# Patient Record
Sex: MALE | Race: BLACK OR AFRICAN AMERICAN | NOT HISPANIC OR LATINO | URBAN - METROPOLITAN AREA
[De-identification: names, ages, dates, MRNs, and addresses within clinical notes are randomized per-mention and may not be internally consistent; named-entity substitution may affect disease eponyms.]

---

## 2018-01-31 ENCOUNTER — EMERGENCY (EMERGENCY)
Facility: HOSPITAL | Age: 48
LOS: 0 days | Discharge: HOME | End: 2018-01-31
Attending: EMERGENCY MEDICINE | Admitting: EMERGENCY MEDICINE
Payer: SELF-PAY

## 2018-01-31 VITALS
RESPIRATION RATE: 18 BRPM | TEMPERATURE: 98 F | OXYGEN SATURATION: 100 % | HEART RATE: 73 BPM | DIASTOLIC BLOOD PRESSURE: 89 MMHG | SYSTOLIC BLOOD PRESSURE: 144 MMHG

## 2018-01-31 VITALS
HEART RATE: 88 BPM | TEMPERATURE: 98 F | RESPIRATION RATE: 16 BRPM | SYSTOLIC BLOOD PRESSURE: 144 MMHG | HEIGHT: 67 IN | WEIGHT: 160.06 LBS | OXYGEN SATURATION: 100 % | DIASTOLIC BLOOD PRESSURE: 102 MMHG

## 2018-01-31 PROCEDURE — 99285 EMERGENCY DEPT VISIT HI MDM: CPT
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
## 2018-01-31 NOTE — ED PROVIDER NOTE - MEDICAL DECISION MAKING DETAILS
48 yo male, diabetic s/p angry outburst at shelter, no active medical or psychiatric complaints. wants to return to [facility]
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
## 2018-01-31 NOTE — ED ADULT TRIAGE NOTE - CHIEF COMPLAINT QUOTE
Got angry at [facility] and kicked some glass. Denies any complaints. Brought in by [employer] because patient is diabetic and needs to be seen in ED. BGM 71. Patient appears in no distress. Calm and cooperative at this time.

## 2018-02-01 DIAGNOSIS — Z87.891 PERSONAL HISTORY OF NICOTINE DEPENDENCE: ICD-10-CM

## 2018-02-01 DIAGNOSIS — R45.4 IRRITABILITY AND ANGER: ICD-10-CM

## 2018-02-01 DIAGNOSIS — R45.1 RESTLESSNESS AND AGITATION: ICD-10-CM

## 2018-08-24 PROBLEM — Z00.00 ENCOUNTER FOR PREVENTIVE HEALTH EXAMINATION: Status: ACTIVE | Noted: 2018-08-24

## 2018-09-20 ENCOUNTER — APPOINTMENT (OUTPATIENT)
Dept: ENDOCRINOLOGY | Facility: CLINIC | Age: 48
End: 2018-09-20

## 2018-10-24 ENCOUNTER — APPOINTMENT (OUTPATIENT)
Dept: ENDOCRINOLOGY | Facility: CLINIC | Age: 48
End: 2018-10-24

## 2019-11-21 ENCOUNTER — TRANSCRIPTION ENCOUNTER (OUTPATIENT)
Age: 49
End: 2019-11-21

## 2021-03-07 ENCOUNTER — EMERGENCY (EMERGENCY)
Facility: HOSPITAL | Age: 51
LOS: 1 days | Discharge: HOME | End: 2021-03-07
Attending: EMERGENCY MEDICINE
Payer: MEDICAID

## 2021-03-07 VITALS
HEART RATE: 85 BPM | SYSTOLIC BLOOD PRESSURE: 147 MMHG | RESPIRATION RATE: 18 BRPM | OXYGEN SATURATION: 97 % | TEMPERATURE: 98 F | DIASTOLIC BLOOD PRESSURE: 100 MMHG

## 2021-03-07 DIAGNOSIS — F29 UNSPECIFIED PSYCHOSIS NOT DUE TO A SUBSTANCE OR KNOWN PHYSIOLOGICAL CONDITION: ICD-10-CM

## 2021-03-07 LAB
ALBUMIN SERPL ELPH-MCNC: 4.2 G/DL — SIGNIFICANT CHANGE UP (ref 3.3–5.2)
ALP SERPL-CCNC: 70 U/L — SIGNIFICANT CHANGE UP (ref 40–120)
ALT FLD-CCNC: 23 U/L — SIGNIFICANT CHANGE UP
ANION GAP SERPL CALC-SCNC: 12 MMOL/L — SIGNIFICANT CHANGE UP (ref 5–17)
APAP SERPL-MCNC: <3 UG/ML — LOW (ref 10–26)
AST SERPL-CCNC: 27 U/L — SIGNIFICANT CHANGE UP
BASOPHILS # BLD AUTO: 0.03 K/UL — SIGNIFICANT CHANGE UP (ref 0–0.2)
BASOPHILS NFR BLD AUTO: 0.4 % — SIGNIFICANT CHANGE UP (ref 0–2)
BILIRUB SERPL-MCNC: 0.2 MG/DL — LOW (ref 0.4–2)
BUN SERPL-MCNC: 11 MG/DL — SIGNIFICANT CHANGE UP (ref 8–20)
CALCIUM SERPL-MCNC: 8.8 MG/DL — SIGNIFICANT CHANGE UP (ref 8.6–10.2)
CHLORIDE SERPL-SCNC: 99 MMOL/L — SIGNIFICANT CHANGE UP (ref 98–107)
CO2 SERPL-SCNC: 24 MMOL/L — SIGNIFICANT CHANGE UP (ref 22–29)
CREAT SERPL-MCNC: 0.7 MG/DL — SIGNIFICANT CHANGE UP (ref 0.5–1.3)
EOSINOPHIL # BLD AUTO: 0.16 K/UL — SIGNIFICANT CHANGE UP (ref 0–0.5)
EOSINOPHIL NFR BLD AUTO: 2.2 % — SIGNIFICANT CHANGE UP (ref 0–6)
ETHANOL SERPL-MCNC: <10 MG/DL — SIGNIFICANT CHANGE UP (ref 0–9)
GLUCOSE BLDC GLUCOMTR-MCNC: 286 MG/DL — HIGH (ref 70–99)
GLUCOSE SERPL-MCNC: 156 MG/DL — HIGH (ref 70–99)
HCT VFR BLD CALC: 48.9 % — SIGNIFICANT CHANGE UP (ref 39–50)
HGB BLD-MCNC: 16.1 G/DL — SIGNIFICANT CHANGE UP (ref 13–17)
IMM GRANULOCYTES NFR BLD AUTO: 0.1 % — SIGNIFICANT CHANGE UP (ref 0–1.5)
LYMPHOCYTES # BLD AUTO: 2.22 K/UL — SIGNIFICANT CHANGE UP (ref 1–3.3)
LYMPHOCYTES # BLD AUTO: 30 % — SIGNIFICANT CHANGE UP (ref 13–44)
MCHC RBC-ENTMCNC: 30.4 PG — SIGNIFICANT CHANGE UP (ref 27–34)
MCHC RBC-ENTMCNC: 32.9 GM/DL — SIGNIFICANT CHANGE UP (ref 32–36)
MCV RBC AUTO: 92.4 FL — SIGNIFICANT CHANGE UP (ref 80–100)
MONOCYTES # BLD AUTO: 0.96 K/UL — HIGH (ref 0–0.9)
MONOCYTES NFR BLD AUTO: 13 % — SIGNIFICANT CHANGE UP (ref 2–14)
NEUTROPHILS # BLD AUTO: 4.01 K/UL — SIGNIFICANT CHANGE UP (ref 1.8–7.4)
NEUTROPHILS NFR BLD AUTO: 54.3 % — SIGNIFICANT CHANGE UP (ref 43–77)
PLATELET # BLD AUTO: 278 K/UL — SIGNIFICANT CHANGE UP (ref 150–400)
POTASSIUM SERPL-MCNC: 4.3 MMOL/L — SIGNIFICANT CHANGE UP (ref 3.5–5.3)
POTASSIUM SERPL-SCNC: 4.3 MMOL/L — SIGNIFICANT CHANGE UP (ref 3.5–5.3)
PROT SERPL-MCNC: 7.3 G/DL — SIGNIFICANT CHANGE UP (ref 6.6–8.7)
RBC # BLD: 5.29 M/UL — SIGNIFICANT CHANGE UP (ref 4.2–5.8)
RBC # FLD: 12.2 % — SIGNIFICANT CHANGE UP (ref 10.3–14.5)
SALICYLATES SERPL-MCNC: <0.6 MG/DL — LOW (ref 10–20)
SARS-COV-2 RNA SPEC QL NAA+PROBE: SIGNIFICANT CHANGE UP
SODIUM SERPL-SCNC: 135 MMOL/L — SIGNIFICANT CHANGE UP (ref 135–145)
WBC # BLD: 7.39 K/UL — SIGNIFICANT CHANGE UP (ref 3.8–10.5)
WBC # FLD AUTO: 7.39 K/UL — SIGNIFICANT CHANGE UP (ref 3.8–10.5)

## 2021-03-07 PROCEDURE — 99220: CPT

## 2021-03-07 PROCEDURE — 90792 PSYCH DIAG EVAL W/MED SRVCS: CPT

## 2021-03-07 PROCEDURE — 93010 ELECTROCARDIOGRAM REPORT: CPT

## 2021-03-07 RX ORDER — TRAZODONE HCL 50 MG
200 TABLET ORAL AT BEDTIME
Refills: 0 | Status: DISCONTINUED | OUTPATIENT
Start: 2021-03-07 | End: 2021-03-11

## 2021-03-07 RX ORDER — OLANZAPINE 15 MG/1
5 TABLET, FILM COATED ORAL
Refills: 0 | Status: DISCONTINUED | OUTPATIENT
Start: 2021-03-07 | End: 2021-03-11

## 2021-03-07 RX ORDER — OLANZAPINE 15 MG/1
10 TABLET, FILM COATED ORAL ONCE
Refills: 0 | Status: DISCONTINUED | OUTPATIENT
Start: 2021-03-07 | End: 2021-03-11

## 2021-03-07 RX ORDER — TRAZODONE HCL 50 MG
200 TABLET ORAL ONCE
Refills: 0 | Status: COMPLETED | OUTPATIENT
Start: 2021-03-07 | End: 2021-03-07

## 2021-03-07 RX ORDER — METFORMIN HYDROCHLORIDE 850 MG/1
500 TABLET ORAL
Refills: 0 | Status: DISCONTINUED | OUTPATIENT
Start: 2021-03-07 | End: 2021-03-11

## 2021-03-07 RX ORDER — ACETAMINOPHEN 500 MG
975 TABLET ORAL ONCE
Refills: 0 | Status: COMPLETED | OUTPATIENT
Start: 2021-03-07 | End: 2021-03-07

## 2021-03-07 RX ADMIN — Medication 200 MILLIGRAM(S): at 06:10

## 2021-03-07 RX ADMIN — METFORMIN HYDROCHLORIDE 500 MILLIGRAM(S): 850 TABLET ORAL at 16:29

## 2021-03-07 RX ADMIN — Medication 975 MILLIGRAM(S): at 06:17

## 2021-03-07 RX ADMIN — Medication 200 MILLIGRAM(S): at 21:29

## 2021-03-07 NOTE — ED BEHAVIORAL HEALTH ASSESSMENT NOTE - DETAILS
feet pain to kill self and others na reports suicide attempt by cutting and burning Pt selectively cooperative with questions. to follow

## 2021-03-07 NOTE — ED ADULT NURSE NOTE - SUICIDE RISK FACTORS
Hopelessness or despair/Mood Disorder current/past/Recent onset of current/past psychiatric diagnosis
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
## 2021-03-07 NOTE — ED PROVIDER NOTE - PRINCIPAL DIAGNOSIS
Spoke with patient and made patient aware of US results and Dr. DUNAWAY's recommendation to see a Vascular Dr. Patient verbalized understanding and agreed on the referral to vascular. Please place referral.    Psychosis, unspecified psychosis type

## 2021-03-07 NOTE — ED ADULT NURSE REASSESSMENT NOTE - NSIMPLEMENTINTERV_GEN_ALL_ED
Implemented All Universal Safety Interventions:  [place] to call system. Call bell, personal items and telephone within reach. Instruct patient to call for assistance. Room bathroom lighting operational. Non-slip footwear when patient is off stretcher. Physically safe environment: no spills, clutter or unnecessary equipment. Stretcher in lowest position, wheels locked, appropriate side rails in place.

## 2021-03-07 NOTE — ED BEHAVIORAL HEALTH ASSESSMENT NOTE - VIOLENCE RISK FACTORS:
Antisocial behavior/cognition (past or present)/Violent ideation/threat/speech/Substance abuse/Affective dysregulation/Impulsivity/Command hallucinations for violent behavior/Noncompliance with treatment/Community stressors that increase the risk of destabilization/Irritability
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
## 2021-03-07 NOTE — ED CDU PROVIDER INITIAL DAY NOTE - MEDICAL DECISION MAKING DETAILS
concern for psychosis. pending bed availability. psych recommending admission for further management

## 2021-03-07 NOTE — ED ADULT TRIAGE NOTE - CHIEF COMPLAINT QUOTE
pt presents to ed due to auditory hallucinations and suicidal ideations since being off medications for bipolar disorder and schizophrenia. last compliant with meds 6months ago. pt requesting to speak to psychiatry. belongings secured. yellow gown in place.

## 2021-03-07 NOTE — ED BEHAVIORAL HEALTH ASSESSMENT NOTE - NSPRESENTSXS_PSY_ALL_CORE
Depressed mood/Anhedonia/Psychosis/Hopelessness or despair/Command hallucinations to hurt self/Severe anxiety, agitation or panic/Refusal or inability to complete safety plan

## 2021-03-07 NOTE — ED ADULT NURSE REASSESSMENT NOTE - DESCRIPTION
pt received a&o x4 and in a yellow gown. pt endorses suicidal ideations and command auditory hallucinations, pt has a blunted affect and pressured speech but does not appear to be thought blocking. pt states that he has tried to harm himself by overdosing in the past and this would be his present plan. pt reports to be undomiciled with hygiene and grooming appearing fair.

## 2021-03-07 NOTE — CHART NOTE - NSCHARTNOTEFT_GEN_A_CORE
SW Note: Per psych, pt is in need of inpt psych trx on involuntary basis, DOCS completed and on chart. Pt's UA is pending at this time, covid is negative. SW placed call to [facility] ([facility]), [facility] ([facility]), [facility] ([facility]) [facility] ([place]), [facility] ([place]), no male beds at this time. Too late for referral to [place] or [facility]. SW to expand referral to other faiclities, SW following for inpt trx SW Note: Per psych, pt is in need of inpt psych trx on involuntary basis, DOCS completed and on chart. Pt's UA is pending at this time, covid is negative. SW placed call to [facility] ([facility]), [facility] ([facility]), [facility] ([facility]) [facility] ([place]), [facility] ([place]),  (Dr. Brewer) no male beds at this time. Too late for referral to [place] or [facility]. SW to expand referral to other faiclities, SW following for inpt trx SW Note: Per psych, pt is in need of inpt psych trx on involuntary basis, DOCS completed and on chart. Pt's UA is pending at this time, covid is negative. SW placed call to [facility] ([facility]), [facility] ([facility]), [facility] ([facility]) [facility] ([place]), [facility] ([place]),  (Dr. Brewer) no male beds at this time. Too late for referral to [place] or [facility]. SW to expand referral to other facilities, SW following for inpt trx

## 2021-03-07 NOTE — ED BEHAVIORAL HEALTH ASSESSMENT NOTE - DESCRIPTION
ICU Vital Signs Last 24 Hrs  T(C): 36.8 (07 Mar 2021 11:09), Max: 36.8 (07 Mar 2021 08:03)  T(F): 98.3 (07 Mar 2021 11:09), Max: 98.3 (07 Mar 2021 08:03)  HR: 87 (07 Mar 2021 11:09) (85 - 88)  BP: 105/60 (07 Mar 2021 11:09) (105/60 - 147/100)  BP(mean): --  ABP: --  ABP(mean): --  RR: 18 (07 Mar 2021 11:09) (18 - 18)  SpO2: 95% (07 Mar 2021 11:09) (95% - 98%) NIDDM homeless, uncooperative with history

## 2021-03-07 NOTE — ED PROVIDER NOTE - OBJECTIVE STATEMENT
51yom with bipolar and schizophrenia presents with hallucinations and suicidal thoughts. He has been off his medications for at least 6 months and he has been hearing voices telling him to kill himself. His symptoms have been exacerbated recently by the death of his mother 2 weeks ago. He states he has been trying to kill himself by using drugs but cannot clarify what drugs he used. He requests a dose of trazodone which he was previously prescribed.

## 2021-03-07 NOTE — ED ADULT NURSE REASSESSMENT NOTE - NS ED NURSE REASSESS COMMENT FT1
pt in no apparent distress, states he does not want to talk to anyone at the moment.
pt speaking to Alfredo RUSSELL NP Psych, plan of care explained to pt, pt verbalized understanding.
pt was medically cleared by the ED attending. pt was screened by security per hospital policies.

## 2021-03-07 NOTE — ED ADULT NURSE NOTE - OBJECTIVE STATEMENT
pt with auditory hallucinations and suicidal ideations. hx of schizophrenia and bipolar disorder. off medications x 6months. pt reports hallucinations have worsened since recent passing of his mother. pt in yellow gown. calm and cooperative throughout assessment, blood draw and ekg. constant observation initiated at triage. pt requesting psych eval.

## 2021-03-07 NOTE — ED PROVIDER NOTE - CLINICAL SUMMARY MEDICAL DECISION MAKING FREE TEXT BOX
Psychotic symptoms causing suicidality. No apparent medical problem, will give PO dose of trazodone at pt's request, hold for psychiatric evaluation.

## 2021-03-08 VITALS
TEMPERATURE: 98 F | HEART RATE: 89 BPM | SYSTOLIC BLOOD PRESSURE: 127 MMHG | OXYGEN SATURATION: 97 % | DIASTOLIC BLOOD PRESSURE: 78 MMHG | RESPIRATION RATE: 18 BRPM

## 2021-03-08 LAB
AMPHET UR-MCNC: NEGATIVE — SIGNIFICANT CHANGE UP
APPEARANCE UR: CLEAR — SIGNIFICANT CHANGE UP
BARBITURATES UR SCN-MCNC: NEGATIVE — SIGNIFICANT CHANGE UP
BENZODIAZ UR-MCNC: NEGATIVE — SIGNIFICANT CHANGE UP
BILIRUB UR-MCNC: NEGATIVE — SIGNIFICANT CHANGE UP
COCAINE METAB.OTHER UR-MCNC: POSITIVE
COLOR SPEC: YELLOW — SIGNIFICANT CHANGE UP
DIFF PNL FLD: NEGATIVE — SIGNIFICANT CHANGE UP
GLUCOSE UR QL: 1000 MG/DL
KETONES UR-MCNC: NEGATIVE — SIGNIFICANT CHANGE UP
LEUKOCYTE ESTERASE UR-ACNC: NEGATIVE — SIGNIFICANT CHANGE UP
METHADONE UR-MCNC: NEGATIVE — SIGNIFICANT CHANGE UP
NITRITE UR-MCNC: NEGATIVE — SIGNIFICANT CHANGE UP
OPIATES UR-MCNC: NEGATIVE — SIGNIFICANT CHANGE UP
PCP SPEC-MCNC: SIGNIFICANT CHANGE UP
PCP UR-MCNC: NEGATIVE — SIGNIFICANT CHANGE UP
PH UR: 6.5 — SIGNIFICANT CHANGE UP (ref 5–8)
PROT UR-MCNC: NEGATIVE MG/DL — SIGNIFICANT CHANGE UP
SP GR SPEC: 1.01 — SIGNIFICANT CHANGE UP (ref 1.01–1.02)
THC UR QL: NEGATIVE — SIGNIFICANT CHANGE UP
UROBILINOGEN FLD QL: 1 MG/DL

## 2021-03-08 PROCEDURE — G0378: CPT

## 2021-03-08 PROCEDURE — 82962 GLUCOSE BLOOD TEST: CPT

## 2021-03-08 PROCEDURE — 81003 URINALYSIS AUTO W/O SCOPE: CPT

## 2021-03-08 PROCEDURE — 85025 COMPLETE CBC W/AUTO DIFF WBC: CPT

## 2021-03-08 PROCEDURE — 80053 COMPREHEN METABOLIC PANEL: CPT

## 2021-03-08 PROCEDURE — 99285 EMERGENCY DEPT VISIT HI MDM: CPT

## 2021-03-08 PROCEDURE — 80307 DRUG TEST PRSMV CHEM ANLYZR: CPT

## 2021-03-08 PROCEDURE — U0005: CPT

## 2021-03-08 PROCEDURE — 36415 COLL VENOUS BLD VENIPUNCTURE: CPT

## 2021-03-08 PROCEDURE — 99217: CPT

## 2021-03-08 PROCEDURE — 93005 ELECTROCARDIOGRAM TRACING: CPT

## 2021-03-08 PROCEDURE — U0003: CPT

## 2021-03-08 RX ADMIN — METFORMIN HYDROCHLORIDE 500 MILLIGRAM(S): 850 TABLET ORAL at 07:54
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
## 2021-03-08 NOTE — CHART NOTE - NSCHARTNOTEFT_GEN_A_CORE
SW Note: pt has been accepted for inpt psych admit to [facility]. Coordinated admit with Sarah in admissions [phone]. Accepting MD, Dr. Watson. Legal status: 9.37. Ambulance arranged with [facility]. Completed ins auth. Called [facility] [phone], spoke with INs ARIANA Chavez [phone]. Auth approved for 5 days 3/8-3/12. Auth# [id number]. Ins auth and legals faxed to admissions.

## 2021-04-29 ENCOUNTER — EMERGENCY (EMERGENCY)
Facility: HOSPITAL | Age: 51
LOS: 1 days | Discharge: OTHER INSTITUTION | End: 2021-04-29
Attending: EMERGENCY MEDICINE
Payer: MEDICAID

## 2021-04-29 VITALS
RESPIRATION RATE: 18 BRPM | OXYGEN SATURATION: 95 % | WEIGHT: 199.96 LBS | HEART RATE: 100 BPM | HEIGHT: 72 IN | SYSTOLIC BLOOD PRESSURE: 132 MMHG | TEMPERATURE: 98 F | DIASTOLIC BLOOD PRESSURE: 86 MMHG

## 2021-04-29 DIAGNOSIS — F25.9 SCHIZOAFFECTIVE DISORDER, UNSPECIFIED: ICD-10-CM

## 2021-04-29 LAB
ALBUMIN SERPL ELPH-MCNC: 4.3 G/DL — SIGNIFICANT CHANGE UP (ref 3.3–5.2)
ALP SERPL-CCNC: 70 U/L — SIGNIFICANT CHANGE UP (ref 40–120)
ALT FLD-CCNC: 17 U/L — SIGNIFICANT CHANGE UP
ANION GAP SERPL CALC-SCNC: 17 MMOL/L — SIGNIFICANT CHANGE UP (ref 5–17)
APAP SERPL-MCNC: <3 UG/ML — LOW (ref 10–26)
AST SERPL-CCNC: 32 U/L — SIGNIFICANT CHANGE UP
BASOPHILS # BLD AUTO: 0.03 K/UL — SIGNIFICANT CHANGE UP (ref 0–0.2)
BASOPHILS NFR BLD AUTO: 0.4 % — SIGNIFICANT CHANGE UP (ref 0–2)
BILIRUB SERPL-MCNC: <0.2 MG/DL — LOW (ref 0.4–2)
BUN SERPL-MCNC: 10 MG/DL — SIGNIFICANT CHANGE UP (ref 8–20)
CALCIUM SERPL-MCNC: 9.8 MG/DL — SIGNIFICANT CHANGE UP (ref 8.6–10.2)
CHLORIDE SERPL-SCNC: 97 MMOL/L — LOW (ref 98–107)
CO2 SERPL-SCNC: 22 MMOL/L — SIGNIFICANT CHANGE UP (ref 22–29)
CREAT SERPL-MCNC: 0.66 MG/DL — SIGNIFICANT CHANGE UP (ref 0.5–1.3)
EOSINOPHIL # BLD AUTO: 0.04 K/UL — SIGNIFICANT CHANGE UP (ref 0–0.5)
EOSINOPHIL NFR BLD AUTO: 0.5 % — SIGNIFICANT CHANGE UP (ref 0–6)
ETHANOL SERPL-MCNC: 24 MG/DL — HIGH (ref 0–9)
GLUCOSE SERPL-MCNC: 159 MG/DL — HIGH (ref 70–99)
HCT VFR BLD CALC: 50.2 % — HIGH (ref 39–50)
HGB BLD-MCNC: 16.8 G/DL — SIGNIFICANT CHANGE UP (ref 13–17)
IMM GRANULOCYTES NFR BLD AUTO: 0.4 % — SIGNIFICANT CHANGE UP (ref 0–1.5)
LYMPHOCYTES # BLD AUTO: 1.97 K/UL — SIGNIFICANT CHANGE UP (ref 1–3.3)
LYMPHOCYTES # BLD AUTO: 26.5 % — SIGNIFICANT CHANGE UP (ref 13–44)
MCHC RBC-ENTMCNC: 30.1 PG — SIGNIFICANT CHANGE UP (ref 27–34)
MCHC RBC-ENTMCNC: 33.5 GM/DL — SIGNIFICANT CHANGE UP (ref 32–36)
MCV RBC AUTO: 90 FL — SIGNIFICANT CHANGE UP (ref 80–100)
MONOCYTES # BLD AUTO: 0.77 K/UL — SIGNIFICANT CHANGE UP (ref 0–0.9)
MONOCYTES NFR BLD AUTO: 10.4 % — SIGNIFICANT CHANGE UP (ref 2–14)
NEUTROPHILS # BLD AUTO: 4.58 K/UL — SIGNIFICANT CHANGE UP (ref 1.8–7.4)
NEUTROPHILS NFR BLD AUTO: 61.8 % — SIGNIFICANT CHANGE UP (ref 43–77)
PLATELET # BLD AUTO: 310 K/UL — SIGNIFICANT CHANGE UP (ref 150–400)
POTASSIUM SERPL-MCNC: 4.3 MMOL/L — SIGNIFICANT CHANGE UP (ref 3.5–5.3)
POTASSIUM SERPL-SCNC: 4.3 MMOL/L — SIGNIFICANT CHANGE UP (ref 3.5–5.3)
PROT SERPL-MCNC: 7.8 G/DL — SIGNIFICANT CHANGE UP (ref 6.6–8.7)
RBC # BLD: 5.58 M/UL — SIGNIFICANT CHANGE UP (ref 4.2–5.8)
RBC # FLD: 12.5 % — SIGNIFICANT CHANGE UP (ref 10.3–14.5)
SALICYLATES SERPL-MCNC: <0.6 MG/DL — LOW (ref 10–20)
SODIUM SERPL-SCNC: 136 MMOL/L — SIGNIFICANT CHANGE UP (ref 135–145)
WBC # BLD: 7.42 K/UL — SIGNIFICANT CHANGE UP (ref 3.8–10.5)
WBC # FLD AUTO: 7.42 K/UL — SIGNIFICANT CHANGE UP (ref 3.8–10.5)

## 2021-04-29 PROCEDURE — 90792 PSYCH DIAG EVAL W/MED SRVCS: CPT

## 2021-04-29 PROCEDURE — 99218: CPT

## 2021-04-29 RX ORDER — HEPARIN SODIUM 5000 [USP'U]/ML
INJECTION INTRAVENOUS; SUBCUTANEOUS
Qty: 25000 | Refills: 0 | Status: DISCONTINUED | OUTPATIENT
Start: 2021-04-29 | End: 2021-04-29

## 2021-04-29 RX ORDER — HEPARIN SODIUM 5000 [USP'U]/ML
7500 INJECTION INTRAVENOUS; SUBCUTANEOUS EVERY 6 HOURS
Refills: 0 | Status: DISCONTINUED | OUTPATIENT
Start: 2021-04-29 | End: 2021-04-29

## 2021-04-29 RX ORDER — HEPARIN SODIUM 5000 [USP'U]/ML
7500 INJECTION INTRAVENOUS; SUBCUTANEOUS ONCE
Refills: 0 | Status: DISCONTINUED | OUTPATIENT
Start: 2021-04-29 | End: 2021-04-29

## 2021-04-29 RX ORDER — HALOPERIDOL DECANOATE 100 MG/ML
5 INJECTION INTRAMUSCULAR EVERY 6 HOURS
Refills: 0 | Status: DISCONTINUED | OUTPATIENT
Start: 2021-04-29 | End: 2021-05-04

## 2021-04-29 RX ORDER — TRAZODONE HCL 50 MG
100 TABLET ORAL AT BEDTIME
Refills: 0 | Status: DISCONTINUED | OUTPATIENT
Start: 2021-04-29 | End: 2021-05-04

## 2021-04-29 RX ORDER — DIPHENHYDRAMINE HCL 50 MG
50 CAPSULE ORAL EVERY 6 HOURS
Refills: 0 | Status: DISCONTINUED | OUTPATIENT
Start: 2021-04-29 | End: 2021-05-04

## 2021-04-29 RX ORDER — HEPARIN SODIUM 5000 [USP'U]/ML
3500 INJECTION INTRAVENOUS; SUBCUTANEOUS EVERY 6 HOURS
Refills: 0 | Status: DISCONTINUED | OUTPATIENT
Start: 2021-04-29 | End: 2021-04-29

## 2021-04-29 RX ORDER — METFORMIN HYDROCHLORIDE 850 MG/1
500 TABLET ORAL ONCE
Refills: 0 | Status: COMPLETED | OUTPATIENT
Start: 2021-04-29 | End: 2021-04-30

## 2021-04-29 RX ADMIN — Medication 100 MILLIGRAM(S): at 21:34

## 2021-04-29 NOTE — ED BEHAVIORAL HEALTH ASSESSMENT NOTE - RISK ASSESSMENT
RF chronic noncompliance, failure to follow tx recommendations, substance abuse. possible malingering  PF help seeking Low Acute Suicide Risk

## 2021-04-29 NOTE — ED ADULT NURSE NOTE - OBJECTIVE STATEMENT
patient states that he has not had meds in 2 weeks states that he wants to die has given up and hearing voices non command

## 2021-04-29 NOTE — ED BEHAVIORAL HEALTH ASSESSMENT NOTE - NSTXRELFACTOR_PSY_ALL_CORE
Non-compliant or not receiving treatment/Recent inpatient discharge/Hopeless about or dissatisfied with provider or treatment

## 2021-04-29 NOTE — CHART NOTE - NSCHARTNOTEFT_GEN_A_CORE
SW Note: SW made aware by  provider pt is to be held overnight and reassessed in AM. SW following pending final psych reccs

## 2021-04-29 NOTE — ED PROVIDER NOTE - OBJECTIVE STATEMENT
/o suicidal thoughts. Pt states, "I am hearing voices but I don't know what they are telling me". Denies plan. Hx of bipolar and schizophrenia. Pt. present to the ED with  suicidal thoughts. Pt states, "I am hearing voices but I don't know what they are telling me". Denies plan. Hx of bipolar and schizophrenia. PT. states that he has hx of HTN and DM. PT. states that he is on insulin and metformin. He also states that he has not taken any of his medications for the past few weeks. Pt. denies any chest pain or shortness of breath.

## 2021-04-29 NOTE — ED PROVIDER NOTE - CLINICAL SUMMARY MEDICAL DECISION MAKING FREE TEXT BOX
Pt. with hx of Bipolar dz present to ED with suicidal ideations but no plan. Pt. with prior psych evaluation here in the ED. Will check labs/EKG/ and get psych consult.

## 2021-04-29 NOTE — ED BEHAVIORAL HEALTH ASSESSMENT NOTE - DESCRIPTION
homeless, uncooperative with history NIDDM ICU Vital Signs Last 24 Hrs  T(C): 36.8 (29 Apr 2021 20:15), Max: 36.8 (29 Apr 2021 20:15)  T(F): 98.3 (29 Apr 2021 20:15), Max: 98.3 (29 Apr 2021 20:15)  HR: 103 (29 Apr 2021 20:15) (100 - 103)  BP: 117/78 (29 Apr 2021 20:15) (117/78 - 132/86)  BP(mean): --  ABP: --  ABP(mean): --  RR: 18 (29 Apr 2021 20:15) (18 - 18)  SpO2: 103% (29 Apr 2021 20:15) (95% - 103%)

## 2021-04-29 NOTE — ED ADULT NURSE NOTE - ACTIVATING EVENTS/STRESSORS
Triggering events leading to humiliation, shame, and/or despair (e.g. Loss of relationship, financial or health status) (real or anticipated)/Pending incarceration or homelessness

## 2021-04-29 NOTE — ED ADULT NURSE NOTE - HPI (INCLUDE ILLNESS QUALITY, SEVERITY, DURATION, TIMING, CONTEXT, MODIFYING FACTORS, ASSOCIATED SIGNS AND SYMPTOMS)
patient rec'd to  unit wand by security for keyur. patient stats for 2 weeks he has been homeless wanting to die and just given up.  when asked what facility he was last he states that he does not remember . Pt given vague answer to question.  Pt states that the last place he was in did not help him because they did not take him serious they just treated him ,like a guinea pig testing him with drugs.  pt states that he take Trazadone 200 mg in pm and Depakote 500 mg BID, benadryl 100mg daily a d metformin 500 mg BID.  Pt stats not  having 5 daughters ages 19 to 32 smokes cigarettes drinks beer about 2 some times and occasional use of cocaine.  Pt states that he wants in-pt admission and asking for help
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
## 2021-04-29 NOTE — ED ADULT TRIAGE NOTE - CHIEF COMPLAINT QUOTE
C/o suicidal thoughts. Pt states, "I am hearing voices but I don't know what they are telling me". Denies plan. Hx of bipolar and schizophrenia. Pt placed in yellow gown. Belongings secured. Pt in NAD.
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
## 2021-04-29 NOTE — ED CDU PROVIDER INITIAL DAY NOTE - OBJECTIVE STATEMENT
Pt. present to the ED with  suicidal thoughts. Pt states, "I am hearing voices but I don't know what they are telling me". Denies plan. Hx of bipolar and schizophrenia. PT. states that he has hx of HTN and DM. PT. states that he is on insulin and metformin. He also states that he has not taken any of his medications for the past few weeks. Pt. denies any chest pain or shortness of breath.
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
## 2021-04-29 NOTE — ED CDU PROVIDER INITIAL DAY NOTE - MEDICAL DECISION MAKING DETAILS
Pt. seen by psych. As per psych note: 'Will hold for reeval in am pending labs, med clearance as may be intoxicated with drugs and or alcohol".

## 2021-04-29 NOTE — ED CDU PROVIDER INITIAL DAY NOTE - DETAILS
Pt. with depression and suicidal ideations and drug use. Pt. seen by psych and pt. will be held in the ED for re-evaluation in the morning.

## 2021-04-30 VITALS
TEMPERATURE: 98 F | SYSTOLIC BLOOD PRESSURE: 131 MMHG | DIASTOLIC BLOOD PRESSURE: 73 MMHG | RESPIRATION RATE: 18 BRPM | OXYGEN SATURATION: 95 % | HEART RATE: 90 BPM

## 2021-04-30 PROBLEM — F31.9 BIPOLAR DISORDER, UNSPECIFIED: Chronic | Status: ACTIVE | Noted: 2021-03-07

## 2021-04-30 LAB
AMPHET UR-MCNC: NEGATIVE — SIGNIFICANT CHANGE UP
APPEARANCE UR: CLEAR — SIGNIFICANT CHANGE UP
BACTERIA # UR AUTO: ABNORMAL
BARBITURATES UR SCN-MCNC: NEGATIVE — SIGNIFICANT CHANGE UP
BENZODIAZ UR-MCNC: NEGATIVE — SIGNIFICANT CHANGE UP
BILIRUB UR-MCNC: NEGATIVE — SIGNIFICANT CHANGE UP
COCAINE METAB.OTHER UR-MCNC: POSITIVE
COLOR SPEC: YELLOW — SIGNIFICANT CHANGE UP
COVID-19 NUCLEOCAPSID GAM AB INTERP: POSITIVE
COVID-19 NUCLEOCAPSID TOTAL GAM ANTIBODY RESULT: 212 INDEX — HIGH
DIFF PNL FLD: NEGATIVE — SIGNIFICANT CHANGE UP
EPI CELLS # UR: NEGATIVE — SIGNIFICANT CHANGE UP
GLUCOSE UR QL: 1000 MG/DL
KETONES UR-MCNC: ABNORMAL
LEUKOCYTE ESTERASE UR-ACNC: NEGATIVE — SIGNIFICANT CHANGE UP
METHADONE UR-MCNC: NEGATIVE — SIGNIFICANT CHANGE UP
NITRITE UR-MCNC: NEGATIVE — SIGNIFICANT CHANGE UP
OPIATES UR-MCNC: NEGATIVE — SIGNIFICANT CHANGE UP
PCP SPEC-MCNC: SIGNIFICANT CHANGE UP
PCP UR-MCNC: NEGATIVE — SIGNIFICANT CHANGE UP
PH UR: 5 — SIGNIFICANT CHANGE UP (ref 5–8)
PROT UR-MCNC: 30 MG/DL
RBC CASTS # UR COMP ASSIST: SIGNIFICANT CHANGE UP /HPF (ref 0–4)
SARS-COV-2 IGG+IGM SERPL QL IA: 212 INDEX — HIGH
SARS-COV-2 IGG+IGM SERPL QL IA: POSITIVE
SARS-COV-2 RNA SPEC QL NAA+PROBE: SIGNIFICANT CHANGE UP
SP GR SPEC: 1.02 — SIGNIFICANT CHANGE UP (ref 1.01–1.02)
THC UR QL: NEGATIVE — SIGNIFICANT CHANGE UP
UROBILINOGEN FLD QL: NEGATIVE MG/DL — SIGNIFICANT CHANGE UP
WBC UR QL: SIGNIFICANT CHANGE UP

## 2021-04-30 PROCEDURE — 85025 COMPLETE CBC W/AUTO DIFF WBC: CPT

## 2021-04-30 PROCEDURE — 80053 COMPREHEN METABOLIC PANEL: CPT

## 2021-04-30 PROCEDURE — 86769 SARS-COV-2 COVID-19 ANTIBODY: CPT

## 2021-04-30 PROCEDURE — 99285 EMERGENCY DEPT VISIT HI MDM: CPT

## 2021-04-30 PROCEDURE — 36415 COLL VENOUS BLD VENIPUNCTURE: CPT

## 2021-04-30 PROCEDURE — 99211 OFF/OP EST MAY X REQ PHY/QHP: CPT

## 2021-04-30 PROCEDURE — U0003: CPT

## 2021-04-30 PROCEDURE — 80307 DRUG TEST PRSMV CHEM ANLYZR: CPT

## 2021-04-30 PROCEDURE — 81001 URINALYSIS AUTO W/SCOPE: CPT

## 2021-04-30 PROCEDURE — U0005: CPT

## 2021-04-30 PROCEDURE — 99217: CPT

## 2021-04-30 PROCEDURE — G0378: CPT

## 2021-04-30 RX ADMIN — METFORMIN HYDROCHLORIDE 500 MILLIGRAM(S): 850 TABLET ORAL at 07:34

## 2021-04-30 NOTE — CHART NOTE - NSCHARTNOTEFT_GEN_A_CORE
SW Note: Plan is to transfer pt for inpt psychiatric care. Referral made to [facility]. Working on 9.13 legals.

## 2021-04-30 NOTE — CHART NOTE - NSCHARTNOTEFT_GEN_A_CORE
Brian: p/c to pt's insurance [employer] mcaid [phone] to request auth for transfer to [facility]. Spoke with Kelly MORGAN clinical info shared, auth# [id number] given for 48hrs ,a [facility] care advocate will contact [facility] next business day  for updated info and number of days. Email sent to [facility].
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
## 2021-05-03 NOTE — CHART NOTE - NSCHARTNOTEFT_GEN_A_CORE
SW Note: Completed ins auth for admit to [facility]. Spoke with ins ARIANA Grayson [phone] x [phone]. Auth approved 6 days 4/30-5/5. Auth# [id number]. Info forwarded to [facility] UR dept.

## 2021-05-10 ENCOUNTER — EMERGENCY (EMERGENCY)
Facility: HOSPITAL | Age: 51
LOS: 1 days | Discharge: HOME | End: 2021-05-10
Attending: EMERGENCY MEDICINE
Payer: MEDICAID

## 2021-05-10 VITALS
SYSTOLIC BLOOD PRESSURE: 124 MMHG | RESPIRATION RATE: 18 BRPM | OXYGEN SATURATION: 98 % | HEART RATE: 91 BPM | DIASTOLIC BLOOD PRESSURE: 74 MMHG | HEIGHT: 72 IN | TEMPERATURE: 98 F

## 2021-05-10 DIAGNOSIS — F14.10 COCAINE ABUSE, UNCOMPLICATED: ICD-10-CM

## 2021-05-10 DIAGNOSIS — F25.9 SCHIZOAFFECTIVE DISORDER, UNSPECIFIED: ICD-10-CM

## 2021-05-10 PROBLEM — I10 ESSENTIAL (PRIMARY) HYPERTENSION: Chronic | Status: ACTIVE | Noted: 2021-04-29

## 2021-05-10 PROBLEM — E11.9 TYPE 2 DIABETES MELLITUS WITHOUT COMPLICATIONS: Chronic | Status: ACTIVE | Noted: 2021-04-29

## 2021-05-10 LAB
ALBUMIN SERPL ELPH-MCNC: 4.5 G/DL — SIGNIFICANT CHANGE UP (ref 3.3–5.2)
ALP SERPL-CCNC: 82 U/L — SIGNIFICANT CHANGE UP (ref 40–120)
ALT FLD-CCNC: 20 U/L — SIGNIFICANT CHANGE UP
AMPHET UR-MCNC: NEGATIVE — SIGNIFICANT CHANGE UP
ANION GAP SERPL CALC-SCNC: 16 MMOL/L — SIGNIFICANT CHANGE UP (ref 5–17)
APAP SERPL-MCNC: <3 UG/ML — LOW (ref 10–26)
APPEARANCE UR: CLEAR — SIGNIFICANT CHANGE UP
AST SERPL-CCNC: 27 U/L — SIGNIFICANT CHANGE UP
BACTERIA # UR AUTO: NEGATIVE — SIGNIFICANT CHANGE UP
BARBITURATES UR SCN-MCNC: NEGATIVE — SIGNIFICANT CHANGE UP
BASOPHILS # BLD AUTO: 0.03 K/UL — SIGNIFICANT CHANGE UP (ref 0–0.2)
BASOPHILS NFR BLD AUTO: 0.3 % — SIGNIFICANT CHANGE UP (ref 0–2)
BENZODIAZ UR-MCNC: NEGATIVE — SIGNIFICANT CHANGE UP
BILIRUB SERPL-MCNC: 0.3 MG/DL — LOW (ref 0.4–2)
BILIRUB UR-MCNC: NEGATIVE — SIGNIFICANT CHANGE UP
BUN SERPL-MCNC: 16 MG/DL — SIGNIFICANT CHANGE UP (ref 8–20)
CALCIUM SERPL-MCNC: 9.8 MG/DL — SIGNIFICANT CHANGE UP (ref 8.6–10.2)
CHLORIDE SERPL-SCNC: 96 MMOL/L — LOW (ref 98–107)
CO2 SERPL-SCNC: 24 MMOL/L — SIGNIFICANT CHANGE UP (ref 22–29)
COCAINE METAB.OTHER UR-MCNC: POSITIVE
COLOR SPEC: YELLOW — SIGNIFICANT CHANGE UP
COVID-19 SPIKE DOMAIN AB INTERP: POSITIVE
COVID-19 SPIKE DOMAIN ANTIBODY RESULT: >250 U/ML — HIGH
CREAT SERPL-MCNC: 0.75 MG/DL — SIGNIFICANT CHANGE UP (ref 0.5–1.3)
DIFF PNL FLD: NEGATIVE — SIGNIFICANT CHANGE UP
EOSINOPHIL # BLD AUTO: 0.14 K/UL — SIGNIFICANT CHANGE UP (ref 0–0.5)
EOSINOPHIL NFR BLD AUTO: 1.6 % — SIGNIFICANT CHANGE UP (ref 0–6)
EPI CELLS # UR: NEGATIVE — SIGNIFICANT CHANGE UP
ETHANOL SERPL-MCNC: <10 MG/DL — SIGNIFICANT CHANGE UP (ref 0–9)
GLUCOSE SERPL-MCNC: 260 MG/DL — HIGH (ref 70–99)
GLUCOSE UR QL: 1000 MG/DL
HCT VFR BLD CALC: 53.6 % — HIGH (ref 39–50)
HGB BLD-MCNC: 17.5 G/DL — HIGH (ref 13–17)
IMM GRANULOCYTES NFR BLD AUTO: 0.2 % — SIGNIFICANT CHANGE UP (ref 0–1.5)
KETONES UR-MCNC: ABNORMAL
LEUKOCYTE ESTERASE UR-ACNC: NEGATIVE — SIGNIFICANT CHANGE UP
LYMPHOCYTES # BLD AUTO: 3.47 K/UL — HIGH (ref 1–3.3)
LYMPHOCYTES # BLD AUTO: 38.7 % — SIGNIFICANT CHANGE UP (ref 13–44)
MCHC RBC-ENTMCNC: 29.8 PG — SIGNIFICANT CHANGE UP (ref 27–34)
MCHC RBC-ENTMCNC: 32.6 GM/DL — SIGNIFICANT CHANGE UP (ref 32–36)
MCV RBC AUTO: 91.2 FL — SIGNIFICANT CHANGE UP (ref 80–100)
METHADONE UR-MCNC: NEGATIVE — SIGNIFICANT CHANGE UP
MONOCYTES # BLD AUTO: 1.16 K/UL — HIGH (ref 0–0.9)
MONOCYTES NFR BLD AUTO: 12.9 % — SIGNIFICANT CHANGE UP (ref 2–14)
NEUTROPHILS # BLD AUTO: 4.14 K/UL — SIGNIFICANT CHANGE UP (ref 1.8–7.4)
NEUTROPHILS NFR BLD AUTO: 46.3 % — SIGNIFICANT CHANGE UP (ref 43–77)
NITRITE UR-MCNC: NEGATIVE — SIGNIFICANT CHANGE UP
OPIATES UR-MCNC: NEGATIVE — SIGNIFICANT CHANGE UP
PCP SPEC-MCNC: SIGNIFICANT CHANGE UP
PCP UR-MCNC: NEGATIVE — SIGNIFICANT CHANGE UP
PH UR: 6 — SIGNIFICANT CHANGE UP (ref 5–8)
PLATELET # BLD AUTO: 295 K/UL — SIGNIFICANT CHANGE UP (ref 150–400)
POTASSIUM SERPL-MCNC: 4.1 MMOL/L — SIGNIFICANT CHANGE UP (ref 3.5–5.3)
POTASSIUM SERPL-SCNC: 4.1 MMOL/L — SIGNIFICANT CHANGE UP (ref 3.5–5.3)
PROT SERPL-MCNC: 8.5 G/DL — SIGNIFICANT CHANGE UP (ref 6.6–8.7)
PROT UR-MCNC: 15 MG/DL
RBC # BLD: 5.88 M/UL — HIGH (ref 4.2–5.8)
RBC # FLD: 12.4 % — SIGNIFICANT CHANGE UP (ref 10.3–14.5)
RBC CASTS # UR COMP ASSIST: NEGATIVE /HPF — SIGNIFICANT CHANGE UP (ref 0–4)
SALICYLATES SERPL-MCNC: <0.6 MG/DL — LOW (ref 10–20)
SARS-COV-2 IGG+IGM SERPL QL IA: >250 U/ML — HIGH
SARS-COV-2 IGG+IGM SERPL QL IA: POSITIVE
SARS-COV-2 RNA SPEC QL NAA+PROBE: SIGNIFICANT CHANGE UP
SODIUM SERPL-SCNC: 136 MMOL/L — SIGNIFICANT CHANGE UP (ref 135–145)
SP GR SPEC: 1.01 — SIGNIFICANT CHANGE UP (ref 1.01–1.02)
THC UR QL: NEGATIVE — SIGNIFICANT CHANGE UP
UROBILINOGEN FLD QL: NEGATIVE MG/DL — SIGNIFICANT CHANGE UP
VALPROATE SERPL-MCNC: <3.7 UG/ML — LOW (ref 50–100)
WBC # BLD: 8.96 K/UL — SIGNIFICANT CHANGE UP (ref 3.8–10.5)
WBC # FLD AUTO: 8.96 K/UL — SIGNIFICANT CHANGE UP (ref 3.8–10.5)
WBC UR QL: SIGNIFICANT CHANGE UP

## 2021-05-10 PROCEDURE — 99218: CPT

## 2021-05-10 PROCEDURE — 93010 ELECTROCARDIOGRAM REPORT: CPT

## 2021-05-10 PROCEDURE — 99285 EMERGENCY DEPT VISIT HI MDM: CPT

## 2021-05-10 RX ORDER — METFORMIN HYDROCHLORIDE 850 MG/1
500 TABLET ORAL
Refills: 0 | Status: DISCONTINUED | OUTPATIENT
Start: 2021-05-10 | End: 2021-05-14

## 2021-05-10 RX ORDER — TRAZODONE HCL 50 MG
100 TABLET ORAL AT BEDTIME
Refills: 0 | Status: DISCONTINUED | OUTPATIENT
Start: 2021-05-10 | End: 2021-05-14

## 2021-05-10 RX ORDER — FLUPHENAZINE HYDROCHLORIDE 1 MG/1
5 TABLET, FILM COATED ORAL
Refills: 0 | Status: DISCONTINUED | OUTPATIENT
Start: 2021-05-10 | End: 2021-05-14

## 2021-05-10 RX ORDER — TRAZODONE HCL 50 MG
100 TABLET ORAL ONCE
Refills: 0 | Status: COMPLETED | OUTPATIENT
Start: 2021-05-10 | End: 2021-05-10

## 2021-05-10 RX ADMIN — FLUPHENAZINE HYDROCHLORIDE 5 MILLIGRAM(S): 1 TABLET, FILM COATED ORAL at 11:06

## 2021-05-10 RX ADMIN — METFORMIN HYDROCHLORIDE 500 MILLIGRAM(S): 850 TABLET ORAL at 18:30

## 2021-05-10 RX ADMIN — METFORMIN HYDROCHLORIDE 500 MILLIGRAM(S): 850 TABLET ORAL at 11:06

## 2021-05-10 RX ADMIN — Medication 100 MILLIGRAM(S): at 22:56

## 2021-05-10 RX ADMIN — FLUPHENAZINE HYDROCHLORIDE 5 MILLIGRAM(S): 1 TABLET, FILM COATED ORAL at 18:30

## 2021-05-10 RX ADMIN — Medication 100 MILLIGRAM(S): at 02:15

## 2021-05-10 NOTE — ED PROVIDER NOTE - PHYSICAL EXAMINATION
Gen: Well appearing in NAD  Head: NC/AT  Neck: trachea midline  Cardiac: RRR  Resp:  No distress  Ext: no deformities  Neuro:  A&O appears non focal  Skin:  Warm and dry as visualized  Psych: Mildly anxious. Cooperative with exam. Gen: Well appearing in NAD  Head: NC/AT  Neck: trachea midline  Cardiac: RRR  Resp:  No distress  Ext: no deformities  Neuro:  A&O appears non focal  Skin:  Warm and dry as visualized  Psych: Mildly anxious. Cooperative with exam. (+) SI without plan (+) auditory hallucinations

## 2021-05-10 NOTE — ED ADULT NURSE NOTE - OBJECTIVE STATEMENT
received pt in yellow gown .  pt is anger yelling at this rn stating look I was here last week I need to be admitted to a hospital  to [place]. I need help. I hear voices.   if you put me on the street I am going to hurt someone. and don't asked me the same questions don't f...k with me.  (as patient pointing finger at this rn. )  this rn stopped interview. made pt aware awaiting security to waned.  patient stating a list of needs ie vegetarian meal, something for chap lips, a pillow a lot blankets I need to be in a different room not this one.

## 2021-05-10 NOTE — ED BEHAVIORAL HEALTH ASSESSMENT NOTE - DETAILS
See HPI. To kill himself and harm others. Contact ED if you have any other questions. None Patient states Haldol causes side effect of slurred speech and twitching of the body. Patient is uncooperative and only states "I have thoughts of killing myself"

## 2021-05-10 NOTE — ED BEHAVIORAL HEALTH ASSESSMENT NOTE - PSYCHIATRIC ISSUES AND PLAN (INCLUDE STANDING AND PRN MEDICATION)
Schizoaffective disorder - Fluphenazine 5 mg, oral two times a day, trazodone 100 mg, oral once stop after 1 dose.

## 2021-05-10 NOTE — ED CDU PROVIDER INITIAL DAY NOTE - PHYSICAL EXAMINATION
Gen: Well appearing in NAD  Head: NC/AT  Neck: trachea midline  Cardiac: RRR  Resp:  No distress  Ext: no deformities  Neuro:  A&O appears non focal  Skin:  Warm and dry as visualized  Psych: Mildly anxious. Cooperative with exam. (+) SI without plan (+) auditory hallucinations

## 2021-05-10 NOTE — ED PROVIDER NOTE - PMH
Bipolar disorder    DM (diabetes mellitus)    HTN (hypertension)    Schizoaffective disorder, unspecified type
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
## 2021-05-10 NOTE — ED PROVIDER NOTE - OBJECTIVE STATEMENT
Pt is a 50yo M presenting with suicidal ideation. He reports that he has been off his trazadone and depakote. States that he is hearing command auditory hallucinations to hurt himself. He reports that he is feeling like killing or hurting himself. He denies having a plan or means. Endorses thoughts. States he feels like he may be coming down with a cold, endorses some increased WOB, endorses smoking cigarettes. Denies fevers, chills, chest pain, sob, nausea, vomiting, diarrhea

## 2021-05-10 NOTE — CHART NOTE - NSCHARTNOTEFT_GEN_A_CORE
NOELLE Note: NOELLE made aware by  provider that pt is in need of inpt psych trx on involuntary basis. DOCS legals completed and on chart. NOELLE placed call to [facility] ([place]) and [facility] ([place]), no beds available. NOELLE placed call to [facility] ([facility]), who reports pt has exhausted his treatment options at their facility. Too late in the day to send referral to [facility] Health system or [facility]. NOELLE placed call to [facility], spoke to Georgina who made this writer aware NOELLE can send packet to [phone] to be reviewed. Packet faxed, NOELLE awaiting call back at this time

## 2021-05-10 NOTE — ED ADULT NURSE REASSESSMENT NOTE - DESCRIPTION
received report received pt ao awaiting transfer.  pt speech is loud pt is cooperative pleasant at this time but demanding.   po fluid food provided. talking on phone for long periods.

## 2021-05-10 NOTE — ED BEHAVIORAL HEALTH ASSESSMENT NOTE - ORAL MEDICATION DETAILS
Metformin 500 mg 1 tablet BID for DM, Fluphenazine 5 mg, oral two times a day, trazodone 100 mg, oral once stop after 1 dose.

## 2021-05-10 NOTE — ED PROVIDER NOTE - CLINICAL SUMMARY MEDICAL DECISION MAKING FREE TEXT BOX
Patient presenting with psychiatric complaints and + SI. Will evaluate medically, to treat with trazodone, will clear for BH. Patient presenting with psychiatric complaints and + SI. Will evaluate medically, to treat with trazodone, will clear for psychiatric evaluation.

## 2021-05-10 NOTE — ED ADULT TRIAGE NOTE - CHIEF COMPLAINT QUOTE
PT BIBA for auditory hallucinations. PT stating the voices are telling him to hurt himself. PT states he has been off his medications.  Belongings secured and pt placed in yellow gown

## 2021-05-10 NOTE — ED BEHAVIORAL HEALTH ASSESSMENT NOTE - COMMENTS ON VIOLENCE RISK/PROTECTIVE FACTORS:
Patient is aware that he needs help since he has thoughts of killing himself and harming other people.

## 2021-05-10 NOTE — ED BEHAVIORAL HEALTH ASSESSMENT NOTE - NSPRESENTSXS_PSY_ALL_CORE
Depressed mood/Anhedonia/Psychosis/Hopelessness or despair/Command hallucinations to hurt self/Severe anxiety, agitation or panic
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
## 2021-05-10 NOTE — ED PROVIDER NOTE - NS ED ROS FT
General: Denies fever, chills  HEENT: Denies visual changes, sore throat  Neck: Denies neck pain, neck stiffness  Resp: Denies coughing, SOB  Cardiovascular: Denies CP, palpitations, LE edema  GI: Denies abdominal pain, nausea, vomiting, diarrhea  : Denies dysuria, hematuria, incontinence  MSK: Denies back pain  Neuro: Denies HA, dizziness, numbness, weakness  Skin: Denies rashes

## 2021-05-10 NOTE — ED BEHAVIORAL HEALTH ASSESSMENT NOTE - DESCRIPTION
Patient will be admitted to inpatient for further evaluation and monitoring. Diabetes Mellitus, HTN Homeless, uncooperative.

## 2021-05-10 NOTE — ED PROVIDER NOTE - ATTENDING CONTRIBUTION TO CARE
50 yo male with hx of bipolar dz and schizoaffective disorder and previous suicide attempts presents again for evaluation due to command auditory hallucinations and recurrent SI. I personally saw the patient with the resident, and completed the key components of the history and physical exam. I then discussed the management plan with the resident.

## 2021-05-11 VITALS
DIASTOLIC BLOOD PRESSURE: 84 MMHG | SYSTOLIC BLOOD PRESSURE: 142 MMHG | RESPIRATION RATE: 20 BRPM | HEART RATE: 86 BPM | OXYGEN SATURATION: 98 % | TEMPERATURE: 98 F

## 2021-05-11 LAB
GLUCOSE BLDC GLUCOMTR-MCNC: 270 MG/DL — HIGH (ref 70–99)
GLUCOSE BLDC GLUCOMTR-MCNC: 308 MG/DL — HIGH (ref 70–99)

## 2021-05-11 PROCEDURE — U0005: CPT

## 2021-05-11 PROCEDURE — U0003: CPT

## 2021-05-11 PROCEDURE — G0378: CPT

## 2021-05-11 PROCEDURE — 86769 SARS-COV-2 COVID-19 ANTIBODY: CPT

## 2021-05-11 PROCEDURE — 85025 COMPLETE CBC W/AUTO DIFF WBC: CPT

## 2021-05-11 PROCEDURE — 93005 ELECTROCARDIOGRAM TRACING: CPT

## 2021-05-11 PROCEDURE — 99217: CPT

## 2021-05-11 PROCEDURE — 81001 URINALYSIS AUTO W/SCOPE: CPT

## 2021-05-11 PROCEDURE — 80053 COMPREHEN METABOLIC PANEL: CPT

## 2021-05-11 PROCEDURE — 36415 COLL VENOUS BLD VENIPUNCTURE: CPT

## 2021-05-11 PROCEDURE — 80164 ASSAY DIPROPYLACETIC ACD TOT: CPT

## 2021-05-11 PROCEDURE — 82962 GLUCOSE BLOOD TEST: CPT

## 2021-05-11 PROCEDURE — 99212 OFFICE O/P EST SF 10 MIN: CPT

## 2021-05-11 PROCEDURE — 99285 EMERGENCY DEPT VISIT HI MDM: CPT | Mod: 25

## 2021-05-11 PROCEDURE — 80307 DRUG TEST PRSMV CHEM ANLYZR: CPT

## 2021-05-11 RX ORDER — INSULIN LISPRO 100/ML
6 VIAL (ML) SUBCUTANEOUS ONCE
Refills: 0 | Status: COMPLETED | OUTPATIENT
Start: 2021-05-11 | End: 2021-05-11

## 2021-05-11 RX ADMIN — Medication 6 UNIT(S): at 10:40

## 2021-05-11 RX ADMIN — METFORMIN HYDROCHLORIDE 500 MILLIGRAM(S): 850 TABLET ORAL at 07:47

## 2021-05-11 RX ADMIN — FLUPHENAZINE HYDROCHLORIDE 5 MILLIGRAM(S): 1 TABLET, FILM COATED ORAL at 07:47

## 2021-05-11 NOTE — ED ADULT NURSE REASSESSMENT NOTE - COMFORT CARE
ambulated to bathroom/darkened lights/meal provided/plan of care explained/po fluids offered
ambulated to bathroom
meal provided/plan of care explained
meal provided
darkened lights

## 2021-05-11 NOTE — CHART NOTE - NSCHARTNOTEFT_GEN_A_CORE
SW Note: Plan is to transfer pt for inpt psychiatric care. Referral made to [facility] and pt accepted by DR. Vernon. Legal status: 9.37. Pt made aware. Ambulance arranged with [facility]. Ins auth will be needed for admit, SW to follow

## 2021-05-11 NOTE — ED ADULT NURSE REASSESSMENT NOTE - GENERAL PATIENT STATE
comfortable appearance/resting/sleeping
resting/sleeping
comfortable appearance/cooperative
resting/sleeping
comfortable appearance/cooperative
comfortable appearance/cooperative
cooperative

## 2021-05-11 NOTE — CHART NOTE - NSCHARTNOTEFT_GEN_A_CORE
NOELLE Note: Plan is for inpt psychiatric care. Followed up on referral to [facility] [phone], told by staff that their MD was not in yet and to call back. Called [facility] , referral made, pending bed census and MD review. SW to follow

## 2021-05-11 NOTE — ED BEHAVIORAL HEALTH NOTE - BEHAVIORAL HEALTH NOTE
PROGRESS NOTE: 21 @ 08:59  	  • Reason for Ongoing Consultation: 	    ID: 51yyo Male with HEALTH ISSUES - PROBLEM Dx:  Schizoaffective disorder, unspecified type    Cocaine abuse            INTERVAL DATA:   • Interval Chief Complaint: No complaints  • Interval History: Patient sleeping, no acute complaints. Patient states still hearing voices.     REVIEW OF SYSTEMS:   • Constitutional Symptoms	No complaints  • Eyes	No complaints  • Ears / Nose / Throat / Mouth	No complaints  • Cardiovascular	No complaints  • Respiratory	No complaints  • Gastrointestinal	No complaints  • Genitourinary	No complaints  • Musculoskeletal	No complaints  • Skin	No complaints  • Neurological	No complaints  • Psychiatric (see HPI)	See HPI  • Endocrine	No complaints  • Hematologic / Lymphatic	No complaints  • Allergic / Immunologic	No complaints    REVIEW OF VITALS/LABS/IMAGING/INVESTIGATIONS:   • Vital signs reviewed: Yes  • Vital Signs:	  Vital Signs Last 24 Hrs  T(C): 36.7 (11 May 2021 03:45), Max: 36.8 (10 May 2021 15:10)  T(F): 98 (11 May 2021 03:45), Max: 98.3 (10 May 2021 23:33)  HR: 82 (11 May 2021 03:45) (82 - 93)  BP: 122/70 (11 May 2021 03:45) (112/73 - 147/90)  BP(mean): --  RR: 18 (11 May 2021 03:45) (18 - 19)  SpO2: 98% (11 May 2021 03:45) (96% - 98%)    • Available labs reviewed: Yes  • Available Lab Results:                           17.5   8.96  )-----------( 295      ( 10 May 2021 02:06 )             53.6     05-10    136  |  96<L>  |  16.0  ----------------------------<  260<H>  4.1   |  24.0  |  0.75    Ca    9.8      10 May 2021 02:06    TPro  8.5  /  Alb  4.5  /  TBili  0.3<L>  /  DBili  x   /  AST  27  /  ALT  20  /  AlkPhos  82  05-10    LIVER FUNCTIONS - ( 10 May 2021 02:06 )  Alb: 4.5 g/dL / Pro: 8.5 g/dL / ALK PHOS: 82 U/L / ALT: 20 U/L / AST: 27 U/L / GGT: x             Urinalysis Basic - ( 10 May 2021 11:27 )    Color: Yellow / Appearance: Clear / S.015 / pH: x  Gluc: x / Ketone: Moderate  / Bili: Negative / Urobili: Negative mg/dL   Blood: x / Protein: 15 mg/dL / Nitrite: Negative   Leuk Esterase: Negative / RBC: Negative /HPF / WBC 3-5   Sq Epi: x / Non Sq Epi: Negative / Bacteria: Negative          MEDICATIONS:      PRN Medications:  • PRN Medications since last evaluation: N/A	  • PRN Details	    Current Medications:   fluphenazine 5 milliGRAM(s) Oral two times a day  metformin 500 milliGRAM(s) Oral two times a day  trazodone 100 milliGRAM(s) Oral at bedtime PRN     Medication Side Effects:  • Medication Side Effects or Adverse Reactions (new or ongoing)	None known    MENTAL STATUS EXAM:   • Level of Consciousness	Alert, sleeping but awakens to name  • General Appearance	Well developed  • Body Habitus	Well nourished  • Hygiene	Fair  • Grooming	Fair  • Behavior	Cooperative  • Eye Contact	Good  • Relatedness	Good  • Impulse Control	Normal  • Muscle Tone / Strength	Normal muscle tone/strength  • Abnormal Movements	No abnormal movements  • Gait / Station	Unable to access, patient sleeping in stretcher  • Speech Volume	Normal  • Speech Rate	Normal  • Speech Spontaneity	Normal  • Speech Articulation	Normal  • Mood	Normal  • Affect Quality	Euthymic  • Affect Range	Full  • Affect Congruence	Congruent  • Thought Process	Linear  • Thought Associations	AV Hallucinations  • Thought Content	AV Hallucinations  • Perceptions	AV Hallucinations  • Oriented to Time	Yes  • Oriented to Place	Yes  • Oriented to Situation	Yes  • Oriented to Person	Yes  • Attention / Concentration	Normal  • Estimated Intelligence	Average  • Recent Memory	Normal  • Remote Memory	Unable to access  • Fund of Knowledge	Normal  • Language	No abnormalities noted  • Judgment (regarding everyday events)	Fair  • Insight (regarding psychiatric illness)	Fair    SUICIDALITY:   • Suicidality (Interval)	none known    HOMICIDALITY/AGGRESSION:   • Homicidality/Aggression	none known    DIAGNOSIS DSM-V:    Psychiatric Diagnosis (Corresponds to DSM-IV Axis I, II):   HEALTH ISSUES - PROBLEM Dx:  Schizoaffective disorder, unspecified type    Cocaine abuse             Medical Diagnosis (Corresponds to DSM-IV Axis III):  • Axis III	      ASSESSMENT OF CURRENT CONDITION:   Summary (include case differential, formulation and patient response to therapy):     Pt sleeping in stretcher, no acute complaints or events overnight. Patient states persistent voices. Informed patient SW still working on placement. Patient pleasant and thanked team for letting him know.     Risk Assessment (consider static vs modifiable risk factors and protective factors; comment on level of risk for dangerous behavior):     PLAN PROGRESS NOTE: 21 @ 08:59  	  • Reason for Ongoing Consultation: 	awaiting transfer    ID: 51yyo Male with HEALTH ISSUES - PROBLEM Dx:  Schizoaffective disorder, unspecified type    Cocaine abuse            INTERVAL DATA:   • Interval Chief Complaint: No complaints  • Interval History: Patient sleeping, no acute complaints. Patient states still hearing voices.     REVIEW OF SYSTEMS:   • Constitutional Symptoms	No complaints  • Eyes	No complaints  • Ears / Nose / Throat / Mouth	No complaints  • Cardiovascular	No complaints  • Respiratory	No complaints  • Gastrointestinal	No complaints  • Genitourinary	No complaints  • Musculoskeletal	No complaints  • Skin	No complaints  • Neurological	No complaints  • Psychiatric (see HPI)	See HPI  • Endocrine	No complaints  • Hematologic / Lymphatic	No complaints  • Allergic / Immunologic	No complaints    REVIEW OF VITALS/LABS/IMAGING/INVESTIGATIONS:   • Vital signs reviewed: Yes  • Vital Signs:	  Vital Signs Last 24 Hrs  T(C): 36.7 (11 May 2021 03:45), Max: 36.8 (10 May 2021 15:10)  T(F): 98 (11 May 2021 03:45), Max: 98.3 (10 May 2021 23:33)  HR: 82 (11 May 2021 03:45) (82 - 93)  BP: 122/70 (11 May 2021 03:45) (112/73 - 147/90)  BP(mean): --  RR: 18 (11 May 2021 03:45) (18 - 19)  SpO2: 98% (11 May 2021 03:45) (96% - 98%)    • Available labs reviewed: Yes  • Available Lab Results:                           17.5   8.96  )-----------( 295      ( 10 May 2021 02:06 )             53.6     05-10    136  |  96<L>  |  16.0  ----------------------------<  260<H>  4.1   |  24.0  |  0.75    Ca    9.8      10 May 2021 02:06    TPro  8.5  /  Alb  4.5  /  TBili  0.3<L>  /  DBili  x   /  AST  27  /  ALT  20  /  AlkPhos  82  05-10    LIVER FUNCTIONS - ( 10 May 2021 02:06 )  Alb: 4.5 g/dL / Pro: 8.5 g/dL / ALK PHOS: 82 U/L / ALT: 20 U/L / AST: 27 U/L / GGT: x             Urinalysis Basic - ( 10 May 2021 11:27 )    Color: Yellow / Appearance: Clear / S.015 / pH: x  Gluc: x / Ketone: Moderate  / Bili: Negative / Urobili: Negative mg/dL   Blood: x / Protein: 15 mg/dL / Nitrite: Negative   Leuk Esterase: Negative / RBC: Negative /HPF / WBC 3-5   Sq Epi: x / Non Sq Epi: Negative / Bacteria: Negative          MEDICATIONS:      PRN Medications:  • PRN Medications since last evaluation: N/A	  • PRN Details	    Current Medications:   fluphenazine 5 milliGRAM(s) Oral two times a day  metformin 500 milliGRAM(s) Oral two times a day  trazodone 100 milliGRAM(s) Oral at bedtime PRN     Medication Side Effects:  • Medication Side Effects or Adverse Reactions (new or ongoing)	None known    MENTAL STATUS EXAM:   • Level of Consciousness	Alert, sleeping but awakens to name  • General Appearance	Well developed  • Body Habitus	Well nourished  • Hygiene	Fair  • Grooming	Fair  • Behavior	Cooperative  • Eye Contact	Good  • Relatedness	Good  • Impulse Control	Normal  • Muscle Tone / Strength	Normal muscle tone/strength  • Abnormal Movements	No abnormal movements  • Gait / Station	Unable to access, patient sleeping in stretcher  • Speech Volume	Normal  • Speech Rate	Normal  • Speech Spontaneity	Normal  • Speech Articulation	Normal  • Mood	Normal  • Affect Quality	Euthymic  • Affect Range	Full  • Affect Congruence	Congruent  • Thought Process	Linear  • Thought Associations	AV Hallucinations  • Thought Content	AV Hallucinations  • Perceptions	AV Hallucinations  • Oriented to Time	Yes  • Oriented to Place	Yes  • Oriented to Situation	Yes  • Oriented to Person	Yes  • Attention / Concentration	Normal  • Estimated Intelligence	Average  • Recent Memory	Normal  • Remote Memory	Unable to access  • Fund of Knowledge	Normal  • Language	No abnormalities noted  • Judgment (regarding everyday events)	Fair  • Insight (regarding psychiatric illness)	Fair    SUICIDALITY:   • Suicidality (Interval)	none known    HOMICIDALITY/AGGRESSION:   • Homicidality/Aggression	none known    DIAGNOSIS DSM-V:    Psychiatric Diagnosis (Corresponds to DSM-IV Axis I, II):   HEALTH ISSUES - PROBLEM Dx:  Schizoaffective disorder, unspecified type    Cocaine abuse             Medical Diagnosis (Corresponds to DSM-IV Axis III):  • Axis III	  diabetes mellitus    ASSESSMENT OF CURRENT CONDITION:   Summary (include case differential, formulation and patient response to therapy):     Pt sleeping in stretcher, no acute complaints or events overnight. Patient states persistent voices. Informed patient SW still working on placement. Patient pleasant and thanked team for letting him know.     Risk Assessment (consider static vs modifiable risk factors and protective factors; comment on level of risk for dangerous behavior):     PLAN transfer to inpatient psych unit  diabetes management

## 2021-05-11 NOTE — ED ADULT NURSE REASSESSMENT NOTE - NS ED NURSE REASSESS COMMENT FT1
Patient ate 100% of his lunch.  Patient compliant with medications as prescribed.  No attempts to harm self or others.  Contacting support system via phone.  Continues to agree with plan for transfer.  Safety of patient maintained.
pt refusing to take metformn and prolixin until breakfast. medciation held at this time
requesting trazodone and something to eat.  medicated with trazadone as ordered and snack provided
Patient POC testing reported to Dr. Mondragon.  Patient endorses he would benefit from inpatient hospitalization to maintain his safety.  No attempts to harm self or others and safety maintained.
Patient resting in bed much of day, asking for food and extra portions. Aware of plan to transfer inpatient when bed available. Accepted meds as ordered. States he hears voices "sometimes", but is able to contract for safety while in hospital.
Assumed care of patient at 0715.  Patient resting in bed appears to be sleeping with no distress and regular nonlabored breathing.  Safety of patient maintained.

## 2021-06-02 ENCOUNTER — INPATIENT (INPATIENT)
Facility: HOSPITAL | Age: 51
LOS: 5 days | Discharge: HOME | End: 2021-06-08
Attending: PSYCHIATRY & NEUROLOGY | Admitting: PSYCHIATRY & NEUROLOGY
Payer: MEDICAID

## 2021-06-02 ENCOUNTER — EMERGENCY (EMERGENCY)
Facility: HOSPITAL | Age: 51
LOS: 1 days | Discharge: OTHER INSTITUTION | End: 2021-06-02
Attending: STUDENT IN AN ORGANIZED HEALTH CARE EDUCATION/TRAINING PROGRAM
Payer: MEDICAID

## 2021-06-02 VITALS
WEIGHT: 154.98 LBS | TEMPERATURE: 98 F | DIASTOLIC BLOOD PRESSURE: 84 MMHG | HEIGHT: 72 IN | SYSTOLIC BLOOD PRESSURE: 119 MMHG | RESPIRATION RATE: 18 BRPM | HEART RATE: 98 BPM | OXYGEN SATURATION: 96 %

## 2021-06-02 VITALS
TEMPERATURE: 98 F | RESPIRATION RATE: 18 BRPM | DIASTOLIC BLOOD PRESSURE: 74 MMHG | HEART RATE: 87 BPM | OXYGEN SATURATION: 95 % | SYSTOLIC BLOOD PRESSURE: 144 MMHG

## 2021-06-02 DIAGNOSIS — F25.9 SCHIZOAFFECTIVE DISORDER, UNSPECIFIED: ICD-10-CM

## 2021-06-02 LAB
ALBUMIN SERPL ELPH-MCNC: 3.8 G/DL — SIGNIFICANT CHANGE UP (ref 3.3–5.2)
ALP SERPL-CCNC: 69 U/L — SIGNIFICANT CHANGE UP (ref 40–120)
ALT FLD-CCNC: 28 U/L — SIGNIFICANT CHANGE UP
AMPHET UR-MCNC: NEGATIVE — SIGNIFICANT CHANGE UP
ANION GAP SERPL CALC-SCNC: 12 MMOL/L — SIGNIFICANT CHANGE UP (ref 5–17)
APAP SERPL-MCNC: <3 UG/ML — LOW (ref 10–26)
APPEARANCE UR: ABNORMAL
AST SERPL-CCNC: 32 U/L — SIGNIFICANT CHANGE UP
BACTERIA # UR AUTO: NEGATIVE — SIGNIFICANT CHANGE UP
BARBITURATES UR SCN-MCNC: NEGATIVE — SIGNIFICANT CHANGE UP
BASOPHILS # BLD AUTO: 0.02 K/UL — SIGNIFICANT CHANGE UP (ref 0–0.2)
BASOPHILS NFR BLD AUTO: 0.2 % — SIGNIFICANT CHANGE UP (ref 0–2)
BENZODIAZ UR-MCNC: NEGATIVE — SIGNIFICANT CHANGE UP
BILIRUB SERPL-MCNC: 0.5 MG/DL — SIGNIFICANT CHANGE UP (ref 0.4–2)
BILIRUB UR-MCNC: NEGATIVE — SIGNIFICANT CHANGE UP
BUN SERPL-MCNC: 7.4 MG/DL — LOW (ref 8–20)
CALCIUM SERPL-MCNC: 8.6 MG/DL — SIGNIFICANT CHANGE UP (ref 8.6–10.2)
CHLORIDE SERPL-SCNC: 99 MMOL/L — SIGNIFICANT CHANGE UP (ref 98–107)
CO2 SERPL-SCNC: 22 MMOL/L — SIGNIFICANT CHANGE UP (ref 22–29)
COCAINE METAB.OTHER UR-MCNC: POSITIVE
COLOR SPEC: YELLOW — SIGNIFICANT CHANGE UP
COVID-19 SPIKE DOMAIN AB INTERP: POSITIVE
COVID-19 SPIKE DOMAIN ANTIBODY RESULT: >250 U/ML — HIGH
CREAT SERPL-MCNC: 0.59 MG/DL — SIGNIFICANT CHANGE UP (ref 0.5–1.3)
DIFF PNL FLD: NEGATIVE — SIGNIFICANT CHANGE UP
EOSINOPHIL # BLD AUTO: 0.11 K/UL — SIGNIFICANT CHANGE UP (ref 0–0.5)
EOSINOPHIL NFR BLD AUTO: 1.4 % — SIGNIFICANT CHANGE UP (ref 0–6)
EPI CELLS # UR: NEGATIVE — SIGNIFICANT CHANGE UP
ETHANOL SERPL-MCNC: <10 MG/DL — SIGNIFICANT CHANGE UP (ref 0–9)
GLUCOSE BLDC GLUCOMTR-MCNC: 385 MG/DL — HIGH (ref 70–99)
GLUCOSE SERPL-MCNC: 169 MG/DL — HIGH (ref 70–99)
GLUCOSE UR QL: 250 MG/DL
HCT VFR BLD CALC: 42.7 % — SIGNIFICANT CHANGE UP (ref 39–50)
HGB BLD-MCNC: 14.2 G/DL — SIGNIFICANT CHANGE UP (ref 13–17)
IMM GRANULOCYTES NFR BLD AUTO: 0.4 % — SIGNIFICANT CHANGE UP (ref 0–1.5)
KETONES UR-MCNC: ABNORMAL
LEUKOCYTE ESTERASE UR-ACNC: NEGATIVE — SIGNIFICANT CHANGE UP
LYMPHOCYTES # BLD AUTO: 2.29 K/UL — SIGNIFICANT CHANGE UP (ref 1–3.3)
LYMPHOCYTES # BLD AUTO: 28.2 % — SIGNIFICANT CHANGE UP (ref 13–44)
MCHC RBC-ENTMCNC: 29.9 PG — SIGNIFICANT CHANGE UP (ref 27–34)
MCHC RBC-ENTMCNC: 33.3 GM/DL — SIGNIFICANT CHANGE UP (ref 32–36)
MCV RBC AUTO: 89.9 FL — SIGNIFICANT CHANGE UP (ref 80–100)
METHADONE UR-MCNC: NEGATIVE — SIGNIFICANT CHANGE UP
MONOCYTES # BLD AUTO: 1.07 K/UL — HIGH (ref 0–0.9)
MONOCYTES NFR BLD AUTO: 13.2 % — SIGNIFICANT CHANGE UP (ref 2–14)
NEUTROPHILS # BLD AUTO: 4.6 K/UL — SIGNIFICANT CHANGE UP (ref 1.8–7.4)
NEUTROPHILS NFR BLD AUTO: 56.6 % — SIGNIFICANT CHANGE UP (ref 43–77)
NITRITE UR-MCNC: NEGATIVE — SIGNIFICANT CHANGE UP
OPIATES UR-MCNC: NEGATIVE — SIGNIFICANT CHANGE UP
PCP SPEC-MCNC: SIGNIFICANT CHANGE UP
PCP UR-MCNC: NEGATIVE — SIGNIFICANT CHANGE UP
PH UR: 6.5 — SIGNIFICANT CHANGE UP (ref 5–8)
PLATELET # BLD AUTO: 285 K/UL — SIGNIFICANT CHANGE UP (ref 150–400)
POTASSIUM SERPL-MCNC: 3.6 MMOL/L — SIGNIFICANT CHANGE UP (ref 3.5–5.3)
POTASSIUM SERPL-SCNC: 3.6 MMOL/L — SIGNIFICANT CHANGE UP (ref 3.5–5.3)
PROT SERPL-MCNC: 6.5 G/DL — LOW (ref 6.6–8.7)
PROT UR-MCNC: NEGATIVE MG/DL — SIGNIFICANT CHANGE UP
RBC # BLD: 4.75 M/UL — SIGNIFICANT CHANGE UP (ref 4.2–5.8)
RBC # FLD: 12.8 % — SIGNIFICANT CHANGE UP (ref 10.3–14.5)
RBC CASTS # UR COMP ASSIST: NEGATIVE /HPF — SIGNIFICANT CHANGE UP (ref 0–4)
SALICYLATES SERPL-MCNC: <0.6 MG/DL — LOW (ref 10–20)
SARS-COV-2 IGG+IGM SERPL QL IA: >250 U/ML — HIGH
SARS-COV-2 IGG+IGM SERPL QL IA: POSITIVE
SARS-COV-2 RNA SPEC QL NAA+PROBE: SIGNIFICANT CHANGE UP
SODIUM SERPL-SCNC: 132 MMOL/L — LOW (ref 135–145)
SP GR SPEC: 1.01 — SIGNIFICANT CHANGE UP (ref 1.01–1.02)
THC UR QL: NEGATIVE — SIGNIFICANT CHANGE UP
UROBILINOGEN FLD QL: NEGATIVE MG/DL — SIGNIFICANT CHANGE UP
WBC # BLD: 8.12 K/UL — SIGNIFICANT CHANGE UP (ref 3.8–10.5)
WBC # FLD AUTO: 8.12 K/UL — SIGNIFICANT CHANGE UP (ref 3.8–10.5)
WBC UR QL: NEGATIVE — SIGNIFICANT CHANGE UP

## 2021-06-02 PROCEDURE — 99285 EMERGENCY DEPT VISIT HI MDM: CPT

## 2021-06-02 PROCEDURE — 93010 ELECTROCARDIOGRAM REPORT: CPT

## 2021-06-02 PROCEDURE — 80307 DRUG TEST PRSMV CHEM ANLYZR: CPT

## 2021-06-02 PROCEDURE — U0005: CPT

## 2021-06-02 PROCEDURE — 80053 COMPREHEN METABOLIC PANEL: CPT

## 2021-06-02 PROCEDURE — 82962 GLUCOSE BLOOD TEST: CPT

## 2021-06-02 PROCEDURE — G0378: CPT

## 2021-06-02 PROCEDURE — 86769 SARS-COV-2 COVID-19 ANTIBODY: CPT

## 2021-06-02 PROCEDURE — 99218: CPT

## 2021-06-02 PROCEDURE — 36415 COLL VENOUS BLD VENIPUNCTURE: CPT

## 2021-06-02 PROCEDURE — 81001 URINALYSIS AUTO W/SCOPE: CPT

## 2021-06-02 PROCEDURE — 85025 COMPLETE CBC W/AUTO DIFF WBC: CPT

## 2021-06-02 PROCEDURE — U0003: CPT

## 2021-06-02 PROCEDURE — 93005 ELECTROCARDIOGRAM TRACING: CPT

## 2021-06-02 RX ORDER — DIPHENHYDRAMINE HCL 50 MG
50 CAPSULE ORAL EVERY 6 HOURS
Refills: 0 | Status: DISCONTINUED | OUTPATIENT
Start: 2021-06-02 | End: 2021-06-08

## 2021-06-02 RX ORDER — GLUCAGON INJECTION, SOLUTION 0.5 MG/.1ML
1 INJECTION, SOLUTION SUBCUTANEOUS ONCE
Refills: 0 | Status: DISCONTINUED | OUTPATIENT
Start: 2021-06-02 | End: 2021-06-02

## 2021-06-02 RX ORDER — METFORMIN HYDROCHLORIDE 850 MG/1
1000 TABLET ORAL
Refills: 0 | Status: DISCONTINUED | OUTPATIENT
Start: 2021-06-03 | End: 2021-06-06

## 2021-06-02 RX ORDER — NICOTINE POLACRILEX 2 MG
2 GUM BUCCAL
Refills: 0 | Status: DISCONTINUED | OUTPATIENT
Start: 2021-06-02 | End: 2021-06-02

## 2021-06-02 RX ORDER — INSULIN LISPRO 100/ML
VIAL (ML) SUBCUTANEOUS
Refills: 0 | Status: DISCONTINUED | OUTPATIENT
Start: 2021-06-02 | End: 2021-06-08

## 2021-06-02 RX ORDER — NICOTINE POLACRILEX 2 MG
2 GUM BUCCAL
Refills: 0 | Status: DISCONTINUED | OUTPATIENT
Start: 2021-06-02 | End: 2021-06-08

## 2021-06-02 RX ORDER — DIVALPROEX SODIUM 500 MG/1
500 TABLET, DELAYED RELEASE ORAL
Refills: 0 | Status: DISCONTINUED | OUTPATIENT
Start: 2021-06-02 | End: 2021-06-08

## 2021-06-02 RX ORDER — TRAZODONE HCL 50 MG
100 TABLET ORAL ONCE
Refills: 0 | Status: COMPLETED | OUTPATIENT
Start: 2021-06-02 | End: 2021-06-02

## 2021-06-02 RX ORDER — RISPERIDONE 4 MG/1
2 TABLET ORAL
Refills: 0 | Status: DISCONTINUED | OUTPATIENT
Start: 2021-06-02 | End: 2021-06-06

## 2021-06-02 RX ORDER — NICOTINE POLACRILEX 2 MG
1 GUM BUCCAL DAILY
Refills: 0 | Status: DISCONTINUED | OUTPATIENT
Start: 2021-06-03 | End: 2021-06-08

## 2021-06-02 RX ORDER — ACETAMINOPHEN 500 MG
650 TABLET ORAL EVERY 6 HOURS
Refills: 0 | Status: DISCONTINUED | OUTPATIENT
Start: 2021-06-02 | End: 2021-06-08

## 2021-06-02 RX ORDER — METFORMIN HYDROCHLORIDE 850 MG/1
1000 TABLET ORAL
Refills: 0 | Status: DISCONTINUED | OUTPATIENT
Start: 2021-06-02 | End: 2021-06-06

## 2021-06-02 RX ORDER — DEXTROSE 50 % IN WATER 50 %
25 SYRINGE (ML) INTRAVENOUS ONCE
Refills: 0 | Status: DISCONTINUED | OUTPATIENT
Start: 2021-06-02 | End: 2021-06-02

## 2021-06-02 RX ORDER — TRAZODONE HCL 50 MG
100 TABLET ORAL AT BEDTIME
Refills: 0 | Status: DISCONTINUED | OUTPATIENT
Start: 2021-06-02 | End: 2021-06-08

## 2021-06-02 RX ORDER — DIPHENHYDRAMINE HCL 50 MG
50 CAPSULE ORAL ONCE
Refills: 0 | Status: DISCONTINUED | OUTPATIENT
Start: 2021-06-02 | End: 2021-06-08

## 2021-06-02 RX ORDER — DEXTROSE 50 % IN WATER 50 %
15 SYRINGE (ML) INTRAVENOUS ONCE
Refills: 0 | Status: DISCONTINUED | OUTPATIENT
Start: 2021-06-02 | End: 2021-06-02

## 2021-06-02 RX ADMIN — METFORMIN HYDROCHLORIDE 1000 MILLIGRAM(S): 850 TABLET ORAL at 10:37

## 2021-06-02 RX ADMIN — Medication 100 MILLIGRAM(S): at 22:01

## 2021-06-02 RX ADMIN — Medication 100 MILLIGRAM(S): at 10:35

## 2021-06-02 RX ADMIN — Medication 5: at 22:00
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
## 2021-06-02 NOTE — ED ADULT NURSE NOTE - NSIMPLEMENTINTERV_GEN_ALL_ED
Implemented All Universal Safety Interventions:  [place] to call system. Call bell, personal items and telephone within reach. Instruct patient to call for assistance. Room bathroom lighting operational. Non-slip footwear when patient is off stretcher. Physically safe environment: no spills, clutter or unnecessary equipment. Stretcher in lowest position, wheels locked, appropriate side rails in place.
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
## 2021-06-02 NOTE — ED BEHAVIORAL HEALTH ASSESSMENT NOTE - OTHER
Unable to assess since patient is laying in bed. Patient was laying with his eyes closed sleeping Homeless Patient was sleeping.

## 2021-06-02 NOTE — ED BEHAVIORAL HEALTH ASSESSMENT NOTE - NSBHPSYCHOLCOGORIENT_PSY_A_CORE
Oriented to time, place, person, situation Patient was sleeping and was uncooperative during interview./Unable to assess

## 2021-06-02 NOTE — ED ADULT NURSE NOTE - NS ED NURSE LEVEL OF CONSCIOUSNESS ORIENTATION
Oriented - self; Oriented - place; Oriented - time/Age appropriate behavior/Hallucination - visual/Hallucination - audio/Ideation - homicidal/Ideation - suicidal

## 2021-06-02 NOTE — ED PROVIDER NOTE - ATTENDING CONTRIBUTION TO CARE
52 yo male presents for evaluation of recurrent agitation/ HI. I personally saw the patient with the PA, and completed the key components of the history and physical exam. I then discussed the management plan with the PA.
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
## 2021-06-02 NOTE — ED PROVIDER NOTE - NS ED ROS FT
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
## 2021-06-02 NOTE — ED CDU PROVIDER DISPOSITION NOTE - CLINICAL COURSE
patient arrived to ED with suicidal and homicidal ideation, seen by psych and recommend inpatient admission.
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
## 2021-06-02 NOTE — ED BEHAVIORAL HEALTH ASSESSMENT NOTE - ADDITIONAL DETAILS ALL
Patient has prior history of cutting himself and was hospitalized twice with suicidal ideation as per previous note
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
## 2021-06-02 NOTE — ED BEHAVIORAL HEALTH ASSESSMENT NOTE - PSYCHIATRIC ISSUES AND PLAN (INCLUDE STANDING AND PRN MEDICATION)
Schizoaffective disorder - trazodone 100 mg, oral once stop after 1 dose, Metformin 1000 mg 1 tablet BID for DM, Lorazepam 1 mg every 8 hours, Risperadol 2 mg, oral two times a day,

## 2021-06-02 NOTE — ED ADULT TRIAGE NOTE - CHIEF COMPLAINT QUOTE
comes to ED for auditory hallucinations and HI states has been off all medications for a year. pt placed in yellow gown and belongings secured. .

## 2021-06-02 NOTE — ED PROVIDER NOTE - CLINICAL SUMMARY MEDICAL DECISION MAKING FREE TEXT BOX
PT with stable VS, no acute distress, non toxic appearing, Pt with no HI, cooperative with staff, no s/s of trama, appears clinically sober, dines substance abuse in the last 24 hr, pt [facility].

## 2021-06-02 NOTE — ED BEHAVIORAL HEALTH ASSESSMENT NOTE - ORAL MEDICATION DETAILS
Metformin 1000 mg 1 tablet BID for DM, Lorazepam 1 mg every 8 hours, Risperadol 2 mg, oral two times a day, trazodone 100 mg, oral once stop after 1 dose.

## 2021-06-03 VITALS — TEMPERATURE: 97 F

## 2021-06-03 LAB
A1C WITH ESTIMATED AVERAGE GLUCOSE RESULT: 11.5 % — HIGH (ref 4–5.6)
ANION GAP SERPL CALC-SCNC: 11 MMOL/L — SIGNIFICANT CHANGE UP (ref 7–14)
BUN SERPL-MCNC: 10 MG/DL — SIGNIFICANT CHANGE UP (ref 7–23)
CALCIUM SERPL-MCNC: 9.1 MG/DL — SIGNIFICANT CHANGE UP (ref 8.4–10.5)
CHLORIDE SERPL-SCNC: 100 MMOL/L — SIGNIFICANT CHANGE UP (ref 98–107)
CHOLEST SERPL-MCNC: 138 MG/DL — SIGNIFICANT CHANGE UP
CO2 SERPL-SCNC: 25 MMOL/L — SIGNIFICANT CHANGE UP (ref 22–31)
COVID-19 SPIKE DOMAIN AB INTERP: POSITIVE
COVID-19 SPIKE DOMAIN ANTIBODY RESULT: >250 U/ML — HIGH
CREAT SERPL-MCNC: 0.77 MG/DL — SIGNIFICANT CHANGE UP (ref 0.5–1.3)
ESTIMATED AVERAGE GLUCOSE: 283 MG/DL — HIGH (ref 68–114)
GLUCOSE BLDC GLUCOMTR-MCNC: 278 MG/DL — HIGH (ref 70–99)
GLUCOSE BLDC GLUCOMTR-MCNC: 298 MG/DL — HIGH (ref 70–99)
GLUCOSE BLDC GLUCOMTR-MCNC: 314 MG/DL — HIGH (ref 70–99)
GLUCOSE BLDC GLUCOMTR-MCNC: 315 MG/DL — HIGH (ref 70–99)
GLUCOSE SERPL-MCNC: 293 MG/DL — HIGH (ref 70–99)
HDLC SERPL-MCNC: 44 MG/DL — SIGNIFICANT CHANGE UP
LIPID PNL WITH DIRECT LDL SERPL: 75 MG/DL — SIGNIFICANT CHANGE UP
NON HDL CHOLESTEROL: 94 MG/DL — SIGNIFICANT CHANGE UP
POTASSIUM SERPL-MCNC: 4.8 MMOL/L — SIGNIFICANT CHANGE UP (ref 3.5–5.3)
POTASSIUM SERPL-SCNC: 4.8 MMOL/L — SIGNIFICANT CHANGE UP (ref 3.5–5.3)
SARS-COV-2 IGG+IGM SERPL QL IA: >250 U/ML — HIGH
SARS-COV-2 IGG+IGM SERPL QL IA: POSITIVE
SARS-COV-2 RNA SPEC QL NAA+PROBE: SIGNIFICANT CHANGE UP
SODIUM SERPL-SCNC: 136 MMOL/L — SIGNIFICANT CHANGE UP (ref 135–145)
TRIGL SERPL-MCNC: 94 MG/DL — SIGNIFICANT CHANGE UP

## 2021-06-03 PROCEDURE — 99222 1ST HOSP IP/OBS MODERATE 55: CPT

## 2021-06-03 RX ORDER — DEXTROSE 50 % IN WATER 50 %
12.5 SYRINGE (ML) INTRAVENOUS ONCE
Refills: 0 | Status: DISCONTINUED | OUTPATIENT
Start: 2021-06-03 | End: 2021-06-04

## 2021-06-03 RX ORDER — DEXTROSE 50 % IN WATER 50 %
25 SYRINGE (ML) INTRAVENOUS ONCE
Refills: 0 | Status: DISCONTINUED | OUTPATIENT
Start: 2021-06-03 | End: 2021-06-04

## 2021-06-03 RX ORDER — GLUCAGON INJECTION, SOLUTION 0.5 MG/.1ML
1 INJECTION, SOLUTION SUBCUTANEOUS ONCE
Refills: 0 | Status: DISCONTINUED | OUTPATIENT
Start: 2021-06-03 | End: 2021-06-04

## 2021-06-03 RX ORDER — SODIUM CHLORIDE 9 MG/ML
1000 INJECTION, SOLUTION INTRAVENOUS
Refills: 0 | Status: DISCONTINUED | OUTPATIENT
Start: 2021-06-03 | End: 2021-06-04

## 2021-06-03 RX ORDER — DEXTROSE 50 % IN WATER 50 %
15 SYRINGE (ML) INTRAVENOUS ONCE
Refills: 0 | Status: DISCONTINUED | OUTPATIENT
Start: 2021-06-03 | End: 2021-06-04

## 2021-06-03 RX ORDER — INSULIN GLARGINE 100 [IU]/ML
10 INJECTION, SOLUTION SUBCUTANEOUS AT BEDTIME
Refills: 0 | Status: DISCONTINUED | OUTPATIENT
Start: 2021-06-03 | End: 2021-06-04

## 2021-06-03 RX ADMIN — Medication 50 MILLIGRAM(S): at 20:51

## 2021-06-03 RX ADMIN — Medication 4: at 17:04

## 2021-06-03 RX ADMIN — INSULIN GLARGINE 10 UNIT(S): 100 INJECTION, SOLUTION SUBCUTANEOUS at 21:08

## 2021-06-03 RX ADMIN — METFORMIN HYDROCHLORIDE 1000 MILLIGRAM(S): 850 TABLET ORAL at 21:09

## 2021-06-03 RX ADMIN — DIVALPROEX SODIUM 500 MILLIGRAM(S): 500 TABLET, DELAYED RELEASE ORAL at 08:25

## 2021-06-03 RX ADMIN — Medication 3: at 08:22

## 2021-06-03 RX ADMIN — Medication 100 MILLIGRAM(S): at 20:54

## 2021-06-03 RX ADMIN — METFORMIN HYDROCHLORIDE 1000 MILLIGRAM(S): 850 TABLET ORAL at 08:26

## 2021-06-03 RX ADMIN — DIVALPROEX SODIUM 500 MILLIGRAM(S): 500 TABLET, DELAYED RELEASE ORAL at 20:51

## 2021-06-03 RX ADMIN — Medication 1 TABLET(S): at 08:25

## 2021-06-03 RX ADMIN — Medication 4: at 11:42

## 2021-06-03 RX ADMIN — Medication 1 PATCH: at 08:25

## 2021-06-03 NOTE — BH INPATIENT PSYCHIATRY ASSESSMENT NOTE - NSBHMETABOLIC_PSY_ALL_CORE_FT
BMI:   HbA1c: A1C with Estimated Average Glucose Result: 11.5 % (06-03-21 @ 10:14)    Glucose: POCT Blood Glucose.: 314 mg/dL (06-03-21 @ 11:23)    BP: --  Lipid Panel: Date/Time: 06-03-21 @ 10:14  Cholesterol, Serum: 138  Direct LDL: --  HDL Cholesterol, Serum: 44  Total Cholesterol/HDL Ration Measurement: --  Triglycerides, Serum: 94

## 2021-06-03 NOTE — BH TREATMENT PLAN - NSTXPATIENTPARTICIPATE_PSY_ALL_CORE
Progress Note - Infectious Disease   Jaylyn Dawn 68 y o  male MRN: [id number]  [facility] En[facility]: [id number]      A/P:  1  Latent TB    Positive QuantiFERON gold x2   2  Drug-induced liver injury   Secondary to [address]  No, patient unwilling to participate

## 2021-06-03 NOTE — BH INPATIENT PSYCHIATRY ASSESSMENT NOTE - NSBHCHARTREVIEWVS_PSY_A_CORE FT
Vital Signs Last 24 Hrs  T(C): 36.3 (03 Jun 2021 08:16), Max: 36.3 (03 Jun 2021 08:16)  T(F): 97.3 (03 Jun 2021 08:16), Max: 97.3 (03 Jun 2021 08:16)  HR: --  BP: --  BP(mean): --  RR: --  SpO2: --

## 2021-06-03 NOTE — BH SOCIAL WORK INITIAL PSYCHOSOCIAL EVALUATION - NSBHHOUSECOMMENTFT_PSY_ALL_CORE
Patient reports both that he is homeless and that he lives with his sister it is unclear which is accurate.  None of the issues below were specifically reported other than asking for housing at times.

## 2021-06-03 NOTE — BH INPATIENT PSYCHIATRY ASSESSMENT NOTE - CURRENT MEDICATION
MEDICATIONS  (STANDING):  diVALproex  milliGRAM(s) Oral two times a day  insulin lispro (ADMELOG) corrective regimen sliding scale   SubCutaneous three times a day before meals  metFORMIN 1000 milliGRAM(s) Oral two times a day  multivitamin 1 Tablet(s) Oral daily  nicotine -  14 mG/24Hr(s) Patch 1 patch Transdermal daily    MEDICATIONS  (PRN):  acetaminophen   Tablet .. 650 milliGRAM(s) Oral every 6 hours PRN Mild Pain (1 - 3), Moderate Pain (4 - 6)  diphenhydrAMINE 50 milliGRAM(s) Oral every 6 hours PRN Agitation and/or Rash and/or Itching  diphenhydrAMINE   Injectable 50 milliGRAM(s) IntraMuscular once PRN severe agitation  LORazepam     Tablet 2 milliGRAM(s) Oral every 6 hours PRN agitation  LORazepam   Injectable 2 milliGRAM(s) IntraMuscular once PRN severe agitation  nicotine  Polacrilex Gum 2 milliGRAM(s) Oral every 2 hours PRN nicotine w/d  traZODone 100 milliGRAM(s) Oral at bedtime PRN insomnia

## 2021-06-03 NOTE — BH INPATIENT PSYCHIATRY ASSESSMENT NOTE - NSBHASSESSSUMMFT_PSY_ALL_CORE
A&P:  -52 y/o man, undomiciled with SAD, h/o of severe suicide attempts in past, prolonged stay at [facility], presenting with CAH and SI, recent cocaine intoxication.  -Admit on 9.37 status, Routine checks here on unit, pt contracts for safety, no need for 1:1 given lack of intent/plan  -Psych: Unclear home med regimen, pt likely non-adherent, questionable historian. Reports he remembers being on trazodone and depakote. Will restart trazodone 100 mg qHS (received at [facility] last night) and depakote 500 mg BID. Pt would benefit from standing antipsychotic pending clarification of meds ([facility] attempted to restart Risperdal 2 mg BID but pt refused). Haldol/Ativan/Benadryl prns for agitation  -Med: pt with diabetes, unknown insulin regimen. Will reinitiate metformin 1000 mg BID   -Substance: Utox positive for cocaine, denies alcohol/benzo use. No CIWA necessary, supportive care

## 2021-06-03 NOTE — BH INPATIENT PSYCHIATRY ASSESSMENT NOTE - VIOLENCE RISK FACTORS:
Substance abuse/Affective dysregulation/Impulsivity/Lack of insight into violence risk/need for treatment/Noncompliance with treatment/Irritability
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
## 2021-06-03 NOTE — BH INPATIENT PSYCHIATRY ASSESSMENT NOTE - CASE SUMMARY
gentleman with multiple inpatient admissions, long history of mental illness, suicide attempts, psychosis.  presented with psychosis and thoughts of self harm, noncompliant with medications.    I agree with above plan:    -50 y/o man, undomiciled with SAD, h/o of severe suicide attempts in past, prolonged stay at [place], presenting with CAH and SI, recent cocaine intoxication.  -Admit on 9.37 status, Routine checks here on unit, pt contracts for safety, no need for 1:1 given lack of intent/plan  -Psych: Unclear home med regimen, pt likely non-adherent, questionable historian. Reports he remembers being on trazodone and depakote. Will restart trazodone 100 mg qHS (received at [facility] last night) and depakote 500 mg BID. Pt would benefit from standing antipsychotic pending clarification of meds ([facility] attempted to restart Risperdal 2 mg BID but pt refused). Haldol/Ativan/Benadryl prns for agitation  -Med: pt with diabetes, unknown insulin regimen. Will reinitiate metformin 1000 mg BID   -Substance: Utox positive for cocaine, denies alcohol/benzo use. No CIWA necessary, supportive care

## 2021-06-03 NOTE — BH INPATIENT PSYCHIATRY ASSESSMENT NOTE - NSBHSACOC_PSY_A_CORE FT
Reports using cocaine x1 two weeks ago Reports using cocaine x1 two weeks ago.  Per chart, multiple hospital visits for cocaine abuse
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
## 2021-06-03 NOTE — DIETITIAN INITIAL EVALUATION ADULT. - NAME AND PHONE
Subjective:      Patient ID: Mariama Black is a 39 y.o. female. HPI    Review of Systems    Objective:   Physical Exam    Assessment:      Chronic migraine      Plan:      Botox completed, see procedure note. Brandi Lockhart MS RDN  Pager #[phone]

## 2021-06-04 LAB
GLUCOSE BLDC GLUCOMTR-MCNC: 234 MG/DL — HIGH (ref 70–99)
GLUCOSE BLDC GLUCOMTR-MCNC: 297 MG/DL — HIGH (ref 70–99)
GLUCOSE BLDC GLUCOMTR-MCNC: 361 MG/DL — HIGH (ref 70–99)
GLUCOSE BLDC GLUCOMTR-MCNC: 367 MG/DL — HIGH (ref 70–99)

## 2021-06-04 PROCEDURE — 99232 SBSQ HOSP IP/OBS MODERATE 35: CPT

## 2021-06-04 RX ORDER — SODIUM CHLORIDE 9 MG/ML
1000 INJECTION, SOLUTION INTRAVENOUS
Refills: 0 | Status: DISCONTINUED | OUTPATIENT
Start: 2021-06-04 | End: 2021-06-07

## 2021-06-04 RX ORDER — GLUCAGON INJECTION, SOLUTION 0.5 MG/.1ML
1 INJECTION, SOLUTION SUBCUTANEOUS ONCE
Refills: 0 | Status: DISCONTINUED | OUTPATIENT
Start: 2021-06-04 | End: 2021-06-04

## 2021-06-04 RX ORDER — DEXTROSE 50 % IN WATER 50 %
15 SYRINGE (ML) INTRAVENOUS ONCE
Refills: 0 | Status: DISCONTINUED | OUTPATIENT
Start: 2021-06-04 | End: 2021-06-07

## 2021-06-04 RX ORDER — DEXTROSE 50 % IN WATER 50 %
25 SYRINGE (ML) INTRAVENOUS ONCE
Refills: 0 | Status: DISCONTINUED | OUTPATIENT
Start: 2021-06-04 | End: 2021-06-04

## 2021-06-04 RX ORDER — SODIUM CHLORIDE 9 MG/ML
1000 INJECTION, SOLUTION INTRAVENOUS
Refills: 0 | Status: DISCONTINUED | OUTPATIENT
Start: 2021-06-04 | End: 2021-06-04

## 2021-06-04 RX ORDER — OLANZAPINE 15 MG/1
5 TABLET, FILM COATED ORAL EVERY 6 HOURS
Refills: 0 | Status: DISCONTINUED | OUTPATIENT
Start: 2021-06-04 | End: 2021-06-08

## 2021-06-04 RX ORDER — DEXTROSE 50 % IN WATER 50 %
15 SYRINGE (ML) INTRAVENOUS ONCE
Refills: 0 | Status: DISCONTINUED | OUTPATIENT
Start: 2021-06-04 | End: 2021-06-04

## 2021-06-04 RX ORDER — INSULIN GLARGINE 100 [IU]/ML
15 INJECTION, SOLUTION SUBCUTANEOUS AT BEDTIME
Refills: 0 | Status: DISCONTINUED | OUTPATIENT
Start: 2021-06-04 | End: 2021-06-07

## 2021-06-04 RX ORDER — DEXTROSE 50 % IN WATER 50 %
25 SYRINGE (ML) INTRAVENOUS ONCE
Refills: 0 | Status: DISCONTINUED | OUTPATIENT
Start: 2021-06-04 | End: 2021-06-07

## 2021-06-04 RX ORDER — DEXTROSE 50 % IN WATER 50 %
12.5 SYRINGE (ML) INTRAVENOUS ONCE
Refills: 0 | Status: DISCONTINUED | OUTPATIENT
Start: 2021-06-04 | End: 2021-06-04

## 2021-06-04 RX ORDER — OLANZAPINE 15 MG/1
5 TABLET, FILM COATED ORAL ONCE
Refills: 0 | Status: DISCONTINUED | OUTPATIENT
Start: 2021-06-04 | End: 2021-06-08

## 2021-06-04 RX ORDER — DEXTROSE 50 % IN WATER 50 %
12.5 SYRINGE (ML) INTRAVENOUS ONCE
Refills: 0 | Status: DISCONTINUED | OUTPATIENT
Start: 2021-06-04 | End: 2021-06-07

## 2021-06-04 RX ORDER — INSULIN LISPRO 100/ML
VIAL (ML) SUBCUTANEOUS AT BEDTIME
Refills: 0 | Status: DISCONTINUED | OUTPATIENT
Start: 2021-06-04 | End: 2021-06-08

## 2021-06-04 RX ORDER — GLUCAGON INJECTION, SOLUTION 0.5 MG/.1ML
1 INJECTION, SOLUTION SUBCUTANEOUS ONCE
Refills: 0 | Status: DISCONTINUED | OUTPATIENT
Start: 2021-06-04 | End: 2021-06-07

## 2021-06-04 RX ADMIN — DIVALPROEX SODIUM 500 MILLIGRAM(S): 500 TABLET, DELAYED RELEASE ORAL at 08:08

## 2021-06-04 RX ADMIN — Medication 2: at 08:07

## 2021-06-04 RX ADMIN — Medication 3: at 21:10

## 2021-06-04 RX ADMIN — Medication 50 MILLIGRAM(S): at 19:13

## 2021-06-04 RX ADMIN — Medication 1 TABLET(S): at 08:08

## 2021-06-04 RX ADMIN — METFORMIN HYDROCHLORIDE 1000 MILLIGRAM(S): 850 TABLET ORAL at 16:58

## 2021-06-04 RX ADMIN — Medication 3: at 11:51

## 2021-06-04 RX ADMIN — METFORMIN HYDROCHLORIDE 1000 MILLIGRAM(S): 850 TABLET ORAL at 08:08

## 2021-06-04 RX ADMIN — Medication 100 MILLIGRAM(S): at 21:22

## 2021-06-04 RX ADMIN — Medication 5: at 16:48

## 2021-06-04 RX ADMIN — INSULIN GLARGINE 15 UNIT(S): 100 INJECTION, SOLUTION SUBCUTANEOUS at 21:24

## 2021-06-04 RX ADMIN — DIVALPROEX SODIUM 500 MILLIGRAM(S): 500 TABLET, DELAYED RELEASE ORAL at 21:22

## 2021-06-04 NOTE — CHART NOTE - NSCHARTNOTEFT_GEN_A_CORE
Screening Medical Evaluation  Patient Admitted from: [facility] ER    [facility] admitting diagnosis: Schizoaffective disorder    PAST MEDICAL & SURGICAL HISTORY:        Allergies    Haldol (Other)    Intolerances        Social History:     FAMILY HISTORY:      MEDICATIONS  (STANDING):  diVALproex  milliGRAM(s) Oral two times a day  insulin glargine Injectable (LANTUS) 15 Unit(s) SubCutaneous at bedtime  insulin lispro (ADMELOG) corrective regimen sliding scale   SubCutaneous three times a day before meals  metFORMIN 1000 milliGRAM(s) Oral two times a day  multivitamin 1 Tablet(s) Oral daily  nicotine -  14 mG/24Hr(s) Patch 1 patch Transdermal daily    MEDICATIONS  (PRN):  acetaminophen   Tablet .. 650 milliGRAM(s) Oral every 6 hours PRN Mild Pain (1 - 3), Moderate Pain (4 - 6)  diphenhydrAMINE 50 milliGRAM(s) Oral every 6 hours PRN Agitation and/or Rash and/or Itching  diphenhydrAMINE   Injectable 50 milliGRAM(s) IntraMuscular once PRN severe agitation  LORazepam     Tablet 2 milliGRAM(s) Oral every 6 hours PRN agitation  nicotine  Polacrilex Gum 2 milliGRAM(s) Oral every 2 hours PRN nicotine w/d  OLANZapine 5 milliGRAM(s) Oral every 6 hours PRN agitation  OLANZapine Injectable 5 milliGRAM(s) IntraMuscular once PRN severe agitation  traZODone 100 milliGRAM(s) Oral at bedtime PRN insomnia      Vital Signs Last 24 Hrs  T(C): 36.3 (04 Jun 2021 08:23), Max: 36.3 (04 Jun 2021 08:23)  T(F): 97.4 (04 Jun 2021 08:23), Max: 97.4 (04 Jun 2021 08:23)  HR: --82  BP: --107/76  BP(mean): --  RR: --  SpO2: --  CAPILLARY BLOOD GLUCOSE      POCT Blood Glucose.: 367 mg/dL (04 Jun 2021 20:13)  POCT Blood Glucose.: 361 mg/dL (04 Jun 2021 16:46)  POCT Blood Glucose.: 297 mg/dL (04 Jun 2021 11:39)  POCT Blood Glucose.: 234 mg/dL (04 Jun 2021 08:01)        PHYSICAL EXAM:  GENERAL: NAD, well-developed  HEAD:  Atraumatic, Normocephalic  EYES: EOMI, PERRLA, conjunctiva and sclera clear  NECK: Supple, No JVD  CHEST/LUNG: Clear to auscultation bilaterally; No wheeze  HEART: Regular rate and rhythm; No murmurs, rubs, or gallops  ABDOMEN: Soft, Nontender, Nondistended; Bowel sounds present  EXTREMITIES:  2+ Peripheral Pulses, No clubbing, cyanosis, or edema  PSYCH: AAOx3  NEUROLOGY: non-focal  SKIN: No rashes or lesions    LABS:    06-03    136  |  100  |  10  ----------------------------<  293<H>  4.8   |  25  |  0.77    Ca    9.1      03 Jun 2021 10:14                RADIOLOGY & ADDITIONAL TESTS:    Assessment and Plan:  51y yo Male with PMH of diabetes is admitted to [facility] with a primary psychiatric diagnosis of Schizoaffective disorder. The pt currently denies having any medical complaints such as chest pain, sob, abdominal pain, n/v/d/c, or any problems with urination or bowel movements. The rest of his screening physical is unremarkable.    1.Schizoaffective disorder-Plan: continue with meds as per primary psychiatric team  2. Diabetes-Plan: continue with metformin; monitor FS

## 2021-06-04 NOTE — CHART NOTE - NSCHARTNOTEFT_GEN_A_CORE
SW Note: Completed ins auth for admit to [facility]. Spoke with ins ARIANA MILLS [phone]. Auth approved for 6 days. Info forwarded to [facility] UR dept for f/u
SW Note: Plan is to transfer pt for inpt psychiatric care. Covid neg. Last inpt tx at [facility], discharged a few weeks ago. Referral made to [facility] for possible transfer. SW to follow

## 2021-06-04 NOTE — PSYCHIATRIC REHAB INITIAL EVALUATION - NSBHPRRECOMMEND_PSY_ALL_CORE
Writer met with patient to orient to unit and introduce self, psychiatric rehabilitation staff and department functions Pts behavior is overall un-cooperative with poor eye contact. Pts speech is loud. Pts mood is irritable with labile affect. Pts thought process is disorganized and illogical/delusional. Pt ADLs are poorly maintained. Pts insight and judgement are poor. Writer encouraged patient to attend psychiatric rehabilitation groups and engage in treatment. Psychiatric Rehabilitation staff will continue to engage patient daily in order to develop therapeutic rapport.

## 2021-06-05 LAB
GLUCOSE BLDC GLUCOMTR-MCNC: 171 MG/DL — HIGH (ref 70–99)
GLUCOSE BLDC GLUCOMTR-MCNC: 275 MG/DL — HIGH (ref 70–99)
GLUCOSE BLDC GLUCOMTR-MCNC: 301 MG/DL — HIGH (ref 70–99)
GLUCOSE BLDC GLUCOMTR-MCNC: 433 MG/DL — HIGH (ref 70–99)

## 2021-06-05 PROCEDURE — 99232 SBSQ HOSP IP/OBS MODERATE 35: CPT

## 2021-06-05 RX ADMIN — Medication 1 TABLET(S): at 08:25

## 2021-06-05 RX ADMIN — Medication 100 MILLIGRAM(S): at 20:24

## 2021-06-05 RX ADMIN — Medication 2: at 20:37

## 2021-06-05 RX ADMIN — METFORMIN HYDROCHLORIDE 1000 MILLIGRAM(S): 850 TABLET ORAL at 08:25

## 2021-06-05 RX ADMIN — Medication 1: at 13:02

## 2021-06-05 RX ADMIN — Medication 3: at 08:23

## 2021-06-05 RX ADMIN — DIVALPROEX SODIUM 500 MILLIGRAM(S): 500 TABLET, DELAYED RELEASE ORAL at 08:24

## 2021-06-05 RX ADMIN — DIVALPROEX SODIUM 500 MILLIGRAM(S): 500 TABLET, DELAYED RELEASE ORAL at 20:23

## 2021-06-05 RX ADMIN — INSULIN GLARGINE 15 UNIT(S): 100 INJECTION, SOLUTION SUBCUTANEOUS at 20:31

## 2021-06-05 RX ADMIN — Medication 6: at 17:00

## 2021-06-05 RX ADMIN — METFORMIN HYDROCHLORIDE 1000 MILLIGRAM(S): 850 TABLET ORAL at 20:26

## 2021-06-05 RX ADMIN — Medication 50 MILLIGRAM(S): at 19:20

## 2021-06-06 LAB
GLUCOSE BLDC GLUCOMTR-MCNC: 217 MG/DL — HIGH (ref 70–99)
GLUCOSE BLDC GLUCOMTR-MCNC: 221 MG/DL — HIGH (ref 70–99)
GLUCOSE BLDC GLUCOMTR-MCNC: 275 MG/DL — HIGH (ref 70–99)
GLUCOSE BLDC GLUCOMTR-MCNC: 328 MG/DL — HIGH (ref 70–99)

## 2021-06-06 PROCEDURE — 99232 SBSQ HOSP IP/OBS MODERATE 35: CPT

## 2021-06-06 RX ORDER — METFORMIN HYDROCHLORIDE 850 MG/1
1000 TABLET ORAL
Refills: 0 | Status: DISCONTINUED | OUTPATIENT
Start: 2021-06-06 | End: 2021-06-08

## 2021-06-06 RX ADMIN — Medication 2: at 11:56

## 2021-06-06 RX ADMIN — DIVALPROEX SODIUM 500 MILLIGRAM(S): 500 TABLET, DELAYED RELEASE ORAL at 08:22

## 2021-06-06 RX ADMIN — Medication 50 MILLIGRAM(S): at 03:38

## 2021-06-06 RX ADMIN — Medication 2: at 08:21

## 2021-06-06 RX ADMIN — DIVALPROEX SODIUM 500 MILLIGRAM(S): 500 TABLET, DELAYED RELEASE ORAL at 20:27

## 2021-06-06 RX ADMIN — Medication 2 MILLIGRAM(S): at 19:45

## 2021-06-06 RX ADMIN — Medication 50 MILLIGRAM(S): at 19:46

## 2021-06-06 RX ADMIN — Medication 100 MILLIGRAM(S): at 19:46

## 2021-06-06 RX ADMIN — Medication 1 TABLET(S): at 08:23

## 2021-06-06 RX ADMIN — Medication 1: at 20:41

## 2021-06-06 RX ADMIN — METFORMIN HYDROCHLORIDE 1000 MILLIGRAM(S): 850 TABLET ORAL at 17:24

## 2021-06-06 RX ADMIN — METFORMIN HYDROCHLORIDE 1000 MILLIGRAM(S): 850 TABLET ORAL at 08:23

## 2021-06-06 RX ADMIN — Medication 4: at 17:07

## 2021-06-06 RX ADMIN — INSULIN GLARGINE 15 UNIT(S): 100 INJECTION, SOLUTION SUBCUTANEOUS at 20:40

## 2021-06-07 LAB
GLUCOSE BLDC GLUCOMTR-MCNC: 212 MG/DL — HIGH (ref 70–99)
GLUCOSE BLDC GLUCOMTR-MCNC: 236 MG/DL — HIGH (ref 70–99)
GLUCOSE BLDC GLUCOMTR-MCNC: 364 MG/DL — HIGH (ref 70–99)
GLUCOSE BLDC GLUCOMTR-MCNC: 369 MG/DL — HIGH (ref 70–99)

## 2021-06-07 PROCEDURE — 99232 SBSQ HOSP IP/OBS MODERATE 35: CPT | Mod: 25

## 2021-06-07 PROCEDURE — 99222 1ST HOSP IP/OBS MODERATE 55: CPT

## 2021-06-07 PROCEDURE — 90853 GROUP PSYCHOTHERAPY: CPT

## 2021-06-07 RX ORDER — DEXTROSE 50 % IN WATER 50 %
12.5 SYRINGE (ML) INTRAVENOUS ONCE
Refills: 0 | Status: DISCONTINUED | OUTPATIENT
Start: 2021-06-07 | End: 2021-06-07

## 2021-06-07 RX ORDER — INSULIN LISPRO 100/ML
5 VIAL (ML) SUBCUTANEOUS
Qty: 300 | Refills: 0
Start: 2021-06-07 | End: 2021-07-06

## 2021-06-07 RX ORDER — INSULIN GLARGINE 100 [IU]/ML
20 INJECTION, SOLUTION SUBCUTANEOUS AT BEDTIME
Refills: 0 | Status: DISCONTINUED | OUTPATIENT
Start: 2021-06-07 | End: 2021-06-08

## 2021-06-07 RX ORDER — DEXTROSE 50 % IN WATER 50 %
25 SYRINGE (ML) INTRAVENOUS ONCE
Refills: 0 | Status: DISCONTINUED | OUTPATIENT
Start: 2021-06-07 | End: 2021-06-07

## 2021-06-07 RX ORDER — ENOXAPARIN SODIUM 100 MG/ML
20 INJECTION SUBCUTANEOUS
Qty: 600 | Refills: 0
Start: 2021-06-07 | End: 2021-07-06

## 2021-06-07 RX ORDER — SODIUM CHLORIDE 9 MG/ML
1000 INJECTION, SOLUTION INTRAVENOUS
Refills: 0 | Status: DISCONTINUED | OUTPATIENT
Start: 2021-06-07 | End: 2021-06-07

## 2021-06-07 RX ORDER — METFORMIN HYDROCHLORIDE 850 MG/1
1 TABLET ORAL
Qty: 60 | Refills: 0
Start: 2021-06-07 | End: 2021-07-06

## 2021-06-07 RX ORDER — DEXTROSE 50 % IN WATER 50 %
15 SYRINGE (ML) INTRAVENOUS ONCE
Refills: 0 | Status: DISCONTINUED | OUTPATIENT
Start: 2021-06-07 | End: 2021-06-07

## 2021-06-07 RX ORDER — INSULIN LISPRO 100/ML
5 VIAL (ML) SUBCUTANEOUS
Refills: 0 | Status: DISCONTINUED | OUTPATIENT
Start: 2021-06-07 | End: 2021-06-08

## 2021-06-07 RX ORDER — DIVALPROEX SODIUM 500 MG/1
1 TABLET, DELAYED RELEASE ORAL
Qty: 60 | Refills: 0
Start: 2021-06-07 | End: 2021-07-06

## 2021-06-07 RX ORDER — ISOPROPYL ALCOHOL, BENZOCAINE .7; .06 ML/ML; ML/ML
1 SWAB TOPICAL
Qty: 120 | Refills: 0
Start: 2021-06-07 | End: 2021-07-06

## 2021-06-07 RX ORDER — GLUCAGON INJECTION, SOLUTION 0.5 MG/.1ML
1 INJECTION, SOLUTION SUBCUTANEOUS ONCE
Refills: 0 | Status: DISCONTINUED | OUTPATIENT
Start: 2021-06-07 | End: 2021-06-07

## 2021-06-07 RX ADMIN — Medication 1 TABLET(S): at 08:28

## 2021-06-07 RX ADMIN — Medication 2: at 12:39

## 2021-06-07 RX ADMIN — Medication 2: at 08:25

## 2021-06-07 RX ADMIN — DIVALPROEX SODIUM 500 MILLIGRAM(S): 500 TABLET, DELAYED RELEASE ORAL at 08:28

## 2021-06-07 RX ADMIN — METFORMIN HYDROCHLORIDE 1000 MILLIGRAM(S): 850 TABLET ORAL at 16:56

## 2021-06-07 RX ADMIN — Medication 5 UNIT(S): at 16:55

## 2021-06-07 RX ADMIN — Medication 100 MILLIGRAM(S): at 20:40

## 2021-06-07 RX ADMIN — Medication 50 MILLIGRAM(S): at 19:13

## 2021-06-07 RX ADMIN — INSULIN GLARGINE 20 UNIT(S): 100 INJECTION, SOLUTION SUBCUTANEOUS at 21:11

## 2021-06-07 RX ADMIN — Medication 5 UNIT(S): at 12:44

## 2021-06-07 RX ADMIN — Medication 2 MILLIGRAM(S): at 19:13

## 2021-06-07 RX ADMIN — Medication 3: at 20:44

## 2021-06-07 RX ADMIN — METFORMIN HYDROCHLORIDE 1000 MILLIGRAM(S): 850 TABLET ORAL at 08:28

## 2021-06-07 RX ADMIN — DIVALPROEX SODIUM 500 MILLIGRAM(S): 500 TABLET, DELAYED RELEASE ORAL at 20:36

## 2021-06-07 RX ADMIN — Medication 5: at 16:55

## 2021-06-07 NOTE — BH PSYCHOLOGY - GROUP THERAPY NOTE - NSPSYCHOLGRPGENGOAL_PSY_A_CORE
assessment/decrease symptoms/improve level of independent functioning/improve social/vocational/coping skills/psychoeducation/treatment compliance

## 2021-06-07 NOTE — BH PSYCHOLOGY - GROUP THERAPY NOTE - NSPSYCHOLGRPGENINT_PSY_A_CORE
cognitive/behavioral therapy/problem solving techniques discussed/stress management/supported coping skills/supportive therapy/treatment compliance encouraged/social skills training

## 2021-06-07 NOTE — CONSULT NOTE ADULT - SUBJECTIVE AND OBJECTIVE BOX
51y yo Male with PMH of DM2 is admitted to [facility] with a primary psychiatric diagnosis of Schizoaffective disorder, was asked to see for FS management saw pt during group meeting this morning, he is fixated on being discharge and is difficult to redirect and doesn't provide reliable hx on DM and home regimen but FS and labs reviewed, noted with A1C >11, was started on basal insulin on the 4th and noted still with high FS. pt likely on no prior regimen considering A1C. no hypoglycemic events reported, baron po and pt has accepted insulin regimen here. denies hx of HTN, CAD, retinopathy, neuropathy     Allergies: Haldol    PMHx: DM2, psych disorder  Social: denies to me  FHX: NC    ROS:  No HA/DZ  No Vision changes   No CP, SOB  No N/V/D  No Edema  No Rash  NO weakness, numbness    MEDICATIONS  (STANDING):  diVALproex  milliGRAM(s) Oral two times a day  insulin glargine Injectable (LANTUS) 15 Unit(s) SubCutaneous at bedtime  insulin lispro (ADMELOG) corrective regimen sliding scale   SubCutaneous at bedtime  insulin lispro (ADMELOG) corrective regimen sliding scale   SubCutaneous three times a day before meals  metFORMIN 1000 milliGRAM(s) Oral <User Schedule>  multivitamin 1 Tablet(s) Oral daily  nicotine -  14 mG/24Hr(s) Patch 1 patch Transdermal daily    MEDICATIONS  (PRN):  acetaminophen   Tablet .. 650 milliGRAM(s) Oral every 6 hours PRN Mild Pain (1 - 3), Moderate Pain (4 - 6)  diphenhydrAMINE 50 milliGRAM(s) Oral every 6 hours PRN Agitation and/or Rash and/or Itching  diphenhydrAMINE   Injectable 50 milliGRAM(s) IntraMuscular once PRN severe agitation  LORazepam     Tablet 2 milliGRAM(s) Oral every 6 hours PRN agitation  nicotine  Polacrilex Gum 2 milliGRAM(s) Oral every 2 hours PRN nicotine w/d  OLANZapine 5 milliGRAM(s) Oral every 6 hours PRN agitation  OLANZapine Injectable 5 milliGRAM(s) IntraMuscular once PRN severe agitation  traZODone 100 milliGRAM(s) Oral at bedtime PRN insomnia      T(C): 36.6 (06-07-21 @ 08:09)  HR: --76  BP: --101/67  RR: --12  SpO2: --  CAPILLARY BLOOD GLUCOSE      POCT Blood Glucose.: 212 mg/dL (07 Jun 2021 08:10)  POCT Blood Glucose.: 275 mg/dL (06 Jun 2021 20:28)  POCT Blood Glucose.: 328 mg/dL (06 Jun 2021 16:43)    I&O's Summary      PHYSICAL EXAM:  GENERAL: NAD, anxious   HEAD:  Atraumatic, Normocephalic  EYES: EOMI, PERRL, conjunctiva and sclera clear  NECK: Supple, No JVD  CHEST/LUNG: Clear to auscultation bilaterally  HEART: Regular rate and rhythm; No murmurs, rubs, or gallops, No Edema  ABDOMEN: Soft, Nontender, Nondistended; Bowel sounds present  EXTREMITIES:  2+ Peripheral Pulses, No clubbing, cyanosis  PSYCH: No SI/HI  NEUROLOGY: non-focal  SKIN: No rashes or lesions    LABS:            A1C 11.5    LDL 75        RADIOLOGY & ADDITIONAL TESTS:    Imaging Personally Reviewed:    Consultant(s) Notes Reviewed:      Care Discussed with Consultants/Other Providers:

## 2021-06-07 NOTE — BH PSYCHOLOGY - GROUP THERAPY NOTE - NSBHPSYCHOLPARTICIPCOMMENT_PSY_A_CORE FT
Patient appeared guarded during the group discussion. When offered, he refused to take one of the group worksheets; however, he observed the group discussing the topic. Patient did not engage in the group but was observed to stay for most, occasionally leaving only to return.

## 2021-06-07 NOTE — BH PSYCHOLOGY - GROUP THERAPY NOTE - NSPSYCHOLGRPGENPT_PSY_A_CORE FT
Patient attended the psychology cognitive-behavior therapy group. The discussion was focused on the topic of behavioral activation. Group expectations, guidelines, confidentiality and its limitations were reviewed. The group began with a description of behavioral activation as a treatment technique to decrease avoidance and isolation. Group members then learned how to introduce activities gradually into daily schedules. Also discussed were methods to create a behavioral activation schedule to assist in improving mood and activity levels.  Group members demonstrated their understanding through active participation, discussion, and by asking clarifying questions. Discussion stemmed from the group's focus on the connection between thoughts, feelings and behaviors. Group members were provided with a handout describing the concepts and strategies discussed during group.

## 2021-06-07 NOTE — BH PSYCHOLOGY - GROUP THERAPY NOTE - NSBHPSYCHOLPARTICIP_PSY_A_CORE
Ms. Kristy,  Your urine did have bacteria, but the amoxicillin should take care of this.  We can reschedule the stent removal in the near future.  Thank you, Sky Britt.
Resistant to interventions

## 2021-06-07 NOTE — CONSULT NOTE ADULT - ASSESSMENT
51y yo Male with PMH of DM2 is admitted to [facility] with a primary psychiatric diagnosis of Schizoaffective disorder, with uncontrolled FS    DM2 - uncontrolled, FS was in mid 300s and then started on lantus 15units, and Metformin - somewhat better controlled, but still running high especially post prandial. AM fs 212. A1C 11.5, needs basal + bolus for adequate control. would increase lantus to 20 units and add admelog 5 units TID pre meals. cw NISS and monitor FS closely, needs DM education and teaching, nutrition eval.     LDL controlled - no need for statin at this time    psych - per primary team, monitor QTC on psychotropics   check CBC while on psychotropics to ensure WBC stable

## 2021-06-08 VITALS — TEMPERATURE: 98 F | SYSTOLIC BLOOD PRESSURE: 116 MMHG | DIASTOLIC BLOOD PRESSURE: 67 MMHG | HEART RATE: 66 BPM

## 2021-06-08 LAB
ALBUMIN SERPL ELPH-MCNC: 3.9 G/DL — SIGNIFICANT CHANGE UP (ref 3.3–5)
ALP SERPL-CCNC: 59 U/L — SIGNIFICANT CHANGE UP (ref 40–120)
ALT FLD-CCNC: 15 U/L — SIGNIFICANT CHANGE UP (ref 4–41)
AST SERPL-CCNC: 11 U/L — SIGNIFICANT CHANGE UP (ref 4–40)
BILIRUB DIRECT SERPL-MCNC: <0.2 MG/DL — SIGNIFICANT CHANGE UP (ref 0–0.2)
BILIRUB INDIRECT FLD-MCNC: SIGNIFICANT CHANGE UP MG/DL (ref 0–1)
BILIRUB SERPL-MCNC: <0.2 MG/DL — SIGNIFICANT CHANGE UP (ref 0.2–1.2)
GLUCOSE BLDC GLUCOMTR-MCNC: 176 MG/DL — HIGH (ref 70–99)
GLUCOSE BLDC GLUCOMTR-MCNC: 234 MG/DL — HIGH (ref 70–99)
HCT VFR BLD CALC: 48.9 % — SIGNIFICANT CHANGE UP (ref 39–50)
HGB BLD-MCNC: 16.1 G/DL — SIGNIFICANT CHANGE UP (ref 13–17)
MCHC RBC-ENTMCNC: 29.9 PG — SIGNIFICANT CHANGE UP (ref 27–34)
MCHC RBC-ENTMCNC: 32.9 GM/DL — SIGNIFICANT CHANGE UP (ref 32–36)
MCV RBC AUTO: 90.9 FL — SIGNIFICANT CHANGE UP (ref 80–100)
NRBC # BLD: 0 /100 WBCS — SIGNIFICANT CHANGE UP
NRBC # FLD: 0 K/UL — SIGNIFICANT CHANGE UP
PLATELET # BLD AUTO: 371 K/UL — SIGNIFICANT CHANGE UP (ref 150–400)
PROT SERPL-MCNC: 7 G/DL — SIGNIFICANT CHANGE UP (ref 6–8.3)
RBC # BLD: 5.38 M/UL — SIGNIFICANT CHANGE UP (ref 4.2–5.8)
RBC # FLD: 12.5 % — SIGNIFICANT CHANGE UP (ref 10.3–14.5)
VALPROATE SERPL-MCNC: 51.4 UG/ML — SIGNIFICANT CHANGE UP (ref 50–100)
WBC # BLD: 4.67 K/UL — SIGNIFICANT CHANGE UP (ref 3.8–10.5)
WBC # FLD AUTO: 4.67 K/UL — SIGNIFICANT CHANGE UP (ref 3.8–10.5)

## 2021-06-08 PROCEDURE — 99232 SBSQ HOSP IP/OBS MODERATE 35: CPT

## 2021-06-08 RX ORDER — INSULIN GLARGINE 100 [IU]/ML
26 INJECTION, SOLUTION SUBCUTANEOUS
Qty: 780 | Refills: 0
Start: 2021-06-08 | End: 2021-07-07

## 2021-06-08 RX ORDER — INSULIN LISPRO 100/ML
8 VIAL (ML) SUBCUTANEOUS
Qty: 720 | Refills: 0
Start: 2021-06-08 | End: 2021-07-07

## 2021-06-08 RX ORDER — INSULIN GLARGINE 100 [IU]/ML
26 INJECTION, SOLUTION SUBCUTANEOUS AT BEDTIME
Refills: 0 | Status: DISCONTINUED | OUTPATIENT
Start: 2021-06-08 | End: 2021-06-08

## 2021-06-08 RX ORDER — INSULIN LISPRO 100/ML
8 VIAL (ML) SUBCUTANEOUS
Refills: 0 | Status: DISCONTINUED | OUTPATIENT
Start: 2021-06-08 | End: 2021-06-08

## 2021-06-08 RX ORDER — INSULIN LISPRO 100/ML
10 VIAL (ML) SUBCUTANEOUS
Refills: 0 | Status: DISCONTINUED | OUTPATIENT
Start: 2021-06-08 | End: 2021-06-08

## 2021-06-08 RX ORDER — INSULIN GLARGINE 100 [IU]/ML
30 INJECTION, SOLUTION SUBCUTANEOUS AT BEDTIME
Refills: 0 | Status: DISCONTINUED | OUTPATIENT
Start: 2021-06-08 | End: 2021-06-08

## 2021-06-08 RX ADMIN — Medication 1 TABLET(S): at 08:08

## 2021-06-08 RX ADMIN — METFORMIN HYDROCHLORIDE 1000 MILLIGRAM(S): 850 TABLET ORAL at 08:08

## 2021-06-08 RX ADMIN — Medication 1: at 08:07

## 2021-06-08 RX ADMIN — DIVALPROEX SODIUM 500 MILLIGRAM(S): 500 TABLET, DELAYED RELEASE ORAL at 08:08

## 2021-06-08 RX ADMIN — Medication 5 UNIT(S): at 08:08

## 2021-06-08 RX ADMIN — Medication 2: at 11:51

## 2021-06-08 NOTE — BH INPATIENT PSYCHIATRY PROGRESS NOTE - NSTXDCOPNOGOAL_PSY_ALL_CORE
Will agree to participate in appropriate outpatient care

## 2021-06-08 NOTE — BH DISCHARGE NOTE NURSING/SOCIAL WORK/PSYCH REHAB - NSCDUDCCRISIS_PSY_A_CORE
[facility]  1 [phone]) [facility]-WELL ([phone])  Text "WELL" to 31180  Website: www.[employer]/.[employer] [facility] 1 (394) 331-EDKO (4941) Website: www.safehorizon.org/.National Suicide Prevention Lifeline 7 (050) 161-1634/.  [employer]  1 (637) LIFENET (768-7158)/.  [facility]  [phone] [place] [phone]   Hours:  Monday through Friday from 9 AM to 3 PM/.  [employer] - Veterans Crisis Line  8 [phone], Option 1

## 2021-06-08 NOTE — BH INPATIENT PSYCHIATRY DISCHARGE NOTE - NSDCCPCAREPLAN_GEN_ALL_CORE_FT
PRINCIPAL DISCHARGE DIAGNOSIS  Diagnosis: Psychoactive substance-induced mood disorder  Assessment and Plan of Treatment:

## 2021-06-08 NOTE — BH INPATIENT PSYCHIATRY PROGRESS NOTE - NSBHASSESSSUMMFT_PSY_ALL_CORE
Pt more cooperative today and has been in good behavioral control. A bit irritable and overinclusive though currently without concern for acute psychotic process. Continues to deny CAH. Mood also stable and pt denying SIIP and HIIP. Compliant with Depakote though refusing antipsychotics. Will monitor over the weekend for safety and sustained improvement.      Plan: C/w Depakote DR 500mg BID, will get a level next week. Will collaborate with SW and sister with regards to dispo planning. 
Pt remains with more stable mood, noted to be much more pleasant and cooperative. Has been in good behavioral control. No evidence of acute mood or psychotic episode/process. Continues to deny CAH. Continuing to deny SIIP and HIIP. Remain concerned about uncontrolled blood sugar, though pt minimizing this and is rather very discharge-focused.     Plan: C/w Depakote DR 500mg BID, will get a level next week. F/u depakote level, liver function panel, and CBC tomorrow AM. Will f/u hospitalist recommendation regarding insulin dosing and will consult diabetes nurse educator. Will collaborate w/ SW with regards to psych and medical outpt f/u. Dispo planning underway. 
Pt remains with more stable mood, noted to be much more pleasant and cooperative. Has been in good behavioral control. No evidence of acute mood or psychotic episode/process. Continues to deny CAH. Continuing to deny SIIP and HIIP. In light of significant symptomatic improvement, pt no longer presents as an acute risk of harm to self or others and no longer requires hospitalization. Will be discharged to sister's home on Depakote DR 500mg BID (in addition to Insulin + supplies).

## 2021-06-08 NOTE — BH INPATIENT PSYCHIATRY PROGRESS NOTE - NSBHFUPINTERVALCCFT_PSY_A_CORE
f/u aron/psychosis and SI
f/u psychosis and SI
f/u aron/psychosis and SI
f/u CAH and SI
f/u aron/psychosis and SI

## 2021-06-08 NOTE — BH INPATIENT PSYCHIATRY PROGRESS NOTE - NSBHCONSULTIPREASON_PSY_A_CORE
danger to self; mental illness expected to respond to inpatient care
danger to self; mental illness expected to respond to inpatient care
other reason
danger to self; mental illness expected to respond to inpatient care
danger to self; mental illness expected to respond to inpatient care

## 2021-06-08 NOTE — BH INPATIENT PSYCHIATRY PROGRESS NOTE - NSBHATTESTSEENBY_PSY_A_CORE
attending Psychiatrist without NP/Trainee
NP without Attending Psychiatrist
NP without Attending Psychiatrist

## 2021-06-08 NOTE — BH INPATIENT PSYCHIATRY PROGRESS NOTE - CURRENT MEDICATION
MEDICATIONS  (STANDING):  dextrose 40% Gel 15 Gram(s) Oral once  dextrose 5%. 1000 milliLiter(s) (100 mL/Hr) IV Continuous <Continuous>  dextrose 5%. 1000 milliLiter(s) (50 mL/Hr) IV Continuous <Continuous>  dextrose 50% Injectable 25 Gram(s) IV Push once  dextrose 50% Injectable 12.5 Gram(s) IV Push once  dextrose 50% Injectable 25 Gram(s) IV Push once  diVALproex  milliGRAM(s) Oral two times a day  glucagon  Injectable 1 milliGRAM(s) IntraMuscular once  insulin glargine Injectable (LANTUS) 15 Unit(s) SubCutaneous at bedtime  insulin lispro (ADMELOG) corrective regimen sliding scale   SubCutaneous at bedtime  insulin lispro (ADMELOG) corrective regimen sliding scale   SubCutaneous three times a day before meals  metFORMIN 1000 milliGRAM(s) Oral two times a day  multivitamin 1 Tablet(s) Oral daily  nicotine -  14 mG/24Hr(s) Patch 1 patch Transdermal daily    MEDICATIONS  (PRN):  acetaminophen   Tablet .. 650 milliGRAM(s) Oral every 6 hours PRN Mild Pain (1 - 3), Moderate Pain (4 - 6)  diphenhydrAMINE 50 milliGRAM(s) Oral every 6 hours PRN Agitation and/or Rash and/or Itching  diphenhydrAMINE   Injectable 50 milliGRAM(s) IntraMuscular once PRN severe agitation  LORazepam     Tablet 2 milliGRAM(s) Oral every 6 hours PRN agitation  nicotine  Polacrilex Gum 2 milliGRAM(s) Oral every 2 hours PRN nicotine w/d  OLANZapine 5 milliGRAM(s) Oral every 6 hours PRN agitation  OLANZapine Injectable 5 milliGRAM(s) IntraMuscular once PRN severe agitation  traZODone 100 milliGRAM(s) Oral at bedtime PRN insomnia  
MEDICATIONS  (STANDING):  diVALproex  milliGRAM(s) Oral two times a day  insulin glargine Injectable (LANTUS) 26 Unit(s) SubCutaneous at bedtime  insulin lispro (ADMELOG) corrective regimen sliding scale   SubCutaneous three times a day before meals  insulin lispro (ADMELOG) corrective regimen sliding scale   SubCutaneous at bedtime  insulin lispro Injectable (ADMELOG) 8 Unit(s) SubCutaneous three times a day with meals  metFORMIN 1000 milliGRAM(s) Oral <User Schedule>  multivitamin 1 Tablet(s) Oral daily  nicotine -  14 mG/24Hr(s) Patch 1 patch Transdermal daily    MEDICATIONS  (PRN):  acetaminophen   Tablet .. 650 milliGRAM(s) Oral every 6 hours PRN Mild Pain (1 - 3), Moderate Pain (4 - 6)  diphenhydrAMINE 50 milliGRAM(s) Oral every 6 hours PRN Agitation and/or Rash and/or Itching  diphenhydrAMINE   Injectable 50 milliGRAM(s) IntraMuscular once PRN severe agitation  LORazepam     Tablet 2 milliGRAM(s) Oral every 6 hours PRN agitation  nicotine  Polacrilex Gum 2 milliGRAM(s) Oral every 2 hours PRN nicotine w/d  OLANZapine 5 milliGRAM(s) Oral every 6 hours PRN agitation  OLANZapine Injectable 5 milliGRAM(s) IntraMuscular once PRN severe agitation  traZODone 100 milliGRAM(s) Oral at bedtime PRN insomnia  
MEDICATIONS  (STANDING):  diVALproex  milliGRAM(s) Oral two times a day  insulin glargine Injectable (LANTUS) 20 Unit(s) SubCutaneous at bedtime  insulin lispro (ADMELOG) corrective regimen sliding scale   SubCutaneous at bedtime  insulin lispro (ADMELOG) corrective regimen sliding scale   SubCutaneous three times a day before meals  insulin lispro Injectable (ADMELOG) 5 Unit(s) SubCutaneous three times a day before meals  metFORMIN 1000 milliGRAM(s) Oral <User Schedule>  multivitamin 1 Tablet(s) Oral daily  nicotine -  14 mG/24Hr(s) Patch 1 patch Transdermal daily    MEDICATIONS  (PRN):  acetaminophen   Tablet .. 650 milliGRAM(s) Oral every 6 hours PRN Mild Pain (1 - 3), Moderate Pain (4 - 6)  diphenhydrAMINE 50 milliGRAM(s) Oral every 6 hours PRN Agitation and/or Rash and/or Itching  diphenhydrAMINE   Injectable 50 milliGRAM(s) IntraMuscular once PRN severe agitation  LORazepam     Tablet 2 milliGRAM(s) Oral every 6 hours PRN agitation  nicotine  Polacrilex Gum 2 milliGRAM(s) Oral every 2 hours PRN nicotine w/d  OLANZapine 5 milliGRAM(s) Oral every 6 hours PRN agitation  OLANZapine Injectable 5 milliGRAM(s) IntraMuscular once PRN severe agitation  traZODone 100 milliGRAM(s) Oral at bedtime PRN insomnia  
MEDICATIONS  (STANDING):  dextrose 40% Gel 15 Gram(s) Oral once  dextrose 5%. 1000 milliLiter(s) (50 mL/Hr) IV Continuous <Continuous>  dextrose 5%. 1000 milliLiter(s) (100 mL/Hr) IV Continuous <Continuous>  dextrose 50% Injectable 25 Gram(s) IV Push once  dextrose 50% Injectable 12.5 Gram(s) IV Push once  dextrose 50% Injectable 25 Gram(s) IV Push once  diVALproex  milliGRAM(s) Oral two times a day  glucagon  Injectable 1 milliGRAM(s) IntraMuscular once  insulin glargine Injectable (LANTUS) 15 Unit(s) SubCutaneous at bedtime  insulin lispro (ADMELOG) corrective regimen sliding scale   SubCutaneous three times a day before meals  insulin lispro (ADMELOG) corrective regimen sliding scale   SubCutaneous at bedtime  metFORMIN 1000 milliGRAM(s) Oral two times a day  multivitamin 1 Tablet(s) Oral daily  nicotine -  14 mG/24Hr(s) Patch 1 patch Transdermal daily    MEDICATIONS  (PRN):  acetaminophen   Tablet .. 650 milliGRAM(s) Oral every 6 hours PRN Mild Pain (1 - 3), Moderate Pain (4 - 6)  diphenhydrAMINE 50 milliGRAM(s) Oral every 6 hours PRN Agitation and/or Rash and/or Itching  diphenhydrAMINE   Injectable 50 milliGRAM(s) IntraMuscular once PRN severe agitation  LORazepam     Tablet 2 milliGRAM(s) Oral every 6 hours PRN agitation  nicotine  Polacrilex Gum 2 milliGRAM(s) Oral every 2 hours PRN nicotine w/d  OLANZapine 5 milliGRAM(s) Oral every 6 hours PRN agitation  OLANZapine Injectable 5 milliGRAM(s) IntraMuscular once PRN severe agitation  traZODone 100 milliGRAM(s) Oral at bedtime PRN insomnia  
MEDICATIONS  (STANDING):  diVALproex  milliGRAM(s) Oral two times a day  insulin glargine Injectable (LANTUS) 10 Unit(s) SubCutaneous at bedtime  insulin lispro (ADMELOG) corrective regimen sliding scale   SubCutaneous three times a day before meals  metFORMIN 1000 milliGRAM(s) Oral two times a day  multivitamin 1 Tablet(s) Oral daily  nicotine -  14 mG/24Hr(s) Patch 1 patch Transdermal daily    MEDICATIONS  (PRN):  acetaminophen   Tablet .. 650 milliGRAM(s) Oral every 6 hours PRN Mild Pain (1 - 3), Moderate Pain (4 - 6)  diphenhydrAMINE 50 milliGRAM(s) Oral every 6 hours PRN Agitation and/or Rash and/or Itching  diphenhydrAMINE   Injectable 50 milliGRAM(s) IntraMuscular once PRN severe agitation  LORazepam     Tablet 2 milliGRAM(s) Oral every 6 hours PRN agitation  LORazepam   Injectable 2 milliGRAM(s) IntraMuscular once PRN severe agitation  nicotine  Polacrilex Gum 2 milliGRAM(s) Oral every 2 hours PRN nicotine w/d  traZODone 100 milliGRAM(s) Oral at bedtime PRN insomnia

## 2021-06-08 NOTE — BH INPATIENT PSYCHIATRY PROGRESS NOTE - NSTXPSYCHOGOAL_PSY_ALL_CORE
Will identify 2 coping skills that help mitigate hallucinations

## 2021-06-08 NOTE — BH DISCHARGE NOTE NURSING/SOCIAL WORK/PSYCH REHAB - NSDCPEEMAIL_GEN_ALL_CORE
[facility] for Tobacco Control email [email or web address]

## 2021-06-08 NOTE — BH INPATIENT PSYCHIATRY PROGRESS NOTE - NSICDXBHPRIMARYDX_PSY_ALL_CORE
Schizoaffective disorder   F25.9  
Substance induced mood disorder   F19.66  
Schizoaffective disorder   F25.9
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
## 2021-06-08 NOTE — BH DISCHARGE NOTE NURSING/SOCIAL WORK/PSYCH REHAB - NSDCPEWEB_GEN_ALL_CORE
[facility] for Tobacco Control website --- [email or web address] website --- [email or web address]

## 2021-06-08 NOTE — BH DISCHARGE NOTE NURSING/SOCIAL WORK/PSYCH REHAB - DISCHARGE INSTRUCTIONS AFTERCARE APPOINTMENTS
Left voicemail.  Raghu Garay MD  You 1 hour ago (1:07 PM)      Give her a glycerin suppository, which is available over-the-counter.  Repeat in 48 hours.  Ask mom to schedule an appointment for continued symptoms or concerns     LORE Garay    Routing comment         In order to check the location, date, or time of your aftercare appointment, please refer to your Discharge Instructions Document given to you upon leaving the hospital.  If you have lost the instructions please call [phone]

## 2021-06-08 NOTE — BH DISCHARGE NOTE NURSING/SOCIAL WORK/PSYCH REHAB - PATIENT PORTAL LINK FT
You can access the FollowMyHealth Patient Portal offered by [facility] by registering at the following website: [email or web address]. By joining [facility] FollowMyHealth portal, you will also be able to view your health information using other applications (apps) compatible with our system.

## 2021-06-08 NOTE — BH INPATIENT PSYCHIATRY PROGRESS NOTE - NSBHMETABOLIC_PSY_ALL_CORE_FT
BMI:   HbA1c: A1C with Estimated Average Glucose Result: 11.5 % (06-03-21 @ 10:14)    Glucose: POCT Blood Glucose.: 236 mg/dL (06-07-21 @ 11:51)    BP: 104/60 (06-06-21 @ 08:02) (104/60 - 127/84)  Lipid Panel: Date/Time: 06-03-21 @ 10:14  Cholesterol, Serum: 138  Direct LDL: --  HDL Cholesterol, Serum: 44  Total Cholesterol/HDL Ration Measurement: --  Triglycerides, Serum: 94  
BMI:   HbA1c: A1C with Estimated Average Glucose Result: 11.5 % (06-03-21 @ 10:14)    Glucose: POCT Blood Glucose.: 234 mg/dL (06-08-21 @ 11:42)    BP: 116/67 (06-08-21 @ 08:06) (104/60 - 127/84)  Lipid Panel: Date/Time: 06-03-21 @ 10:14  Cholesterol, Serum: 138  Direct LDL: --  HDL Cholesterol, Serum: 44  Total Cholesterol/HDL Ration Measurement: --  Triglycerides, Serum: 94  
BMI:   HbA1c: A1C with Estimated Average Glucose Result: 11.5 % (06-03-21 @ 10:14)    Glucose: POCT Blood Glucose.: 217 mg/dL (06-06-21 @ 07:47)    BP: 104/60 (06-06-21 @ 08:02) (104/60 - 127/84)  Lipid Panel: Date/Time: 06-03-21 @ 10:14  Cholesterol, Serum: 138  Direct LDL: --  HDL Cholesterol, Serum: 44  Total Cholesterol/HDL Ration Measurement: --  Triglycerides, Serum: 94  
BMI:   HbA1c: A1C with Estimated Average Glucose Result: 11.5 % (06-03-21 @ 10:14)    Glucose: POCT Blood Glucose.: 297 mg/dL (06-04-21 @ 11:39)    BP: 112/70 (06-03-21 @ 19:51) (112/70 - 112/70)  Lipid Panel: Date/Time: 06-03-21 @ 10:14  Cholesterol, Serum: 138  Direct LDL: --  HDL Cholesterol, Serum: 44  Total Cholesterol/HDL Ration Measurement: --  Triglycerides, Serum: 94  
BMI:   HbA1c: A1C with Estimated Average Glucose Result: 11.5 % (06-03-21 @ 10:14)    Glucose: POCT Blood Glucose.: 275 mg/dL (06-05-21 @ 07:49)    BP: 112/70 (06-03-21 @ 19:51) (112/70 - 112/70)  Lipid Panel: Date/Time: 06-03-21 @ 10:14  Cholesterol, Serum: 138  Direct LDL: --  HDL Cholesterol, Serum: 44  Total Cholesterol/HDL Ration Measurement: --  Triglycerides, Serum: 94

## 2021-06-08 NOTE — BH INPATIENT PSYCHIATRY DISCHARGE NOTE - NSDCRECOMMENDDIETFT_PSY_ALL_CORE
Please abide by diet discussed with diabetes nurse educator (using the plate method: fill 1/2 of plate with vegetables, 1/4 of plate with protein, and 1/4 of plate with starch)

## 2021-06-08 NOTE — BH DISCHARGE NOTE NURSING/SOCIAL WORK/PSYCH REHAB - NSDCPRRECOMMEND_PSY_ALL_CORE
Psych rehab staff recommends that pt continue to explore appropriate outpatient treatment services for medication management, support, and psychotherapy services. Pt has follow up with [employer].

## 2021-06-08 NOTE — BH INPATIENT PSYCHIATRY DISCHARGE NOTE - HOSPITAL COURSE
Pt initially presented reports SI and CAH in the context of possible homelessness, cocaine use, and non-adherence with meds (Depakote) and outpt treatment. He presented as quite loud, animated, and irritable though was without any karsten delusions or other acute symptoms of psychosis. Pt also did not meet criteria for aron or a major depressive episode.     Pt was restarted on Depakote DR 500mg BID (serum level 51). He was initially offered Risperdal as well, though refused this (as well as other antipsychotics) and so it was discontinued (also due to no clear indication as pt did not present acutely psychotic on further evaluation). Irritability improved over hospitalization though pt did remain somewhat intrusive and demanding at times (largely related to wish for discharge). SI and CAH resolved on admission and pt consistently denied both throughout hospitalization (and quickly came up with own discharge plan to live with his sister). Mood was stable and pt slept and ate well. He was in good behavioral control and never became aggressive or violent (did not require IM prns or restraints). He was interactive in the unit milieu, social with select peers, and attended group therapy. He tended to ADLs independently and appropriately. He was hopeful about the future and demonstrated future-oriented thinking (discussed wish to attend family members birthday party, plan to attend court case, and wish to spend time with various family members).     Pt was discharged with 30-day supply of Depakote DR 500mg BID, as well as Insulin (nutritional and basal) and required supplies.

## 2021-06-08 NOTE — BH INPATIENT PSYCHIATRY PROGRESS NOTE - NSBHFUPINTERVALHXFT_PSY_A_CORE
Pt compliant with medication and tolerating it well.  No events reported overnight.  Patient cooperative with interview today, per nursing has been more pleasant.  Pt denies SI/HI/I/P or AH/VH or paranoia.  Pt reports eating and sleeping well.  Pt reports he came into the hospital because he was feeling depressed, but no longer feels depressed and is ready to be discharged.  Pt reports using cocaine and ETOH prior to admission, but does not attribute his substance use to his mood change.
No acute events overnight. Pt compliant with meds and slept well. Remains very discharge-focused and intrusive with staff regarding wish to leave the hospital. Mood stable. Denies SIIP and HIIP. Denies all symptoms of psychosis, including CAH. Appetite and energy remains good/at baseline. States intent to remain sober upon discharge. Remains in good behavioral control without any aggression or violence. Tending to ADLs independently and appropriately. Participated in education regarding diabetes self-management from diabetes nurse educator. 
Pt compliant with medication and tolerating it well.  No events reported overnight.  Pt in bed during interview, but reports he was out for breakfast.  Pt more irritable today, but has been in behavioral control.  Pt asking for discharge on Tuesday.  Pt superficially cooperative and denies all sx.
No acute events overnight. Pt compliant with meds, utilized prns last night and slept well thereafter. Mostly keeping to himself on the unit. Today pt more cooperative and amenable to interview though does remain discharge-focused with plan to stay with his sister. Reports stable mood. Denies SIIP and HIIP. Denies symptoms of psychosis including delusions of surveillance, IOR, AH, VH, and other paranoia. Says he is hoping to make it to his god mother's birthday party next week. Says he feels well-supported by family and denies being homeless. Agreeable to c/w Depakote though refusing all antipsychotics. 
No acute events over the weekend. Pt compliant with meds. Slept well. Remains in good behavioral control. Mood stable and pt noted to be much less irritable, more cooperative and pleasant. Mostly sticks to himself though noted to be out in the unit community more and attending some groups. Denies SIIP and HIIP. Denies all symptoms of psychosis, including AH/CAH (as previously reported). Reports feeling some anxiety about an impending court date for DWI charge. Looking forward to attending family member's birthday party later this week and still intends to stay with his sister upon discharge. Finger sticks noted to be very high over the weekend with need to adjust Insulin, pt has difficulty appreciating the importance of this.

## 2021-06-08 NOTE — BH INPATIENT PSYCHIATRY PROGRESS NOTE - NSBHINPTBILLING_PSY_ALL_CORE
24673 - Inpatient Moderate Complexity
27245 - Inpatient Moderate Complexity
33327 - Inpatient Moderate Complexity
17674 - Inpatient Moderate Complexity
29568 - Inpatient Moderate Complexity

## 2021-06-08 NOTE — BH INPATIENT PSYCHIATRY PROGRESS NOTE - NSTXPROBIMPULS_PSY_ALL_CORE
IMPULSIVITY/AGITATION

## 2021-06-08 NOTE — BH DISCHARGE NOTE NURSING/SOCIAL WORK/PSYCH REHAB - NSDCPRGOAL_PSY_ALL_CORE
Upon arrival to unit, pt presented in an irritable, loud, disorganized, illogical, and delusional state. Pt had very poor insight. According to pt's chart, one week prior to admission, pt endorsed having CAH telling him to kill himself however upon admission no longer endorsed CAH. Throughout hospitalization, pt became less intrusive and irritable, and more self isolative to personal room. Pt very perseverative on discharge time/date and is consistently asking to speak with staff regarding discharge plan although he has been informed about plan. Pt gets very close to others when speaking to them with an intensity, however is able to be redirected at discharge. Pt was calm and cooperative during meeting with writer. Pt was able to engage in the safety planning process where he was able to identify coping skills such as listening to music, taking walks, and taking long showers. Pt endorsed engaging in coping outside of the hospital. Pt was present during fresh air breaks and music listening groups. Pt endorsed loving music, especially Paul-Z. Pt remains minimizing of severity of diabetes, however has gone through diabetes education with staff and educated on the severity of the illness. Pt denies SI/HI/I/P and psychotic symptomology at this time.

## 2021-06-08 NOTE — PROGRESS NOTE ADULT - SUBJECTIVE AND OBJECTIVE BOX
Patient is a 51y old  Male who presents with a chief complaint of DM2    SUBJECTIVE / OVERNIGHT EVENTS: feels ok, pacing the hallways - fixated on being discharged     ROS:  No HA/DZ  No Vision changes   No CP, SOB  No N/V/D  No Edema  No Rash  NO weakness, numbness    MEDICATIONS  (STANDING):  diVALproex  milliGRAM(s) Oral two times a day  insulin glargine Injectable (LANTUS) 20 Unit(s) SubCutaneous at bedtime  insulin lispro (ADMELOG) corrective regimen sliding scale   SubCutaneous three times a day before meals  insulin lispro (ADMELOG) corrective regimen sliding scale   SubCutaneous at bedtime  insulin lispro Injectable (ADMELOG) 5 Unit(s) SubCutaneous three times a day before meals  metFORMIN 1000 milliGRAM(s) Oral <User Schedule>  multivitamin 1 Tablet(s) Oral daily  nicotine -  14 mG/24Hr(s) Patch 1 patch Transdermal daily    MEDICATIONS  (PRN):  acetaminophen   Tablet .. 650 milliGRAM(s) Oral every 6 hours PRN Mild Pain (1 - 3), Moderate Pain (4 - 6)  diphenhydrAMINE 50 milliGRAM(s) Oral every 6 hours PRN Agitation and/or Rash and/or Itching  diphenhydrAMINE   Injectable 50 milliGRAM(s) IntraMuscular once PRN severe agitation  LORazepam     Tablet 2 milliGRAM(s) Oral every 6 hours PRN agitation  nicotine  Polacrilex Gum 2 milliGRAM(s) Oral every 2 hours PRN nicotine w/d  OLANZapine 5 milliGRAM(s) Oral every 6 hours PRN agitation  OLANZapine Injectable 5 milliGRAM(s) IntraMuscular once PRN severe agitation  traZODone 100 milliGRAM(s) Oral at bedtime PRN insomnia      T(C): 36.7 (06-08-21 @ 08:06)  HR: 66 (06-08-21 @ 08:06)  BP: 116/67 (06-08-21 @ 08:06)  RR: --12  SpO2: --  CAPILLARY BLOOD GLUCOSE      POCT Blood Glucose.: 176 mg/dL (08 Jun 2021 08:04)  POCT Blood Glucose.: 369 mg/dL (07 Jun 2021 20:23)  POCT Blood Glucose.: 364 mg/dL (07 Jun 2021 16:53)  POCT Blood Glucose.: 236 mg/dL (07 Jun 2021 11:51)    I&O's Summary      PHYSICAL EXAM:  GENERAL: NAD, well-developed  HEAD:  Atraumatic, Normocephalic  EYES: EOMI, PERRL, conjunctiva and sclera clear  NECK: Supple, No JVD  CHEST/LUNG: Clear to auscultation bilaterally  HEART: Regular rate and rhythm; No murmurs, rubs, or gallops, No Edema  ABDOMEN: Soft, Nontender, Nondistended; Bowel sounds present  EXTREMITIES:  2+ Peripheral Pulses, No clubbing, cyanosis  PSYCH: No SI/HI  NEUROLOGY: non-focal  SKIN: No rashes or lesions    LABS:                        RADIOLOGY & ADDITIONAL TESTS:    Imaging Personally Reviewed:    Consultant(s) Notes Reviewed:      Care Discussed with Consultants/Other Providers:

## 2021-06-08 NOTE — ADVANCED PRACTICE NURSE CONSULT - RECOMMEDATIONS
Case discussed with Dr. Ragland for diabetes medication regimen  Requested for NP Abbie to order a glucometer for patient to learn before D/C  Endorsed to primary nurse
Endorsed to primary nurse for patient to administer his insulin and check his blood glucose with staff for lunch.  Patient is to follow up with PCP, dental and ortho

## 2021-06-08 NOTE — PROGRESS NOTE ADULT - ASSESSMENT
51y yo Male with PMH of DM2 is admitted to [facility] with a primary psychiatric diagnosis of Schizoaffective disorder, with uncontrolled FS    DM2 - remains uncontrolled, particularly post prandial. AM FS low 176 - inc basal lantus to 30 units and premeals to 10 TID. cw NISS - monitor FS, diabetic education and teaching, nutrition eval, tight diet control     LDL controlled - no need for statin at this time    psych - per primary team, monitor QTC on psychotropics     check CBC while on psychotropics to ensure WBC stable - pending collection    51y yo Male with PMH of DM2 is admitted to [facility] with a primary psychiatric diagnosis of Schizoaffective disorder, with uncontrolled FS    DM2 - remains uncontrolled, particularly post prandial. coverage requirements reviwed - required 13 units coverage. AM FS low 176 - inc basal lantus to 26 units and premeals to 8 TID. cw NISS - monitor FS, diabetic education and teaching, nutrition eval, tight diet control     LDL controlled - no need for statin at this time    psych - per primary team, monitor QTC on psychotropics     check CBC while on psychotropics to ensure WBC stable - pending collection
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
## 2021-06-08 NOTE — BH INPATIENT PSYCHIATRY DISCHARGE NOTE - NSDCMRMEDTOKEN_GEN_ALL_CORE_FT
Admelog SoloStar 100 units/mL injectable solution: 8 unit(s) subcutaneous 3 times a day  (5 to 15 min before meals)   Alcohol Swabs: Apply topically to affected area 4 times a day   BD esmer pen needles: 1 unit(s) subcutaneous 4 times a day   Depakote 500 mg oral delayed release tablet: 1 tab(s) orally 2 times a day  Glucometer: as per insurance  Lancets: Test blood sugar 4 times daily   metFORMIN 1000 mg oral tablet: 1 tab(s) orally 2 times a day   Semglee Prefilled Pen 100 units/mL subcutaneous solution: 26 unit(s) subcutaneous once a day (at bedtime)   Test strips: 1 strip(s) 4 times a day

## 2021-06-08 NOTE — BH INPATIENT PSYCHIATRY DISCHARGE NOTE - NSBHSUICIDESTATUS_PSY_ALL_CORE
Acute risk factors include recent SI and CAH (reported by patient) in the context on possible homelessness, cocaine use, and non-adherence to meds and outpt treatment, now treated with inpatient hospitalization. Other protective factors that mitigate acute risks include pt has consistently denied SIIP during hospitalization, he has been in good behavioral control, he is without evidence of acute mood or psychotic episode, he is now compliant with meds and motivated to engage in outpt treatment, he is sleeping well, he is hopeful and future-oriented, he has supportive sister who will provide stable domicile for him, he does not have access to a gun, and he is able to engage in safety planning. Pt remains at elevated CHRONIC risk of harm to self given his hx of many prior hospitalizations (including at [facility]), hx of several prior serious suicide attempts, hx of polysubstance use/abuse, chronic medical problems (diabetes), long hx of non-adherence to meds, and outpt treatment; however acute risk mitigated by current hospitalization and the above protective factors. Pt agreeable to contact outpt providers, call 911, or go to the nearest ED with any imminent safety concerns for self or others.

## 2021-06-08 NOTE — BH INPATIENT PSYCHIATRY PROGRESS NOTE - NSBHCHARTREVIEWLAB_PSY_A_CORE FT
CBC Full  -  ( 08 Jun 2021 09:41 )  WBC Count : 4.67 K/uL  RBC Count : 5.38 M/uL  Hemoglobin : 16.1 g/dL  Hematocrit : 48.9 %  Platelet Count - Automated : 371 K/uL  Mean Cell Volume : 90.9 fL  Mean Cell Hemoglobin : 29.9 pg  Mean Cell Hemoglobin Concentration : 32.9 gm/dL  Auto Neutrophil # : x  Auto Lymphocyte # : x  Auto Monocyte # : x  Auto Eosinophil # : x  Auto Basophil # : x  Auto Neutrophil % : x  Auto Lymphocyte % : x  Auto Monocyte % : x  Auto Eosinophil % : x  Auto Basophil % : x    TPro  7.0  /  Alb  3.9  /  TBili  <0.2  /  DBili  <0.2  /  AST  11  /  ALT  15  /  AlkPhos  59  06-08    Depakote level 51

## 2021-06-08 NOTE — BH INPATIENT PSYCHIATRY DISCHARGE NOTE - NSDCRECOMMENDMEDICALFT_PSY_ALL_CORE
Please follow-up with your primary care doctor with regards to your diabetes and ongoing prescribing of Insulin. This is extremely important.  Please follow-up with your primary care doctor (referral via [employer]) with regards to your diabetes and ongoing prescribing of Insulin. This is extremely important.

## 2021-06-08 NOTE — BH INPATIENT PSYCHIATRY PROGRESS NOTE - NSTXIMPULSGOAL_PSY_ALL_CORE
Will be able to recognize 2+ triggers

## 2021-06-08 NOTE — BH INPATIENT PSYCHIATRY PROGRESS NOTE - NSTXPROBDCOPNO_PSY_ALL_CORE
DISCHARGE ISSUE - NON-ADHERENT WITH OUTPATIENT SERVICES

## 2021-06-08 NOTE — BH INPATIENT PSYCHIATRY PROGRESS NOTE - PRN MEDS
MEDICATIONS  (PRN):  acetaminophen   Tablet .. 650 milliGRAM(s) Oral every 6 hours PRN Mild Pain (1 - 3), Moderate Pain (4 - 6)  diphenhydrAMINE 50 milliGRAM(s) Oral every 6 hours PRN Agitation and/or Rash and/or Itching  diphenhydrAMINE   Injectable 50 milliGRAM(s) IntraMuscular once PRN severe agitation  LORazepam     Tablet 2 milliGRAM(s) Oral every 6 hours PRN agitation  LORazepam   Injectable 2 milliGRAM(s) IntraMuscular once PRN severe agitation  nicotine  Polacrilex Gum 2 milliGRAM(s) Oral every 2 hours PRN nicotine w/d  traZODone 100 milliGRAM(s) Oral at bedtime PRN insomnia  
MEDICATIONS  (PRN):  acetaminophen   Tablet .. 650 milliGRAM(s) Oral every 6 hours PRN Mild Pain (1 - 3), Moderate Pain (4 - 6)  diphenhydrAMINE 50 milliGRAM(s) Oral every 6 hours PRN Agitation and/or Rash and/or Itching  diphenhydrAMINE   Injectable 50 milliGRAM(s) IntraMuscular once PRN severe agitation  LORazepam     Tablet 2 milliGRAM(s) Oral every 6 hours PRN agitation  nicotine  Polacrilex Gum 2 milliGRAM(s) Oral every 2 hours PRN nicotine w/d  OLANZapine 5 milliGRAM(s) Oral every 6 hours PRN agitation  OLANZapine Injectable 5 milliGRAM(s) IntraMuscular once PRN severe agitation  traZODone 100 milliGRAM(s) Oral at bedtime PRN insomnia  
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
## 2021-06-08 NOTE — BH INPATIENT PSYCHIATRY PROGRESS NOTE - NSTXSUICIDGOAL_PSY_ALL_CORE
Talk to staff and ask for assistance when having suicidal wishes instead of acting out
117.9
Talk to staff and ask for assistance when having suicidal wishes instead of acting out

## 2021-06-08 NOTE — ADVANCED PRACTICE NURSE CONSULT - REASON FOR CONSULT
Patient was seen for Diabetes self-management
Patient was seen for insulin pen and glucometer teaching

## 2021-06-08 NOTE — BH INPATIENT PSYCHIATRY DISCHARGE NOTE - NSBHDCMEDICALFT_PSY_A_CORE
Pt had very poorly controlled blood sugar during hospitalization and reported he had been non-adherence with Insulin. A1C was found to 11.5. He was restarted on Metformin 1000mg BID and followed by the hospitalist for insulin requirements, which were titrated to Lantus 26U qhs and Ademolog 8U TID (pre-meal). Pt also met with diabetes nurse educator at length for teaching on diabetes self-management.

## 2021-06-08 NOTE — BH INPATIENT PSYCHIATRY PROGRESS NOTE - NSBHMSESPEECH_PSY_A_CORE
Abnormal as indicated, otherwise normal...
Normal volume, rate, productivity, spontaneity and articulation
Abnormal as indicated, otherwise normal...
Normal volume, rate, productivity, spontaneity and articulation
Normal volume, rate, productivity, spontaneity and articulation

## 2021-06-08 NOTE — BH INPATIENT PSYCHIATRY PROGRESS NOTE - NSBHCHARTREVIEWVS_PSY_A_CORE FT
Vital Signs Last 24 Hrs  T(C): 36.7 (08 Jun 2021 08:06), Max: 36.7 (08 Jun 2021 08:06)  T(F): 98 (08 Jun 2021 08:06), Max: 98 (08 Jun 2021 08:06)  HR: 66 (08 Jun 2021 08:06) (66 - 66)  BP: 116/67 (08 Jun 2021 08:06) (116/67 - 116/67)  BP(mean): --  RR: --  SpO2: --
Vital Signs Last 24 Hrs  T(C): 36.3 (04 Jun 2021 08:23), Max: 36.3 (03 Jun 2021 19:51)  T(F): 97.4 (04 Jun 2021 08:23), Max: 97.4 (03 Jun 2021 19:51)  HR: 80 (03 Jun 2021 19:51) (80 - 80)  BP: 112/70 (03 Jun 2021 19:51) (112/70 - 112/70)  BP(mean): --  RR: --  SpO2: --
Vital Signs Last 24 Hrs  T(C): 36.6 (07 Jun 2021 08:09), Max: 36.6 (07 Jun 2021 08:09)  T(F): 97.8 (07 Jun 2021 08:09), Max: 97.8 (07 Jun 2021 08:09)    Orthostatic VS    06-07-21 @ 08:09  Lying BP: --/-- HR: --   Sitting BP: 101/67 HR: 68  Standing BP: --/-- HR: --  Site: --   Mode: --  
Vital Signs Last 24 Hrs  T(C): 36.6 (06 Jun 2021 08:02), Max: 36.6 (06 Jun 2021 08:02)  T(F): 97.9 (06 Jun 2021 08:02), Max: 97.9 (06 Jun 2021 08:02)  HR: 68 (06 Jun 2021 08:02) (68 - 94)  BP: 104/60 (06 Jun 2021 08:02) (104/60 - 127/84)  BP(mean): --  RR: --  SpO2: --
Vital Signs Last 24 Hrs  T(C): 36.6 (05 Jun 2021 08:27), Max: 36.6 (05 Jun 2021 08:27)  T(F): 97.8 (05 Jun 2021 08:27), Max: 97.8 (05 Jun 2021 08:27)  HR: --  BP: --  BP(mean): --  RR: --  SpO2: --

## 2021-06-08 NOTE — BH INPATIENT PSYCHIATRY DISCHARGE NOTE - NSBHMETABOLIC_PSY_ALL_CORE_FT
BMI:   HbA1c: A1C with Estimated Average Glucose Result: 11.5 % (06-03-21 @ 10:14)    Glucose: POCT Blood Glucose.: 234 mg/dL (06-08-21 @ 11:42)    BP: 116/67 (06-08-21 @ 08:06) (104/60 - 127/84)  Lipid Panel: Date/Time: 06-03-21 @ 10:14  Cholesterol, Serum: 138  Direct LDL: --  HDL Cholesterol, Serum: 44  Total Cholesterol/HDL Ration Measurement: --  Triglycerides, Serum: 94

## 2021-06-17 ENCOUNTER — EMERGENCY (EMERGENCY)
Facility: HOSPITAL | Age: 51
LOS: 1 days | Discharge: OTHER INSTITUTION | End: 2021-06-17
Attending: EMERGENCY MEDICINE
Payer: MEDICAID

## 2021-06-17 VITALS
HEART RATE: 68 BPM | DIASTOLIC BLOOD PRESSURE: 76 MMHG | RESPIRATION RATE: 18 BRPM | TEMPERATURE: 98 F | SYSTOLIC BLOOD PRESSURE: 117 MMHG | HEIGHT: 72 IN | OXYGEN SATURATION: 96 %

## 2021-06-17 DIAGNOSIS — F32.9 MAJOR DEPRESSIVE DISORDER, SINGLE EPISODE, UNSPECIFIED: ICD-10-CM

## 2021-06-17 DIAGNOSIS — F14.10 COCAINE ABUSE, UNCOMPLICATED: ICD-10-CM

## 2021-06-17 LAB
ALBUMIN SERPL ELPH-MCNC: 3.8 G/DL — SIGNIFICANT CHANGE UP (ref 3.3–5.2)
ALP SERPL-CCNC: 71 U/L — SIGNIFICANT CHANGE UP (ref 40–120)
ALT FLD-CCNC: 22 U/L — SIGNIFICANT CHANGE UP
AMPHET UR-MCNC: NEGATIVE — SIGNIFICANT CHANGE UP
ANION GAP SERPL CALC-SCNC: 11 MMOL/L — SIGNIFICANT CHANGE UP (ref 5–17)
APAP SERPL-MCNC: <3 UG/ML — LOW (ref 10–26)
APPEARANCE UR: CLEAR — SIGNIFICANT CHANGE UP
AST SERPL-CCNC: 26 U/L — SIGNIFICANT CHANGE UP
BARBITURATES UR SCN-MCNC: NEGATIVE — SIGNIFICANT CHANGE UP
BASE EXCESS BLDV CALC-SCNC: 1.2 MMOL/L — SIGNIFICANT CHANGE UP (ref -2–3)
BASOPHILS # BLD AUTO: 0.02 K/UL — SIGNIFICANT CHANGE UP (ref 0–0.2)
BASOPHILS NFR BLD AUTO: 0.4 % — SIGNIFICANT CHANGE UP (ref 0–2)
BENZODIAZ UR-MCNC: NEGATIVE — SIGNIFICANT CHANGE UP
BILIRUB SERPL-MCNC: 0.3 MG/DL — LOW (ref 0.4–2)
BILIRUB UR-MCNC: NEGATIVE — SIGNIFICANT CHANGE UP
BUN SERPL-MCNC: 10.2 MG/DL — SIGNIFICANT CHANGE UP (ref 8–20)
CA-I SERPL-SCNC: 1.15 MMOL/L — SIGNIFICANT CHANGE UP (ref 1.15–1.33)
CALCIUM SERPL-MCNC: 9.1 MG/DL — SIGNIFICANT CHANGE UP (ref 8.6–10.2)
CHLORIDE BLDV-SCNC: 100 MMOL/L — SIGNIFICANT CHANGE UP (ref 98–107)
CHLORIDE SERPL-SCNC: 101 MMOL/L — SIGNIFICANT CHANGE UP (ref 98–107)
CO2 SERPL-SCNC: 25 MMOL/L — SIGNIFICANT CHANGE UP (ref 22–29)
COCAINE METAB.OTHER UR-MCNC: POSITIVE
COLOR SPEC: YELLOW — SIGNIFICANT CHANGE UP
CREAT SERPL-MCNC: 0.9 MG/DL — SIGNIFICANT CHANGE UP (ref 0.5–1.3)
DIFF PNL FLD: NEGATIVE — SIGNIFICANT CHANGE UP
EOSINOPHIL # BLD AUTO: 0.16 K/UL — SIGNIFICANT CHANGE UP (ref 0–0.5)
EOSINOPHIL NFR BLD AUTO: 3.4 % — SIGNIFICANT CHANGE UP (ref 0–6)
ETHANOL SERPL-MCNC: <10 MG/DL — SIGNIFICANT CHANGE UP (ref 0–9)
GAS PNL BLDV: 133 MMOL/L — LOW (ref 136–145)
GAS PNL BLDV: SIGNIFICANT CHANGE UP
GLUCOSE BLDC GLUCOMTR-MCNC: 268 MG/DL — HIGH (ref 70–99)
GLUCOSE BLDV-MCNC: 320 MG/DL — HIGH (ref 70–99)
GLUCOSE SERPL-MCNC: 299 MG/DL — HIGH (ref 70–99)
GLUCOSE UR QL: 1000 MG/DL
HCO3 BLDV-SCNC: 28 MMOL/L — SIGNIFICANT CHANGE UP (ref 22–29)
HCT VFR BLD CALC: 49.3 % — SIGNIFICANT CHANGE UP (ref 39–50)
HCT VFR BLDA CALC: 50 % — SIGNIFICANT CHANGE UP (ref 39–51)
HGB BLD CALC-MCNC: 16.7 G/DL — SIGNIFICANT CHANGE UP (ref 12.6–17.4)
HGB BLD-MCNC: 16.2 G/DL — SIGNIFICANT CHANGE UP (ref 13–17)
IMM GRANULOCYTES NFR BLD AUTO: 0.2 % — SIGNIFICANT CHANGE UP (ref 0–1.5)
KETONES UR-MCNC: ABNORMAL
LACTATE BLDV-MCNC: 1.4 MMOL/L — SIGNIFICANT CHANGE UP (ref 0.5–2)
LEUKOCYTE ESTERASE UR-ACNC: NEGATIVE — SIGNIFICANT CHANGE UP
LYMPHOCYTES # BLD AUTO: 1.88 K/UL — SIGNIFICANT CHANGE UP (ref 1–3.3)
LYMPHOCYTES # BLD AUTO: 40 % — SIGNIFICANT CHANGE UP (ref 13–44)
MCHC RBC-ENTMCNC: 30.2 PG — SIGNIFICANT CHANGE UP (ref 27–34)
MCHC RBC-ENTMCNC: 32.9 GM/DL — SIGNIFICANT CHANGE UP (ref 32–36)
MCV RBC AUTO: 91.8 FL — SIGNIFICANT CHANGE UP (ref 80–100)
METHADONE UR-MCNC: NEGATIVE — SIGNIFICANT CHANGE UP
MONOCYTES # BLD AUTO: 0.65 K/UL — SIGNIFICANT CHANGE UP (ref 0–0.9)
MONOCYTES NFR BLD AUTO: 13.8 % — SIGNIFICANT CHANGE UP (ref 2–14)
NEUTROPHILS # BLD AUTO: 1.98 K/UL — SIGNIFICANT CHANGE UP (ref 1.8–7.4)
NEUTROPHILS NFR BLD AUTO: 42.2 % — LOW (ref 43–77)
NITRITE UR-MCNC: NEGATIVE — SIGNIFICANT CHANGE UP
OPIATES UR-MCNC: NEGATIVE — SIGNIFICANT CHANGE UP
PCO2 BLDV: 56 MMHG — HIGH (ref 42–55)
PCP SPEC-MCNC: SIGNIFICANT CHANGE UP
PCP UR-MCNC: NEGATIVE — SIGNIFICANT CHANGE UP
PH BLDV: 7.3 — LOW (ref 7.32–7.43)
PH UR: 5 — SIGNIFICANT CHANGE UP (ref 5–8)
PLATELET # BLD AUTO: 282 K/UL — SIGNIFICANT CHANGE UP (ref 150–400)
PO2 BLDV: 44 MMHG — SIGNIFICANT CHANGE UP (ref 25–45)
POTASSIUM BLDV-SCNC: 4.3 MMOL/L — SIGNIFICANT CHANGE UP (ref 3.5–5.1)
POTASSIUM SERPL-MCNC: 4.3 MMOL/L — SIGNIFICANT CHANGE UP (ref 3.5–5.3)
POTASSIUM SERPL-SCNC: 4.3 MMOL/L — SIGNIFICANT CHANGE UP (ref 3.5–5.3)
PROT SERPL-MCNC: 6.9 G/DL — SIGNIFICANT CHANGE UP (ref 6.6–8.7)
PROT UR-MCNC: NEGATIVE MG/DL — SIGNIFICANT CHANGE UP
RBC # BLD: 5.37 M/UL — SIGNIFICANT CHANGE UP (ref 4.2–5.8)
RBC # FLD: 12.6 % — SIGNIFICANT CHANGE UP (ref 10.3–14.5)
SALICYLATES SERPL-MCNC: <0.6 MG/DL — LOW (ref 10–20)
SAO2 % BLDV: 78.2 % — SIGNIFICANT CHANGE UP
SARS-COV-2 RNA SPEC QL NAA+PROBE: SIGNIFICANT CHANGE UP
SODIUM SERPL-SCNC: 137 MMOL/L — SIGNIFICANT CHANGE UP (ref 135–145)
SP GR SPEC: 1.02 — SIGNIFICANT CHANGE UP (ref 1.01–1.02)
THC UR QL: NEGATIVE — SIGNIFICANT CHANGE UP
UROBILINOGEN FLD QL: NEGATIVE MG/DL — SIGNIFICANT CHANGE UP
WBC # BLD: 4.7 K/UL — SIGNIFICANT CHANGE UP (ref 3.8–10.5)
WBC # FLD AUTO: 4.7 K/UL — SIGNIFICANT CHANGE UP (ref 3.8–10.5)

## 2021-06-17 PROCEDURE — 85014 HEMATOCRIT: CPT

## 2021-06-17 PROCEDURE — 93005 ELECTROCARDIOGRAM TRACING: CPT

## 2021-06-17 PROCEDURE — 80307 DRUG TEST PRSMV CHEM ANLYZR: CPT

## 2021-06-17 PROCEDURE — 82947 ASSAY GLUCOSE BLOOD QUANT: CPT

## 2021-06-17 PROCEDURE — 84132 ASSAY OF SERUM POTASSIUM: CPT

## 2021-06-17 PROCEDURE — 93010 ELECTROCARDIOGRAM REPORT: CPT

## 2021-06-17 PROCEDURE — U0005: CPT

## 2021-06-17 PROCEDURE — 84295 ASSAY OF SERUM SODIUM: CPT

## 2021-06-17 PROCEDURE — 36415 COLL VENOUS BLD VENIPUNCTURE: CPT

## 2021-06-17 PROCEDURE — 82962 GLUCOSE BLOOD TEST: CPT

## 2021-06-17 PROCEDURE — U0003: CPT

## 2021-06-17 PROCEDURE — 82803 BLOOD GASES ANY COMBINATION: CPT

## 2021-06-17 PROCEDURE — 90792 PSYCH DIAG EVAL W/MED SRVCS: CPT

## 2021-06-17 PROCEDURE — 85018 HEMOGLOBIN: CPT

## 2021-06-17 PROCEDURE — 80053 COMPREHEN METABOLIC PANEL: CPT

## 2021-06-17 PROCEDURE — 82330 ASSAY OF CALCIUM: CPT

## 2021-06-17 PROCEDURE — 99285 EMERGENCY DEPT VISIT HI MDM: CPT

## 2021-06-17 PROCEDURE — 82435 ASSAY OF BLOOD CHLORIDE: CPT

## 2021-06-17 PROCEDURE — 83605 ASSAY OF LACTIC ACID: CPT

## 2021-06-17 PROCEDURE — 85025 COMPLETE CBC W/AUTO DIFF WBC: CPT

## 2021-06-17 PROCEDURE — 99218: CPT

## 2021-06-17 PROCEDURE — 81003 URINALYSIS AUTO W/O SCOPE: CPT

## 2021-06-17 PROCEDURE — G0378: CPT

## 2021-06-17 RX ORDER — INSULIN HUMAN 100 [IU]/ML
6 INJECTION, SOLUTION SUBCUTANEOUS ONCE
Refills: 0 | Status: COMPLETED | OUTPATIENT
Start: 2021-06-17 | End: 2021-06-17

## 2021-06-17 RX ORDER — METFORMIN HYDROCHLORIDE 850 MG/1
1000 TABLET ORAL
Refills: 0 | Status: DISCONTINUED | OUTPATIENT
Start: 2021-06-17 | End: 2021-06-21

## 2021-06-17 RX ORDER — TRAZODONE HCL 50 MG
100 TABLET ORAL AT BEDTIME
Refills: 0 | Status: DISCONTINUED | OUTPATIENT
Start: 2021-06-17 | End: 2021-06-21

## 2021-06-17 RX ORDER — DEXTROSE 50 % IN WATER 50 %
15 SYRINGE (ML) INTRAVENOUS ONCE
Refills: 0 | Status: DISCONTINUED | OUTPATIENT
Start: 2021-06-17 | End: 2021-06-21

## 2021-06-17 RX ORDER — INSULIN LISPRO 100/ML
VIAL (ML) SUBCUTANEOUS
Refills: 0 | Status: DISCONTINUED | OUTPATIENT
Start: 2021-06-17 | End: 2021-06-21

## 2021-06-17 RX ORDER — GLUCAGON INJECTION, SOLUTION 0.5 MG/.1ML
1 INJECTION, SOLUTION SUBCUTANEOUS ONCE
Refills: 0 | Status: DISCONTINUED | OUTPATIENT
Start: 2021-06-17 | End: 2021-06-21

## 2021-06-17 RX ORDER — DIVALPROEX SODIUM 500 MG/1
250 TABLET, DELAYED RELEASE ORAL
Refills: 0 | Status: DISCONTINUED | OUTPATIENT
Start: 2021-06-17 | End: 2021-06-21

## 2021-06-17 RX ORDER — QUETIAPINE FUMARATE 200 MG/1
50 TABLET, FILM COATED ORAL AT BEDTIME
Refills: 0 | Status: DISCONTINUED | OUTPATIENT
Start: 2021-06-17 | End: 2021-06-17

## 2021-06-17 RX ORDER — DEXTROSE 50 % IN WATER 50 %
25 SYRINGE (ML) INTRAVENOUS ONCE
Refills: 0 | Status: DISCONTINUED | OUTPATIENT
Start: 2021-06-17 | End: 2021-06-21

## 2021-06-17 RX ORDER — DEXTROSE 50 % IN WATER 50 %
12.5 SYRINGE (ML) INTRAVENOUS ONCE
Refills: 0 | Status: DISCONTINUED | OUTPATIENT
Start: 2021-06-17 | End: 2021-06-21

## 2021-06-17 RX ORDER — SODIUM CHLORIDE 9 MG/ML
1000 INJECTION, SOLUTION INTRAVENOUS
Refills: 0 | Status: DISCONTINUED | OUTPATIENT
Start: 2021-06-17 | End: 2021-06-21

## 2021-06-17 RX ADMIN — Medication 100 MILLIGRAM(S): at 22:44

## 2021-06-17 RX ADMIN — METFORMIN HYDROCHLORIDE 1000 MILLIGRAM(S): 850 TABLET ORAL at 10:07

## 2021-06-17 RX ADMIN — Medication 4: at 17:11

## 2021-06-17 RX ADMIN — Medication: at 22:47

## 2021-06-17 RX ADMIN — INSULIN HUMAN 6 UNIT(S): 100 INJECTION, SOLUTION SUBCUTANEOUS at 09:27

## 2021-06-17 RX ADMIN — Medication 2: at 11:39

## 2021-06-17 RX ADMIN — METFORMIN HYDROCHLORIDE 1000 MILLIGRAM(S): 850 TABLET ORAL at 17:11
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
## 2021-06-17 NOTE — ED BEHAVIORAL HEALTH ASSESSMENT NOTE - NSBHPSYCHOLCOGORIENT_PSY_A_CORE
Patient was sleeping and was uncooperative during interview./Unable to assess Oriented to time, place, person, situation

## 2021-06-17 NOTE — ED ADULT NURSE NOTE - ALCOHOL PRE SCREEN (AUDIT - C)
Jacqueline Stuart is a 65-year-old female with PMHx significant for HTN, and recently diagnosed metastatic colon cancer.  She presented to the ED with complaint of abdominal pain that started yesterday.  She describes the pain as a mid abdominal pain that is 10/10 at worse with no alleviating or exacerbating factors.  Associated symptoms include nausea without vomiting.  She states initially she thought the pain may be gas, however she has never had a pain that bad.  Pain at this time is down to 4/10.  She reports several days of diarrhea that ceased 2 days ago with Imodium.  Since that time she has had normal bowel movements with last normal bowel movement yesterday.  She denies any bloody or black tarry stool.  She denies any fever, chills, but does endorse generalized weakness that was worse this morning upon wakening.  She denies any mouth sores or pain, chest pain, SOB, burning or blood with urination, flank pain, or leg swelling. She denies any recent travel, sick contact, rale or suspicious food intake, or Hx of MRSA or line infection.  She reports her last chemo treatment was approximately 2 weeks ago and is due to have another round of chemo tomorrow. She is followed closely by Dr. Pettit.  Other pertinent medical Hx as below:   Statement Selected

## 2021-06-17 NOTE — ED BEHAVIORAL HEALTH ASSESSMENT NOTE - NSACTIVEVENT_PSY_ALL_CORE
Triggering events leading to humiliation, shame, and/or despair (e.g., Loss of relationship, financial or health status) (real or anticipated)/Current or pending social isolation/Chronic pain or other acute medical condition/Substance intoxication or withdrawal/Pending incarceration or homelessness/Inadequate social supports

## 2021-06-17 NOTE — ED CDU PROVIDER INITIAL DAY NOTE - OBJECTIVE STATEMENT
50 yo M hx of schizophrenia, stopped taking his medication for 5 weeks, p/w auditory and visual hallucination with suicidal ideation . patient has DM with hyperglycemia, no signes of DKA. Insulin given. patient placed on sliding scale. Urine showed positive cocaine. pending psych consult.

## 2021-06-17 NOTE — ED BEHAVIORAL HEALTH ASSESSMENT NOTE - NSCURPASTPSYDX_PSY_ALL_CORE
Mood disorder/Alcohol/Substance Use disorders Mood disorder/Psychotic disorder/Alcohol/Substance Use disorders

## 2021-06-17 NOTE — CHART NOTE - NSCHARTNOTEFT_GEN_A_CORE
SW Note: SW made aware pt is in need of inpt psych trx on voluntary basis, pt's covid is negative. SW made referral to [facility], SW Note: SW made aware pt is in need of inpt psych trx on voluntary basis, pt's covid is negative. SW made referral to [facility], EKG and legals faxed. NOELLE received call back that pt has been accepted for admission tonight by Dr. Stephens. NOELLE arranged transport via  EMS ([facility]), TONA Gann completed  Transport questions. SW left full trx packet on unit, Pt has kavin, will need f/u in AM as insurance currently closed. No further acute  services identified
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
## 2021-06-17 NOTE — ED ADULT NURSE NOTE - OBJECTIVE STATEMENT
pt presents to ed a&ox3, no acute distress, breaths even and unlabored c/o auditory and visual hallucinations of voices telling him to hurt people. pt denies SI. pt denies wanting to hurt anyone but hears voices telling him to hurt people. pt calm and cooperative. reports he hasn't taken any of his diabetes or psych meds in 6 months. does not give a reason why. reports drank alcohol yesterday. denies alcohol or drug use today.

## 2021-06-17 NOTE — ED BEHAVIORAL HEALTH ASSESSMENT NOTE - RISK ASSESSMENT
RF chronic SI, cocaine use, noncompliance, h/o suicide attempt, poor insight  PF help seeking High Acute Suicide Risk

## 2021-06-17 NOTE — ED BEHAVIORAL HEALTH ASSESSMENT NOTE - OTHER PAST PSYCHIATRIC HISTORY (INCLUDE DETAILS REGARDING ONSET, COURSE OF ILLNESS, INPATIENT/OUTPATIENT TREATMENT)
[place] 9 mo ago  [facility] for 10 yrs per note [facility] 6/2-6/8/21  [place] 9 mo ago  [place] for 10 yrs per note
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
## 2021-06-17 NOTE — ED PROVIDER NOTE - ATTENDING CONTRIBUTION TO CARE
I, Marino Sharma, personally saw the patient with the resident, and completed the key components of the history and physical exam. I then discussed the management plan with the resident.    50 yo M hx of schizophrenia, stopped taking his medication for 5 weeks, p/w auditory and visual hallucination with suicidal ideation . patient has DM with hyperglycemia, no signes of DKA. Insulin given. patient placed on sliding scale. Urine showed positive cocaine. pending psych consult.

## 2021-06-17 NOTE — ED BEHAVIORAL HEALTH ASSESSMENT NOTE - DETAILS
See HPI. Haldol - unknown na to follow unk prior details about HI or agression DM on Insulin and Metformin to kill self and others

## 2021-06-17 NOTE — ED PROVIDER NOTE - ENMT NEGATIVE STATEMENT, MLM
patient Ears: no ear pain and no hearing problems. Nose: no nasal congestion and no nasal drainage. Mouth/Throat: no dysphagia, no hoarseness and no throat pain. Neck: no lumps, no pain, no stiffness and no swollen glands.

## 2021-06-17 NOTE — ED BEHAVIORAL HEALTH ASSESSMENT NOTE - VIOLENCE RISK FACTORS:
Antisocial behavior/cognition (past or present)/Substance abuse/Noncompliance with treatment Antisocial behavior/cognition (past or present)/Substance abuse/Noncompliance with treatment/Community stressors that increase the risk of destabilization/Irritability

## 2021-06-17 NOTE — ED ADULT NURSE NOTE - NS ED NURSE LEVEL OF CONSCIOUSNESS ORIENTATION
Oriented - self; Oriented - place; Oriented - time/Hallucination - visual/Hallucination - audio/Ideation - homicidal

## 2021-06-17 NOTE — ED BEHAVIORAL HEALTH ASSESSMENT NOTE - DIFFERENTIAL
substance induced mood disorder malingering, substance induced mood disorder  COVID Exposure Screen- Patient    1.         *Have you had a COVID-19 test in the last 90 days?  ( x ) Yes  (  ) No   (  ) Unknown- Reason: _____    2.         *Have you tested positive for COVID-19 antibodies? ( x ) Yes   (  ) No   (  ) Unknown- Reason:    3.         *Have you received 2 doses of the COVID-19 vaccine? (  ) Yes   (  x) No   (  ) Unknown- Reason: _____    4.         *In the past 10 days, have you been around anyone with a positive COVID-19 test?* (  ) Yes   (  ) No   ( x ) Unknown- Reason: __homeless__    5.         *Have you been out of [place] within the past 10 days?* (  ) Yes   (x  ) No   (  ) Unknown- Reason: _____

## 2021-06-17 NOTE — ED ADULT NURSE NOTE - NSIMPLEMENTINTERV_GEN_ALL_ED
Implemented All Universal Safety Interventions:  [place] to call system. Call bell, personal items and telephone within reach. Instruct patient to call for assistance. Room bathroom lighting operational. Non-slip footwear when patient is off stretcher. Physically safe environment: no spills, clutter or unnecessary equipment. Stretcher in lowest position, wheels locked, appropriate side rails in place.

## 2021-06-17 NOTE — ED PROVIDER NOTE - OBJECTIVE STATEMENT
50 yo male history of DM, Schizophrenia presents with thoughts of wanting to harm himself. He states that he is homeless and living in the woods. He has not taken his medications in roughly 5 weeks. He is currently experiencing thoughts of wanting to harm himself, he has not yet acted on these thoughts. The patient also states that he is experiencing both auditory and visual hallucinations. Is endorsing HI at this time, has no plan to act on these thoughts. He denies fever/chills, chest pain, SOB, cough, headache, dizziness, LOC, weakness/numbness/tingling in extremities, abd pain, n/v/d, dysuria, hematuria.

## 2021-06-17 NOTE — ED PROVIDER NOTE - CLINICAL SUMMARY MEDICAL DECISION MAKING FREE TEXT BOX
52 yo male presents with SI, HI. Has not taken medications in roughly 5 weeks. Initial BG level is 280. Will obtain basic labs, VBG, U/A, alcohol level. R/O DKA. Plan on sending patient to  once medically cleared and stable.

## 2021-06-17 NOTE — ED BEHAVIORAL HEALTH ASSESSMENT NOTE - DESCRIPTION
homeless, uncooperative with history Diabetes Mellitus - Metformin 1000 mg no agitation or aggression

## 2021-06-17 NOTE — ED BEHAVIORAL HEALTH NOTE - BEHAVIORAL HEALTH NOTE
Attempting eval but Pt sleeping and would not participate.  Refusing to cooperate or answer questions.  Per [facility] chart Pt d/c on 6/8/21 qith follow up at [facility] for 6/15.  Called [facility] to confirm if Pt showed up.  Utox pos cocaine bal<10.  Will follow when awake.

## 2021-06-17 NOTE — ED BEHAVIORAL HEALTH ASSESSMENT NOTE - NSPRESENTSXS_PSY_ALL_CORE
Psychosis Depressed mood/Anhedonia/Psychosis/Hopelessness or despair/Command hallucinations to hurt self/Refusal or inability to complete safety plan

## 2021-06-17 NOTE — ED BEHAVIORAL HEALTH ASSESSMENT NOTE - NS ED BHA PLAN ADMIT TO PSYCHIATRY BH CONTACTED FT
attempted with [facility] who report they did not have correct phone number to confirm appt

## 2021-06-17 NOTE — ED BEHAVIORAL HEALTH ASSESSMENT NOTE - NSSUICRSKFACTOR_PSY_ALL_CORE
Current and Past Psychiatric Diagnoses/Presenting Symptoms Current and Past Psychiatric Diagnoses/Presenting Symptoms/Treatment Related Factors/Activating Events/Stressors

## 2021-06-17 NOTE — ED ADULT TRIAGE NOTE - CHIEF COMPLAINT QUOTE
pt ambulatory into ED, BIBA, a&ox3, no acute distress, breaths even and unlabored c/o auditory and visual hallucinations. reports has been off his DM & psych meds x months. pt denies SI/HI. pt dressed into yellow gown, belongings placed in belongings bag and locked in secure unit by [facility] as per protocol.

## 2021-06-17 NOTE — ED BEHAVIORAL HEALTH ASSESSMENT NOTE - AFFECT QUALITY
Depressed/Irritable Post-Care Instructions: I reviewed with the patient in detail post-care instructions. Patient is not to engage in any heavy lifting, exercise, or swimming for the next 14 days. Should the patient develop any fevers, chills, bleeding, severe pain patient will contact the office immediately.

## 2021-06-17 NOTE — ED PROVIDER NOTE - PHYSICAL EXAMINATION
Const: Awake, alert and oriented. In no acute distress.   HEENT: NC/AT. Moist mucous membranes.  Eyes: No scleral icterus. EOMI.  Neck:. Soft and supple. Full ROM without pain.  Cardiac: Regular rate and regular rhythm. +S1/S2. Peripheral pulses 2+ and symmetric. No LE edema.  Resp: Speaking in full sentences. No evidence of respiratory distress. No wheezes, rales or rhonchi.  Abd: Soft, non-tender, non-distended. Normal bowel sounds in all 4 quadrants. No guarding or rebound.  Back: Spine midline and non-tender. No CVAT.  Skin: No rashes, abrasions or lacerations.  Lymph: No cervical lymphadenopathy.  Neuro: Awake, alert & oriented x 3. Moves all extremities symmetrically. No focal neurologic deficits elicited on exam.   Psychiatric: Endorses SI, HI.

## 2021-06-17 NOTE — ED BEHAVIORAL HEALTH ASSESSMENT NOTE - CURRENT MEDICATION
Metformin 1000 mg 1 tablet BID for DM,  Risperdal 2 mg, oral two times a day, trazodone 100 mg, oral once stop after 1 dose. Metformin 1000 mg 1 tablet BID for DM,  Depakote 500 dr bid, Ademelog Solo Star 8 u tid before meals,  Semglee Pen 100 u sg hs

## 2021-06-17 NOTE — ED BEHAVIORAL HEALTH ASSESSMENT NOTE - OTHER
Unable to assess since patient is laying in bed. Patient was sleeping. Patient was laying with his eyes closed sleeping pending bed availability noncomplaince

## 2021-06-18 VITALS
SYSTOLIC BLOOD PRESSURE: 127 MMHG | HEART RATE: 71 BPM | RESPIRATION RATE: 18 BRPM | TEMPERATURE: 98 F | DIASTOLIC BLOOD PRESSURE: 75 MMHG | OXYGEN SATURATION: 95 %

## 2021-06-18 NOTE — ED ADULT NURSE REASSESSMENT NOTE - STATUS
awaiting transfer, no change
awaiting consult
awaiting consult
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
## 2021-06-18 NOTE — CHART NOTE - NSCHARTNOTEFT_GEN_A_CORE
Brian; p/c to pt's insurance Ernesto doyle( [phone] ) spoke with Nikia MARRERO who informed worker [facility] called already , auth #[id number] given. Worker called [facility] /info confirmed. No other issues reported at this moment.

## 2021-06-18 NOTE — ED ADULT NURSE REASSESSMENT NOTE - GENERAL PATIENT STATE
comfortable appearance/cooperative
resting/sleeping
comfortable appearance/cooperative
comfortable appearance/resting/sleeping

## 2021-06-22 NOTE — SOCIAL WORK POST DISCHARGE FOLLOW UP NOTE - NSBHSWFOLLOWUP_PSY_ALL_CORE_FT
Writer attempted to contact pts number provided however its is not a good working number for pt, number states its [facility]. Pt was admitted and discharged same day. treatment feels pt was stable, familiar with emergency contacts and community supports.

## 2021-07-28 ENCOUNTER — EMERGENCY (EMERGENCY)
Facility: HOSPITAL | Age: 51
LOS: 1 days | Discharge: HOME | End: 2021-07-28
Attending: EMERGENCY MEDICINE
Payer: MEDICAID

## 2021-07-28 ENCOUNTER — INPATIENT (INPATIENT)
Facility: HOSPITAL | Age: 51
LOS: 4 days | Discharge: HOME | DRG: 750 | End: 2021-08-02
Attending: PSYCHIATRY & NEUROLOGY | Admitting: PSYCHIATRY & NEUROLOGY
Payer: MEDICAID

## 2021-07-28 VITALS
SYSTOLIC BLOOD PRESSURE: 107 MMHG | RESPIRATION RATE: 20 BRPM | HEART RATE: 95 BPM | WEIGHT: 130.07 LBS | HEIGHT: 72 IN | TEMPERATURE: 98 F | OXYGEN SATURATION: 95 % | DIASTOLIC BLOOD PRESSURE: 74 MMHG

## 2021-07-28 VITALS
SYSTOLIC BLOOD PRESSURE: 121 MMHG | OXYGEN SATURATION: 97 % | RESPIRATION RATE: 18 BRPM | HEART RATE: 72 BPM | TEMPERATURE: 98 F | DIASTOLIC BLOOD PRESSURE: 76 MMHG

## 2021-07-28 VITALS — RESPIRATION RATE: 16 BRPM | OXYGEN SATURATION: 98 % | HEIGHT: 66 IN | TEMPERATURE: 99 F | WEIGHT: 147.27 LBS

## 2021-07-28 DIAGNOSIS — Z72.89 OTHER PROBLEMS RELATED TO LIFESTYLE: ICD-10-CM

## 2021-07-28 DIAGNOSIS — F25.9 SCHIZOAFFECTIVE DISORDER, UNSPECIFIED: ICD-10-CM

## 2021-07-28 DIAGNOSIS — E11.9 TYPE 2 DIABETES MELLITUS WITHOUT COMPLICATIONS: ICD-10-CM

## 2021-07-28 DIAGNOSIS — F25.0 SCHIZOAFFECTIVE DISORDER, BIPOLAR TYPE: ICD-10-CM

## 2021-07-28 DIAGNOSIS — F14.11 COCAINE ABUSE, IN REMISSION: ICD-10-CM

## 2021-07-28 LAB
ALBUMIN SERPL ELPH-MCNC: 3.9 G/DL — SIGNIFICANT CHANGE UP (ref 3.3–5.2)
ALP SERPL-CCNC: 58 U/L — SIGNIFICANT CHANGE UP (ref 40–120)
ALT FLD-CCNC: 19 U/L — SIGNIFICANT CHANGE UP
AMPHET UR-MCNC: NEGATIVE — SIGNIFICANT CHANGE UP
ANION GAP SERPL CALC-SCNC: 13 MMOL/L — SIGNIFICANT CHANGE UP (ref 5–17)
APAP SERPL-MCNC: <3 UG/ML — LOW (ref 10–26)
APPEARANCE UR: CLEAR — SIGNIFICANT CHANGE UP
AST SERPL-CCNC: 24 U/L — SIGNIFICANT CHANGE UP
BARBITURATES UR SCN-MCNC: NEGATIVE — SIGNIFICANT CHANGE UP
BASOPHILS # BLD AUTO: 0.04 K/UL — SIGNIFICANT CHANGE UP (ref 0–0.2)
BASOPHILS NFR BLD AUTO: 0.8 % — SIGNIFICANT CHANGE UP (ref 0–2)
BENZODIAZ UR-MCNC: NEGATIVE — SIGNIFICANT CHANGE UP
BILIRUB SERPL-MCNC: 0.2 MG/DL — LOW (ref 0.4–2)
BILIRUB UR-MCNC: NEGATIVE — SIGNIFICANT CHANGE UP
BUN SERPL-MCNC: 8.5 MG/DL — SIGNIFICANT CHANGE UP (ref 8–20)
CALCIUM SERPL-MCNC: 9 MG/DL — SIGNIFICANT CHANGE UP (ref 8.6–10.2)
CHLORIDE SERPL-SCNC: 104 MMOL/L — SIGNIFICANT CHANGE UP (ref 98–107)
CO2 SERPL-SCNC: 22 MMOL/L — SIGNIFICANT CHANGE UP (ref 22–29)
COCAINE METAB.OTHER UR-MCNC: POSITIVE
COLOR SPEC: YELLOW — SIGNIFICANT CHANGE UP
COVID-19 SPIKE DOMAIN AB INTERP: POSITIVE
COVID-19 SPIKE DOMAIN ANTIBODY RESULT: >250 U/ML — HIGH
CREAT SERPL-MCNC: 0.72 MG/DL — SIGNIFICANT CHANGE UP (ref 0.5–1.3)
DIFF PNL FLD: NEGATIVE — SIGNIFICANT CHANGE UP
EOSINOPHIL # BLD AUTO: 0.13 K/UL — SIGNIFICANT CHANGE UP (ref 0–0.5)
EOSINOPHIL NFR BLD AUTO: 2.5 % — SIGNIFICANT CHANGE UP (ref 0–6)
ETHANOL SERPL-MCNC: 42 MG/DL — HIGH (ref 0–9)
GLUCOSE SERPL-MCNC: 114 MG/DL — HIGH (ref 70–99)
GLUCOSE UR QL: 1000 MG/DL
HCT VFR BLD CALC: 47.2 % — SIGNIFICANT CHANGE UP (ref 39–50)
HGB BLD-MCNC: 15.7 G/DL — SIGNIFICANT CHANGE UP (ref 13–17)
IMM GRANULOCYTES NFR BLD AUTO: 0.2 % — SIGNIFICANT CHANGE UP (ref 0–1.5)
KETONES UR-MCNC: NEGATIVE — SIGNIFICANT CHANGE UP
LEUKOCYTE ESTERASE UR-ACNC: NEGATIVE — SIGNIFICANT CHANGE UP
LYMPHOCYTES # BLD AUTO: 2.07 K/UL — SIGNIFICANT CHANGE UP (ref 1–3.3)
LYMPHOCYTES # BLD AUTO: 40.3 % — SIGNIFICANT CHANGE UP (ref 13–44)
MCHC RBC-ENTMCNC: 30.4 PG — SIGNIFICANT CHANGE UP (ref 27–34)
MCHC RBC-ENTMCNC: 33.3 GM/DL — SIGNIFICANT CHANGE UP (ref 32–36)
MCV RBC AUTO: 91.3 FL — SIGNIFICANT CHANGE UP (ref 80–100)
METHADONE UR-MCNC: NEGATIVE — SIGNIFICANT CHANGE UP
MONOCYTES # BLD AUTO: 0.51 K/UL — SIGNIFICANT CHANGE UP (ref 0–0.9)
MONOCYTES NFR BLD AUTO: 9.9 % — SIGNIFICANT CHANGE UP (ref 2–14)
NEUTROPHILS # BLD AUTO: 2.38 K/UL — SIGNIFICANT CHANGE UP (ref 1.8–7.4)
NEUTROPHILS NFR BLD AUTO: 46.3 % — SIGNIFICANT CHANGE UP (ref 43–77)
NITRITE UR-MCNC: NEGATIVE — SIGNIFICANT CHANGE UP
OPIATES UR-MCNC: NEGATIVE — SIGNIFICANT CHANGE UP
PCP SPEC-MCNC: SIGNIFICANT CHANGE UP
PCP UR-MCNC: NEGATIVE — SIGNIFICANT CHANGE UP
PH UR: 7 — SIGNIFICANT CHANGE UP (ref 5–8)
PLATELET # BLD AUTO: 304 K/UL — SIGNIFICANT CHANGE UP (ref 150–400)
POTASSIUM SERPL-MCNC: 3.7 MMOL/L — SIGNIFICANT CHANGE UP (ref 3.5–5.3)
POTASSIUM SERPL-SCNC: 3.7 MMOL/L — SIGNIFICANT CHANGE UP (ref 3.5–5.3)
PROT SERPL-MCNC: 6.5 G/DL — LOW (ref 6.6–8.7)
PROT UR-MCNC: NEGATIVE MG/DL — SIGNIFICANT CHANGE UP
RBC # BLD: 5.17 M/UL — SIGNIFICANT CHANGE UP (ref 4.2–5.8)
RBC # FLD: 12.7 % — SIGNIFICANT CHANGE UP (ref 10.3–14.5)
SALICYLATES SERPL-MCNC: <0.6 MG/DL — LOW (ref 10–20)
SARS-COV-2 IGG+IGM SERPL QL IA: >250 U/ML — HIGH
SARS-COV-2 IGG+IGM SERPL QL IA: POSITIVE
SARS-COV-2 RNA SPEC QL NAA+PROBE: SIGNIFICANT CHANGE UP
SODIUM SERPL-SCNC: 139 MMOL/L — SIGNIFICANT CHANGE UP (ref 135–145)
SP GR SPEC: 1.01 — SIGNIFICANT CHANGE UP (ref 1.01–1.02)
THC UR QL: POSITIVE
UROBILINOGEN FLD QL: NEGATIVE MG/DL — SIGNIFICANT CHANGE UP
WBC # BLD: 5.14 K/UL — SIGNIFICANT CHANGE UP (ref 3.8–10.5)
WBC # FLD AUTO: 5.14 K/UL — SIGNIFICANT CHANGE UP (ref 3.8–10.5)

## 2021-07-28 PROCEDURE — U0003: CPT

## 2021-07-28 PROCEDURE — 99223 1ST HOSP IP/OBS HIGH 75: CPT

## 2021-07-28 PROCEDURE — 85025 COMPLETE CBC W/AUTO DIFF WBC: CPT

## 2021-07-28 PROCEDURE — 93010 ELECTROCARDIOGRAM REPORT: CPT

## 2021-07-28 PROCEDURE — 36415 COLL VENOUS BLD VENIPUNCTURE: CPT

## 2021-07-28 PROCEDURE — 80061 LIPID PANEL: CPT

## 2021-07-28 PROCEDURE — 80307 DRUG TEST PRSMV CHEM ANLYZR: CPT

## 2021-07-28 PROCEDURE — 99285 EMERGENCY DEPT VISIT HI MDM: CPT

## 2021-07-28 PROCEDURE — U0005: CPT

## 2021-07-28 PROCEDURE — 90792 PSYCH DIAG EVAL W/MED SRVCS: CPT

## 2021-07-28 PROCEDURE — 84443 ASSAY THYROID STIM HORMONE: CPT

## 2021-07-28 PROCEDURE — 83036 HEMOGLOBIN GLYCOSYLATED A1C: CPT

## 2021-07-28 PROCEDURE — 82962 GLUCOSE BLOOD TEST: CPT

## 2021-07-28 PROCEDURE — 86769 SARS-COV-2 COVID-19 ANTIBODY: CPT

## 2021-07-28 PROCEDURE — 80053 COMPREHEN METABOLIC PANEL: CPT

## 2021-07-28 PROCEDURE — 81003 URINALYSIS AUTO W/O SCOPE: CPT

## 2021-07-28 PROCEDURE — 93005 ELECTROCARDIOGRAM TRACING: CPT

## 2021-07-28 RX ORDER — TRAZODONE HCL 50 MG
50 TABLET ORAL ONCE
Refills: 0 | Status: COMPLETED | OUTPATIENT
Start: 2021-07-28 | End: 2021-07-28

## 2021-07-28 RX ORDER — DEXTROSE 50 % IN WATER 50 %
25 SYRINGE (ML) INTRAVENOUS ONCE
Refills: 0 | Status: DISCONTINUED | OUTPATIENT
Start: 2021-07-28 | End: 2021-08-02

## 2021-07-28 RX ORDER — FLUPHENAZINE HYDROCHLORIDE 1 MG/1
5 TABLET, FILM COATED ORAL EVERY 6 HOURS
Refills: 0 | Status: DISCONTINUED | OUTPATIENT
Start: 2021-07-28 | End: 2021-08-02

## 2021-07-28 RX ORDER — GLUCAGON INJECTION, SOLUTION 0.5 MG/.1ML
1 INJECTION, SOLUTION SUBCUTANEOUS ONCE
Refills: 0 | Status: DISCONTINUED | OUTPATIENT
Start: 2021-07-28 | End: 2021-08-02

## 2021-07-28 RX ORDER — METFORMIN HYDROCHLORIDE 850 MG/1
1000 TABLET ORAL
Refills: 0 | Status: DISCONTINUED | OUTPATIENT
Start: 2021-07-28 | End: 2021-08-02

## 2021-07-28 RX ORDER — PANTOPRAZOLE SODIUM 20 MG/1
40 TABLET, DELAYED RELEASE ORAL
Refills: 0 | Status: DISCONTINUED | OUTPATIENT
Start: 2021-07-28 | End: 2021-08-02

## 2021-07-28 RX ORDER — RISPERIDONE 4 MG/1
1 TABLET ORAL
Refills: 0 | Status: DISCONTINUED | OUTPATIENT
Start: 2021-07-28 | End: 2021-08-01

## 2021-07-28 RX ORDER — ACETAMINOPHEN 500 MG
650 TABLET ORAL EVERY 6 HOURS
Refills: 0 | Status: DISCONTINUED | OUTPATIENT
Start: 2021-07-28 | End: 2021-08-02

## 2021-07-28 RX ORDER — METFORMIN HYDROCHLORIDE 850 MG/1
500 TABLET ORAL
Refills: 0 | Status: DISCONTINUED | OUTPATIENT
Start: 2021-07-28 | End: 2021-08-01

## 2021-07-28 RX ORDER — DEXTROSE 50 % IN WATER 50 %
12.5 SYRINGE (ML) INTRAVENOUS ONCE
Refills: 0 | Status: DISCONTINUED | OUTPATIENT
Start: 2021-07-28 | End: 2021-08-02

## 2021-07-28 RX ORDER — SODIUM CHLORIDE 9 MG/ML
1000 INJECTION, SOLUTION INTRAVENOUS
Refills: 0 | Status: DISCONTINUED | OUTPATIENT
Start: 2021-07-28 | End: 2021-08-02

## 2021-07-28 RX ORDER — FLUPHENAZINE HYDROCHLORIDE 1 MG/1
2.5 TABLET, FILM COATED ORAL EVERY 6 HOURS
Refills: 0 | Status: DISCONTINUED | OUTPATIENT
Start: 2021-07-28 | End: 2021-07-29

## 2021-07-28 RX ORDER — INSULIN LISPRO 100/ML
VIAL (ML) SUBCUTANEOUS
Refills: 0 | Status: DISCONTINUED | OUTPATIENT
Start: 2021-07-28 | End: 2021-08-02

## 2021-07-28 RX ORDER — TRAZODONE HCL 50 MG
150 TABLET ORAL AT BEDTIME
Refills: 0 | Status: DISCONTINUED | OUTPATIENT
Start: 2021-07-28 | End: 2021-08-01

## 2021-07-28 RX ORDER — RISPERIDONE 4 MG/1
1 TABLET ORAL
Refills: 0 | Status: DISCONTINUED | OUTPATIENT
Start: 2021-07-28 | End: 2021-07-30

## 2021-07-28 RX ORDER — TRAZODONE HCL 50 MG
150 TABLET ORAL AT BEDTIME
Refills: 0 | Status: DISCONTINUED | OUTPATIENT
Start: 2021-07-28 | End: 2021-08-02

## 2021-07-28 RX ORDER — DEXTROSE 50 % IN WATER 50 %
15 SYRINGE (ML) INTRAVENOUS ONCE
Refills: 0 | Status: DISCONTINUED | OUTPATIENT
Start: 2021-07-28 | End: 2021-08-02

## 2021-07-28 RX ADMIN — Medication 1 MILLIGRAM(S): at 21:37

## 2021-07-28 RX ADMIN — RISPERIDONE 1 MILLIGRAM(S): 4 TABLET ORAL at 21:17

## 2021-07-28 RX ADMIN — RISPERIDONE 1 MILLIGRAM(S): 4 TABLET ORAL at 10:58

## 2021-07-28 RX ADMIN — Medication 150 MILLIGRAM(S): at 21:17

## 2021-07-28 RX ADMIN — METFORMIN HYDROCHLORIDE 1000 MILLIGRAM(S): 850 TABLET ORAL at 21:17

## 2021-07-28 RX ADMIN — METFORMIN HYDROCHLORIDE 500 MILLIGRAM(S): 850 TABLET ORAL at 10:58

## 2021-07-28 RX ADMIN — Medication 50 MILLIGRAM(S): at 04:47

## 2021-07-28 NOTE — ED PROVIDER NOTE - PROGRESS NOTE DETAILS
Patient medically cleared. Will send back to . Patient medically cleared. Will send back to . Patient stated that taking trazadone sometimes causes him to drool but still requested it. Hema: pt for involuntary admission to [facility].

## 2021-07-28 NOTE — H&P ADULT - NSHPLABSRESULTS_GEN_ALL_CORE
LABS: All Labs Reviewed:                        15.7   5.14  )-----------( 304      ( 28 Jul 2021 03:52 )             47.2     07-28    139  |  104  |  8.5  ----------------------------<  114<H>  3.7   |  22.0  |  0.72    Ca    9.0      28 Jul 2021 03:52    TPro  6.5<L>  /  Alb  3.9  /  TBili  0.2<L>  /  DBili  x   /  AST  24  /  ALT  19  /  AlkPhos  58  07-28              Blood Culture:

## 2021-07-28 NOTE — ED BEHAVIORAL HEALTH ASSESSMENT NOTE - SUMMARY
52 y/o M with history of schizoaffective disorder (bipolar type), DM, alcohol and cocaine use presents with auditory hallucinations telling him to kill himself or someone else. Patient is uncooperative and will not elaborate on specific suicidal/homicidal plan or intent. Reports he cannot be let out back onto the street and is requesting transfer inpatient. Patient wants to be restarted on his medications including Depakote, Trazadone and Metformin.
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
## 2021-07-28 NOTE — ED BEHAVIORAL HEALTH ASSESSMENT NOTE - OTHER
Unable to assess, patient laying in bed pending bed availability Patient turned away from interviewer

## 2021-07-28 NOTE — ED BEHAVIORAL HEALTH ASSESSMENT NOTE - VIOLENCE RISK FACTORS:
Substance abuse/Command hallucinations for violent behavior/Noncompliance with treatment/Irritability

## 2021-07-28 NOTE — ED ADULT TRIAGE NOTE - CHIEF COMPLAINT QUOTE
PT brought to Ed C/O of auditory hallucinations for the past 2 weeks that are telling him to hurt himself.  Pt states he has been off his psych meds for the last 6 months.  Denies HI, alcohol or drugs tonight.  PT dressed in yellow gown, belongings removed and secured.

## 2021-07-28 NOTE — ED BEHAVIORAL HEALTH ASSESSMENT NOTE - DESCRIPTION
Patient BIBA to [facility] ED complaining of auditory hallucinations and suicidal thoughts. CHRISTIE found to be 42, Utox pending collection. Patient given 50mg of Trazadone and sent back to  unit. DM Patient reports he is curently homeless, living on the street. States he used to live at [place] for 10 years. Uncooperative with questioning. Patient reports he is currently homeless, living on the street. States he used to live at [place] for 10 years. Uncooperative with questioning.

## 2021-07-28 NOTE — H&P ADULT - HISTORY OF PRESENT ILLNESS
51M transferred to  for involuntary admission. Pt with h/o schizoaffective disorder, bipolar disorder and dm on metformin presently undomiciled presents with complains of auditory hallucinations advising him to kill himself. Pt has no plan however voices have caused him distress and feels depressed. Has not been able to take his meds for his diabetes or bpd for well over 2 weeks. He denies drug use however utox +ve for THC and cocaine  Hospitalist consulted for H&P  Denies cp, sob, N/V/D. No F/C. No cough. No wt loss. No dysuria. No rash. No dizziness    Social: smokes tobacco, denies drugs or etoh  Fhx:unknown          PAST MEDICAL/SURGICAL/FAMILY/SOCIAL HISTORY:    Past Medical History:  Bipolar disorder    DM (diabetes mellitus)    HTN (hypertension)    Schizoaffective disorder, unspecified type.     Past Surgical History:  No significant past surgical history.     Tobacco Usage:  · Tobacco Usage	Current some day smoker

## 2021-07-28 NOTE — ED BEHAVIORAL HEALTH ASSESSMENT NOTE - DETAILS
n/a [facility] in [place] See HPI Voices telling him to kill himself or others pending placement

## 2021-07-28 NOTE — PATIENT PROFILE BEHAVIORAL HEALTH - MEDICATION, ABILITY TO FOLLOW SCHEDULE, PROFILE
BPH (benign prostatic hyperplasia)    GERD (gastroesophageal reflux disease)    Glaucoma    HLD (hyperlipidemia)    HTN (hypertension)    Hypothyroidism    Prostate cancer    
no
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
## 2021-07-28 NOTE — BEHAVIORAL HEALTH ASSESSMENT NOTE - NSBHCHARTREVIEWVS_PSY_A_CORE FT
Vital Signs Last 24 Hrs  T(C): 37.2 (28 Jul 2021 16:03), Max: 37.2 (28 Jul 2021 16:03)  T(F): 98.9 (28 Jul 2021 16:03), Max: 98.9 (28 Jul 2021 16:03)  HR: 72 (28 Jul 2021 14:09) (72 - 95)  BP: 121/76 (28 Jul 2021 14:09) (107/74 - 121/76)  BP(mean): --  RR: 16 (28 Jul 2021 16:03) (16 - 20)  SpO2: 98% (28 Jul 2021 16:03) (95% - 98%)

## 2021-07-28 NOTE — ED PROVIDER NOTE - CARE PLAN
A&Ox4. Forgetful. VSS on RA. CIWA scores btw 2-3 for tremors. Paracentesis with 5200ml out. Occlusive dressing to para site-CDI. Abdomen still distended. Bilateral lower abdominal pain managed with prn oxycodone. CT abdomen completed-no acute findings. Up Ax1 walker+GB. Rashes to perineum-using antifungal powder. Blanchable redness to coccyx. Leger intact. Tolerating low fat/Na diet. 1500cc fluid restriction. Seen by nephrology, Chep Dep, and PT. SW following for placement.    Principal Discharge DX:	Schizoaffective disorder, bipolar type

## 2021-07-28 NOTE — H&P ADULT - NSHPPHYSICALEXAM_GEN_ALL_CORE
ICU Vital Signs Last 24 Hrs  T(C): 37.2 (28 Jul 2021 16:03), Max: 37.2 (28 Jul 2021 16:03)  T(F): 98.9 (28 Jul 2021 16:03), Max: 98.9 (28 Jul 2021 16:03)  HR: 72 (28 Jul 2021 14:09) (72 - 95)  BP: 121/76 (28 Jul 2021 14:09) (107/74 - 121/76)  BP(mean): --  ABP: --  ABP(mean): --  RR: 16 (28 Jul 2021 16:03) (16 - 20)  SpO2: 98% (28 Jul 2021 16:03) (95% - 98%)      PHYSICAL EXAM:    Constitutional: NAD, awake and alert, well-developed  HEENT: PERR, EOMI, Normal Hearing, MMM  Neck: Soft and supple, No LAD, No JVD  Respiratory: Breath sounds are clear bilaterally, No wheezing, rales or rhonchi  Cardiovascular: S1 and S2, regular rate and rhythm, no Murmurs, gallops or rubs  Gastrointestinal: Bowel Sounds present, soft, nontender, nondistended, no guarding, no rebound  Extremities: No peripheral edema  Vascular: 2+ peripheral pulses  Neurological: A/O x 3, no focal deficits  Musculoskeletal: 5/5 strength b/l upper and lower extremities  Skin: No rashes    EKG: NSRR

## 2021-07-28 NOTE — ED BEHAVIORAL HEALTH ASSESSMENT NOTE - NS ED BHA PLAN
[facility]      Nephrology Consultation    Referring Provider:   No ref. provider found    Reason for Consultation:  ESRD and associated problems.       Subjective:  Chief complaint   Chief Complaint   Patient presents with   • Dizziness   • Hip Injury     History of present illness:    Patient is a 79 year old  female, well known to me as a dialysis patient, with significant medical history as noted below.  She is a MWF dialysis patient at [facility] and was last dialyzed on 09/16/2020.  She runs for a total of 3 hours on a 3k bath.  Patient had a CVA with severe dysarthria and right upper extremity weakness in July 2020.  Patient suffered a fall at home secondary to dizzy/lightheadedness and lost her footing.  She fell on her left hip but did not lose any consciousness or sustain a head injury.  She was brought into the ED for further evaluation of fall.  It was recommended that she be admitted for further evaluation by neurology.  I was consulted for ESRD management.   I have reviewed labs/imaging/records from this hospitalization, including ER staff and admitting/attending physicians H/P's and progress notes to establish a comprehensive understanding of this patient's clinical hospital course, as well as to establish plan of care appropriately.   Past Medical History:   Diagnosis Date   • Acute on chronic diastolic congestive heart failure (CMS/HCC) 12/20/2019   • Anemia    • Angina at rest (CMS/HCC)    • Arthritis    • Atrial fibrillation (CMS/HCC)    • CKD (chronic kidney disease) requiring chronic dialysis (CMS/HCC) 12/2/2019   • Coagulation disorder due to circulating anticoagulants (CMS/HCC) 12/2/2019   • Coronary artery disease    • Disease of thyroid gland    • Elevated cholesterol    • GERD without esophagitis 12/2/2019   • History of blood transfusion    • History of colonoscopy 11/19/2019   • History of melena 11/19/2019   • History of stomach ulcers    • Hypertension    •  Impaired functional mobility, balance, gait, and endurance    • Impaired functional mobility, balance, gait, and endurance    • Impaired functional mobility, balance, gait, and endurance    • Osteoporosis    • Positive TB test    • Stroke (CMS/HCC)     No deficits       Past Surgical History:   Procedure Laterality Date   • BRONCHOSCOPY Bilateral 4/12/2019    Procedure: BRONCHOSCOPY;  Surgeon: Jeff Laura MD;  Location:  [facility];  Service: Pulmonary   • BYPASS GRAFT     • COLONOSCOPY N/A 10/15/2019    Procedure: COLONOSCOPY;  Surgeon: Fredi Aaron MD;  Location:  [facility];  Service: Gastroenterology   • CORONARY ANGIOPLASTY WITH STENT PLACEMENT     • CORONARY ARTERY BYPASS GRAFT  10/18/2010   • ENDOSCOPY N/A 10/14/2019    Procedure: ESOPHAGOGASTRODUODENOSCOPY;  Surgeon: Fredi Aaron MD;  Location:  [facility];  Service: Gastroenterology   • HYSTERECTOMY     • INSERTION HEMODIALYSIS CATHETER N/A 2/14/2020    Procedure: HEMODIALYSIS CATHETER INSERTION(DINO);  Surgeon: Familia Paez MD;  Location:  [facility];  Service: General;  Laterality: N/A;   • STOMACH SURGERY  08/11/2019    Upper GI bleed    • STOMACH SURGERY  10/13/2019     Family History   Problem Relation Age of Onset   • Cancer Mother    • Arthritis Mother    • No Known Problems Father    • Liver disease Sister      negative h/o ESRD     Social History     Tobacco Use   • Smoking status: Never Smoker   • Smokeless tobacco: Never Used   Substance Use Topics   • Alcohol use: No   • Drug use: No     Home medications:   Prior to Admission Medications     Prescriptions Last Dose Informant Patient Reported? Taking?    aspirin (aspirin) 81 MG EC tablet 9/16/2020  No Yes    Take 1 tablet by mouth Daily.    atorvastatin (LIPITOR) 40 MG tablet 9/16/2020  No Yes    Take 1 tablet by mouth Every Night.    budesonide (PULMICORT) 0.5 MG/2ML nebulizer solution 9/16/2020  Yes Yes    dilTIAZem CD (CARDIZEM CD) 180 MG 24 hr  capsule 9/16/2020  No Yes    Take 1 capsule by mouth Daily.    levothyroxine (SYNTHROID, LEVOTHROID) 75 MCG tablet 9/16/2020  No Yes    Take 1 tablet by mouth Daily.    mirtazapine (REMERON) 15 MG tablet 9/16/2020  No Yes    Take 1 tablet by mouth Every Night.    nystatin (MYCOSTATIN) 821850 UNIT/ML suspension 9/16/2020  No Yes    Swish and spit 5 mL 3 (Three) Times a Day.    pantoprazole (PROTONIX) 40 MG EC tablet 9/16/2020  No Yes    Take 1 tablet by mouth Daily.    predniSONE (DELTASONE) 5 MG tablet 9/16/2020  No Yes    Take 1 tablet by mouth Daily.    ranolazine (RANEXA) 500 MG 12 hr tablet 9/16/2020  No Yes    Take 1 tablet by mouth Every 12 (Twelve) Hours.        Emergency department medications:   Medications   aspirin EC tablet 81 mg (81 mg Oral Given 9/17/20 1327)   atorvastatin (LIPITOR) tablet 40 mg (40 mg Oral Given 9/17/20 2126)   budesonide (PULMICORT) nebulizer solution 0.5 mg ( Nebulization Canceled Entry 9/17/20 0930)   levothyroxine (SYNTHROID, LEVOTHROID) tablet 75 mcg (75 mcg Oral Given 9/18/20 0616)   mirtazapine (REMERON) tablet 15 mg (15 mg Oral Given 9/17/20 2126)   pantoprazole (PROTONIX) EC tablet 40 mg (40 mg Oral Given 9/18/20 0617)   ranolazine (RANEXA) 12 hr tablet 500 mg (500 mg Oral Given 9/17/20 2126)   acetaminophen (TYLENOL) tablet 650 mg (has no administration in time range)     Or   acetaminophen (TYLENOL) 160 MG/5ML solution 650 mg (has no administration in time range)     Or   acetaminophen (TYLENOL) suppository 650 mg (has no administration in time range)   bisacodyl (DULCOLAX) suppository 10 mg (has no administration in time range)   ondansetron (ZOFRAN) injection 4 mg (has no administration in time range)   enoxaparin (LOVENOX) syringe 30 mg (30 mg Subcutaneous Given 9/17/20 2125)   melatonin tablet 5 mg (5 mg Oral Given 9/17/20 2126)   dilTIAZem (CARDIZEM) tablet 30 mg (30 mg Oral Given 9/18/20 0617)   predniSONE (DELTASONE) tablet 5 mg (5 mg Oral Given 9/17/20 2000)  "  sodium chloride 0.9 % bolus 500 mL (0 mL Intravenous Stopped 9/17/20 0230)   sodium chloride 0.9 % bolus 250 mL (0 mL Intravenous Stopped 9/17/20 1400)   dilTIAZem (CARDIZEM) injection 10 mg (10 mg Intravenous Given 9/17/20 1327)       Allergies:  Tuberculin tests    Review of Systems  1. Constitutional: Negative for fever and chills.  Denies any diaphoresis. Positive for fatigue and malaise.   2. HENT: Negative for congestion and hearing loss.   3. Eyes: Negative for redness and visual disturbance.   4. Respiratory: negative for shortness of breath or cough. Negative for chest pain  5. Cardiovascular: Negative for chest pain and chest tightness or palpitations.   6. Gastrointestinal: Negative for abdominal pain or distention, and blood in stool. Denies any Nausea, vomiting, diarrhea or constipation.  7. Endocrine: Negative for heat or cold intolerance.   8. Genitourinary: Negative for difficulty urinating, dysuria and frequency.   9. Musculoskeletal: Negative for arthralgias, back pain.  Denies any myalgias.   10. Skin: Negative for color change, rash and wound.   11. Neurological: Positive for dizziness/lightheadedness with fall.  Positive for RUE weakness.  Denies headaches.  She is still a phasic from her last stroke but usually answers appropriately with her head shake.  12. Hematological: Negative for adenopathy. Does not bruise/bleed easily.   13. Psychiatric/Behavioral: Negative for confusion. The patient is not nervous/anxious.     Objective:  Vital Signs  /68   Pulse 80   Temp 98 °F (36.7 °C) (Oral)   Resp 16   Ht 147.3 cm (58\")   Wt 50.8 kg (112 lb)   LMP  (LMP Unknown)   SpO2 96%   BMI 23.41 kg/m²          No intake/output data recorded.    Intake/Output Summary (Last 24 hours) at 9/18/2020 07  Last data filed at 9/18/2020 0617  Gross per 24 hour   Intake 240 ml   Output --   Net 240 ml       Physical Exam:  General Appearance:   Alert, cooperative, in no acute distress.     Head:   " Normocephalic, without obvious abnormality, atraumatic.     Eyes:      Normal, conjunctivae and sclerae, no icterus, no pallor, corneas clear, PERRLA        Throat:   Oral mucosa dry      Neck:  No adenopathy, supple, trachea midline, no thyromegaly, no carotid bruit, no JVD      Back:   No CVA tenderness on Percussion.     Lungs:    Clear to auscultation and fair air movement noted.      Heart::   Regular rhythm and normal rate, normal S1 and S2.       Abdomen:   Obese. Normal bowel sounds, no masses, no organomegaly, soft non-tender, non-distended, no guarding, no rebound tenderness      Genital urinary:   No urinary bladder palpable      Extremities:  Moves all extremities, no edema, no cyanosis, no redness.     Pulses:  Pulses palpable and equal bilaterally but weak.     Skin:  No bleeding, bruising or rash        Neurologic:  Speech difficult to understand/articulate.  RUE weakness noted.         Results Review:   Results from last 7 days   Lab Units 09/18/20 0522 09/17/20  0529 09/16/20  1832   SODIUM mmol/L 138 137 136   POTASSIUM mmol/L 3.4* 4.4 4.1   CHLORIDE mmol/L 104 101 96*   CO2 mmol/L 22.8 23.9 24.0   BUN mg/dL 26* 16 9   CREATININE mg/dL 2.64* 1.87* 1.38*   CALCIUM mg/dL 9.1 8.8 9.9   ALBUMIN g/dL  --   --  4.70   BILIRUBIN mg/dL  --   --  0.6   ALK PHOS U/L  --   --  109   ALT (SGPT) U/L  --   --  20   AST (SGOT) U/L  --   --  24   GLUCOSE mg/dL 115* 85 103*     Estimated Creatinine Clearance: 13.9 mL/min (A) (by C-G formula based on SCr of 2.64 mg/dL (H)).          Results from last 7 days   Lab Units 09/18/20 0522 09/17/20  0529 09/16/20  1832   WBC 10*3/mm3 8.20 8.34 10.00   HEMOGLOBIN g/dL 10.3* 10.0* 12.9   PLATELETS 10*3/mm3 228 226 288         Brief Urine Lab Results  (Last result in the past 365 days)      Color   Clarity   Blood   Leuk Est   Nitrite   Protein   CREAT   Urine HCG        09/16/20 1821 Orange Cloudy Negative Moderate (2+) Negative 100 mg/dL (2+)             No results found  for: [facility]  Imaging Results (Last 24 Hours)     Procedure Component Value Units Date/Time    MRI Angiogram Head Without Contrast [[id number]] Collected: 09/17/20 1722     Updated: 09/17/20 1723    Narrative:      FINAL REPORT    CLINICAL HISTORY:  Stroke MRA BRAIN.    FINDINGS:  The intracranial portions of the internal carotid arteries are  normal as are the anterior and middle cerebral arteries  bilaterally.  There are diminished peripheral left MCA branches  consistent with acute/subacute infarction seen on the MR.  The  intracranial vertebral arteries and the basilar artery and their  major branches are also unremarkable.      Impression:      Diminished left MCA peripheral branches consistent with  infarction.    Authenticated by Ran Nathan M.D. on 09/17/2020 05:22:26 PM    MRI Brain Without Contrast [[id number]] Collected: 09/17/20 1722     Updated: 09/17/20 1723    Narrative:      FINAL REPORT    TECHNIQUE:  Multiplanar imaging was performed of the brain without  gadolinium infusion.    CLINICAL HISTORY:  . STROKE.    FINDINGS:  Diffusion-weighted images show restricted diffusion in the left  frontoparietal region consistent with an acute infarction in the  MCA territory. The ventricles are mildly dilated, but  proportionate to the degree of generalized atrophy.  Periventricular and deep white matter signal abnormality is  consistent with changes of chronic small vessel ischemia. There  is no mass or shift of midline structures. There is no  intracranial hemorrhage. No significant sinus or osseous  abnormality is seen.      Impression:      Acute/subacute infarction in the left frontoparietal region.    Authenticated by Ran Nathan M.D. on 09/17/2020 05:22:25 PM    US Carotid Bilateral [[id number]] Collected: 09/17/20 1128     Updated: 09/17/20 1131    Narrative:      PROCEDURE: US CAROTID BILATERAL-     HISTORY: tia; I63.9-Cerebral infarction, unspecified; R42-Dizziness and  giddiness; M25.552-Pain in left hip;  I48.91-Unspecified atrial  fibrillation     Technique: Real-time imaging was performed of the extracranial carotid  arteries in transverse and longitudinal planes, with color duplex  evaluation of blood flow velocity. Spectral analysis was performed. The  cervicovertebral arteries were also examined. Stenosis evaluation is  based on validated velocity criteria.     Right carotid system:  CCA: 78 cm/s  ICA: 76 cm/s  ECA: 105 cm/s  Vertebral artery: Antegrade  ICA/CCA ratio: 0.98  Mild plaque is identified at the bifurcation.     Left carotid system:  CCA: 78 cm/s  ICA: 107 cm/s  ECA: 88 cm/s  Vertebral artery: Antegrade  ICA/CCA ratio: 1.38  Mild plaque is identified at the bifurcation.          Impression:      Mild plaque within the carotid bulbs with no hemodynamically significant  stenosis.     This report was finalized on 9/17/2020 11:29 AM by Joaquín Shannon M.D..    XR Chest 1 View [[id number]] Collected: 09/17/20 1019     Updated: 09/17/20 1036    Narrative:      PROCEDURE: XR CHEST 1 VW-     HISTORY: fall, dizziness     COMPARISON: June 2020.     FINDINGS: The heart is enlarged. There is a right IJ dialysis catheter  present. The patient is status post median sternotomy for CABG. There is  mild pulmonary edema. There is no pneumothorax.  There are no acute  osseous abnormalities. There are large right upper quadrant  calcifications likely due to gallstones.       Impression:      Mild pulmonary edema.     Probable cholelithiasis.              Images were reviewed, interpreted, and dictated by Dr. Joaquín Shannon M.D.  Transcribed by Thea Sage PA-C.     This report was finalized on 9/17/2020 10:34 AM by Joaquín Shannon M.D..        aspirin, 81 mg, Oral, Daily  atorvastatin, 40 mg, Oral, Nightly  budesonide, 0.5 mg, Nebulization, Daily - RT  dilTIAZem, 30 mg, Oral, Q6H  enoxaparin, 30 mg, Subcutaneous, Q24H  levothyroxine, 75 mcg, Oral, Daily  mirtazapine, 15 mg, Oral, Nightly  pantoprazole,  40 mg, Oral, Daily  predniSONE, 5 mg, Oral, Daily  ranolazine, 500 mg, Oral, Q12H           Assessment/Plan:    1. ESRD on hemodialysis: patient runs 3 hours on 3k bath, MWF at [facility]  2. Hypertension with ESRD  3. Anemia of ESRD: patient is on venofer  4. Secondary hyperparathyroidism  5. Subacute left frontal ischemic stroke: neurology following  6. History of CVA with residual dysphasia/dysarthria and RUE weakness.  7. Chronic diastolic heart failure  8. Atrial fibrillation: not on anticoagulation  9. Coronary artery disease: s/p CABG  10. Dyslipidemia  11. Hypothyroidism  12. GERD      Plan:  · Dialysis as scheduled.  · Continue with rest of the treatment plan and surveillance labs.  · Details were discussed with the patient no family in the room.    · Details were also discussed with the hospitalist service.   · Further recommendations will depend on clinical course of the patient during the current hospitalization.    · I also discussed the details with the nursing staff.  · Rest as ordered.    In closing, I sincerely appreciate opportunity to participate in care of this patient. If I can be of any further assistance with the management of this patient, please don’t hesitate to contact me.    Sanford Tsang MD, FASN  09/18/20  07:27 EDT    Dictated using Dragon.           Admit/Transfer to Inpatient Psychiatry

## 2021-07-28 NOTE — ED PROVIDER NOTE - TOBACCO USE
Current some day smoker Telephone Encounter by Maryan Abbasi Maryan Abbasi (Patient )            Spoke with Flavia (VOF).  Pt was re-admitted to the hospital.[KL1.1M]      Revision History        User Key Date/Time User Provider Type Action    > KL1.1 12/03/18 03:02 PM Maryan Abbasi Patient  Sign    M - Manual

## 2021-07-28 NOTE — ED PROVIDER NOTE - OBJECTIVE STATEMENT
52 yo male with hx of bipolar disease and diabetes presents for help with SI and active auditory hallucination telling him to kill himself. Patient states that he is recently homeless and has been unable to take his medications for the past two weeks.  He states he continues to hear voices telling him to kill himself. He presents for evaluation and help. Patient denies illicit substance use or alcohol use. He denies nausea, vomiting, fever, chills, or pains. 50 yo male with hx of bipolar disease and diabetes presents for help with SI and active auditory hallucination telling him to kill himself. Patient states that he is recently homeless and has been unable to take his medications ( metformin and trazadone) for the past two weeks.  He states he continues to hear voices telling him to kill himself. He presents for evaluation and help. Patient denies illicit substance use or alcohol use. He denies nausea, vomiting, fever, chills, or pains.

## 2021-07-28 NOTE — ED ADULT NURSE REASSESSMENT NOTE - NS ED NURSE REASSESS COMMENT FT1
plan of care reviewed with pt and that he was being transferred to . pt endorses plan and transitioned to ems for transfer without incident.
Patient aware that he is being transferred to [facility] for inpatient psychiatric admission. Report called and given to Fabiano CARBONE. Ate well at breakfast and accepted meds as ordered. Provided urine specimen. EMS called for transport. Awaiting covid results. No distress noted. Pt expressed intent to cooperate with plan to transfer inpatient.
Received pt Aox3, calm and in yellow gowns. Pt c/o suicidal thoughts w/ no plan. states he has bipolar, schizoaffective disorder and DM. states he is depressed and  reports AH of voices telling him to hurt himself and others. pt states he has been using ectasy, etoh and "black beauty drugs". pt states he is homeless, has not eaten and has not been able to sleep in x3 days. reports last psychiatric hospitalization x1 year ago and noncompliance with medication ( trazodone, metformin, Depakote). pt denies any VH,HI or prior SA. As per pt, he lived in [facility] for 15 years 10 years ago. comfort measures provided to pt. will monitor the pt for safety.

## 2021-07-28 NOTE — BEHAVIORAL HEALTH ASSESSMENT NOTE - MARITAL STATUS
Chief Complaint   Patient presents with     Otalgia     left   Patient presents to the clinic with mother. Patient reports that his left ear started hurting wednesday. Patients mother reports that he has had a fever today and is using tylenol for the pain and fever.    Medication Reconciliation: completed   Di Cardoza LPN  3/30/2019 4:33 PM   
Single

## 2021-07-28 NOTE — BEHAVIORAL HEALTH ASSESSMENT NOTE - RISK ASSESSMENT
High acute risk of dangerous behavior, risk factors include previous suicide attempts, cocaine and alcohol use, medication noncompliance, command auditory hallucinations. Patient is seeking help. High Acute Suicide Risk

## 2021-07-28 NOTE — BEHAVIORAL HEALTH ASSESSMENT NOTE - DETAILS
Voices telling him to kill himself or others See HPI [facility] in [place] DM n/a

## 2021-07-28 NOTE — ED BEHAVIORAL HEALTH ASSESSMENT NOTE - RISK ASSESSMENT
High Acute Suicide Risk High acute risk of dangerous behavior, risk factors include previous suicide attempts, cocaine and alcohol use, medication noncompliance, command auditory hallucinations. Patient is seeking help.

## 2021-07-28 NOTE — ED BEHAVIORAL HEALTH ASSESSMENT NOTE - CURRENT MEDICATION
Per patient: Metformin 1000mg one tablet BID for DM, Depakote 500mg daily, Trazadone 200mg at bedtime

## 2021-07-28 NOTE — ED ADULT NURSE REASSESSMENT NOTE - COMFORT CARE
meal provided/plan of care explained/po fluids offered
meal provided/plan of care explained/po fluids offered/warm blanket provided

## 2021-07-28 NOTE — BEHAVIORAL HEALTH ASSESSMENT NOTE - NSBHCHARTREVIEWINVESTIGATE_PSY_A_CORE FT
< from: 12 Lead ECG (07.28.21 @ 03:46) >    Ventricular Rate 88 BPM    Atrial Rate 88 BPM    P-R Interval 152 ms    QRS Duration 100 ms    Q-T Interval 390 ms    QTC Calculation(Bazett) 471 ms    P Axis 73 degrees    R Axis 10 degrees    T Axis 46 degrees    Diagnosis Line *** Poor data quality, interpretation may be adversely affected  Normal sinus rhythm  Minimal voltage criteria for LVH, may be normal variant  Borderline ECG    Confirmed by Lloyd Tena ([id number]) on 7/28/2021 12:12:02 PM

## 2021-07-28 NOTE — BEHAVIORAL HEALTH ASSESSMENT NOTE - NSBHCHARTREVIEWLAB_PSY_A_CORE FT
15.7   5.14  )-----------( 304      ( 28 Jul 2021 03:52 )             47.2   07-28    139  |  104  |  8.5  ----------------------------<  114<H>  3.7   |  22.0  |  0.72    Ca    9.0      28 Jul 2021 03:52    TPro  6.5<L>  /  Alb  3.9  /  TBili  0.2<L>  /  DBili  x   /  AST  24  /  ALT  19  /  AlkPhos  58  07-28

## 2021-07-28 NOTE — ED ADULT NURSE NOTE - OBJECTIVE STATEMENT
A&Ox4, RR even and unlabored. Skin is warm and dry.  Pt coming to ED for auditory hallucinations.  Pt reports hearing voices that are telling him to kill himself.  PT states he starting to believe the voices.  No plan at this time.  Denies SI.  Pt now endorsing that he drank alcohol tonight and took ectasy and "black beauty drugs" this evening.  PT sates he has bipolar and schizophrenia and has been off his medication for the past 6 months.  Pt also states that he is diabetic and has not been taking those medications as well.  Denies pain at this time.  PT placed on 1:1, belongings removed and secured.  Dressed in yellow gown. Will continue to monitor.
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
## 2021-07-28 NOTE — ED PROVIDER NOTE - CLINICAL SUMMARY MEDICAL DECISION MAKING FREE TEXT BOX
Patient with bipolar and active complaints of threatening auditory hallucinations. Patient pending psych consultation once medically cleared.

## 2021-07-28 NOTE — H&P ADULT - ASSESSMENT
#Schizoaffective d/o with AH and SI  -admitted involuntarily to psych unit for treatment  -care for above per primary team  -suicide watch  -monitor QTc if to start or continue QTc prolonging meds  -ETOH level 45. Monitor for s/s of withdrawal and start ciwa as appropriate      #NIDDM  -obtain A1c  -start ISS  -hold metformin  -check ua      DVT px

## 2021-07-28 NOTE — CHART NOTE - NSCHARTNOTEFT_GEN_A_CORE
SW Note: pt will be transferred to  for inpt psychiatric care. Accepting MD Dr. Brewer. 9.37 legals completed and faxed. [facility]ulance arranged with [facility]. 1:30p/u requested. [facility] transportation form sent with pt regrading any transport bills he might receive. Covid neg.  RN called and gave report. Ins auth will be needed for admit, SW to follow

## 2021-07-28 NOTE — ED BEHAVIORAL HEALTH ASSESSMENT NOTE - OTHER PAST PSYCHIATRIC HISTORY (INCLUDE DETAILS REGARDING ONSET, COURSE OF ILLNESS, INPATIENT/OUTPATIENT TREATMENT)
History of schizoaffective disorder, bipolar type   Per prior notes, admission to [facility] 6/18/21, [facility] 6/2/21-6/8/2021, [place] 10 months ago, and lived at [place] for 10 years.

## 2021-07-29 LAB
A1C WITH ESTIMATED AVERAGE GLUCOSE RESULT: 10.6 % — HIGH (ref 4–5.6)
CHOLEST SERPL-MCNC: 127 MG/DL — SIGNIFICANT CHANGE UP
COVID-19 SPIKE DOMAIN AB INTERP: POSITIVE
COVID-19 SPIKE DOMAIN ANTIBODY RESULT: >250 U/ML — HIGH
ESTIMATED AVERAGE GLUCOSE: 258 MG/DL — HIGH (ref 68–114)
HDLC SERPL-MCNC: 39 MG/DL — LOW
LIPID PNL WITH DIRECT LDL SERPL: 70 MG/DL — SIGNIFICANT CHANGE UP
NON HDL CHOLESTEROL: 88 MG/DL — SIGNIFICANT CHANGE UP
SARS-COV-2 IGG+IGM SERPL QL IA: >250 U/ML — HIGH
SARS-COV-2 IGG+IGM SERPL QL IA: POSITIVE
TRIGL SERPL-MCNC: 90 MG/DL — SIGNIFICANT CHANGE UP
TSH SERPL-MCNC: 0.17 UU/ML — LOW (ref 0.34–4.82)

## 2021-07-29 PROCEDURE — 99233 SBSQ HOSP IP/OBS HIGH 50: CPT

## 2021-07-29 RX ORDER — FLUPHENAZINE HYDROCHLORIDE 1 MG/1
5 TABLET, FILM COATED ORAL ONCE
Refills: 0 | Status: DISCONTINUED | OUTPATIENT
Start: 2021-07-29 | End: 2021-08-02

## 2021-07-29 RX ORDER — FLUPHENAZINE HYDROCHLORIDE 1 MG/1
5 TABLET, FILM COATED ORAL EVERY 6 HOURS
Refills: 0 | Status: DISCONTINUED | OUTPATIENT
Start: 2021-07-29 | End: 2021-08-02

## 2021-07-29 RX ADMIN — Medication 2: at 18:04

## 2021-07-29 RX ADMIN — PANTOPRAZOLE SODIUM 40 MILLIGRAM(S): 20 TABLET, DELAYED RELEASE ORAL at 07:06

## 2021-07-29 RX ADMIN — METFORMIN HYDROCHLORIDE 1000 MILLIGRAM(S): 850 TABLET ORAL at 18:04

## 2021-07-29 RX ADMIN — METFORMIN HYDROCHLORIDE 1000 MILLIGRAM(S): 850 TABLET ORAL at 08:02

## 2021-07-29 RX ADMIN — RISPERIDONE 1 MILLIGRAM(S): 4 TABLET ORAL at 11:43

## 2021-07-29 RX ADMIN — RISPERIDONE 1 MILLIGRAM(S): 4 TABLET ORAL at 20:43

## 2021-07-29 RX ADMIN — Medication 150 MILLIGRAM(S): at 20:43

## 2021-07-29 NOTE — PROGRESS NOTE BEHAVIORAL HEALTH - SUMMARY
50 y/o M with history of schizoaffective disorder (bipolar type), DM, alcohol and cocaine use presents with auditory hallucinations telling him to kill himself or someone else. Patient is uncooperative and will not elaborate on specific suicidal/homicidal plan or intent. Reports he cannot be let out back onto the street and is requesting transfer inpatient. Patient wants to be restarted on his medications including Depakote, Trazadone and Metformin.
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
## 2021-07-30 DIAGNOSIS — F25.9 SCHIZOAFFECTIVE DISORDER, UNSPECIFIED: ICD-10-CM

## 2021-07-30 PROCEDURE — 99232 SBSQ HOSP IP/OBS MODERATE 35: CPT

## 2021-07-30 PROCEDURE — 99233 SBSQ HOSP IP/OBS HIGH 50: CPT

## 2021-07-30 RX ORDER — FLUPHENAZINE HYDROCHLORIDE 1 MG/1
5 TABLET, FILM COATED ORAL
Refills: 0 | Status: DISCONTINUED | OUTPATIENT
Start: 2021-07-30 | End: 2021-08-02

## 2021-07-30 RX ADMIN — Medication 150 MILLIGRAM(S): at 20:26

## 2021-07-30 RX ADMIN — Medication 1 MILLIGRAM(S): at 20:36

## 2021-07-30 RX ADMIN — METFORMIN HYDROCHLORIDE 1000 MILLIGRAM(S): 850 TABLET ORAL at 11:52

## 2021-07-30 RX ADMIN — PANTOPRAZOLE SODIUM 40 MILLIGRAM(S): 20 TABLET, DELAYED RELEASE ORAL at 06:24

## 2021-07-30 RX ADMIN — FLUPHENAZINE HYDROCHLORIDE 5 MILLIGRAM(S): 1 TABLET, FILM COATED ORAL at 20:26

## 2021-07-30 RX ADMIN — METFORMIN HYDROCHLORIDE 1000 MILLIGRAM(S): 850 TABLET ORAL at 20:27

## 2021-07-30 NOTE — PROGRESS NOTE BEHAVIORAL HEALTH - NSBHCHARTREVIEWLAB_PSY_A_CORE FT
15.7   5.14  )-----------( 304      ( 28 Jul 2021 03:52 )             47.2   07-28    139  |  104  |  8.5  ----------------------------<  114<H>  3.7   |  22.0  |  0.72    Ca    9.0      28 Jul 2021 03:52    TPro  6.5<L>  /  Alb  3.9  /  TBili  0.2<L>  /  DBili  x   /  AST  24  /  ALT  19  /  AlkPhos  58  07-28
15.7   5.14  )-----------( 304      ( 28 Jul 2021 03:52 )             47.2   07-28    139  |  104  |  8.5  ----------------------------<  114<H>  3.7   |  22.0  |  0.72    Ca    9.0      28 Jul 2021 03:52    TPro  6.5<L>  /  Alb  3.9  /  TBili  0.2<L>  /  DBili  x   /  AST  24  /  ALT  19  /  AlkPhos  58  07-28

## 2021-07-30 NOTE — PROGRESS NOTE BEHAVIORAL HEALTH - NSBHFUPIPCHARTREVFT_PSY_A_CORE
51 yr old single Black unemployed  male homeless since 4 months, with dxn of schizoaffective disorder, off medication for the last 6 months and now with recurrence of auditory hallucination of voices and increased suicidal and homicidal  ideation especially since the last two weeks due to the added stressor of his mother having  . He indicated that he was attempting to kill himself
51 yr old single Black unemployed  male homeless since 4 months, with dxn of schizoaffective disorder, off medication for the last 6 months and now with recurrence of auditory hallucination of voices and increased suicidal and homicidal  ideation especially since the last two weeks due to the added stressor of his mother having  . He indicated that he was attempting to kill himself

## 2021-07-30 NOTE — PROGRESS NOTE ADULT - SUBJECTIVE AND OBJECTIVE BOX
Patient is a 51y old  Male who presents with a chief complaint of SI (28 Jul 2021 16:06)      SUBJECTIVE:   HPI:  51M transferred to  for involuntary admission. Pt with h/o schizoaffective disorder, bipolar disorder and dm on metformin presently undomiciled presents with complains of auditory hallucinations advising him to kill himself. Pt has no plan however voices have caused him distress and feels depressed. Has not been able to take his meds for his diabetes or bpd for well over 2 weeks. He denies drug use however utox +ve for THC and cocaine  Hospitalist consulted for H&P  Denies cp, sob, N/V/D. No F/C. No cough. No wt loss. No dysuria. No rash. No dizziness      sub: no events. A1C high. BGM controlled in house. Vitals pending    Social: smokes tobacco, denies drugs or etoh  Fhx:unknown          PAST MEDICAL/SURGICAL/FAMILY/SOCIAL HISTORY:    Past Medical History:  Bipolar disorder    DM (diabetes mellitus)    HTN (hypertension)    Schizoaffective disorder, unspecified type.     Past Surgical History:  No significant past surgical history.     Tobacco Usage:  · Tobacco Usage	Current some day smoker   (28 Jul 2021 16:06)        REVIEW OF SYSTEMS:    CONSTITUTIONAL: No weakness, fevers or chills  EYES/ENT: No visual changes;  No vertigo or throat pain   NECK: No pain or stiffness  RESPIRATORY: No cough, wheezing, hemoptysis; No shortness of breath  CARDIOVASCULAR: No chest pain or palpitations  GASTROINTESTINAL: No abdominal or epigastric pain. No nausea, vomiting, or hematemesis; No diarrhea or constipation. No melena or hematochezia.  GENITOURINARY: No dysuria, frequency or hematuria  NEUROLOGICAL: No numbness or weakness  SKIN: No itching, burning, rashes, or lesions   All other review of systems is negative unless indicated above        Vital Signs Last 24 Hrs  T(C): 36.6 (30 Jul 2021 08:19), Max: 36.6 (30 Jul 2021 08:19)  T(F): 97.9 (30 Jul 2021 08:19), Max: 97.9 (30 Jul 2021 08:19)  HR: --  BP: --  BP(mean): --  RR: 18 (30 Jul 2021 08:19) (18 - 18)  SpO2: 100% (30 Jul 2021 08:19) (100% - 100%)    I&O's Summary      CAPILLARY BLOOD GLUCOSE      POCT Blood Glucose.: 204 mg/dL (30 Jul 2021 11:50)  POCT Blood Glucose.: 205 mg/dL (29 Jul 2021 17:09)      PHYSICAL EXAM:    Constitutional: NAD, awake and alert, well-developed  HEENT: PERR, EOMI, Normal Hearing, MMM  Neck: Soft and supple, No LAD, No JVD  Respiratory: Breath sounds are clear bilaterally, No wheezing, rales or rhonchi  Cardiovascular: S1 and S2, regular rate and rhythm, no Murmurs, gallops or rubs  Gastrointestinal: Bowel Sounds present, soft, nontender, nondistended, no guarding, no rebound  Extremities: No peripheral edema  Vascular: 2+ peripheral pulses  Neurological: A/O x 3, no focal deficits  Musculoskeletal: 5/5 strength b/l upper and lower extremities  Skin: No rashes    MEDICATIONS:  MEDICATIONS  (STANDING):  dextrose 40% Gel 15 Gram(s) Oral once  dextrose 5%. 1000 milliLiter(s) (50 mL/Hr) IV Continuous <Continuous>  dextrose 5%. 1000 milliLiter(s) (100 mL/Hr) IV Continuous <Continuous>  dextrose 50% Injectable 25 Gram(s) IV Push once  dextrose 50% Injectable 12.5 Gram(s) IV Push once  dextrose 50% Injectable 25 Gram(s) IV Push once  glucagon  Injectable 1 milliGRAM(s) IntraMuscular once  insulin lispro (ADMELOG) corrective regimen sliding scale   SubCutaneous three times a day before meals  metFORMIN 1000 milliGRAM(s) Oral two times a day  pantoprazole    Tablet 40 milliGRAM(s) Oral before breakfast  risperiDONE   Tablet 1 milliGRAM(s) Oral two times a day  traZODone 150 milliGRAM(s) Oral at bedtime      LABS: All Labs Reviewed:                    Blood Culture:     RADIOLOGY/EKG: reviewed

## 2021-07-30 NOTE — PROGRESS NOTE ADULT - ASSESSMENT
#Schizoaffective d/o with AH and SI  -admitted involuntarily to psych unit for treatment  -care for above per primary team  -suicide watch  -monitor QTc if to start or continue QTc prolonging meds  -ETOH level 45. Monitor for s/s of withdrawal and start ciwa as appropriate      #NIDDM  -A1C 10.1  - will need insulin upon discharge  -please get SW involved regarding plan when discharge approaches  -SW to also assist with insulin and medication coverage when d/c approaches  -cont ISS regimen for now  -hold metformin  -check ua for proteinuria      DVT px

## 2021-07-31 PROCEDURE — 99232 SBSQ HOSP IP/OBS MODERATE 35: CPT

## 2021-07-31 RX ADMIN — Medication 1 MILLIGRAM(S): at 19:18

## 2021-07-31 RX ADMIN — METFORMIN HYDROCHLORIDE 1000 MILLIGRAM(S): 850 TABLET ORAL at 17:24

## 2021-07-31 RX ADMIN — Medication 150 MILLIGRAM(S): at 21:11

## 2021-07-31 RX ADMIN — FLUPHENAZINE HYDROCHLORIDE 5 MILLIGRAM(S): 1 TABLET, FILM COATED ORAL at 21:11

## 2021-07-31 NOTE — PROGRESS NOTE BEHAVIORAL HEALTH - SUMMARY
50 y/o M with history of schizoaffective disorder (bipolar type), DM, alcohol and cocaine use presents with auditory hallucinations telling him to kill himself or someone else. Patient is uncooperative and will not elaborate on specific suicidal/homicidal plan or intent. Reports he cannot be let out back onto the street and is requesting transfer inpatient. Patient wants to be restarted on his medications including Depakote, Trazadone and Metformin.    PLAN: CONTINUES AS PER TREATMENT  TEAM.

## 2021-08-01 PROCEDURE — 99232 SBSQ HOSP IP/OBS MODERATE 35: CPT

## 2021-08-01 RX ADMIN — METFORMIN HYDROCHLORIDE 1000 MILLIGRAM(S): 850 TABLET ORAL at 09:15

## 2021-08-01 RX ADMIN — FLUPHENAZINE HYDROCHLORIDE 5 MILLIGRAM(S): 1 TABLET, FILM COATED ORAL at 21:10

## 2021-08-01 RX ADMIN — FLUPHENAZINE HYDROCHLORIDE 5 MILLIGRAM(S): 1 TABLET, FILM COATED ORAL at 09:15

## 2021-08-01 RX ADMIN — Medication 150 MILLIGRAM(S): at 21:10

## 2021-08-01 RX ADMIN — Medication 1 MILLIGRAM(S): at 00:49

## 2021-08-01 RX ADMIN — Medication 1 MILLIGRAM(S): at 21:11

## 2021-08-01 RX ADMIN — METFORMIN HYDROCHLORIDE 1000 MILLIGRAM(S): 850 TABLET ORAL at 17:06

## 2021-08-01 NOTE — PROGRESS NOTE BEHAVIORAL HEALTH - NSBHADMITIPOBSFT_PSY_A_CORE
[facility], under license to [employer].  All rights reserved.   6 Clicks Short Forms Daily Activity Inpatient Short Form   [facility] AM-PAC  \"6 Clicks\" Daily Activity Inpatient Short Form   1. Putting on and taking off regular lower body clothing? 3 - A Little   2. Bathing (including washing, rinsing, drying)? 3 - A Little   3. Toileting, which includes using toilet, bedpan or urinal? 3 - A Little   4. Putting on and taking off regular upper body clothing? 4 - None   5. Taking care of personal grooming such as brushing teeth? 4 - None   6. Eating meals? 4 - None   Daily Activity Raw Score (Score out of 24.Lower scores equate to lower levels of function) 21   Total Evaluation Time   Total Evaluation Time (Minutes) 10     "
no current Si and HI
Not S/h now
no current Si and HI
Not S/h now

## 2021-08-01 NOTE — PROGRESS NOTE BEHAVIORAL HEALTH - NSBHCHARTREVIEWVS_PSY_A_CORE FT
Vital Signs Last 24 Hrs  T(C): 36.6 (30 Jul 2021 08:19), Max: 36.6 (30 Jul 2021 08:19)  T(F): 97.9 (30 Jul 2021 08:19), Max: 97.9 (30 Jul 2021 08:19)  HR: --  BP: --  BP(mean): --  RR: 18 (30 Jul 2021 08:19) (18 - 18)  SpO2: 100% (30 Jul 2021 08:19) (100% - 100%)
Vital Signs Last 24 Hrs  T(C): 37.2 (28 Jul 2021 16:03), Max: 37.2 (28 Jul 2021 16:03)  T(F): 98.9 (28 Jul 2021 16:03), Max: 98.9 (28 Jul 2021 16:03)  HR: 72 (28 Jul 2021 14:09) (72 - 95)  BP: 121/76 (28 Jul 2021 14:09) (107/74 - 121/76)  BP(mean): --  RR: 16 (28 Jul 2021 16:03) (16 - 20)  SpO2: 98% (28 Jul 2021 16:03) (95% - 98%)
Vital Signs Last 24 Hrs  T(C): 36.7 (31 Jul 2021 08:19), Max: 36.7 (31 Jul 2021 08:19)  T(F): 98.1 (31 Jul 2021 08:19), Max: 98.1 (31 Jul 2021 08:19)    RR: 14 (31 Jul 2021 08:19) (14 - 14)  SpO2: 100% (31 Jul 2021 08:19) (100% - 100%)
Vital Signs Last 24 Hrs  T(C): 36.7 (31 Jul 2021 08:19), Max: 36.7 (31 Jul 2021 08:19)  T(F): 98.1 (31 Jul 2021 08:19), Max: 98.1 (31 Jul 2021 08:19)    RR: 14 (31 Jul 2021 08:19) (14 - 14)  SpO2: 100% (31 Jul 2021 08:19) (100% - 100%)

## 2021-08-01 NOTE — PROGRESS NOTE BEHAVIORAL HEALTH - NSBHADMITIPBHPROVIDER_PSY_A_CORE
Emergency Department Discharge Information for Oneyda Call was seen in the [facility] Emergency Department today for a viral respiratory infection by Dr. De Jesus and Dr. Junior.      Please return to the ED or contact his primary physician if he becomes much more ill, if he has trouble breathing, he can t keep down liquids, he goes more than 8 hours without urinating or the inside of the mouth is dry, he gets a fever over 101.5F, or if you have any other concerns.      Please make an appointment to follow up with his primary care provider in 2-3 days if not improving.        Medication side effect information:  All medicines may cause side effects. However, most people have no side effects or only have minor side effects.     People can be allergic to any medicine. Signs of an allergic reaction include rash, difficulty breathing or swallowing, wheezing, or unexplained swelling. If he has difficulty breathing or swallowing, call 911 or go right to the Emergency Department. For rash or other concerns, call his doctor.           
N/A

## 2021-08-01 NOTE — PROGRESS NOTE BEHAVIORAL HEALTH - RISK ASSESSMENT
low risk  acute: Pt with continued auditory hallucinations of voices,   mitigating : pt denies any suicidal ideation intent or plan , pt allowing contact with his sister , he may have more stable place to live.  protective support from family  chronic: pt with past psychiatric hospitalization

## 2021-08-01 NOTE — PROGRESS NOTE BEHAVIORAL HEALTH - PRIMARY DX
Schizoaffective disorder, bipolar type

## 2021-08-01 NOTE — PROGRESS NOTE BEHAVIORAL HEALTH - PROBLEM SELECTOR PLAN 1
fluPHENAZine 5 milliGRAM(s) Oral two times a day  traZODone 150 milliGRAM(s) Oral at bedtime
fluPHENAZine 5 milliGRAM(s) Oral two times a day  traZODone 150 milliGRAM(s) Oral at bedtime
MEDICATIONS  (STANDING):  dextrose 40% Gel 15 Gram(s) Oral once  dextrose 5%. 1000 milliLiter(s) (50 mL/Hr) IV Continuous <Continuous>  dextrose 5%. 1000 milliLiter(s) (100 mL/Hr) IV Continuous <Continuous>  dextrose 50% Injectable 25 Gram(s) IV Push once  dextrose 50% Injectable 12.5 Gram(s) IV Push once  dextrose 50% Injectable 25 Gram(s) IV Push once  fluPHENAZine 5 milliGRAM(s) Oral two times a day  glucagon  Injectable 1 milliGRAM(s) IntraMuscular once  insulin lispro (ADMELOG) corrective regimen sliding scale   SubCutaneous three times a day before meals  metFORMIN 1000 milliGRAM(s) Oral two times a day  pantoprazole    Tablet 40 milliGRAM(s) Oral before breakfast  traZODone 150 milliGRAM(s) Oral at bedtime

## 2021-08-01 NOTE — PROGRESS NOTE BEHAVIORAL HEALTH - NSBHCHARTREVIEWINVESTIGATE_PSY_A_CORE FT
krystin.



Prabhjot Burk 

Electronically Signed:  4/5/2019 4:59 PM
< from: 12 Lead ECG (07.28.21 @ 03:46) >    Ventricular Rate 88 BPM    Atrial Rate 88 BPM    P-R Interval 152 ms    QRS Duration 100 ms    Q-T Interval 390 ms    QTC Calculation(Bazett) 471 ms    P Axis 73 degrees    R Axis 10 degrees    T Axis 46 degrees    Diagnosis Line *** Poor data quality, interpretation may be adversely affected  Normal sinus rhythm  Minimal voltage criteria for LVH, may be normal variant  Borderline ECG    Confirmed by Lloyd Tena ([id number]) on 7/28/2021 12:12:02 PM

## 2021-08-01 NOTE — PROGRESS NOTE BEHAVIORAL HEALTH - SUMMARY
50 y/o M with history of schizoaffective disorder (bipolar type), DM, alcohol and cocaine use presents with auditory hallucinations telling him to kill himself or someone else. Patient is uncooperative and will not elaborate on specific suicidal/homicidal plan or intent. Reports he cannot be let out back onto the street and is requesting transfer inpatient. Patient wants to be restarted on his medications including Depakote, Trazadone and Metformin.    PLAN: CONTINUES AS PER TREATMENT  TEAM.  fluPHENAZine 5 milliGRAM(s) Oral two times a day  traZODone 150 milliGRAM(s) Oral at bedtime

## 2021-08-01 NOTE — PROGRESS NOTE BEHAVIORAL HEALTH - NSBHFUPINTERVALHXFT_PSY_A_CORE
NO interval changes. This   AM pt is in his bed, but also observed interacting  with peers. Makes poor eye contact. Denies Feeling  depressed. Also denies SI and H. Denies AH and VH.
Pt remains isolated and remaining mostly in his room.  Pt verbalized willing to continue with medication .  He also allowed contact to be made with his sister who he expressed an interest in living with  at her home.  Pt denies any suicidal ideation intent or plan . Pt still with response to internal stimuli .
Pt is a 51YOAA M with  schizoaffective DO, claims SI . Is labile, impulsive auditory preoccupied. Denies substance use.  This AM spending time in his bed, covering head with his blanket, answers the questions, and denies Ah and SI. he continues to verbalize depressed mood.

## 2021-08-02 VITALS — TEMPERATURE: 98 F | OXYGEN SATURATION: 100 % | RESPIRATION RATE: 18 BRPM

## 2021-08-02 PROCEDURE — 99238 HOSP IP/OBS DSCHRG MGMT 30/<: CPT

## 2021-08-02 RX ORDER — FLUPHENAZINE HYDROCHLORIDE 1 MG/1
1 TABLET, FILM COATED ORAL
Qty: 30 | Refills: 1
Start: 2021-08-02 | End: 2021-08-31

## 2021-08-02 RX ORDER — METFORMIN HYDROCHLORIDE 850 MG/1
1 TABLET ORAL
Qty: 0 | Refills: 0 | DISCHARGE

## 2021-08-02 RX ORDER — TRAZODONE HCL 50 MG
1 TABLET ORAL
Qty: 15 | Refills: 1
Start: 2021-08-02 | End: 2021-09-14

## 2021-08-02 RX ORDER — METFORMIN HYDROCHLORIDE 850 MG/1
1 TABLET ORAL
Qty: 30 | Refills: 1
Start: 2021-08-02 | End: 2021-09-14

## 2021-08-02 RX ORDER — METFORMIN HYDROCHLORIDE 850 MG/1
1 TABLET ORAL
Qty: 30 | Refills: 1
Start: 2021-08-02 | End: 2021-08-31

## 2021-08-02 RX ORDER — FLUPHENAZINE HYDROCHLORIDE 1 MG/1
1 TABLET, FILM COATED ORAL
Qty: 30 | Refills: 1
Start: 2021-08-02 | End: 2021-09-14

## 2021-08-02 RX ORDER — TRAZODONE HCL 50 MG
1 TABLET ORAL
Qty: 15 | Refills: 1
Start: 2021-08-02 | End: 2021-08-31

## 2021-08-02 RX ORDER — DIVALPROEX SODIUM 500 MG/1
0 TABLET, DELAYED RELEASE ORAL
Qty: 0 | Refills: 0 | DISCHARGE

## 2021-08-02 RX ORDER — TRAZODONE HCL 50 MG
200 TABLET ORAL
Qty: 0 | Refills: 0 | DISCHARGE

## 2021-08-02 RX ADMIN — FLUPHENAZINE HYDROCHLORIDE 5 MILLIGRAM(S): 1 TABLET, FILM COATED ORAL at 09:18

## 2021-08-02 RX ADMIN — METFORMIN HYDROCHLORIDE 1000 MILLIGRAM(S): 850 TABLET ORAL at 09:17

## 2021-08-02 NOTE — DISCHARGE NOTE BEHAVIORAL HEALTH - MEDICATION SUMMARY - MEDICATIONS TO CHANGE
I will SWITCH the dose or number of times a day I take the medications listed below when I get home from the hospital:    traZODone  -- 200 milligram(s) by mouth once a day (at bedtime)    metFORMIN 500 mg oral tablet  -- 1 tab(s) by mouth 2 times a day

## 2021-08-02 NOTE — DISCHARGE NOTE BEHAVIORAL HEALTH - NSBHDCMEDSFT_PSY_A_CORE
Pt educated not to stop taking medications unless told to do so by his doctor. Pt educated not to stop taking medications unless told to do so by his doctor.  MEDICATIONS  (STANDING):  dextrose 40% Gel 15 Gram(s) Oral once  dextrose 50% Injectable 12.5 Gram(s) IV Push once  dextrose 50% Injectable 25 Gram(s) IV Push once  fluPHENAZine 5 milliGRAM(s) Oral two times a day  metFORMIN 1000 milliGRAM(s) Oral two times a day  pantoprazole    Tablet 40 milliGRAM(s) Oral before breakfast  traZODone 150 milliGRAM(s) Oral at bedtime

## 2021-08-02 NOTE — DISCHARGE NOTE BEHAVIORAL HEALTH - NSBHDCPURPOSE1FT_PSY_A_CORE
Outpatient mental health treatment. Outpatient duel diagnosis treatment. You have a walk-in appointment with Jesusita Sheets on 8/4/21 at 12PM.

## 2021-08-02 NOTE — DISCHARGE NOTE BEHAVIORAL HEALTH - NSBHDCSUBSTHXFT_PSY_A_CORE
Per documentation, pt uses alcohol and cocaine/crack. It is indicated that pt with multiple previous positive urine screenings for cocaine, admits he uses to try and stop the voices. Per EMR, daily cannabis use.

## 2021-08-02 NOTE — DISCHARGE NOTE BEHAVIORAL HEALTH - PAST PSYCHIATRIC HISTORY
Per documentation, hx of schizoaffective disorder, bipolar type, Admission to [facility] 6/18/21, [facility] 6/2/21 - 6/8/21[facility] 10 months ago. Lived at [place] for 10 years.

## 2021-08-02 NOTE — DISCHARGE NOTE BEHAVIORAL HEALTH - MEDICATION SUMMARY - MEDICATIONS TO TAKE
I will START or STAY ON the medications listed below when I get home from the hospital:    traZODone 150 mg oral tablet  -- 1 tab(s) by mouth once a day (at bedtime)  -- Indication: For insomnia    metFORMIN 1000 mg oral tablet  -- 1 tab(s) by mouth 2 times a day  -- Indication: For DM    fluPHENAZine 5 mg oral tablet  -- 1 tab(s) by mouth 2 times a day  -- Indication: For Schizoaffective disorder

## 2021-08-02 NOTE — DISCHARGE NOTE BEHAVIORAL HEALTH - NSBHDCRESPONSEFT_PSY_A_CORE
improved   MEDICATIONS  (STANDING):  dextrose 40% Gel 15 Gram(s) Oral once  dextrose 50% Injectable 12.5 Gram(s) IV Push once  dextrose 50% Injectable 25 Gram(s) IV Push once  fluPHENAZine 5 milliGRAM(s) Oral two times a day  metFORMIN 1000 milliGRAM(s) Oral two times a day  pantoprazole    Tablet 40 milliGRAM(s) Oral before breakfast  traZODone 150 milliGRAM(s) Oral at bedtime

## 2021-08-02 NOTE — DISCHARGE NOTE BEHAVIORAL HEALTH - NSBHDCRESOURCESOTHERFT_PSY_A_CORE
Mood Disorder Support Group of [place]:   Free Zoom Support Groups:   https://www.[employer]lAudioName.Emailage/zoommeetings    Substance Use Resources    SMART Recovery Groups  *Can meet online   https://www.smartrecovery.org     Find a local AA group:  [facility]  [email or web address] [email or web address]/  [phone]    5-085-305-JAMAR (6264) or info@[email or web address].[email or web address]    Pursue DSS for housing assistance as needed:  [facility]   [address] [place] [address]   [phone]   *Arrive early before 8am opening    Or    [facility]  [address] [place] [id number]  [phone]  *Arrive early before 8am opening    [facility] EMERGENCY AFTER HOURS NUMBER: [phone]

## 2021-08-02 NOTE — DISCHARGE NOTE BEHAVIORAL HEALTH - NSBHDCREFEROTHERFT_PSY_A_CORE
[facility]- for psychiatric crises (available AFTER HOURS)  24/7 hotline: [phone]  Address:  [address], [place] [address]

## 2021-08-02 NOTE — DISCHARGE NOTE BEHAVIORAL HEALTH - NSBHDCCRISISPLAN2FT_PSY_A_CORE
Call [facility] 5N: [phone]  : More Cullen LMSW  Psychiatrists- Dr. Sanjuanita Miller

## 2021-08-02 NOTE — DISCHARGE NOTE BEHAVIORAL HEALTH - NSBHDCSUICSAFETYFT_PSY_A_CORE
1. Pt is aware that if the symptoms of suicidal thoughts return that the pt is to go to the ER to be evaluated and obtain external supports and if needed medication.>>> <<<  2. Pt able to indicate clear recognition of symptoms  of not being well such as poor sleep, increase in anxiety, feeling depressed, having thoughts of self injury, having active or passive thoughts of  death , halluconations , bizarre  thoughts , paranoid  thoughts.that in the interim until he /she links to the outside therapist and MD that Dr Miller and the treatment team can be contacted at [facility] [phone].  3. Both the pt and the family are both aware of the discharge plan and of the aftercare appts set for the pt  4. Pt is aware of the importance to tell family, their social support system and their therapist and provider if they experience yehuda suicidal thoughts, and be willing to receive help to not act on theses thoughts.

## 2021-08-02 NOTE — DISCHARGE NOTE BEHAVIORAL HEALTH - NSBHDCCRISISPLAN3FT_PSY_A_CORE
Discuss in treatment with counselor, attended a local/online self-help group, call a hotline ([facility] (Substance Abuse and Mental Health Services Administration)[phone])

## 2021-08-02 NOTE — DISCHARGE NOTE BEHAVIORAL HEALTH - NSBHDCCRISISPLAN1FT_PSY_A_CORE
Tell a trusted friend/family member, tell housing staff, tell clinic staff, call a crisis line ( Crisis Center ph. [phone], [facility]ASH [phone], [facility] (peer support) ph. [phone]), return to the nearest emergency room. You may call 9-1-1 for assistance.

## 2021-08-02 NOTE — DISCHARGE NOTE BEHAVIORAL HEALTH - CONDITIONS AT DISCHARGE
Patient alert, oriented x3. Therapist and nurse reviewed safety plan with patient who denies any thoughts to self harm or others. SW and nurse reviewed discharge plan with patient who is in agreement with plan. Patient received a copy of his discharge instructions. All belongings returned to patient.
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
## 2021-08-02 NOTE — DISCHARGE NOTE BEHAVIORAL HEALTH - NSBHDCADDFT_PSY_A_CORE
HgA1c Estimated Average Glucose: 258: The Estimated Average Glucose (eAG) or Mean Plasma Glucose (MPG) value is  calculated from the hemoglobin A1c value and covers the same time period.   The American Diabetes Association (ADA) and other professional  organizations recommend reporting the eAG with the HgbA1c. mg/dL (07.29.21 @ 10:13)

## 2021-08-02 NOTE — DISCHARGE NOTE BEHAVIORAL HEALTH - NSBHDCALCOHOLREFERFT_PSY_A_CORE
Pt to address substance abuse issues in treatment at the [facility]. Pt to address substance abuse issues in treatment at [facility].

## 2021-08-02 NOTE — DISCHARGE NOTE BEHAVIORAL HEALTH - MEDICATION SUMMARY - MEDICATIONS TO STOP TAKING
I will STOP taking the medications listed below when I get home from the hospital:    Depakote 500 mg oral delayed release tablet  -- orally 2 times a day

## 2021-08-03 NOTE — CHART NOTE - NSCHARTNOTEFT_GEN_A_CORE
Writer contacted patient to follow up after discharge. Direct contact made with patient's sister Audra who stated the patient made it home safely. In no acute distress. She stated that she will tell him to call the hospital if he needs anything. Writer provided phone number.

## 2021-08-05 DIAGNOSIS — F25.0 SCHIZOAFFECTIVE DISORDER, BIPOLAR TYPE: ICD-10-CM

## 2021-08-05 DIAGNOSIS — F14.11 COCAINE ABUSE, IN REMISSION: ICD-10-CM

## 2021-08-05 DIAGNOSIS — Z91.5 PERSONAL HISTORY OF SELF-HARM: ICD-10-CM

## 2021-08-05 DIAGNOSIS — F17.210 NICOTINE DEPENDENCE, CIGARETTES, UNCOMPLICATED: ICD-10-CM

## 2021-08-05 DIAGNOSIS — E11.9 TYPE 2 DIABETES MELLITUS WITHOUT COMPLICATIONS: ICD-10-CM

## 2021-08-05 DIAGNOSIS — R45.851 SUICIDAL IDEATIONS: ICD-10-CM

## 2021-08-05 DIAGNOSIS — Z59.0 HOMELESSNESS: ICD-10-CM

## 2021-08-05 DIAGNOSIS — Z56.0 UNEMPLOYMENT, UNSPECIFIED: ICD-10-CM

## 2021-08-05 DIAGNOSIS — Z72.89 OTHER PROBLEMS RELATED TO LIFESTYLE: ICD-10-CM

## 2021-08-05 DIAGNOSIS — Z91.14 PATIENT'S OTHER NONCOMPLIANCE WITH MEDICATION REGIMEN: ICD-10-CM

## 2021-08-05 SDOH — ECONOMIC STABILITY - HOUSING INSECURITY: HOMELESSNESS: Z59.0

## 2021-08-05 SDOH — ECONOMIC STABILITY - INCOME SECURITY: UNEMPLOYMENT, UNSPECIFIED: Z56.0

## 2021-08-06 ENCOUNTER — EMERGENCY (EMERGENCY)
Facility: HOSPITAL | Age: 51
LOS: 1 days | Discharge: OTHER INSTITUTION | End: 2021-08-06
Attending: EMERGENCY MEDICINE
Payer: MEDICAID

## 2021-08-06 VITALS
HEART RATE: 98 BPM | HEIGHT: 66 IN | RESPIRATION RATE: 18 BRPM | TEMPERATURE: 98 F | SYSTOLIC BLOOD PRESSURE: 124 MMHG | DIASTOLIC BLOOD PRESSURE: 76 MMHG | WEIGHT: 169.98 LBS | OXYGEN SATURATION: 95 %

## 2021-08-06 VITALS
OXYGEN SATURATION: 97 % | RESPIRATION RATE: 18 BRPM | SYSTOLIC BLOOD PRESSURE: 138 MMHG | DIASTOLIC BLOOD PRESSURE: 90 MMHG | HEART RATE: 86 BPM | TEMPERATURE: 98 F

## 2021-08-06 DIAGNOSIS — F14.11 COCAINE ABUSE, IN REMISSION: ICD-10-CM

## 2021-08-06 DIAGNOSIS — F25.0 SCHIZOAFFECTIVE DISORDER, BIPOLAR TYPE: ICD-10-CM

## 2021-08-06 DIAGNOSIS — F10.11 ALCOHOL ABUSE, IN REMISSION: ICD-10-CM

## 2021-08-06 DIAGNOSIS — E11.9 TYPE 2 DIABETES MELLITUS WITHOUT COMPLICATIONS: ICD-10-CM

## 2021-08-06 LAB
ALBUMIN SERPL ELPH-MCNC: 3.6 G/DL — SIGNIFICANT CHANGE UP (ref 3.3–5.2)
ALP SERPL-CCNC: 61 U/L — SIGNIFICANT CHANGE UP (ref 40–120)
ALT FLD-CCNC: 17 U/L — SIGNIFICANT CHANGE UP
ANION GAP SERPL CALC-SCNC: 10 MMOL/L — SIGNIFICANT CHANGE UP (ref 5–17)
APAP SERPL-MCNC: <3 UG/ML — LOW (ref 10–26)
AST SERPL-CCNC: 18 U/L — SIGNIFICANT CHANGE UP
BASOPHILS # BLD AUTO: 0.02 K/UL — SIGNIFICANT CHANGE UP (ref 0–0.2)
BASOPHILS NFR BLD AUTO: 0.5 % — SIGNIFICANT CHANGE UP (ref 0–2)
BILIRUB SERPL-MCNC: <0.2 MG/DL — LOW (ref 0.4–2)
BUN SERPL-MCNC: 12.4 MG/DL — SIGNIFICANT CHANGE UP (ref 8–20)
CALCIUM SERPL-MCNC: 8.6 MG/DL — SIGNIFICANT CHANGE UP (ref 8.6–10.2)
CHLORIDE SERPL-SCNC: 105 MMOL/L — SIGNIFICANT CHANGE UP (ref 98–107)
CO2 SERPL-SCNC: 26 MMOL/L — SIGNIFICANT CHANGE UP (ref 22–29)
COVID-19 SPIKE DOMAIN AB INTERP: POSITIVE
COVID-19 SPIKE DOMAIN ANTIBODY RESULT: >250 U/ML — HIGH
CREAT SERPL-MCNC: 0.82 MG/DL — SIGNIFICANT CHANGE UP (ref 0.5–1.3)
EOSINOPHIL # BLD AUTO: 0.14 K/UL — SIGNIFICANT CHANGE UP (ref 0–0.5)
EOSINOPHIL NFR BLD AUTO: 3.2 % — SIGNIFICANT CHANGE UP (ref 0–6)
ETHANOL SERPL-MCNC: <10 MG/DL — SIGNIFICANT CHANGE UP (ref 0–9)
GLUCOSE SERPL-MCNC: 203 MG/DL — HIGH (ref 70–99)
HCT VFR BLD CALC: 44.9 % — SIGNIFICANT CHANGE UP (ref 39–50)
HGB BLD-MCNC: 14.7 G/DL — SIGNIFICANT CHANGE UP (ref 13–17)
IMM GRANULOCYTES NFR BLD AUTO: 0 % — SIGNIFICANT CHANGE UP (ref 0–1.5)
LYMPHOCYTES # BLD AUTO: 1.91 K/UL — SIGNIFICANT CHANGE UP (ref 1–3.3)
LYMPHOCYTES # BLD AUTO: 43.7 % — SIGNIFICANT CHANGE UP (ref 13–44)
MCHC RBC-ENTMCNC: 30.2 PG — SIGNIFICANT CHANGE UP (ref 27–34)
MCHC RBC-ENTMCNC: 32.7 GM/DL — SIGNIFICANT CHANGE UP (ref 32–36)
MCV RBC AUTO: 92.2 FL — SIGNIFICANT CHANGE UP (ref 80–100)
MONOCYTES # BLD AUTO: 0.57 K/UL — SIGNIFICANT CHANGE UP (ref 0–0.9)
MONOCYTES NFR BLD AUTO: 13 % — SIGNIFICANT CHANGE UP (ref 2–14)
NEUTROPHILS # BLD AUTO: 1.73 K/UL — LOW (ref 1.8–7.4)
NEUTROPHILS NFR BLD AUTO: 39.6 % — LOW (ref 43–77)
PLATELET # BLD AUTO: 270 K/UL — SIGNIFICANT CHANGE UP (ref 150–400)
POTASSIUM SERPL-MCNC: 4 MMOL/L — SIGNIFICANT CHANGE UP (ref 3.5–5.3)
POTASSIUM SERPL-SCNC: 4 MMOL/L — SIGNIFICANT CHANGE UP (ref 3.5–5.3)
PROT SERPL-MCNC: 6.3 G/DL — LOW (ref 6.6–8.7)
RBC # BLD: 4.87 M/UL — SIGNIFICANT CHANGE UP (ref 4.2–5.8)
RBC # FLD: 12.5 % — SIGNIFICANT CHANGE UP (ref 10.3–14.5)
SALICYLATES SERPL-MCNC: <0.6 MG/DL — LOW (ref 10–20)
SARS-COV-2 IGG+IGM SERPL QL IA: >250 U/ML — HIGH
SARS-COV-2 IGG+IGM SERPL QL IA: POSITIVE
SARS-COV-2 RNA SPEC QL NAA+PROBE: SIGNIFICANT CHANGE UP
SODIUM SERPL-SCNC: 141 MMOL/L — SIGNIFICANT CHANGE UP (ref 135–145)
VALPROATE SERPL-MCNC: <3.7 UG/ML — LOW (ref 50–100)
WBC # BLD: 4.37 K/UL — SIGNIFICANT CHANGE UP (ref 3.8–10.5)
WBC # FLD AUTO: 4.37 K/UL — SIGNIFICANT CHANGE UP (ref 3.8–10.5)

## 2021-08-06 PROCEDURE — 80307 DRUG TEST PRSMV CHEM ANLYZR: CPT

## 2021-08-06 PROCEDURE — 80164 ASSAY DIPROPYLACETIC ACD TOT: CPT

## 2021-08-06 PROCEDURE — U0003: CPT

## 2021-08-06 PROCEDURE — 93010 ELECTROCARDIOGRAM REPORT: CPT

## 2021-08-06 PROCEDURE — U0005: CPT

## 2021-08-06 PROCEDURE — 99285 EMERGENCY DEPT VISIT HI MDM: CPT

## 2021-08-06 PROCEDURE — 85025 COMPLETE CBC W/AUTO DIFF WBC: CPT

## 2021-08-06 PROCEDURE — 99234 HOSP IP/OBS SM DT SF/LOW 45: CPT

## 2021-08-06 PROCEDURE — 86769 SARS-COV-2 COVID-19 ANTIBODY: CPT

## 2021-08-06 PROCEDURE — 36415 COLL VENOUS BLD VENIPUNCTURE: CPT

## 2021-08-06 PROCEDURE — 80053 COMPREHEN METABOLIC PANEL: CPT

## 2021-08-06 PROCEDURE — 93005 ELECTROCARDIOGRAM TRACING: CPT

## 2021-08-06 RX ORDER — TRAZODONE HCL 50 MG
200 TABLET ORAL AT BEDTIME
Refills: 0 | Status: DISCONTINUED | OUTPATIENT
Start: 2021-08-06 | End: 2021-08-10

## 2021-08-06 RX ORDER — METFORMIN HYDROCHLORIDE 850 MG/1
1000 TABLET ORAL
Refills: 0 | Status: DISCONTINUED | OUTPATIENT
Start: 2021-08-06 | End: 2021-08-10

## 2021-08-06 RX ORDER — RISPERIDONE 4 MG/1
2 TABLET ORAL
Refills: 0 | Status: DISCONTINUED | OUTPATIENT
Start: 2021-08-06 | End: 2021-08-10

## 2021-08-06 RX ORDER — DIVALPROEX SODIUM 500 MG/1
500 TABLET, DELAYED RELEASE ORAL DAILY
Refills: 0 | Status: DISCONTINUED | OUTPATIENT
Start: 2021-08-06 | End: 2021-08-10

## 2021-08-06 RX ADMIN — METFORMIN HYDROCHLORIDE 1000 MILLIGRAM(S): 850 TABLET ORAL at 15:03

## 2021-08-06 RX ADMIN — DIVALPROEX SODIUM 500 MILLIGRAM(S): 500 TABLET, DELAYED RELEASE ORAL at 15:03

## 2021-08-06 RX ADMIN — RISPERIDONE 2 MILLIGRAM(S): 4 TABLET ORAL at 15:03

## 2021-08-06 NOTE — ED PROVIDER NOTE - PRINCIPAL DIAGNOSIS
"\"Call patient's [facility] pharmacy to see if they have record of her receiving a tetanus vaccine in the last year approximately bed update patient's vaccination history with the date of the vaccine\"    Called [facility], patient did not have tetanus shot within the last year. I called patient to inform her.   " Suicidal ideations

## 2021-08-06 NOTE — ED ADULT NURSE NOTE - HPI (INCLUDE ILLNESS QUALITY, SEVERITY, DURATION, TIMING, CONTEXT, MODIFYING FACTORS, ASSOCIATED SIGNS AND SYMPTOMS)
Patient comes to -ED after being medically cleared in main ED. He has multiple ED visits and inpatient admissions for similar presentation of symptoms. Endorsing suicidal ideation and need for inpatient admission. Discharged from [facility] 8/2/21. Cooperative with intake interview. Requesting food and remote for tv, sleeping a short time later. Admits to using cocaine yesterday.

## 2021-08-06 NOTE — ED PROVIDER NOTE - OBJECTIVE STATEMENT
51 year old male with PMH schizophrenia presents with SI. pt states that he has been non-compliant with his depakote and his trazodone. He states that several months ago he lost his mother for which he has been grieving. He states that the voices that he hears are becoming more prominent, and he is depressed and having suicidal thoughts. He admits to etoh abuse, but denies drug use. No physical complaints including no chest pain, blurry vision, numbness, tingling, weakness, fevers, chills.

## 2021-08-06 NOTE — ED BEHAVIORAL HEALTH ASSESSMENT NOTE - RISK ASSESSMENT
Moderate acute risk of dangerous behavior, risk factors include previous suicide attempts, substance use, medication noncompliance, command auditory hallucinations High Acute Suicide Risk

## 2021-08-06 NOTE — ED CDU PROVIDER INITIAL DAY NOTE - MEDICAL DECISION MAKING DETAILS
Pt with need for inpatient psychiatric therapy for increased symptoms of known schizoaffective disorder /bipolar type  Pt is expressing SI  Cleared medically and  eval indicated need for inpatient treatment

## 2021-08-06 NOTE — ED BEHAVIORAL HEALTH ASSESSMENT NOTE - SUMMARY
50 y/o M with history of schizoaffective disorder (bipolar type), DM, alcohol and cocaine use presents with auditory hallucinations telling him to "kill himself or other people". Endorses thoughts of cutting himself but will not elaborate on specific homicidal plan or intent. Reports he cannot be let out back onto the street and is requesting transfer inpatient. Patient wants to be restarted on his medications including Depakote, Trazadone and Metformin. 50 y/o M with history of schizoaffective disorder (bipolar type), DM, alcohol and cocaine use presents with auditory hallucinations telling him to "kill himself or other people". Endorses thoughts of cutting himself but will not elaborate on specific homicidal plan or intent. Reports he cannot be let out back onto the street and is requesting transfer inpatient. Patient wants to be restarted on his medications including Depakote, Trazadone and Metformin.    Spoke with patient's , Amalia, at [phone] and informed her of plan for voluntary transfer inpatient, pending bed availability.

## 2021-08-06 NOTE — ED ADULT TRIAGE NOTE - CHIEF COMPLAINT QUOTE
C/o SI, HI, and abdominal pain. Pt states, "I am homeless, hearing voices, and I just want to give up". Denies plan. Hx of bipolar and schizophrenia.

## 2021-08-06 NOTE — ED BEHAVIORAL HEALTH ASSESSMENT NOTE - NSBHSACOC_PSY_A_CORE FT
Multiple previous positive urine screenings for cocaine. Admits he uses to try and stop the voices. Denies recent use.

## 2021-08-06 NOTE — ED BEHAVIORAL HEALTH ASSESSMENT NOTE - DESCRIPTION
type II DM Patient BIBA to [facility] ED complaining of auditory hallucinations and suicidal thoughts. CHRISTIE <10, Utox pending collection. Patient reports he is currently homeless, living on the street. States he used to live at [facility] for 10 years.

## 2021-08-06 NOTE — ED BEHAVIORAL HEALTH ASSESSMENT NOTE - DETAILS
Voices telling him to kill himself or others Self n/a pending placement See HPI [facility] 7/28/21-8/2/21, [facility] in [place]

## 2021-08-06 NOTE — ED BEHAVIORAL HEALTH ASSESSMENT NOTE - SUBSTANCE ISSUES AND PLAN (INCLUDE STANDING AND PRN MEDICATION)
History of ETOH/cocaine abuse, defer to inpatient facility History of ETOH/cocaine abuse, defer to inpatient facility withdrawal not anticipated

## 2021-08-06 NOTE — ED PROVIDER NOTE - CARE PLAN
Principal Discharge DX:	Suicidal ideations  Secondary Diagnosis:	Schizoaffective disorder, unspecified type

## 2021-08-06 NOTE — ED ADULT NURSE REASSESSMENT NOTE - NS ED NURSE REASSESS COMMENT FT1
received pt awake alert maintained 1;1 for suicidal ideation . pt with yellow gown belongings in storage
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
## 2021-08-06 NOTE — ED ADULT TRIAGE NOTE - ISOLATION TYPE:
None
Breathing – normal variations in rate and rhythm, unlabored; grunting absent or intermittent and improving; intercostal, supracostal and subcostal muscles with normal excursion and not retracting; breath sounds are clear or mildly bronchovesicular, symmetric, with adequate intensity and without rales.
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
## 2021-08-06 NOTE — ED BEHAVIORAL HEALTH ASSESSMENT NOTE - OTHER PAST PSYCHIATRIC HISTORY (INCLUDE DETAILS REGARDING ONSET, COURSE OF ILLNESS, INPATIENT/OUTPATIENT TREATMENT)
History of schizoaffective disorder, bipolar type   Most recent admission to [facility] 7/28/21-8/2/2021. Prior admissions to [facility] 6/18/21, [facility] 6/2/21-6/8/2021, [facility] 10 months ago, and lived at [facility] for 10 years.  Not currently on his medications, no outpatient care
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
## 2021-08-13 ENCOUNTER — EMERGENCY (EMERGENCY)
Facility: HOSPITAL | Age: 51
LOS: 1 days | Discharge: HOME | End: 2021-08-13
Attending: EMERGENCY MEDICINE
Payer: MEDICAID

## 2021-08-13 VITALS
RESPIRATION RATE: 18 BRPM | DIASTOLIC BLOOD PRESSURE: 78 MMHG | TEMPERATURE: 98 F | HEIGHT: 66 IN | SYSTOLIC BLOOD PRESSURE: 128 MMHG | OXYGEN SATURATION: 96 % | HEART RATE: 101 BPM

## 2021-08-13 DIAGNOSIS — F19.10 OTHER PSYCHOACTIVE SUBSTANCE ABUSE, UNCOMPLICATED: ICD-10-CM

## 2021-08-13 DIAGNOSIS — F25.0 SCHIZOAFFECTIVE DISORDER, BIPOLAR TYPE: ICD-10-CM

## 2021-08-13 LAB
ALBUMIN SERPL ELPH-MCNC: 4.5 G/DL — SIGNIFICANT CHANGE UP (ref 3.3–5.2)
ALP SERPL-CCNC: 63 U/L — SIGNIFICANT CHANGE UP (ref 40–120)
ALT FLD-CCNC: 14 U/L — SIGNIFICANT CHANGE UP
AMPHET UR-MCNC: NEGATIVE — SIGNIFICANT CHANGE UP
ANION GAP SERPL CALC-SCNC: 12 MMOL/L — SIGNIFICANT CHANGE UP (ref 5–17)
APAP SERPL-MCNC: <3 UG/ML — LOW (ref 10–26)
APPEARANCE UR: CLEAR — SIGNIFICANT CHANGE UP
AST SERPL-CCNC: 20 U/L — SIGNIFICANT CHANGE UP
BARBITURATES UR SCN-MCNC: NEGATIVE — SIGNIFICANT CHANGE UP
BASOPHILS # BLD AUTO: 0.02 K/UL — SIGNIFICANT CHANGE UP (ref 0–0.2)
BASOPHILS NFR BLD AUTO: 0.3 % — SIGNIFICANT CHANGE UP (ref 0–2)
BENZODIAZ UR-MCNC: NEGATIVE — SIGNIFICANT CHANGE UP
BILIRUB SERPL-MCNC: <0.2 MG/DL — LOW (ref 0.4–2)
BILIRUB UR-MCNC: NEGATIVE — SIGNIFICANT CHANGE UP
BUN SERPL-MCNC: 11.2 MG/DL — SIGNIFICANT CHANGE UP (ref 8–20)
CALCIUM SERPL-MCNC: 9.7 MG/DL — SIGNIFICANT CHANGE UP (ref 8.6–10.2)
CHLORIDE SERPL-SCNC: 101 MMOL/L — SIGNIFICANT CHANGE UP (ref 98–107)
CO2 SERPL-SCNC: 26 MMOL/L — SIGNIFICANT CHANGE UP (ref 22–29)
COCAINE METAB.OTHER UR-MCNC: POSITIVE
COLOR SPEC: YELLOW — SIGNIFICANT CHANGE UP
CREAT SERPL-MCNC: 0.77 MG/DL — SIGNIFICANT CHANGE UP (ref 0.5–1.3)
DIFF PNL FLD: NEGATIVE — SIGNIFICANT CHANGE UP
EOSINOPHIL # BLD AUTO: 0.03 K/UL — SIGNIFICANT CHANGE UP (ref 0–0.5)
EOSINOPHIL NFR BLD AUTO: 0.4 % — SIGNIFICANT CHANGE UP (ref 0–6)
ETHANOL SERPL-MCNC: <10 MG/DL — SIGNIFICANT CHANGE UP (ref 0–9)
GLUCOSE SERPL-MCNC: 179 MG/DL — HIGH (ref 70–99)
GLUCOSE UR QL: 1000 MG/DL
HCT VFR BLD CALC: 47.8 % — SIGNIFICANT CHANGE UP (ref 39–50)
HGB BLD-MCNC: 15.9 G/DL — SIGNIFICANT CHANGE UP (ref 13–17)
IMM GRANULOCYTES NFR BLD AUTO: 0.5 % — SIGNIFICANT CHANGE UP (ref 0–1.5)
KETONES UR-MCNC: NEGATIVE — SIGNIFICANT CHANGE UP
LEUKOCYTE ESTERASE UR-ACNC: NEGATIVE — SIGNIFICANT CHANGE UP
LYMPHOCYTES # BLD AUTO: 1.67 K/UL — SIGNIFICANT CHANGE UP (ref 1–3.3)
LYMPHOCYTES # BLD AUTO: 22.4 % — SIGNIFICANT CHANGE UP (ref 13–44)
MCHC RBC-ENTMCNC: 30.2 PG — SIGNIFICANT CHANGE UP (ref 27–34)
MCHC RBC-ENTMCNC: 33.3 GM/DL — SIGNIFICANT CHANGE UP (ref 32–36)
MCV RBC AUTO: 90.7 FL — SIGNIFICANT CHANGE UP (ref 80–100)
METHADONE UR-MCNC: NEGATIVE — SIGNIFICANT CHANGE UP
MONOCYTES # BLD AUTO: 0.88 K/UL — SIGNIFICANT CHANGE UP (ref 0–0.9)
MONOCYTES NFR BLD AUTO: 11.8 % — SIGNIFICANT CHANGE UP (ref 2–14)
NEUTROPHILS # BLD AUTO: 4.81 K/UL — SIGNIFICANT CHANGE UP (ref 1.8–7.4)
NEUTROPHILS NFR BLD AUTO: 64.6 % — SIGNIFICANT CHANGE UP (ref 43–77)
NITRITE UR-MCNC: NEGATIVE — SIGNIFICANT CHANGE UP
OPIATES UR-MCNC: NEGATIVE — SIGNIFICANT CHANGE UP
PCP SPEC-MCNC: SIGNIFICANT CHANGE UP
PCP UR-MCNC: NEGATIVE — SIGNIFICANT CHANGE UP
PH UR: 7 — SIGNIFICANT CHANGE UP (ref 5–8)
PLATELET # BLD AUTO: 331 K/UL — SIGNIFICANT CHANGE UP (ref 150–400)
POTASSIUM SERPL-MCNC: 4.1 MMOL/L — SIGNIFICANT CHANGE UP (ref 3.5–5.3)
POTASSIUM SERPL-SCNC: 4.1 MMOL/L — SIGNIFICANT CHANGE UP (ref 3.5–5.3)
PROT SERPL-MCNC: 7.7 G/DL — SIGNIFICANT CHANGE UP (ref 6.6–8.7)
PROT UR-MCNC: NEGATIVE MG/DL — SIGNIFICANT CHANGE UP
RBC # BLD: 5.27 M/UL — SIGNIFICANT CHANGE UP (ref 4.2–5.8)
RBC # FLD: 12.5 % — SIGNIFICANT CHANGE UP (ref 10.3–14.5)
SALICYLATES SERPL-MCNC: <0.6 MG/DL — LOW (ref 10–20)
SARS-COV-2 RNA SPEC QL NAA+PROBE: SIGNIFICANT CHANGE UP
SODIUM SERPL-SCNC: 139 MMOL/L — SIGNIFICANT CHANGE UP (ref 135–145)
SP GR SPEC: 1 — LOW (ref 1.01–1.02)
THC UR QL: NEGATIVE — SIGNIFICANT CHANGE UP
UROBILINOGEN FLD QL: NEGATIVE MG/DL — SIGNIFICANT CHANGE UP
WBC # BLD: 7.45 K/UL — SIGNIFICANT CHANGE UP (ref 3.8–10.5)
WBC # FLD AUTO: 7.45 K/UL — SIGNIFICANT CHANGE UP (ref 3.8–10.5)

## 2021-08-13 PROCEDURE — 99218: CPT

## 2021-08-13 PROCEDURE — 90792 PSYCH DIAG EVAL W/MED SRVCS: CPT

## 2021-08-13 PROCEDURE — 93010 ELECTROCARDIOGRAM REPORT: CPT

## 2021-08-13 RX ORDER — METFORMIN HYDROCHLORIDE 850 MG/1
1000 TABLET ORAL
Refills: 0 | Status: DISCONTINUED | OUTPATIENT
Start: 2021-08-13 | End: 2021-08-18

## 2021-08-13 RX ORDER — TRAZODONE HCL 50 MG
150 TABLET ORAL AT BEDTIME
Refills: 0 | Status: DISCONTINUED | OUTPATIENT
Start: 2021-08-13 | End: 2021-08-18

## 2021-08-13 RX ORDER — DIVALPROEX SODIUM 500 MG/1
500 TABLET, DELAYED RELEASE ORAL
Refills: 0 | Status: DISCONTINUED | OUTPATIENT
Start: 2021-08-13 | End: 2021-08-18

## 2021-08-13 RX ORDER — RISPERIDONE 4 MG/1
2 TABLET ORAL
Refills: 0 | Status: DISCONTINUED | OUTPATIENT
Start: 2021-08-13 | End: 2021-08-18

## 2021-08-13 RX ORDER — CHLORPROMAZINE HCL 10 MG
25 TABLET ORAL ONCE
Refills: 0 | Status: DISCONTINUED | OUTPATIENT
Start: 2021-08-13 | End: 2021-08-18

## 2021-08-13 RX ADMIN — METFORMIN HYDROCHLORIDE 1000 MILLIGRAM(S): 850 TABLET ORAL at 19:10

## 2021-08-13 RX ADMIN — Medication 150 MILLIGRAM(S): at 22:56

## 2021-08-13 RX ADMIN — RISPERIDONE 2 MILLIGRAM(S): 4 TABLET ORAL at 19:10

## 2021-08-13 RX ADMIN — DIVALPROEX SODIUM 500 MILLIGRAM(S): 500 TABLET, DELAYED RELEASE ORAL at 19:10

## 2021-08-13 NOTE — ED BEHAVIORAL HEALTH ASSESSMENT NOTE - OTHER PAST PSYCHIATRIC HISTORY (INCLUDE DETAILS REGARDING ONSET, COURSE OF ILLNESS, INPATIENT/OUTPATIENT TREATMENT)
History of schizoaffective disorder, bipolar type   Most recent admission to [facility], 8/6-8/12/2021,  [facility] 7/28/21-8/2/2021. Prior admissions to [facility] 6/18/21, [facility] 6/2/21-6/8/2021, [facility] 10 months ago, and lived at [facility] for 10 years.  stopped meds once he leaves the hospital.

## 2021-08-13 NOTE — ED CDU PROVIDER INITIAL DAY NOTE - MEDICAL DECISION MAKING DETAILS
All labs and COVID unremarkable, patient assessed by , will remain in  overnight for observation and reassessment in AM
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
## 2021-08-13 NOTE — ED BEHAVIORAL HEALTH ASSESSMENT NOTE - CURRENT MEDICATION
Metformin 1000mg one tablet BID for DM, Depakote DR 500mg bid, Risperdal consta IM last dose  8/11 25 mg (2ml) with recommended dose biweekly due 8/25  Trazadone 150 mg at bedtime,Risperdal 2 mg bid, MVI daily

## 2021-08-13 NOTE — ED ADULT NURSE NOTE - NS_BH TRG QUESTION8_ED_ALL_ED
Depression (without Suicidality or Psychosis)/Bhavna (includes Bipolar Disorder)/Anxiety (includes Panic, OCD)

## 2021-08-13 NOTE — ED PROVIDER NOTE - OBJECTIVE STATEMENT
52 yo male hx of extensive psychiatric history and currently undomiciled, complains of "I need help again." States he is without meds and is hearing voices, has no place to go. Denies SI

## 2021-08-13 NOTE — ED BEHAVIORAL HEALTH ASSESSMENT NOTE - RISK ASSESSMENT
Unable to determine Suicide Risk RF past suicide attempt with [facility] admission, drug abuse, noncompliance, poor unreliable historian  PF supportive family
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
## 2021-08-13 NOTE — ED ADULT NURSE NOTE - ACTIVATING EVENTS/STRESSORS
Triggering events leading to humiliation, shame, and/or despair (e.g. Loss of relationship, financial or health status) (real or anticipated)/Non-compliant or not receiving treatment/Substance intoxication or withdrawal/Inadequate social supports

## 2021-08-13 NOTE — ED PROVIDER NOTE - PHYSICAL EXAMINATION
Const: Awake, alert and oriented. In no acute distress. Disheveled, malodorous  HEENT: NC/AT. Moist mucous membranes.  Eyes: No scleral icterus. EOMI.  Neck:. Soft and supple. Full ROM without pain.  Cardiac: Regular rate and rhythm. +S1/S2. No murmurs. Peripheral pulses 2+ and symmetric. No LE edema.  Resp: Speaking in full sentences. No evidence of respiratory distress. No wheezes, rales or rhonchi.  Abd: Soft, non-tender, non-distended. Normal bowel sounds in all 4 quadrants. No guarding or rebound.  Back: Spine midline and non-tender. No CVAT.  Skin: No rashes, abrasions or lacerations.  Psych: +AH, no SI

## 2021-08-13 NOTE — ED ADULT NURSE NOTE - SUICIDE RISK FACTORS
Hopelessness or despair/Alcohol/Substance abuse disorders/Agitation/Severe Anxiety/Panic/Mood Disorder current/past

## 2021-08-13 NOTE — ED PROVIDER NOTE - NSICDXPASTMEDICALHX_GEN_ALL_CORE_FT
PAST MEDICAL HISTORY:  Bipolar disorder     DM (diabetes mellitus)     HTN (hypertension)     Schizoaffective disorder, unspecified type

## 2021-08-13 NOTE — ED ADULT NURSE REASSESSMENT NOTE - DESCRIPTION
received pt in room cooperative at first and calm.  pt ambulatory in unit entered another patient room to obtain remote control.  when this rn approached pt and informed that he is not walk into another patients room. pt yelled,. cursed and threaten  writer.   rediscussed the above with pt.  pt hen apologized and then went back in room.

## 2021-08-13 NOTE — ED ADULT TRIAGE NOTE - CHIEF COMPLAINT QUOTE
p;t reports he is bipolar and homeless, has been off medication for " years" C/o SI and HI. Stated he is hearing voices telling him to kill himself. Denies alcohol or drug use today.

## 2021-08-13 NOTE — ED ADULT NURSE NOTE - OBJECTIVE STATEMENT
Patient presented in  area in yellow gowns.  Patient awake and alert.  Patient speech is loud and pressured.  Patient states "the hospital you sent me too kicked me out with no medication or help".  Patient was sent to [facility] on 8/6/21.  Patient reports he is homeless and laid on railroad tracks to get help.  Patient endorses hearing auditory hallucinations command in nature directing him to kill himself or others.  Patient denies plan to harm himself or others.  Patient has been none complaint with his medications.  Patient reports he drank spiked punch at a pool party last night which had "all drugs in it", denies ingesting drugs intentionally.  Cooperative with security contraband assessment and securing belongings in  locker.

## 2021-08-13 NOTE — ED BEHAVIORAL HEALTH ASSESSMENT NOTE - DESCRIPTION
ICU Vital Signs Last 24 Hrs  T(C): 36.5 (13 Aug 2021 14:03), Max: 36.9 (13 Aug 2021 13:30)  T(F): 97.7 (13 Aug 2021 14:03), Max: 98.4 (13 Aug 2021 13:30)  HR: 94 (13 Aug 2021 14:03) (94 - 101)  BP: 136/93 (13 Aug 2021 14:03) (128/78 - 136/93)  BP(mean): --  ABP: --  ABP(mean): --  RR: 18 (13 Aug 2021 14:03) (18 - 18)  SpO2: 96% (13 Aug 2021 14:03) (96% - 96%) type II DM Patient reports he is currently homeless however he lives with sister Audra. States he used to live at [facility] for 10 years.

## 2021-08-14 PROCEDURE — 99224: CPT

## 2021-08-14 PROCEDURE — 99213 OFFICE O/P EST LOW 20 MIN: CPT

## 2021-08-14 RX ADMIN — RISPERIDONE 2 MILLIGRAM(S): 4 TABLET ORAL at 08:23

## 2021-08-14 RX ADMIN — Medication 150 MILLIGRAM(S): at 20:39

## 2021-08-14 RX ADMIN — METFORMIN HYDROCHLORIDE 1000 MILLIGRAM(S): 850 TABLET ORAL at 18:55

## 2021-08-14 RX ADMIN — RISPERIDONE 2 MILLIGRAM(S): 4 TABLET ORAL at 18:55

## 2021-08-14 RX ADMIN — DIVALPROEX SODIUM 500 MILLIGRAM(S): 500 TABLET, DELAYED RELEASE ORAL at 08:23

## 2021-08-14 RX ADMIN — DIVALPROEX SODIUM 500 MILLIGRAM(S): 500 TABLET, DELAYED RELEASE ORAL at 18:55

## 2021-08-14 RX ADMIN — METFORMIN HYDROCHLORIDE 1000 MILLIGRAM(S): 850 TABLET ORAL at 08:23

## 2021-08-14 NOTE — ED BEHAVIORAL HEALTH NOTE - BEHAVIORAL HEALTH NOTE
PROGRESS NOTE: 21 @ 10:55  	  • Reason for Ongoing Consultation: 	    ID: 51yyo Male with HEALTH ISSUES - PROBLEM Dx:  Schizoaffective disorder, bipolar type    Polysubstance abuse        Patient is a 50 y/o M with history of schizoaffective disorder (bipolar type), DM, alcohol and cocaine use who presents with complaint of auditory hallucinations tell him to "kill myself and kill other people".     Patient seen for follow up and still expressing depressed mood and stating he is having thoughts to hurt himself. Patient states he was recently released by [facility] and has been staying with his sister but did not follow up with the doctor and stopped his meds and states he was hearing a voice to jump in front of the train. Patient expressing poor sleep, poor appetite, suicidal thoughts as above and denies any homicidal  thoughts, denies any symptoms of aron and denies any visual hallucinations.         REVIEW OF SYSTEMS:   • Constitutional Symptoms	No complaints  • Eyes	No complaints  • Ears / Nose / Throat / Mouth	No complaints  • Cardiovascular	No complaints  • Respiratory	No complaints  • Gastrointestinal	No complaints  • Genitourinary	No complaints  • Musculoskeletal	No complaints  • Skin	No complaints  • Neurological	No complaints  • Psychiatric (see HPI)	See HPI  • Endocrine	No complaints  • Hematologic / Lymphatic	No complaints  • Allergic / Immunologic	No complaints    REVIEW OF VITALS/LABS/IMAGING/INVESTIGATIONS:   • Vital signs reviewed: Yes  • Vital Signs:	    T(C): 36.6 (21 @ 09:11), Max: 36.9 (21 @ 13:30)  HR: 81 (21 @ 09:11) (81 - 101)  BP: 146/73 (21 @ 09:11) (128/78 - 146/73)  RR: 20 (21 @ 09:11) (18 - 20)  SpO2: 98% (21 @ 09:11) (96% - 98%)    • Available labs reviewed: Yes  • Available Lab Results:                           15.9   7.45  )-----------( 331      ( 13 Aug 2021 14:41 )             47.8         139  |  101  |  11.2  ----------------------------<  179<H>  4.1   |  26.0  |  0.77    Ca    9.7      13 Aug 2021 14:41    TPro  7.7  /  Alb  4.5  /  TBili  <0.2<L>  /  DBili  x   /  AST  20  /  ALT  14  /  AlkPhos  63  08-13    LIVER FUNCTIONS - ( 13 Aug 2021 14:41 )  Alb: 4.5 g/dL / Pro: 7.7 g/dL / ALK PHOS: 63 U/L / ALT: 14 U/L / AST: 20 U/L / GGT: x             Urinalysis Basic - ( 13 Aug 2021 18:21 )    Color: Yellow / Appearance: Clear / S.005 / pH: x  Gluc: x / Ketone: Negative  / Bili: Negative / Urobili: Negative mg/dL   Blood: x / Protein: Negative mg/dL / Nitrite: Negative   Leuk Esterase: Negative / RBC: x / WBC x   Sq Epi: x / Non Sq Epi: x / Bacteria: x          MEDICATIONS:      PRN Medications:  • PRN Medications since last evaluation	  • PRN Details	    Current Medications:   chlorproMAZINE    Injectable 25 milliGRAM(s) IntraMuscular Once PRN  diVALproex  milliGRAM(s) Oral two times a day  metFORMIN 1000 milliGRAM(s) Oral two times a day  risperiDONE   Tablet 2 milliGRAM(s) Oral two times a day  traZODone 150 milliGRAM(s) Oral at bedtime     Medication Side Effects:  • Medication Side Effects or Adverse Reactions (new or ongoing)	None known    MENTAL STATUS EXAM:   • Level of Consciousness	Alert  • General Appearance	Well developed  • Body Habitus	Well nourished  • Hygiene	Fair   • Grooming	Fair   • Behavior	Cooperative  • Eye Contact	Fair   • Relatedness	Good  • Impulse Control	Questionable   • Muscle Tone / Strength	Normal muscle tone/strength  • Abnormal Movements	No abnormal movements  • Gait / Station	Normal gait / station  • Speech Volume	Normal  • Speech Rate	Normal  • Speech Spontaneity	Normal  • Speech Articulation	Normal  • Mood	Depressed  • Affect Quality	Constricted   • Affect Range	Full  • Affect Congruence	Congruent  • Thought Process	Linear  • Thought Associations	Normal  • Thought Content	suicidal ideation, Hopelessness   • Perceptions	AH   • Oriented to Time	Yes  • Oriented to Place	Yes  • Oriented to Situation	Yes  • Oriented to Person	Yes  • Attention / Concentration	Normal  • Estimated Intelligence	Average  • Recent Memory	Normal  • Remote Memory	Normal  • Fund of Knowledge	Normal  • Language	No abnormalities noted  • Judgment (regarding everyday events)	Poor  • Insight (regarding psychiatric illness)	Poor     SUICIDALITY:   • Suicidality (Interval)	none known    HOMICIDALITY/AGGRESSION:   • Homicidality/Aggression	none known    DIAGNOSIS DSM-V:    Psychiatric Diagnosis (Corresponds to DSM-IV Axis I, II):   HEALTH ISSUES - PROBLEM Dx:  Schizoaffective disorder, bipolar type    Polysubstance abuse       Medical Diagnosis (Corresponds to DSM-IV Axis III):  • Axis III	      ASSESSMENT OF CURRENT CONDITION:   Summary (include case differential, formulation and patient response to therapy):     Patient is a 50 y/o M with history of schizoaffective disorder (bipolar type), DM, alcohol and cocaine use who presents with complaint of auditory hallucinations tell him to "kill myself and kill other people".     Patient seen for follow up today and still expressing worsening depression with auditory hallucinations telling him to kill himself and requesting inpatient admission. Patient to be admitted to inpatient psych under voluntary status

## 2021-08-15 PROCEDURE — 99224: CPT

## 2021-08-15 RX ADMIN — DIVALPROEX SODIUM 500 MILLIGRAM(S): 500 TABLET, DELAYED RELEASE ORAL at 17:54

## 2021-08-15 RX ADMIN — RISPERIDONE 2 MILLIGRAM(S): 4 TABLET ORAL at 17:54

## 2021-08-15 RX ADMIN — RISPERIDONE 2 MILLIGRAM(S): 4 TABLET ORAL at 09:54

## 2021-08-15 RX ADMIN — METFORMIN HYDROCHLORIDE 1000 MILLIGRAM(S): 850 TABLET ORAL at 09:55

## 2021-08-15 RX ADMIN — DIVALPROEX SODIUM 500 MILLIGRAM(S): 500 TABLET, DELAYED RELEASE ORAL at 09:55

## 2021-08-15 RX ADMIN — METFORMIN HYDROCHLORIDE 1000 MILLIGRAM(S): 850 TABLET ORAL at 17:54

## 2021-08-15 RX ADMIN — Medication 150 MILLIGRAM(S): at 21:53

## 2021-08-15 NOTE — CHART NOTE - NSCHARTNOTEFT_GEN_A_CORE
As per MD, patient is medically stable. As per Psych MD, patient is to be transferred to inpatient psych on voluntary status. SW met with patient at bedside and patient completed 9.13 legals. There are no inpatient psych beds currently available at [facility], [facility], or [facility]. NOELLE continues to follow to extend referral.
SW Note: SW made aware by  provider pt is to be held overnight and reassessed in AM, SW following
Patient's plan is to attend inpatient psych on involuntary status. NOELLE placed call to [facility] ([phone])- employee Sarina reported there are no available beds. NOELLE placed call to [facility] ([phone]) and spoke with employee, Kristopher, whom reported there are no available beds. NOELLE placed call to [facility] ([phone]) and spoke with employee, Inés, whom reported there are no available beds. NOELLE placed call to [facility] ([phone]) and left callback number for Assistant Director of Nursing to follow up. NOELLE continues to follow for placement.
SWNote: needs transfer 9.13 status , still no beds available . Worker will fax all needed paperwork to [facility] for possible bed availability in the am . Sw to follow.

## 2021-08-15 NOTE — ED CDU PROVIDER SUBSEQUENT DAY NOTE - PROGRESS NOTE DETAILS
Patient has remained stable during the day. He continues to have SI and hallucinations. Patient pending inpatient psych placement.

## 2021-08-16 VITALS
HEART RATE: 88 BPM | DIASTOLIC BLOOD PRESSURE: 79 MMHG | TEMPERATURE: 98 F | SYSTOLIC BLOOD PRESSURE: 130 MMHG | OXYGEN SATURATION: 98 % | RESPIRATION RATE: 20 BRPM

## 2021-08-16 LAB — SARS-COV-2 RNA SPEC QL NAA+PROBE: SIGNIFICANT CHANGE UP

## 2021-08-16 PROCEDURE — 99213 OFFICE O/P EST LOW 20 MIN: CPT

## 2021-08-16 PROCEDURE — 87635 SARS-COV-2 COVID-19 AMP PRB: CPT

## 2021-08-16 PROCEDURE — U0005: CPT

## 2021-08-16 PROCEDURE — 99217: CPT

## 2021-08-16 PROCEDURE — 99284 EMERGENCY DEPT VISIT MOD MDM: CPT

## 2021-08-16 PROCEDURE — 81003 URINALYSIS AUTO W/O SCOPE: CPT

## 2021-08-16 PROCEDURE — 80053 COMPREHEN METABOLIC PANEL: CPT

## 2021-08-16 PROCEDURE — 85025 COMPLETE CBC W/AUTO DIFF WBC: CPT

## 2021-08-16 PROCEDURE — 36415 COLL VENOUS BLD VENIPUNCTURE: CPT

## 2021-08-16 PROCEDURE — 80307 DRUG TEST PRSMV CHEM ANLYZR: CPT

## 2021-08-16 PROCEDURE — U0003: CPT

## 2021-08-16 PROCEDURE — 93005 ELECTROCARDIOGRAM TRACING: CPT

## 2021-08-16 PROCEDURE — G0378: CPT

## 2021-08-16 RX ADMIN — RISPERIDONE 2 MILLIGRAM(S): 4 TABLET ORAL at 06:26

## 2021-08-16 RX ADMIN — DIVALPROEX SODIUM 500 MILLIGRAM(S): 500 TABLET, DELAYED RELEASE ORAL at 06:25

## 2021-08-16 RX ADMIN — METFORMIN HYDROCHLORIDE 1000 MILLIGRAM(S): 850 TABLET ORAL at 06:26

## 2021-08-16 NOTE — ED BEHAVIORAL HEALTH NOTE - BEHAVIORAL HEALTH NOTE
Patient informed by  that there is a bed for him at [facility] ([facility] Patient refusing to be transferred to [facility], states he feels much better now after receiving medications here. Denies suicidal ideations or intent to harm himself upon being discharged. States he can go stay with his sister in [place]. Is requesting his medications be sent to a pharmacy for him to pick  up. Is adamant "I will be okay, I won't get into any trouble". States he has an appointment tomorrow with his outpatient psychiatric provider in [facility] to confirm.  Patient to be discharged. Patient informed by NOELLE that there is a bed for him at [facility]. Patient refusing to be transferred to [facility], states he feels much better now after receiving medications here. Denies suicidal ideations or intent to harm himself upon being discharged. States he can go stay with his sister in [place]. Is requesting his medications be sent to a pharmacy for him to pick  up. Is adamant "I will be okay, I won't get into any trouble".   Patient to be discharged.  NOELLE to make appt at [facility]  Rx sent to pharmacy in [place] at patient's request.

## 2021-08-16 NOTE — ED BEHAVIORAL HEALTH NOTE - BEHAVIORAL HEALTH NOTE
PROGRESS NOTE: 08-16-21 @ 08:18  	  • Reason for Ongoing Consultation: 	auditory hallucinations / suicidal ideations    ID: 51yyo Male with HEALTH ISSUES - PROBLEM Dx:  Schizoaffective disorder, bipolar type  Polysubstance abuse (alcohol/cocaine)  Diabetes Mellitus Type II    INTERVAL DATA:   • Interval Chief Complaint: "I'm waiting to go inpatient"  • Interval History: Patient seen and evaluated at bedside this morning. Awake and alert, cooperative with questioning. Continuing to endorse depressed mood and states "I feel like I got nothing to live for". States he is hearing voices telling him to "kill myself, I got nothing to live for, I'm worthless". States that prior to coming back to the hospital he had tried to harm himself by "laying on the train tracks". While in BH unit, states he has been eating and sleeping well. Has been receiving his medications while in the unit, admits they help but "I still hear the voices". States he will continue to wait as along as he needs for bed placement.     REVIEW OF SYSTEMS:   • Constitutional Symptoms	No complaints  • Eyes	No complaints  • Ears / Nose / Throat / Mouth	No complaints  • Cardiovascular	No complaints  • Respiratory	No complaints  • Gastrointestinal	No complaints  • Genitourinary	No complaints  • Musculoskeletal	No complaints  • Skin	No complaints  • Neurological	No complaints  • Psychiatric (see HPI)	See HPI  • Endocrine	No complaints  • Hematologic / Lymphatic	No complaints  • Allergic / Immunologic	No complaints    REVIEW OF VITALS/LABS/IMAGING/INVESTIGATIONS:   • Vital signs reviewed: Yes  • Vital Signs:	    T(C): 36.6 (08-16-21 @ 07:31), Max: 36.8 (08-15-21 @ 15:32)  HR: 71 (08-16-21 @ 07:31) (71 - 82)  BP: 110/71 (08-16-21 @ 07:31) (110/71 - 131/72)  RR: 18 (08-16-21 @ 07:31) (18 - 18)  SpO2: 97% (08-16-21 @ 07:31) (96% - 98%)    • Available labs reviewed: Yes  • Available Lab Results:    MEDICATIONS:      PRN Medications: none  • PRN Medications since last evaluation	none  • PRN Details	    Current Medications:   chlorproMAZINE    Injectable 25 milliGRAM(s) IntraMuscular Once PRN  diVALproex  milliGRAM(s) Oral two times a day  metFORMIN 1000 milliGRAM(s) Oral two times a day  risperiDONE   Tablet 2 milliGRAM(s) Oral two times a day  traZODone 150 milliGRAM(s) Oral at bedtime     Medication Side Effects:  • Medication Side Effects or Adverse Reactions (new or ongoing)	None known    MENTAL STATUS EXAM:   • Level of Consciousness	Alert  • General Appearance	Well developed  • Body Habitus	Well nourished  • Hygiene	Fair  • Grooming	Fair  • Behavior	Cooperative  • Eye Contact	Good  • Relatedness	Good  • Impulse Control	Normal  • Muscle Tone / Strength	Normal muscle tone/strength  • Abnormal Movements	No abnormal movements  • Gait / Station	Normal gait / station  • Speech Volume	Normal  • Speech Rate	Normal  • Speech Spontaneity	Normal  • Speech Articulation	Normal  • Mood	Depressed  • Affect Quality	Euthymic  • Affect Range	Full  • Affect Congruence	Congruent  • Thought Process	Linear  • Thought Associations	Normal  • Thought Content	Hopelessness, Suicidality   • Perceptions	Auditory hallucinations   • Oriented to Time	Yes  • Oriented to Place	Yes  • Oriented to Situation	Yes  • Oriented to Person	Yes  • Attention / Concentration	Normal  • Estimated Intelligence	Average  • Recent Memory	Normal  • Remote Memory	Normal  • Fund of Knowledge	Normal  • Language	No abnormalities noted  • Judgment (regarding everyday events)	Fair  • Insight (regarding psychiatric illness)	Fair    SUICIDALITY:   • Suicidality (Interval)	Yes. Auditory hallucinations telling him to "kill myself"    HOMICIDALITY/AGGRESSION:   • Homicidality/Aggression	none known    DIAGNOSIS DSM-V:    Psychiatric Diagnosis (Corresponds to DSM-IV Axis I, II):   HEALTH ISSUES - PROBLEM Dx:  Schizoaffective disorder, bipolar type  Polysubstance abuse (alcohol/cocaine)  Diabetes Mellitus Type II     Medical Diagnosis (Corresponds to DSM-IV Axis III):  • Axis III	see above      ASSESSMENT OF CURRENT CONDITION:   Summary (include case differential, formulation and patient response to therapy): Patient is a 52 y/o M with history of schizoaffective disorder (bipolar type), polysubstance abuse (alcohol/cocaine) presented with auditory hallucinations telling him to "kill myself and kill other people". Patient continues to endorse depressed mood and auditory hallucinations, requesting inpatient admission.     Risk Assessment (consider static vs modifiable risk factors and protective factors; comment on level of risk for dangerous behavior): moderate risk, risk factors include command auditory hallucinations, history of medication noncompliance, previous suicide attempt    PLAN  Continue to await voluntary transfer inpatient pending bed availability. Will repeat Covid-19 swab today.

## 2021-08-16 NOTE — ED CDU PROVIDER SUBSEQUENT DAY NOTE - NSALCOHOLUSECOMMENT_GEN_ALL_CORE_FT
long history of polysubstance abuse

## 2021-08-16 NOTE — ED CDU PROVIDER SUBSEQUENT DAY NOTE - SOCIAL CONCERNS
Substance misuse/Complex psychosocial needs/coping issues

## 2021-08-16 NOTE — ED CDU PROVIDER DISPOSITION NOTE - CLINICAL COURSE
50yo male with h/o schizoaffective disorder presenting suicidal ideation, medically cleared. Evaluated by , psychiatrically cleared for DC home. Aisha Duncan DO

## 2021-08-16 NOTE — ED CDU PROVIDER SUBSEQUENT DAY NOTE - NSICDXPASTSURGICALHX_GEN_ALL_CORE_FT
PAST SURGICAL HISTORY:  No significant past surgical history     

## 2021-08-16 NOTE — ED CDU PROVIDER SUBSEQUENT DAY NOTE - HISTORY
Patient has remained stable during is ed over night period.
Pt resting comfortably, no acute events overnight. Medically cleared and pending psych placement
AJM: pt stable. no acute events overnight.

## 2021-08-16 NOTE — ED CDU PROVIDER DISPOSITION NOTE - PATIENT PORTAL LINK FT
You can access the FollowMyHealth Patient Portal offered by [facility] by registering at the following website: [email or web address]. By joining [facility]’s FollowMyHealth portal, you will also be able to view your health information using other applications (apps) compatible with our system.

## 2021-08-16 NOTE — ED ADULT NURSE REASSESSMENT NOTE - NS ED NURSE REASSESS COMMENT FT1
Assumed care of pt @1930. received pt anxious requesting medication to help him sleep. Pt offers no other complaints. Pt currently resting/sleeping comfortably in no apparent distress. respirations even and unlabored .no attempts to harm self or others. safety maintained.
Reviewed discharge plan with patient and verbalized understanding of same.  Patient provided copy of discharge plans.  Patient agrees to return to local ED if symptoms worsen or thoughts to harm self or others develop.
awake requesting food and received
pt provided medication to help with insomnia, Trazadone 150 mg po given, pt continues to request nourishment items and appears to be in NAD.
pt resting in his room with NAD observed. pt offered lunch and consumed it 100%. pt has made no attempts to harm himself or others at this time.
resting quietly through this time
Patient ate 100% of his dinner.  No attempts to harm self or others.  Patient continues to report he needs inpatient hospitalization.
Patient ate 100% of meal at breakfast.  No attempts to harm self ro others.  Patient continues to endorse experiencing auditory hallucinations which are command in nature directing him to harm him self.  Verbally contracts not to harm self in the hospital.
Patient resting comfortably in bed at this time. Has been up and awake for meals, asking often for snacks. Awaiting transfer to inpatient unit when bed available. Offers no complaints and no distress evident. Has been in good behavioral control.
Assumed care of patient at 0710.  Patient pleasant during interaction.  Oriented to staff and educated about plan of care.  No attempts to harm self or others and safety maintained.

## 2021-08-16 NOTE — ED BEHAVIORAL HEALTH NOTE - BEHAVIORAL HEALTH NOTE
Social work note:  spoke with Loida in admissions at [facility]. Pt was accepted on 9.13 status by MD Montelongo. Pt will require auth for this transfer. Worker will arrange transport with [facility] and inform pt.

## 2021-08-16 NOTE — ED CDU PROVIDER SUBSEQUENT DAY NOTE - MEDICAL DECISION MAKING DETAILS
All labs and COVID unremarkable, patient assessed by , will remain in  overnight for observation and reassessment in AM
pt with SI. pending  re-eval
Pt medically cleared and pending psych admission

## 2021-08-16 NOTE — ED ADULT NURSE REASSESSMENT NOTE - STATUS
awaiting transfer, no change
awaiting transfer, no change
awaiting discharge, no change
awaiting transfer, no change

## 2021-08-16 NOTE — ED CDU PROVIDER SUBSEQUENT DAY NOTE - WET READ LAUNCH FT
There are no Wet Read(s) to document.

## 2021-08-16 NOTE — ED ADULT NURSE REASSESSMENT NOTE - GENERAL PATIENT STATE
comfortable appearance/cooperative
comfortable appearance/resting/sleeping
comfortable appearance/cooperative
comfortable appearance/cooperative
resting/sleeping
anxious/comfortable appearance/cooperative/resting/sleeping
comfortable appearance/cooperative/resting/sleeping
comfortable appearance/resting/sleeping

## 2021-08-16 NOTE — ED CDU PROVIDER SUBSEQUENT DAY NOTE - NSICDXNOPASTSURGICALHX_GEN_ALL_ED
<-- Click to add NO significant Past Surgical History

## 2021-08-18 ENCOUNTER — EMERGENCY (EMERGENCY)
Facility: HOSPITAL | Age: 51
LOS: 1 days | Discharge: HOME | End: 2021-08-18
Attending: EMERGENCY MEDICINE
Payer: MEDICAID

## 2021-08-18 VITALS
SYSTOLIC BLOOD PRESSURE: 131 MMHG | RESPIRATION RATE: 16 BRPM | HEIGHT: 66 IN | OXYGEN SATURATION: 94 % | TEMPERATURE: 98 F | DIASTOLIC BLOOD PRESSURE: 91 MMHG | HEART RATE: 96 BPM

## 2021-08-18 VITALS
HEART RATE: 69 BPM | SYSTOLIC BLOOD PRESSURE: 122 MMHG | DIASTOLIC BLOOD PRESSURE: 87 MMHG | RESPIRATION RATE: 18 BRPM | OXYGEN SATURATION: 99 % | TEMPERATURE: 98 F

## 2021-08-18 LAB
ALBUMIN SERPL ELPH-MCNC: 4 G/DL — SIGNIFICANT CHANGE UP (ref 3.3–5.2)
ALP SERPL-CCNC: 58 U/L — SIGNIFICANT CHANGE UP (ref 40–120)
ALT FLD-CCNC: 16 U/L — SIGNIFICANT CHANGE UP
AMPHET UR-MCNC: NEGATIVE — SIGNIFICANT CHANGE UP
ANION GAP SERPL CALC-SCNC: 12 MMOL/L — SIGNIFICANT CHANGE UP (ref 5–17)
APAP SERPL-MCNC: <3 UG/ML — LOW (ref 10–26)
APPEARANCE UR: CLEAR — SIGNIFICANT CHANGE UP
AST SERPL-CCNC: 30 U/L — SIGNIFICANT CHANGE UP
BARBITURATES UR SCN-MCNC: NEGATIVE — SIGNIFICANT CHANGE UP
BASOPHILS # BLD AUTO: 0.03 K/UL — SIGNIFICANT CHANGE UP (ref 0–0.2)
BASOPHILS NFR BLD AUTO: 0.3 % — SIGNIFICANT CHANGE UP (ref 0–2)
BENZODIAZ UR-MCNC: NEGATIVE — SIGNIFICANT CHANGE UP
BILIRUB SERPL-MCNC: 0.3 MG/DL — LOW (ref 0.4–2)
BILIRUB UR-MCNC: NEGATIVE — SIGNIFICANT CHANGE UP
BUN SERPL-MCNC: 11.4 MG/DL — SIGNIFICANT CHANGE UP (ref 8–20)
CALCIUM SERPL-MCNC: 9 MG/DL — SIGNIFICANT CHANGE UP (ref 8.6–10.2)
CHLORIDE SERPL-SCNC: 100 MMOL/L — SIGNIFICANT CHANGE UP (ref 98–107)
CO2 SERPL-SCNC: 22 MMOL/L — SIGNIFICANT CHANGE UP (ref 22–29)
COCAINE METAB.OTHER UR-MCNC: POSITIVE
COLOR SPEC: YELLOW — SIGNIFICANT CHANGE UP
CREAT SERPL-MCNC: 0.6 MG/DL — SIGNIFICANT CHANGE UP (ref 0.5–1.3)
DIFF PNL FLD: NEGATIVE — SIGNIFICANT CHANGE UP
EOSINOPHIL # BLD AUTO: 0.11 K/UL — SIGNIFICANT CHANGE UP (ref 0–0.5)
EOSINOPHIL NFR BLD AUTO: 1.1 % — SIGNIFICANT CHANGE UP (ref 0–6)
ETHANOL SERPL-MCNC: <10 MG/DL — SIGNIFICANT CHANGE UP (ref 0–9)
GLUCOSE BLDC GLUCOMTR-MCNC: 228 MG/DL — HIGH (ref 70–99)
GLUCOSE SERPL-MCNC: 122 MG/DL — HIGH (ref 70–99)
GLUCOSE UR QL: NEGATIVE MG/DL — SIGNIFICANT CHANGE UP
HCT VFR BLD CALC: 44.3 % — SIGNIFICANT CHANGE UP (ref 39–50)
HGB BLD-MCNC: 14.8 G/DL — SIGNIFICANT CHANGE UP (ref 13–17)
IMM GRANULOCYTES NFR BLD AUTO: 0.3 % — SIGNIFICANT CHANGE UP (ref 0–1.5)
KETONES UR-MCNC: NEGATIVE — SIGNIFICANT CHANGE UP
LEUKOCYTE ESTERASE UR-ACNC: NEGATIVE — SIGNIFICANT CHANGE UP
LYMPHOCYTES # BLD AUTO: 2.47 K/UL — SIGNIFICANT CHANGE UP (ref 1–3.3)
LYMPHOCYTES # BLD AUTO: 25.2 % — SIGNIFICANT CHANGE UP (ref 13–44)
MCHC RBC-ENTMCNC: 30.1 PG — SIGNIFICANT CHANGE UP (ref 27–34)
MCHC RBC-ENTMCNC: 33.4 GM/DL — SIGNIFICANT CHANGE UP (ref 32–36)
MCV RBC AUTO: 90.2 FL — SIGNIFICANT CHANGE UP (ref 80–100)
METHADONE UR-MCNC: NEGATIVE — SIGNIFICANT CHANGE UP
MONOCYTES # BLD AUTO: 1.06 K/UL — HIGH (ref 0–0.9)
MONOCYTES NFR BLD AUTO: 10.8 % — SIGNIFICANT CHANGE UP (ref 2–14)
NEUTROPHILS # BLD AUTO: 6.11 K/UL — SIGNIFICANT CHANGE UP (ref 1.8–7.4)
NEUTROPHILS NFR BLD AUTO: 62.3 % — SIGNIFICANT CHANGE UP (ref 43–77)
NITRITE UR-MCNC: NEGATIVE — SIGNIFICANT CHANGE UP
OPIATES UR-MCNC: NEGATIVE — SIGNIFICANT CHANGE UP
PCP SPEC-MCNC: SIGNIFICANT CHANGE UP
PCP UR-MCNC: NEGATIVE — SIGNIFICANT CHANGE UP
PH UR: 6 — SIGNIFICANT CHANGE UP (ref 5–8)
PLATELET # BLD AUTO: 303 K/UL — SIGNIFICANT CHANGE UP (ref 150–400)
POTASSIUM SERPL-MCNC: 4 MMOL/L — SIGNIFICANT CHANGE UP (ref 3.5–5.3)
POTASSIUM SERPL-SCNC: 4 MMOL/L — SIGNIFICANT CHANGE UP (ref 3.5–5.3)
PROT SERPL-MCNC: 6.8 G/DL — SIGNIFICANT CHANGE UP (ref 6.6–8.7)
PROT UR-MCNC: NEGATIVE MG/DL — SIGNIFICANT CHANGE UP
RBC # BLD: 4.91 M/UL — SIGNIFICANT CHANGE UP (ref 4.2–5.8)
RBC # FLD: 12.3 % — SIGNIFICANT CHANGE UP (ref 10.3–14.5)
SALICYLATES SERPL-MCNC: <0.6 MG/DL — LOW (ref 10–20)
SARS-COV-2 RNA SPEC QL NAA+PROBE: SIGNIFICANT CHANGE UP
SODIUM SERPL-SCNC: 133 MMOL/L — LOW (ref 135–145)
SP GR SPEC: 1.02 — SIGNIFICANT CHANGE UP (ref 1.01–1.02)
THC UR QL: NEGATIVE — SIGNIFICANT CHANGE UP
UROBILINOGEN FLD QL: NEGATIVE MG/DL — SIGNIFICANT CHANGE UP
WBC # BLD: 9.81 K/UL — SIGNIFICANT CHANGE UP (ref 3.8–10.5)
WBC # FLD AUTO: 9.81 K/UL — SIGNIFICANT CHANGE UP (ref 3.8–10.5)

## 2021-08-18 PROCEDURE — 82962 GLUCOSE BLOOD TEST: CPT

## 2021-08-18 PROCEDURE — 93010 ELECTROCARDIOGRAM REPORT: CPT

## 2021-08-18 PROCEDURE — 99285 EMERGENCY DEPT VISIT HI MDM: CPT

## 2021-08-18 PROCEDURE — U0003: CPT

## 2021-08-18 PROCEDURE — 80053 COMPREHEN METABOLIC PANEL: CPT

## 2021-08-18 PROCEDURE — 99284 EMERGENCY DEPT VISIT MOD MDM: CPT

## 2021-08-18 PROCEDURE — 81003 URINALYSIS AUTO W/O SCOPE: CPT

## 2021-08-18 PROCEDURE — 99234 HOSP IP/OBS SM DT SF/LOW 45: CPT

## 2021-08-18 PROCEDURE — 80307 DRUG TEST PRSMV CHEM ANLYZR: CPT

## 2021-08-18 PROCEDURE — 36415 COLL VENOUS BLD VENIPUNCTURE: CPT

## 2021-08-18 PROCEDURE — 93005 ELECTROCARDIOGRAM TRACING: CPT

## 2021-08-18 PROCEDURE — U0005: CPT

## 2021-08-18 PROCEDURE — 85025 COMPLETE CBC W/AUTO DIFF WBC: CPT

## 2021-08-18 PROCEDURE — G0378: CPT

## 2021-08-18 RX ORDER — FLUPHENAZINE HYDROCHLORIDE 1 MG/1
5 TABLET, FILM COATED ORAL
Refills: 0 | Status: DISCONTINUED | OUTPATIENT
Start: 2021-08-18 | End: 2021-08-22

## 2021-08-18 RX ORDER — FLUPHENAZINE HYDROCHLORIDE 1 MG/1
5 TABLET, FILM COATED ORAL EVERY 6 HOURS
Refills: 0 | Status: DISCONTINUED | OUTPATIENT
Start: 2021-08-18 | End: 2021-08-22

## 2021-08-18 RX ORDER — METFORMIN HYDROCHLORIDE 850 MG/1
1000 TABLET ORAL
Refills: 0 | Status: DISCONTINUED | OUTPATIENT
Start: 2021-08-18 | End: 2021-08-22

## 2021-08-18 RX ADMIN — FLUPHENAZINE HYDROCHLORIDE 5 MILLIGRAM(S): 1 TABLET, FILM COATED ORAL at 11:11

## 2021-08-18 RX ADMIN — METFORMIN HYDROCHLORIDE 1000 MILLIGRAM(S): 850 TABLET ORAL at 11:11

## 2021-08-18 NOTE — ED PROVIDER NOTE - OBJECTIVE STATEMENT
51M h/o bipolar, HTN, DM, schizoaffective presents with worsening auditory hallucinations telling him to kill himself. Has hallucinations x 15 years, worse for last 2 days. States he has not taken his meds x 1 week. States he took ecstasy 5PM this afternoon. States his urine may be positive for heroin and cocaine bec the ecstasy may have been spiked. Was seen 2 days ago but did not want to be admitted to [facility], wants to be admitted to any other psychiatric facility.

## 2021-08-18 NOTE — ED CDU PROVIDER DISPOSITION NOTE - CLINICAL COURSE
51M h/o bipolar, HTN, DM, schizoaffective presents with worsening auditory hallucinations telling him to kill himself. Has hallucinations x 15 years, worse for last 2 days. States he has not taken his meds x 1 week. States he took ecstasy 5PM this afternoon. States his urine may be positive for heroin and cocaine bec the ecstasy may have been spiked.     urine showed positive for cocaine. patient is medically cleared. patient seen by psych and will need inpatient transfer.

## 2021-08-18 NOTE — ED BEHAVIORAL HEALTH ASSESSMENT NOTE - SUMMARY
Patient is a 50 y/o M with history of schizoaffective disorder (bipolar type), type II DM, alcohol and cocaine use who presents with complaint of auditory hallucinations to "restart my meds" and thoughts to "kill myself". Patient uncooperative with questioning. Patient made involuntary status for inpatient admission. Patient's , Amalia, made aware of plan and agreed. Patient agreeable to restarting medications.

## 2021-08-18 NOTE — ED BEHAVIORAL HEALTH ASSESSMENT NOTE - PAST PSYCHOTROPIC MEDICATION
Depakote DR 500mg daily  Risperdal consta IM (last dose  8/11 25 mg (2ml)) biweekly  Risperdal 2 mg BID

## 2021-08-18 NOTE — ED BEHAVIORAL HEALTH ASSESSMENT NOTE - DESCRIPTION
Type II DM Patient reports he is currently homeless, living on the street.  Was previously staying with his sister. Used to live at [facility] for 10 years. Patient was BIBA for worsening auditory hallucinations and requesting inpatient [facility] admission. Lab work performed, EKG done, urine drug screen pending. Patient was BIBA for worsening auditory hallucinations and requesting inpatient [facility] admission. Lab work performed, EKG done, urine drug screen positive for cocaine.

## 2021-08-18 NOTE — ED BEHAVIORAL HEALTH ASSESSMENT NOTE - HPI (INCLUDE ILLNESS QUALITY, SEVERITY, DURATION, TIMING, CONTEXT, MODIFYING FACTORS, ASSOCIATED SIGNS AND SYMPTOMS)
Patient is a 52 y/o male with history of schizoaffective disorder (bipolar type), polysubstance abuse (alcohol/cocaine) who presented to the [facility] ED complaining of auditory hallucinations. Patient has recent history of multiple prior ED visits and hospitalizations with similar complaints. Most recently seen 8/16, patient gave up voluntary status for admission and requested to be discharged home. Prescription was sent to pharmacy for patient's medications and  set up an appointment for him at [facility]. Patient reports he "got homeless again" after leaving the hospital 8/16 and did not follow through with plans to get his prescription. Patient states he is hearing voices again telling him "to get those meds you gave me last time" and telling him to "kill myself". Patient admits to taking "some pills", when asked what was in the pills he states "I don't know". Per ED note, patient stated he took ecstasy at 5:00 PM yesterday and admitted his urine may also be positive for heroin and cocaine.   Patient's , Amalia Daly , called the  unit expressing concerned over Mr. Yanez's recent behavior and multiple visits to the ED and inpatient facilities. Expressed she believed the patient should be admitted involuntarily. Patient is a 52 y/o male with history of schizoaffective disorder (bipolar type), polysubstance abuse (alcohol/cocaine) who presented to the [facility] ED complaining of auditory hallucinations. Patient has recent history of multiple prior ED visits and hospitalizations with similar complaints. Most recently seen 8/16, patient gave up voluntary status for admission and requested to be discharged home. Prescription was sent to pharmacy for patient's medications and  set up an appointment for him at [facility]. Patient reports he "got homeless again" after leaving the hospital 8/16 and did not follow through with plans to get his prescription. Patient states he is hearing voices again telling him "to get those meds you gave me last time" and telling him to "kill myself". Patient admits to taking "some pills", when asked what was in the pills he states "I don't know". Per ED note, patient stated he took ecstasy at 5:00 PM yesterday and admitted his urine may also be positive for heroin and cocaine.   Patient's , Amalia Daly , called the  unit expressing concerned over Mr. Yanez's recent behavior and multiple visits to the ED and inpatient facilities. Expressed she believed the patient should be admitted involuntarily.    COVID Exposure Screen- Patient    1.         *Have you had a COVID-19 test in the last 90 days?  Yes - negative 8/16    2.         *Have you tested positive for COVID-19 antibodies? Yes 8/6    3.         *Have you received 2 doses of the COVID-19 vaccine? No    4.         *In the past 10 days, have you been around anyone with a positive COVID-19 test?* No    5.         *Have you been out of [place] within the past 10 days?* No

## 2021-08-18 NOTE — ED BEHAVIORAL HEALTH ASSESSMENT NOTE - RISK ASSESSMENT
Moderate Acute Suicide Risk moderate risk; risk factors include previous suicide attempts, substance use, medication noncompliance, auditory hallucinations

## 2021-08-18 NOTE — ED ADULT TRIAGE NOTE - CHIEF COMPLAINT QUOTE
Pt. complaining of audio hallucinations, SI, HI. Pt. states he took mazin and ecstasy at 5pm. Pt. changed into yellow gown and belongings secured.

## 2021-08-18 NOTE — ED PROVIDER NOTE - CLINICAL SUMMARY MEDICAL DECISION MAKING FREE TEXT BOX
Patient with worsening auditory halucinations/suicidal thoughts. Plan for medical clearance and BH eval.

## 2021-08-18 NOTE — ED CDU PROVIDER INITIAL DAY NOTE - OBJECTIVE STATEMENT
51M h/o bipolar, HTN, DM, schizoaffective presents with worsening auditory hallucinations telling him to kill himself. Has hallucinations x 15 years, worse for last 2 days. States he has not taken his meds x 1 week. States he took ecstasy 5PM this afternoon. States his urine may be positive for heroin and cocaine bec the ecstasy may have been spiked. Was seen 2 days ago but did not want to be admitted to [facility], wants to be admitted to any other psychiatric facility.

## 2021-08-18 NOTE — ED BEHAVIORAL HEALTH ASSESSMENT NOTE - OTHER PAST PSYCHIATRIC HISTORY (INCLUDE DETAILS REGARDING ONSET, COURSE OF ILLNESS, INPATIENT/OUTPATIENT TREATMENT)
History of schizoaffective disorder, bipolar type   Most recent admission to [facility], 8/6-8/12/2021, [facility] 7/28/21-8/2/2021. Prior admissions to [facility] 6/18/21, [facility] 6/2/21-6/8/2021, [place] 10 months ago, and lived at [facility] for 10 years.  Patient stops his medications once he leaves the hospital. No outpatient follow up despite multiple attempts to set patient up with services.

## 2021-08-18 NOTE — CHART NOTE - NSCHARTNOTEFT_GEN_A_CORE
NOELLE Note: NOELLE made aware by  provider pt is in need of inpt trx on 9.37 basis. A bed has opened up at [facility]. NOELLE faxed EKG and legals for review, pt accepted pending covid negative. Pt's covid has since resulted, pt is negative. Pt  accepted by Dr. Montelongo. NOELLE to arrange hamida via NOELLE clerical, pt with [employer] med[employer], will f/u for precert SW Note: NOELLE made aware by  provider pt is in need of inpt trx on 9.37 basis. A bed has opened up at [facility]. SW faxed EKG and legals for review, pt accepted pending covid negative. Pt's covid has since resulted, pt is negative. Pt  accepted by Dr. Montelongo. NOELLE arranged hamida via  clerical, pt with [employer] medicaid, will f/u for precert, Full trx packet left on unit with legals and trx form, no further acute  services identified
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
## 2021-08-18 NOTE — ED ADULT NURSE REASSESSMENT NOTE - NS ED NURSE REASSESS COMMENT FT1
Patient ate 100% of his lunch.  Patient resting in bed sleeping intermittently with no distress.  Safety of patient maintained.
Patient presented in  area dressed in yellow gowns, accompanied by staff.  Patient constant observation discontinued on presentation to  area and verbally alonzo not to harm self in the hospital.  Patient reports he did not  Rx after  d/c on 8/16/21 and endorses needing to go to inpatient unit.  Patient reports he hears voices directing him to harm himself but denies plan or intent.  Patient admits to abusing "ecstasy and mazin yesterday".  Patient cooperative with security contraband assessment.  Safety of patient maintained.

## 2021-08-18 NOTE — ED ADULT NURSE NOTE - OBJECTIVE STATEMENT
Pt is alert and oriented. Pt states that he has been hearing voices for 2-3 days. Pt states that he does have SI and HI. Pt resting in yellow gown. 1:1 at bedside. Pt states that he took ectasy and mazin at 5pm. Pt denies sob, chest pain, nausea, vomiting, dizziness and pain. Pt resp are even and unlabored, skin color grace for race. Pt updated on plan of care.

## 2021-08-30 NOTE — ED BEHAVIORAL HEALTH ASSESSMENT NOTE - TIME CONSULT PERFORMED
Pt arrives to ED via triage from ENT office accompanied by daughter.  Pt reports she has had trouble swallowing x2-3 weeks.  Began as \"phlegm build-up\" and now she is unable to swallow medications or tolerate PO intake.  Had scope done at ENT office today who told pt to go to ER for admit and GI consult.  Pt unable to tolerate secretions.  Denies SOB.  Has not taken any medications for last 3 days.   17-Jun-2021 18:57

## 2021-09-16 NOTE — BH INPATIENT PSYCHIATRY ASSESSMENT NOTE - NSBHMSEAFFQUAL_PSY_A_CORE
Joseph Steiner provided this photo for update to CARMELO roca who forwarded to this nurse  She stated she does not believe wound vac is necessary  Patient did finally receive the wound vac supplies to her home but they are not in place  Please advise how to proceed  Irritable

## 2021-10-01 ENCOUNTER — EMERGENCY (EMERGENCY)
Facility: HOSPITAL | Age: 51
LOS: 1 days | Discharge: HOME | End: 2021-10-01
Attending: EMERGENCY MEDICINE
Payer: MEDICAID

## 2021-10-01 VITALS
TEMPERATURE: 99 F | HEIGHT: 66 IN | SYSTOLIC BLOOD PRESSURE: 116 MMHG | HEART RATE: 87 BPM | RESPIRATION RATE: 18 BRPM | WEIGHT: 147.93 LBS | OXYGEN SATURATION: 100 % | DIASTOLIC BLOOD PRESSURE: 86 MMHG

## 2021-10-01 DIAGNOSIS — F25.0 SCHIZOAFFECTIVE DISORDER, BIPOLAR TYPE: ICD-10-CM

## 2021-10-01 PROBLEM — E11.9 TYPE 2 DIABETES MELLITUS WITHOUT COMPLICATIONS: Chronic | Status: ACTIVE | Noted: 2018-01-31

## 2021-10-01 LAB
ALBUMIN SERPL ELPH-MCNC: 4.1 G/DL — SIGNIFICANT CHANGE UP (ref 3.3–5.2)
ALP SERPL-CCNC: 60 U/L — SIGNIFICANT CHANGE UP (ref 40–120)
ALT FLD-CCNC: 24 U/L — SIGNIFICANT CHANGE UP
AMPHET UR-MCNC: NEGATIVE — SIGNIFICANT CHANGE UP
ANION GAP SERPL CALC-SCNC: 12 MMOL/L — SIGNIFICANT CHANGE UP (ref 5–17)
APAP SERPL-MCNC: <3 UG/ML — LOW (ref 10–26)
APPEARANCE UR: ABNORMAL
AST SERPL-CCNC: 37 U/L — SIGNIFICANT CHANGE UP
BACTERIA # UR AUTO: ABNORMAL
BARBITURATES UR SCN-MCNC: NEGATIVE — SIGNIFICANT CHANGE UP
BASOPHILS # BLD AUTO: 0.01 K/UL — SIGNIFICANT CHANGE UP (ref 0–0.2)
BASOPHILS NFR BLD AUTO: 0.2 % — SIGNIFICANT CHANGE UP (ref 0–2)
BENZODIAZ UR-MCNC: NEGATIVE — SIGNIFICANT CHANGE UP
BILIRUB SERPL-MCNC: 0.3 MG/DL — LOW (ref 0.4–2)
BILIRUB UR-MCNC: NEGATIVE — SIGNIFICANT CHANGE UP
BUN SERPL-MCNC: 7.4 MG/DL — LOW (ref 8–20)
CALCIUM SERPL-MCNC: 8.9 MG/DL — SIGNIFICANT CHANGE UP (ref 8.6–10.2)
CHLORIDE SERPL-SCNC: 102 MMOL/L — SIGNIFICANT CHANGE UP (ref 98–107)
CO2 SERPL-SCNC: 24 MMOL/L — SIGNIFICANT CHANGE UP (ref 22–29)
COCAINE METAB.OTHER UR-MCNC: POSITIVE
COLOR SPEC: YELLOW — SIGNIFICANT CHANGE UP
COVID-19 SPIKE DOMAIN AB INTERP: POSITIVE
COVID-19 SPIKE DOMAIN ANTIBODY RESULT: >250 U/ML — HIGH
CREAT SERPL-MCNC: 0.75 MG/DL — SIGNIFICANT CHANGE UP (ref 0.5–1.3)
DIFF PNL FLD: NEGATIVE — SIGNIFICANT CHANGE UP
EOSINOPHIL # BLD AUTO: 0.14 K/UL — SIGNIFICANT CHANGE UP (ref 0–0.5)
EOSINOPHIL NFR BLD AUTO: 3.3 % — SIGNIFICANT CHANGE UP (ref 0–6)
EPI CELLS # UR: SIGNIFICANT CHANGE UP
ETHANOL SERPL-MCNC: <10 MG/DL — SIGNIFICANT CHANGE UP (ref 0–9)
GLUCOSE SERPL-MCNC: 98 MG/DL — SIGNIFICANT CHANGE UP (ref 70–99)
GLUCOSE UR QL: 250 MG/DL
HCT VFR BLD CALC: 48.1 % — SIGNIFICANT CHANGE UP (ref 39–50)
HGB BLD-MCNC: 16.1 G/DL — SIGNIFICANT CHANGE UP (ref 13–17)
IMM GRANULOCYTES NFR BLD AUTO: 0.2 % — SIGNIFICANT CHANGE UP (ref 0–1.5)
KETONES UR-MCNC: ABNORMAL
LEUKOCYTE ESTERASE UR-ACNC: NEGATIVE — SIGNIFICANT CHANGE UP
LYMPHOCYTES # BLD AUTO: 1.49 K/UL — SIGNIFICANT CHANGE UP (ref 1–3.3)
LYMPHOCYTES # BLD AUTO: 35.4 % — SIGNIFICANT CHANGE UP (ref 13–44)
MCHC RBC-ENTMCNC: 30.8 PG — SIGNIFICANT CHANGE UP (ref 27–34)
MCHC RBC-ENTMCNC: 33.5 GM/DL — SIGNIFICANT CHANGE UP (ref 32–36)
MCV RBC AUTO: 92 FL — SIGNIFICANT CHANGE UP (ref 80–100)
METHADONE UR-MCNC: NEGATIVE — SIGNIFICANT CHANGE UP
MONOCYTES # BLD AUTO: 0.46 K/UL — SIGNIFICANT CHANGE UP (ref 0–0.9)
MONOCYTES NFR BLD AUTO: 10.9 % — SIGNIFICANT CHANGE UP (ref 2–14)
NEUTROPHILS # BLD AUTO: 2.1 K/UL — SIGNIFICANT CHANGE UP (ref 1.8–7.4)
NEUTROPHILS NFR BLD AUTO: 50 % — SIGNIFICANT CHANGE UP (ref 43–77)
NITRITE UR-MCNC: NEGATIVE — SIGNIFICANT CHANGE UP
OPIATES UR-MCNC: NEGATIVE — SIGNIFICANT CHANGE UP
PCP SPEC-MCNC: SIGNIFICANT CHANGE UP
PCP UR-MCNC: NEGATIVE — SIGNIFICANT CHANGE UP
PH UR: 6.5 — SIGNIFICANT CHANGE UP (ref 5–8)
PLATELET # BLD AUTO: 299 K/UL — SIGNIFICANT CHANGE UP (ref 150–400)
POTASSIUM SERPL-MCNC: 4 MMOL/L — SIGNIFICANT CHANGE UP (ref 3.5–5.3)
POTASSIUM SERPL-SCNC: 4 MMOL/L — SIGNIFICANT CHANGE UP (ref 3.5–5.3)
PROT SERPL-MCNC: 7.2 G/DL — SIGNIFICANT CHANGE UP (ref 6.6–8.7)
PROT UR-MCNC: NEGATIVE MG/DL — SIGNIFICANT CHANGE UP
RBC # BLD: 5.23 M/UL — SIGNIFICANT CHANGE UP (ref 4.2–5.8)
RBC # FLD: 12.8 % — SIGNIFICANT CHANGE UP (ref 10.3–14.5)
RBC CASTS # UR COMP ASSIST: SIGNIFICANT CHANGE UP /HPF (ref 0–4)
SALICYLATES SERPL-MCNC: <0.6 MG/DL — LOW (ref 10–20)
SARS-COV-2 IGG+IGM SERPL QL IA: >250 U/ML — HIGH
SARS-COV-2 IGG+IGM SERPL QL IA: POSITIVE
SARS-COV-2 RNA SPEC QL NAA+PROBE: SIGNIFICANT CHANGE UP
SODIUM SERPL-SCNC: 138 MMOL/L — SIGNIFICANT CHANGE UP (ref 135–145)
SP GR SPEC: 1.01 — SIGNIFICANT CHANGE UP (ref 1.01–1.02)
THC UR QL: POSITIVE
UROBILINOGEN FLD QL: 1 MG/DL
VALPROATE SERPL-MCNC: <3.7 UG/ML — LOW (ref 50–100)
WBC # BLD: 4.21 K/UL — SIGNIFICANT CHANGE UP (ref 3.8–10.5)
WBC # FLD AUTO: 4.21 K/UL — SIGNIFICANT CHANGE UP (ref 3.8–10.5)
WBC UR QL: SIGNIFICANT CHANGE UP

## 2021-10-01 PROCEDURE — 99285 EMERGENCY DEPT VISIT HI MDM: CPT

## 2021-10-01 PROCEDURE — 90792 PSYCH DIAG EVAL W/MED SRVCS: CPT

## 2021-10-01 PROCEDURE — 93010 ELECTROCARDIOGRAM REPORT: CPT

## 2021-10-01 RX ORDER — TRAZODONE HCL 50 MG
50 TABLET ORAL ONCE
Refills: 0 | Status: COMPLETED | OUTPATIENT
Start: 2021-10-01 | End: 2021-10-01

## 2021-10-01 RX ORDER — DIAZEPAM 5 MG
5 TABLET ORAL ONCE
Refills: 0 | Status: DISCONTINUED | OUTPATIENT
Start: 2021-10-01 | End: 2021-10-01

## 2021-10-01 RX ORDER — FLUPHENAZINE HYDROCHLORIDE 1 MG/1
2.5 TABLET, FILM COATED ORAL ONCE
Refills: 0 | Status: COMPLETED | OUTPATIENT
Start: 2021-10-01 | End: 2021-10-01

## 2021-10-01 RX ADMIN — Medication 50 MILLIGRAM(S): at 22:54

## 2021-10-01 RX ADMIN — FLUPHENAZINE HYDROCHLORIDE 2.5 MILLIGRAM(S): 1 TABLET, FILM COATED ORAL at 22:54

## 2021-10-01 NOTE — ED STATDOCS - NSICDXPASTMEDICALHX_GEN_ALL_CORE_FT
PAST MEDICAL HISTORY:  Bipolar disorder     DM (diabetes mellitus)     DM (diabetes mellitus)     HTN (hypertension)     Schizoaffective disorder, unspecified type

## 2021-10-01 NOTE — ED STATDOCS - CROS ED CONS ALL NEG
Hpi Title: Evaluation of Skin Lesions
How Severe Are Your Spot(S)?: moderate
Have Your Spot(S) Been Treated In The Past?: has not been treated
negative...
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
## 2021-10-01 NOTE — ED STATDOCS - OBJECTIVE STATEMENT
50y/o M with PMHx of Schizoaffective disorder, DM, HTN; on Trazadone, Metformin presents to the ED c/o SI. Pt endorses he wants to kill himself, did not disclose a plan. Pt admits to non compliance to medication, states he has been unable to refill medication. Pt reports ETOH use 2 days ago. No medical complaints.

## 2021-10-01 NOTE — ED STATDOCS - NSICDXPASTSURGICALHX_GEN_ALL_CORE_FT
PAST SURGICAL HISTORY:  No significant past surgical history     No significant past surgical history

## 2021-10-01 NOTE — ED BEHAVIORAL HEALTH ASSESSMENT NOTE - RISK ASSESSMENT
Low Acute Suicide Risk Low Risk--No active SI, passive SI possibly due to substance abuse; limited family support

## 2021-10-01 NOTE — ED ADULT NURSE NOTE - OBJECTIVE STATEMENT
Patient presents to ER C/O having suicidal thoughts, patient irritable, states he is homeless, denies ETOH/drugs today, reports recent hospitalization due to same, resp even/unlabored,

## 2021-10-02 VITALS
RESPIRATION RATE: 20 BRPM | DIASTOLIC BLOOD PRESSURE: 75 MMHG | HEART RATE: 97 BPM | SYSTOLIC BLOOD PRESSURE: 116 MMHG | OXYGEN SATURATION: 97 % | TEMPERATURE: 98 F

## 2021-10-02 PROCEDURE — 80307 DRUG TEST PRSMV CHEM ANLYZR: CPT

## 2021-10-02 PROCEDURE — U0003: CPT

## 2021-10-02 PROCEDURE — G0378: CPT

## 2021-10-02 PROCEDURE — 99234 HOSP IP/OBS SM DT SF/LOW 45: CPT

## 2021-10-02 PROCEDURE — 93005 ELECTROCARDIOGRAM TRACING: CPT

## 2021-10-02 PROCEDURE — U0005: CPT

## 2021-10-02 PROCEDURE — 85025 COMPLETE CBC W/AUTO DIFF WBC: CPT

## 2021-10-02 PROCEDURE — 80053 COMPREHEN METABOLIC PANEL: CPT

## 2021-10-02 PROCEDURE — 80164 ASSAY DIPROPYLACETIC ACD TOT: CPT

## 2021-10-02 PROCEDURE — 36415 COLL VENOUS BLD VENIPUNCTURE: CPT

## 2021-10-02 PROCEDURE — 99285 EMERGENCY DEPT VISIT HI MDM: CPT

## 2021-10-02 PROCEDURE — 99212 OFFICE O/P EST SF 10 MIN: CPT

## 2021-10-02 PROCEDURE — 81001 URINALYSIS AUTO W/SCOPE: CPT

## 2021-10-02 PROCEDURE — 86769 SARS-COV-2 COVID-19 ANTIBODY: CPT

## 2021-10-02 RX ORDER — FLUPHENAZINE HYDROCHLORIDE 1 MG/1
1 TABLET, FILM COATED ORAL
Qty: 28 | Refills: 1
Start: 2021-10-02 | End: 2021-10-29

## 2021-10-02 RX ORDER — METFORMIN HYDROCHLORIDE 850 MG/1
1000 TABLET ORAL ONCE
Refills: 0 | Status: COMPLETED | OUTPATIENT
Start: 2021-10-02 | End: 2021-10-02

## 2021-10-02 RX ORDER — TRAZODONE HCL 50 MG
1 TABLET ORAL
Qty: 14 | Refills: 0
Start: 2021-10-02 | End: 2021-10-15

## 2021-10-02 RX ORDER — FLUPHENAZINE HYDROCHLORIDE 1 MG/1
5 TABLET, FILM COATED ORAL ONCE
Refills: 0 | Status: COMPLETED | OUTPATIENT
Start: 2021-10-02 | End: 2021-10-02

## 2021-10-02 RX ORDER — METFORMIN HYDROCHLORIDE 850 MG/1
1 TABLET ORAL
Qty: 28 | Refills: 1
Start: 2021-10-02 | End: 2021-10-29

## 2021-10-02 RX ADMIN — FLUPHENAZINE HYDROCHLORIDE 5 MILLIGRAM(S): 1 TABLET, FILM COATED ORAL at 11:04

## 2021-10-02 RX ADMIN — METFORMIN HYDROCHLORIDE 1000 MILLIGRAM(S): 850 TABLET ORAL at 11:04

## 2021-10-02 NOTE — ED ADULT NURSE REASSESSMENT NOTE - NS ED NURSE REASSESS COMMENT FT1
Assumed care of patient  Pt lying in darken room asleep.  NAD noted.  Will monitor andchart changes
Patient resting in bed.  Patient ate 100% of his meal at dinner.  No attempts of harming himself or others.
Patient reviewed discharge plans and verbalized understanding of plan.  Patient provided copy of discharge plans.  Patient agrees to return to local ED or call 911 EMS if symptoms worsen or thoughts to harm self or others develop.
Patient presented in  area dressed in yellow gowns.  Patient is awake and pleasant on presentation.  Patient reports he wants to go to the hospital to get back on his medications for depression and has been having passive suicidal ideation.  Patient feels helpless related to substance abuse of cocaine and ETOH.  Patient cooperative with security contraband assessment.  Safety maintained.
patient refusing v/s
Assumed care of patient at 0710. Patient resting in bed asleep with no distress and regular non labored breathing. Safety of patient maintained.
Patient ate 100% of his dinner.  Patient denies suicidal or homicidal ideations.  Patient is awaiting  discharge transportation home.  No attempts to harm self or others and safety maintained.
Patient denies suicidal or homicidal ideation.  No attempts to harm self or others.  Patient agrees to return to a local ED or call 911 if symptoms worsen or thoughts to harm self or others develop after discharge. Safety maintained.

## 2021-10-02 NOTE — CHART NOTE - NSCHARTNOTEFT_GEN_A_CORE
As per MD, patient is medically ready for discharge. As per Dr. Brewer, patient is treat and release with [facility] follow up. SW met with patient and obtained signed HIPAA consent form. The patient is in agreement to attend [facility] appointment on 10/7 at 1:30 pm. SW explained [facility] services and provided patient with time card for appointment. HIPAA was placed to be scanned. SW remains available as needed.

## 2021-10-02 NOTE — ED CDU PROVIDER DISPOSITION NOTE - NS_EDPROVIDERDISPOUSERTYPE_ED_A_ED
Medications: medications verified and updated  Added preferred pharmacy      Physician requesting Consult: Self      Systems Review:   In the PAST 2 weeks have you experienced any of the following?  Constitutional:  Fatigue:( low energy):  No  Psychological:  Sleep Problems:  No  Skin:  Rashes: No  Eyes:  Vision changes: No  Neurological:  Headaches:No  Ears: Hearing Loss: No   Ringing: No   Dizziness: No  Respiratory: Cough:  No  Gastrointestinal: Heartburn: No  Musculoskeletal: Joint Pain: No  Hematologic: Easy Bruising: No    Have you ever had (MRSA) or a serious skin infection?  No  Denies Latex allergy or sensitivity  Family History:     Does anyone in your immediate family have:   Hearing Loss:  No  Anesthesia Complications: No   Bleeding Disorders: No             Attending Attestation (For Attendings USE Only)...

## 2021-10-02 NOTE — ED ADULT NURSE REASSESSMENT NOTE - COMFORT CARE
darkened lights
meal provided/plan of care explained
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
## 2021-10-02 NOTE — ED ADULT NURSE REASSESSMENT NOTE - GENERAL PATIENT STATE
comfortable appearance/cooperative
resting/sleeping
comfortable appearance/cooperative
comfortable appearance/cooperative
comfortable appearance/resting/sleeping
comfortable appearance/cooperative

## 2021-10-02 NOTE — ED CDU PROVIDER DISPOSITION NOTE - CLINICAL COURSE
Patient presented with suicidal ideation. No actual attempt.  Admitted to non compliance on meds  Patient had medical evaluation and was cleared medically. Patient had  evaluation and found to have schizoaffective bipolar disorder and non compliance on meds. Pt does have hx schizoaffective.  Meds to be given by  and SW to schedule pt with [facility] for

## 2021-10-02 NOTE — ED CDU PROVIDER INITIAL DAY NOTE - CPE EDP NEURO NORM
Prescription approved per [facility] Refill Protocol.  Angeles Moran, RN, BSN     normal...

## 2021-10-02 NOTE — ED ADULT NURSE REASSESSMENT NOTE - STATUS
awaiting consult
awaiting discharge, no change
hold for re assessment
reassessment
awaiting discharge, no change
reassessment

## 2021-10-02 NOTE — ED CDU PROVIDER INITIAL DAY NOTE - OBJECTIVE STATEMENT
2y/o M with PMHx of Schizoaffective disorder, DM, HTN; on Trazadone, Metformin presents to the ED c/o SI. Pt endorses he wants to kill himself, did not disclose a plan. Pt admits to non compliance to medication, states he has been unable to refill medication. Pt reports ETOH use 2 days ago. No medical complaints.

## 2021-10-02 NOTE — ED CDU PROVIDER DISPOSITION NOTE - PATIENT PORTAL LINK FT
You can access the FollowMyHealth Patient Portal offered by [facility] by registering at the following website: [email or web address]. By joining [facility] [employer]’s FollowMyHealth portal, you will also be able to view your health information using other applications (apps) compatible with our system.

## 2021-10-17 ENCOUNTER — EMERGENCY (EMERGENCY)
Facility: HOSPITAL | Age: 51
LOS: 1 days | Discharge: OTHER INSTITUTION | End: 2021-10-17
Attending: STUDENT IN AN ORGANIZED HEALTH CARE EDUCATION/TRAINING PROGRAM
Payer: MEDICAID

## 2021-10-17 VITALS
TEMPERATURE: 98 F | DIASTOLIC BLOOD PRESSURE: 68 MMHG | OXYGEN SATURATION: 95 % | HEIGHT: 66 IN | HEART RATE: 85 BPM | SYSTOLIC BLOOD PRESSURE: 104 MMHG | RESPIRATION RATE: 18 BRPM

## 2021-10-17 DIAGNOSIS — F32.9 MAJOR DEPRESSIVE DISORDER, SINGLE EPISODE, UNSPECIFIED: ICD-10-CM

## 2021-10-17 DIAGNOSIS — F25.1 SCHIZOAFFECTIVE DISORDER, DEPRESSIVE TYPE: ICD-10-CM

## 2021-10-17 DIAGNOSIS — F14.10 COCAINE ABUSE, UNCOMPLICATED: ICD-10-CM

## 2021-10-17 LAB
ALBUMIN SERPL ELPH-MCNC: 3.6 G/DL — SIGNIFICANT CHANGE UP (ref 3.3–5.2)
ALP SERPL-CCNC: 55 U/L — SIGNIFICANT CHANGE UP (ref 40–120)
ALT FLD-CCNC: 15 U/L — SIGNIFICANT CHANGE UP
ANION GAP SERPL CALC-SCNC: 10 MMOL/L — SIGNIFICANT CHANGE UP (ref 5–17)
APAP SERPL-MCNC: <3 UG/ML — LOW (ref 10–26)
AST SERPL-CCNC: 20 U/L — SIGNIFICANT CHANGE UP
BASOPHILS # BLD AUTO: 0.02 K/UL — SIGNIFICANT CHANGE UP (ref 0–0.2)
BASOPHILS NFR BLD AUTO: 0.4 % — SIGNIFICANT CHANGE UP (ref 0–2)
BILIRUB SERPL-MCNC: <0.2 MG/DL — LOW (ref 0.4–2)
BUN SERPL-MCNC: 10.8 MG/DL — SIGNIFICANT CHANGE UP (ref 8–20)
CALCIUM SERPL-MCNC: 8.5 MG/DL — LOW (ref 8.6–10.2)
CHLORIDE SERPL-SCNC: 104 MMOL/L — SIGNIFICANT CHANGE UP (ref 98–107)
CO2 SERPL-SCNC: 25 MMOL/L — SIGNIFICANT CHANGE UP (ref 22–29)
COVID-19 SPIKE DOMAIN AB INTERP: POSITIVE
COVID-19 SPIKE DOMAIN ANTIBODY RESULT: >250 U/ML — HIGH
CREAT SERPL-MCNC: 0.65 MG/DL — SIGNIFICANT CHANGE UP (ref 0.5–1.3)
EOSINOPHIL # BLD AUTO: 0.16 K/UL — SIGNIFICANT CHANGE UP (ref 0–0.5)
EOSINOPHIL NFR BLD AUTO: 3.4 % — SIGNIFICANT CHANGE UP (ref 0–6)
ETHANOL SERPL-MCNC: <10 MG/DL — SIGNIFICANT CHANGE UP (ref 0–9)
GLUCOSE SERPL-MCNC: 184 MG/DL — HIGH (ref 70–99)
HCT VFR BLD CALC: 40.8 % — SIGNIFICANT CHANGE UP (ref 39–50)
HGB BLD-MCNC: 13.5 G/DL — SIGNIFICANT CHANGE UP (ref 13–17)
IMM GRANULOCYTES NFR BLD AUTO: 0.2 % — SIGNIFICANT CHANGE UP (ref 0–1.5)
LYMPHOCYTES # BLD AUTO: 2.01 K/UL — SIGNIFICANT CHANGE UP (ref 1–3.3)
LYMPHOCYTES # BLD AUTO: 42.9 % — SIGNIFICANT CHANGE UP (ref 13–44)
MCHC RBC-ENTMCNC: 29.6 PG — SIGNIFICANT CHANGE UP (ref 27–34)
MCHC RBC-ENTMCNC: 33.1 GM/DL — SIGNIFICANT CHANGE UP (ref 32–36)
MCV RBC AUTO: 89.5 FL — SIGNIFICANT CHANGE UP (ref 80–100)
MONOCYTES # BLD AUTO: 0.66 K/UL — SIGNIFICANT CHANGE UP (ref 0–0.9)
MONOCYTES NFR BLD AUTO: 14.1 % — HIGH (ref 2–14)
NEUTROPHILS # BLD AUTO: 1.83 K/UL — SIGNIFICANT CHANGE UP (ref 1.8–7.4)
NEUTROPHILS NFR BLD AUTO: 39 % — LOW (ref 43–77)
PLATELET # BLD AUTO: 309 K/UL — SIGNIFICANT CHANGE UP (ref 150–400)
POTASSIUM SERPL-MCNC: 3.7 MMOL/L — SIGNIFICANT CHANGE UP (ref 3.5–5.3)
POTASSIUM SERPL-SCNC: 3.7 MMOL/L — SIGNIFICANT CHANGE UP (ref 3.5–5.3)
PROT SERPL-MCNC: 5.8 G/DL — LOW (ref 6.6–8.7)
RBC # BLD: 4.56 M/UL — SIGNIFICANT CHANGE UP (ref 4.2–5.8)
RBC # FLD: 12.3 % — SIGNIFICANT CHANGE UP (ref 10.3–14.5)
SALICYLATES SERPL-MCNC: <0.6 MG/DL — LOW (ref 10–20)
SARS-COV-2 IGG+IGM SERPL QL IA: >250 U/ML — HIGH
SARS-COV-2 IGG+IGM SERPL QL IA: POSITIVE
SARS-COV-2 RNA SPEC QL NAA+PROBE: SIGNIFICANT CHANGE UP
SODIUM SERPL-SCNC: 139 MMOL/L — SIGNIFICANT CHANGE UP (ref 135–145)
WBC # BLD: 4.69 K/UL — SIGNIFICANT CHANGE UP (ref 3.8–10.5)
WBC # FLD AUTO: 4.69 K/UL — SIGNIFICANT CHANGE UP (ref 3.8–10.5)

## 2021-10-17 PROCEDURE — 36415 COLL VENOUS BLD VENIPUNCTURE: CPT

## 2021-10-17 PROCEDURE — 86769 SARS-COV-2 COVID-19 ANTIBODY: CPT

## 2021-10-17 PROCEDURE — 82962 GLUCOSE BLOOD TEST: CPT

## 2021-10-17 PROCEDURE — 93005 ELECTROCARDIOGRAM TRACING: CPT

## 2021-10-17 PROCEDURE — 99285 EMERGENCY DEPT VISIT HI MDM: CPT

## 2021-10-17 PROCEDURE — 93010 ELECTROCARDIOGRAM REPORT: CPT

## 2021-10-17 PROCEDURE — 80307 DRUG TEST PRSMV CHEM ANLYZR: CPT

## 2021-10-17 PROCEDURE — U0003: CPT

## 2021-10-17 PROCEDURE — 80053 COMPREHEN METABOLIC PANEL: CPT

## 2021-10-17 PROCEDURE — 85025 COMPLETE CBC W/AUTO DIFF WBC: CPT

## 2021-10-17 PROCEDURE — 90792 PSYCH DIAG EVAL W/MED SRVCS: CPT

## 2021-10-17 PROCEDURE — U0005: CPT

## 2021-10-17 RX ORDER — TRAZODONE HCL 50 MG
100 TABLET ORAL AT BEDTIME
Refills: 0 | Status: DISCONTINUED | OUTPATIENT
Start: 2021-10-17 | End: 2021-10-21

## 2021-10-17 RX ADMIN — Medication 100 MILLIGRAM(S): at 22:06

## 2021-10-17 NOTE — ED ADULT NURSE NOTE - CHIEF COMPLAINT QUOTE
patient states that he is hearing voices to tell him to harm himself, patient states that he has not been taking any of his medication, patient undressed and placed in yellow gown belongings secured

## 2021-10-17 NOTE — ED PROVIDER NOTE - PHYSICAL EXAMINATION
Gen: NAD, AOx3  Head: NCAT  Lung: CTAB, no respiratory distress, no wheezing, rales, rhonchi  CV: normal s1/s2, rrr, no murmurs, Normal perfusion, pulses 2+ throughout  Abd: soft, NTND  MSK: No edema, no visible deformities, full range of motion in all 4 extremities  Neuro: No focal neurologic deficits  Skin: No rash   Psych: withdrawn, laying on side, avoiding eye contact
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
## 2021-10-17 NOTE — ED BEHAVIORAL HEALTH ASSESSMENT NOTE - NSPRESENTSXS_PSY_ALL_CORE
Depressed mood/Anhedonia/Psychosis/Hopelessness or despair/Command hallucinations to hurt self/Refusal or inability to complete safety plan

## 2021-10-17 NOTE — ED BEHAVIORAL HEALTH ASSESSMENT NOTE - DESCRIPTION
DM Single male , undomiciled alone, no children and homeless ICU Vital Signs Last 24 Hrs  T(C): 36.6 (17 Oct 2021 15:57), Max: 36.6 (17 Oct 2021 15:57)  T(F): 97.8 (17 Oct 2021 15:57), Max: 97.8 (17 Oct 2021 15:57)  HR: 82 (17 Oct 2021 15:57) (82 - 85)  BP: 116/77 (17 Oct 2021 15:57) (104/68 - 116/77)  BP(mean): --  ABP: --  ABP(mean): --  RR: 18 (17 Oct 2021 15:57) (18 - 18)  SpO2: 96% (17 Oct 2021 15:57) (95% - 96%)

## 2021-10-17 NOTE — ED PROVIDER NOTE - CLINICAL SUMMARY MEDICAL DECISION MAKING FREE TEXT BOX
Patient with suicidal ideation a/w auditory hallucinations- psych clearance labs, d/w psychiatry. Aisha Duncan DO
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
## 2021-10-17 NOTE — ED BEHAVIORAL HEALTH ASSESSMENT NOTE - RISK ASSESSMENT
RF poor compliance, cocaine abuse, poor insight, past suicide attempt  PF supportive sister Moderate Acute Suicide Risk
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
## 2021-10-17 NOTE — ED ADULT TRIAGE NOTE - CHIEF COMPLAINT QUOTE
patient states that hes hearing voices to tell him to harm himself, patient states that he has not been taking any of his medication, patient undressed and placed in yellow gown belongings secured

## 2021-10-17 NOTE — ED BEHAVIORAL HEALTH ASSESSMENT NOTE - DIFFERENTIAL
malingering  COVID Exposure Screen- Patient    1.         *Have you had a COVID-19 test in the last 90 days?  (x  ) Yes  (  ) No   (  ) Unknown- Reason: _____    2.         *Have you tested positive for COVID-19 antibodies? (  ) Yes   (  ) No   (x  ) Unknown- Reason:    3.         *Have you received 2 doses of the COVID-19 vaccine? ( x ) Yes   (  ) No   (  ) Unknown- Reason: J and J one shot_____    4.         *In the past 10 days, have you been around anyone with a positive COVID-19 test?* (  ) Yes   (  ) No   ( x ) Unknown- Reason: ____    5.         *Have you been out of [place] within the past 10 days?* (  ) Yes   (x  ) No   (  ) Unknown- Reason: _____

## 2021-10-18 VITALS
OXYGEN SATURATION: 99 % | DIASTOLIC BLOOD PRESSURE: 57 MMHG | TEMPERATURE: 98 F | HEART RATE: 56 BPM | SYSTOLIC BLOOD PRESSURE: 90 MMHG | RESPIRATION RATE: 20 BRPM

## 2021-10-18 NOTE — ED ADULT NURSE REASSESSMENT NOTE - NS ED NURSE REASSESS COMMENT FT1
Assumedcare of patient.   Pt alert and cooperative. NAD noted  No c/o  Will monitor and chart changes and maintain safe environment
assumed care for pt sleeping/resting. breakfast left at bedside.
pt resting in his room awake and alert. pt provided nutritional snacks due to multiple request. pt educated on the plan of care to transfer to [facility] for inpatient tx. pt has made no attempts to harm self or others.
report to AKI Bunch RN in , pt walked to unit by STACY recinos
patient refusing v/s
Patient resting in bed after having 2 dinner trays. Came in for complaints of AH and SI, continues to endorse. States he doesn't feel safe "out of the hospital". Assessed by NP and deemed appropriate for inpatient transfer to start back on medication and further treatment for depression.

## 2021-10-18 NOTE — CHART NOTE - NSCHARTNOTEFT_GEN_A_CORE
NOELLE Note: NOELLE made aware by  provider pt is in need of inpt psych trx on 9.13 basis. NOELLE placed call to [facility] ([facility]), [facility] ([facility]), no beds. NOELLE spoke to [facility], was made aware admissions is closed for the night. Too late for referral to [facility], [facility] or [facility], SW following.
SW Note: Pt will be transferred to [facility] for inpt psychiatric care. Pt aware. legals 9.13. Accepting MD Dr. Montelongo. Amb[facility] arranged with [facility]. [facility] transportation billing form sent with pt in transfer packet. Auth needed for admit, SW to follow

## 2021-10-31 ENCOUNTER — EMERGENCY (EMERGENCY)
Facility: HOSPITAL | Age: 51
LOS: 1 days | Discharge: OTHER INSTITUTION | End: 2021-10-31
Attending: EMERGENCY MEDICINE
Payer: MEDICAID

## 2021-10-31 VITALS
DIASTOLIC BLOOD PRESSURE: 86 MMHG | WEIGHT: 175.05 LBS | OXYGEN SATURATION: 98 % | SYSTOLIC BLOOD PRESSURE: 124 MMHG | TEMPERATURE: 98 F | RESPIRATION RATE: 14 BRPM | HEART RATE: 82 BPM | HEIGHT: 66 IN

## 2021-10-31 VITALS
HEART RATE: 86 BPM | OXYGEN SATURATION: 98 % | DIASTOLIC BLOOD PRESSURE: 92 MMHG | RESPIRATION RATE: 20 BRPM | TEMPERATURE: 98 F | SYSTOLIC BLOOD PRESSURE: 137 MMHG

## 2021-10-31 LAB
ALBUMIN SERPL ELPH-MCNC: 4.3 G/DL — SIGNIFICANT CHANGE UP (ref 3.3–5.2)
ALP SERPL-CCNC: 63 U/L — SIGNIFICANT CHANGE UP (ref 40–120)
ALT FLD-CCNC: 20 U/L — SIGNIFICANT CHANGE UP
AMPHET UR-MCNC: NEGATIVE — SIGNIFICANT CHANGE UP
ANION GAP SERPL CALC-SCNC: 14 MMOL/L — SIGNIFICANT CHANGE UP (ref 5–17)
APAP SERPL-MCNC: <3 UG/ML — LOW (ref 10–26)
APPEARANCE UR: CLEAR — SIGNIFICANT CHANGE UP
AST SERPL-CCNC: 23 U/L — SIGNIFICANT CHANGE UP
BACTERIA # UR AUTO: ABNORMAL
BARBITURATES UR SCN-MCNC: NEGATIVE — SIGNIFICANT CHANGE UP
BASOPHILS # BLD AUTO: 0.03 K/UL — SIGNIFICANT CHANGE UP (ref 0–0.2)
BASOPHILS NFR BLD AUTO: 0.4 % — SIGNIFICANT CHANGE UP (ref 0–2)
BENZODIAZ UR-MCNC: NEGATIVE — SIGNIFICANT CHANGE UP
BILIRUB SERPL-MCNC: 0.2 MG/DL — LOW (ref 0.4–2)
BILIRUB UR-MCNC: NEGATIVE — SIGNIFICANT CHANGE UP
BUN SERPL-MCNC: 8.7 MG/DL — SIGNIFICANT CHANGE UP (ref 8–20)
CALCIUM SERPL-MCNC: 9.4 MG/DL — SIGNIFICANT CHANGE UP (ref 8.6–10.2)
CHLORIDE SERPL-SCNC: 101 MMOL/L — SIGNIFICANT CHANGE UP (ref 98–107)
CO2 SERPL-SCNC: 27 MMOL/L — SIGNIFICANT CHANGE UP (ref 22–29)
COCAINE METAB.OTHER UR-MCNC: POSITIVE
COLOR SPEC: YELLOW — SIGNIFICANT CHANGE UP
CREAT SERPL-MCNC: 0.71 MG/DL — SIGNIFICANT CHANGE UP (ref 0.5–1.3)
DIFF PNL FLD: NEGATIVE — SIGNIFICANT CHANGE UP
EOSINOPHIL # BLD AUTO: 0.06 K/UL — SIGNIFICANT CHANGE UP (ref 0–0.5)
EOSINOPHIL NFR BLD AUTO: 0.8 % — SIGNIFICANT CHANGE UP (ref 0–6)
EPI CELLS # UR: SIGNIFICANT CHANGE UP
ETHANOL SERPL-MCNC: 15 MG/DL — HIGH (ref 0–9)
GLUCOSE SERPL-MCNC: 98 MG/DL — SIGNIFICANT CHANGE UP (ref 70–99)
GLUCOSE UR QL: 1000 MG/DL
HCT VFR BLD CALC: 46.3 % — SIGNIFICANT CHANGE UP (ref 39–50)
HGB BLD-MCNC: 15.3 G/DL — SIGNIFICANT CHANGE UP (ref 13–17)
IMM GRANULOCYTES NFR BLD AUTO: 0.3 % — SIGNIFICANT CHANGE UP (ref 0–1.5)
KETONES UR-MCNC: NEGATIVE — SIGNIFICANT CHANGE UP
LEUKOCYTE ESTERASE UR-ACNC: NEGATIVE — SIGNIFICANT CHANGE UP
LYMPHOCYTES # BLD AUTO: 2.14 K/UL — SIGNIFICANT CHANGE UP (ref 1–3.3)
LYMPHOCYTES # BLD AUTO: 29.4 % — SIGNIFICANT CHANGE UP (ref 13–44)
MCHC RBC-ENTMCNC: 30.1 PG — SIGNIFICANT CHANGE UP (ref 27–34)
MCHC RBC-ENTMCNC: 33 GM/DL — SIGNIFICANT CHANGE UP (ref 32–36)
MCV RBC AUTO: 91.1 FL — SIGNIFICANT CHANGE UP (ref 80–100)
METHADONE UR-MCNC: NEGATIVE — SIGNIFICANT CHANGE UP
MONOCYTES # BLD AUTO: 0.85 K/UL — SIGNIFICANT CHANGE UP (ref 0–0.9)
MONOCYTES NFR BLD AUTO: 11.7 % — SIGNIFICANT CHANGE UP (ref 2–14)
NEUTROPHILS # BLD AUTO: 4.17 K/UL — SIGNIFICANT CHANGE UP (ref 1.8–7.4)
NEUTROPHILS NFR BLD AUTO: 57.4 % — SIGNIFICANT CHANGE UP (ref 43–77)
NITRITE UR-MCNC: NEGATIVE — SIGNIFICANT CHANGE UP
OPIATES UR-MCNC: NEGATIVE — SIGNIFICANT CHANGE UP
PCP SPEC-MCNC: SIGNIFICANT CHANGE UP
PCP UR-MCNC: NEGATIVE — SIGNIFICANT CHANGE UP
PH UR: 6.5 — SIGNIFICANT CHANGE UP (ref 5–8)
PLATELET # BLD AUTO: 332 K/UL — SIGNIFICANT CHANGE UP (ref 150–400)
POTASSIUM SERPL-MCNC: 3.8 MMOL/L — SIGNIFICANT CHANGE UP (ref 3.5–5.3)
POTASSIUM SERPL-SCNC: 3.8 MMOL/L — SIGNIFICANT CHANGE UP (ref 3.5–5.3)
PROT SERPL-MCNC: 7.4 G/DL — SIGNIFICANT CHANGE UP (ref 6.6–8.7)
PROT UR-MCNC: 15 MG/DL
RBC # BLD: 5.08 M/UL — SIGNIFICANT CHANGE UP (ref 4.2–5.8)
RBC # FLD: 12.2 % — SIGNIFICANT CHANGE UP (ref 10.3–14.5)
RBC CASTS # UR COMP ASSIST: SIGNIFICANT CHANGE UP /HPF (ref 0–4)
SALICYLATES SERPL-MCNC: <0.6 MG/DL — LOW (ref 10–20)
SARS-COV-2 RNA SPEC QL NAA+PROBE: SIGNIFICANT CHANGE UP
SODIUM SERPL-SCNC: 142 MMOL/L — SIGNIFICANT CHANGE UP (ref 135–145)
SP GR SPEC: 1.01 — SIGNIFICANT CHANGE UP (ref 1.01–1.02)
THC UR QL: NEGATIVE — SIGNIFICANT CHANGE UP
TSH SERPL-MCNC: 1.16 UIU/ML — SIGNIFICANT CHANGE UP (ref 0.27–4.2)
UROBILINOGEN FLD QL: NEGATIVE MG/DL — SIGNIFICANT CHANGE UP
WBC # BLD: 7.27 K/UL — SIGNIFICANT CHANGE UP (ref 3.8–10.5)
WBC # FLD AUTO: 7.27 K/UL — SIGNIFICANT CHANGE UP (ref 3.8–10.5)
WBC UR QL: SIGNIFICANT CHANGE UP

## 2021-10-31 PROCEDURE — 99285 EMERGENCY DEPT VISIT HI MDM: CPT | Mod: 25

## 2021-10-31 PROCEDURE — 36415 COLL VENOUS BLD VENIPUNCTURE: CPT

## 2021-10-31 PROCEDURE — U0005: CPT

## 2021-10-31 PROCEDURE — 80053 COMPREHEN METABOLIC PANEL: CPT

## 2021-10-31 PROCEDURE — 80307 DRUG TEST PRSMV CHEM ANLYZR: CPT

## 2021-10-31 PROCEDURE — G1004: CPT

## 2021-10-31 PROCEDURE — 99285 EMERGENCY DEPT VISIT HI MDM: CPT

## 2021-10-31 PROCEDURE — 72125 CT NECK SPINE W/O DYE: CPT | Mod: 26,MG

## 2021-10-31 PROCEDURE — U0003: CPT

## 2021-10-31 PROCEDURE — 72125 CT NECK SPINE W/O DYE: CPT | Mod: MG

## 2021-10-31 PROCEDURE — 84443 ASSAY THYROID STIM HORMONE: CPT

## 2021-10-31 PROCEDURE — 93005 ELECTROCARDIOGRAM TRACING: CPT

## 2021-10-31 PROCEDURE — 70450 CT HEAD/BRAIN W/O DYE: CPT | Mod: 26,MG

## 2021-10-31 PROCEDURE — 90792 PSYCH DIAG EVAL W/MED SRVCS: CPT

## 2021-10-31 PROCEDURE — 81001 URINALYSIS AUTO W/SCOPE: CPT

## 2021-10-31 PROCEDURE — 93010 ELECTROCARDIOGRAM REPORT: CPT

## 2021-10-31 PROCEDURE — 85025 COMPLETE CBC W/AUTO DIFF WBC: CPT

## 2021-10-31 PROCEDURE — 70450 CT HEAD/BRAIN W/O DYE: CPT | Mod: MG

## 2021-10-31 RX ORDER — TETANUS TOXOID, REDUCED DIPHTHERIA TOXOID AND ACELLULAR PERTUSSIS VACCINE, ADSORBED 5; 2.5; 8; 8; 2.5 [IU]/.5ML; [IU]/.5ML; UG/.5ML; UG/.5ML; UG/.5ML
0.5 SUSPENSION INTRAMUSCULAR ONCE
Refills: 0 | Status: COMPLETED | OUTPATIENT
Start: 2021-10-31 | End: 2021-10-31

## 2021-10-31 NOTE — ED BEHAVIORAL HEALTH ASSESSMENT NOTE - OTHER PAST PSYCHIATRIC HISTORY (INCLUDE DETAILS REGARDING ONSET, COURSE OF ILLNESS, INPATIENT/OUTPATIENT TREATMENT)
History of multiple short term and long term  psychiatric hospitalizations at [facility].

## 2021-10-31 NOTE — ED PROVIDER NOTE - NSRISKSEXPLAINEDTO_ED_A_ED
Date of Injury: 2001  Initial Treatment Date: 10/03/19  Date Last Seen: 12/16/2020  Mechanism of Onset: Other  Occupation: Retired  Referring by: Arcelia Neff LMT  Primary Care Physician: Bairon Perry Haines then Dr. Beto Santiago concurrently with Dr. Christo Nogueira. Patient

## 2021-10-31 NOTE — ED ADULT NURSE NOTE - OBJECTIVE STATEMENT
Patient presented in  area dressed in yellow gowns.  Patient awake and alert, and oriented times 4.  Patient speech is clear.  Patient reports he has been feeling depressed and suicidal with no specific plan.  Patient reports after last inpatient hospitalization he returned to his sisters house and eventually relapsed on cocaine and stopped his medications.  Patient has since left sisters home and has been homeless.  Patient requesting inpatient hospitalization to maintain his safety and reestablish treatment.  Patient cooperative with security contraband assessment. Patient presented in  area dressed in yellow gowns.  Patient awake and alert, and oriented times 4.  Patient speech is clear.  Patient reports he has been feeling depressed and suicidal with no specific plan.  Patient reports after last inpatient hospitalization he returned to his sisters house and eventually relapsed on cocaine and stopped his medications.  Patient has since left sisters home and has been homeless.  Patient requesting inpatient hospitalization to maintain his safety and reestablish treatment.  Patient cooperative with security contraband assessment.  Constant observation discontinued on  presentation.
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
## 2021-10-31 NOTE — ED BEHAVIORAL HEALTH ASSESSMENT NOTE - SUMMARY
This is a 51-year-old, single, non-caregiver, male, unemployed, domiciled, with history of schizoaffective disorder (bipolar type); no history of childhood trauma reported; with a medical history of diabetes and gout and a substance use history of alcohol abuse and cocaine use.  No history of alcohol-withdrawals seizures reported; no history of violence or incarcerations noted. No family history of depression or suicidal attempts; no family history of mental illness reported.    Although patient reports passive suicidal ideations, he denies current plans or intent to die, citing his sister and adult children as protective factor.  He is not exhibiting acute risks to self or others to warrant inpatient psychiatric admission at this time. At this time, patient would benefit from inpatient drug and alcohol rehab to assist him with his sobriety. Patient has had multiple recent hospitalizations and has failed to follow-up with outpatient psychiatric referral and continues to engage in hazardous alcohol and illicit drug use. Chronic medication non-compliance is a significant issue with this patient as well. Patient is unable to state which medications he was last prescribed by his psychiatrist.    PLAN: Patient does not meet criteria for inpatient psychiatric admission at this time. However, he will benefit from inpatient alcohol and drug rehab followed by intensive outpatient psychiatric services. This is a 51-year-old, single, non-caregiver, male, unemployed, domiciled, with history of schizoaffective disorder (bipolar type); no history of childhood trauma reported; with a medical history of diabetes and gout and a substance use history of alcohol abuse and cocaine use.  No history of alcohol-withdrawals seizures reported; no history of violence or incarcerations noted. No family history of depression or suicidal attempts; no family history of mental illness reported.    Although patient reports passive suicidal ideations, he denies current plans or intent to die, citing his sister and adult children as protective factor.  He is not exhibiting acute risks to self or others to warrant inpatient psychiatric admission at this time. At this time, patient would benefit from inpatient drug and alcohol rehab to assist him with his sobriety. Patient has had multiple recent hospitalizations and has failed to follow-up with outpatient psychiatric referral and continues to engage in hazardous alcohol and illicit drug use. Chronic medication non-compliance is a significant issue with this patient as well. Patient is unable to state which medications he was last prescribed by his psychiatrist.    PLAN: Patient does not meet criteria for inpatient psychiatric admission at this time. However, he will benefit from inpatient alcohol and drug rehab followed by intensive outpatient psychiatric services.    At 1:50PM: Patient refused to leave, stating "I need help; I feel depressed; I still have suicidal thoughts; I am going to kill myself; please nurse, let me go the [facility] hospital". Patient states he will sign himself in on a voluntary status so he can be admitted for help for his depression.    PLAN: Per patient's request, will admit to inpatient psychiatry on a voluntary status.

## 2021-10-31 NOTE — ED ADULT NURSE NOTE - DESCRIPTION (FIRST USE, LAST USE, QUANTITY, FREQUENCY, DURATION)
long history of abuse with last use yesterday long history of abuse with last use on one beer this morning

## 2021-10-31 NOTE — ED BEHAVIORAL HEALTH ASSESSMENT NOTE - PSYCHIATRIC ISSUES AND PLAN (INCLUDE STANDING AND PRN MEDICATION)
Schizoaffective disorder, currently depressed: Start Zoloft 50mg PO daily in AM for depression; start Seroquel 50mg PO HS; tirate for mood stabilization.

## 2021-10-31 NOTE — ED PROVIDER NOTE - SKIN, MLM
Skin normal color for race, warm, dry and intact. No evidence of rash. abrasion to lip and right palm of hand
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
## 2021-10-31 NOTE — ED BEHAVIORAL HEALTH ASSESSMENT NOTE - NSSUICPROTFACTOTHER_PSY_ALL_CORE
Has 5 adult children and keeps in touch with them per patient. Has a sister who loves in [facility] who is also supportive.

## 2021-10-31 NOTE — ED ADULT TRIAGE NOTE - CHIEF COMPLAINT QUOTE
Pt BIBEMS c/o suicidal thoughts "for a long time." States he does not have a plan. Admits to ETOH use today and states he has been non-compliant with his daily medications.

## 2021-10-31 NOTE — ED BEHAVIORAL HEALTH ASSESSMENT NOTE - RISK ASSESSMENT
Low Acute Suicide Risk Low risk:  Protective factor: No current auditory hallucinations; no current suicidal plans or intent to die, citing his children and sister as protective factor.    Risks factors: Patient has a history of auditory hallucinations and also has a history of one suicidal attempt. Chronic elevated risk:  Protective factor: No current auditory hallucinations; no current suicidal plans or intent to die, citing his children and sister as protective factor.    Risks factors: Patient has a history of auditory hallucinations and also has a history of one suicidal attempt. Moderate Acute Suicide Risk
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
## 2021-10-31 NOTE — ED ADULT NURSE REASSESSMENT NOTE - GENERAL PATIENT STATE
comfortable appearance/cooperative
comfortable appearance/cooperative/resting/sleeping
comfortable appearance/cooperative
comfortable appearance/resting/sleeping

## 2021-10-31 NOTE — ED PROVIDER NOTE - NSTIMEPROVIDERCAREINITIATE_GEN_ER
Patient:   NIK MYERS            MRN: [id number]            FIN: [id number]              Age:   53 years     Sex:  MALE     :  66   Associated Diagnoses:   None   Author:   AYDIN NGUYỄN     Subjective   Chief complaint.  Additional Info   53 y.o.male  history of cdiff, ETOH abuse, afib on xarelto p/w   dark stools and weakness. pt states had several ab surgeries last year for ulcer etc  Today, pt is not currenty bleeding today.  eager to eat.  Had clear liquid diet as of 10:30  Exam: large hernia on left abdomen with no tender noted around. abdomen with several scar from previous abdominal surgeries x6  Impression/Plan:  1. dark stools with BRB while wiping  2. history of anemia-multifactorial  No bleeding today  Unsure if upper or lower GI source.  Pt on xarelto last dose was 3 days ago. will need EGD and colonoscopy to evaluate. will d/w Dr. Mcdaniel timing? given need to resume meds  Xarelto last dose  blood count has been stable  Ok per cardiology for endoscopy evaluation  on protonix  continue clear liquid diet  3. afib with RVR stable  defer to cardiology   .       History of Present Illness   NOTES.       Physical Examination   VS/Measurements     Vitals between:   18-AUG-2019 12:14:11   TO   19-AUG-2019 12:14:11                   LAST RESULT MINIMUM MAXIMUM  Temperature 36.9 36.4 36.9  Heart Rate 89 74 89  Respiratory Rate 18 16 19  NISBP           128 123 143  NIDBP           81 75 100  NIMBP           111 111 111  SpO2                    96 96 99      Review / Management   Laboratory results:     Labs between:  18-AUG-2019 12:14 to 19-AUG-2019 12:14  CBC:                 WBC  HgB  Hct  Plt  MCV  RDW   19-AUG-2019 5.6  (L) 8.9  (L) 27.3  221  99.6  (H) 15.4   19-AUG-2019   (L) 9.3  (L) 29.6         18-AUG-2019   (L) 9.1  (L) 29.4         18-AUG-2019 5.4  (L) 9.7  (L) 30.2  236  99.7  (H) 15.9   BMP:                 Na  Cl  BUN  Glu   19-AUG-2019 135  101  8  83                      31-Oct-2021 07:06          K  CO2  Cr  Ca                              3.8  30  0.73  (L) 7.9   CMP:                 AST  ALT  AlkPhos  Bili  Albumin   19-AUG-2019 26  23  (H) 194  0.7  (L) 2.4   Other Chem:             Mg  Phos  Triglycerides  GGTP  DirectBili                           (L) 1.6                          .    Radiology results         Impression and Plan   Dx and Plan:  Diagnosis   .

## 2021-10-31 NOTE — ED BEHAVIORAL HEALTH ASSESSMENT NOTE - DESCRIPTION
Gout Single, unemployed, disabled, homeless. Has 5 adult children; in touch with them per patient. Calm and cooperative; ate 100% of his breakfast; currently sleeping.    Vital Signs Last 24 Hrs  T(C): 36.6 (31 Oct 2021 06:59), Max: 36.6 (31 Oct 2021 06:59)  T(F): 97.8 (31 Oct 2021 06:59), Max: 97.8 (31 Oct 2021 06:59)  HR: 82 (31 Oct 2021 06:59) (82 - 82)  BP: 124/86 (31 Oct 2021 06:59) (124/86 - 124/86)  BP(mean): --  RR: 14 (31 Oct 2021 06:59) (14 - 14)  SpO2: 98% (31 Oct 2021 06:59) (98% - 98%)

## 2021-10-31 NOTE — ED PROVIDER NOTE - PROGRESS NOTE DETAILS
AJM; pt seen by  and cleared but recommended substance abuse admission. accepted by [facility]

## 2021-10-31 NOTE — ED BEHAVIORAL HEALTH ASSESSMENT NOTE - DETAILS
NA Reports current passive suicidal ideations, but denies current plans to die. Was admitted to [facility] on 10/17/21 Patient is self-referred [facility] Pending bed placement. Patient is self-referred.

## 2021-10-31 NOTE — ED ADULT NURSE REASSESSMENT NOTE - COMFORT CARE
meal provided/plan of care explained

## 2021-10-31 NOTE — ED ADULT NURSE REASSESSMENT NOTE - NS ED NURSE REASSESS COMMENT FT1
Assumed care of patient.  Pt lying in darken room.  Pt aware of transfer in place to go to [facility].  When asked how he is feeling patient replied that "I don't want to talk about it". Pt completed dinner tray and removed it from room.  Will monitor and chart changes and maintain safe environment
Patient resting in bed with no distress.  Patient has been sleeping intermittently.  Urine sample collection is pending.  No attempts to harm self or others.  Safety maintained.  Lunch provided to patient at bedside.
Patient endorses he feels he needs to be hospitalized and spoke to Hannah Nguyen NP about same.  Patient voided without providing urine and was reeducated about need for sample for testing after reporting "I forgot".  No attempts to harm self or others.  Patient isolating to his room and resting in bed sleeping intermittently.  Safety maintained.
Patient provided meal and ate 100% of meal.  Patient cooperative with ordered testing.  Patient provided collection container for urine sample.  Patient refused ordered Tetanus shot stating he had it within last five years but unable to recall date.  Dr. Price notified of patients refusal.  Patient resting in bed and safety maintained.
Patient ate 100% of his dinner.  Patient denies plan or intent to harm himself in the hospital.  Patient agrees with plan for transfer to inpatient unit when a bed is available.  Patient out of room, ambulating with a steady gait to bathroom.  Safety of patient maintained.

## 2021-11-01 NOTE — CHART NOTE - NSCHARTNOTEFT_GEN_A_CORE
NOELLE placed follow up call to [facility] ([phone]) and spoke with employee, Tamie. Tamie reported she will make MD aware utox is complete- MD to review utox. NOELLE continues to follow the case.
NOELLE received follow up phone call from Tamie from [facility] ([phone]) whom reported the patient has been accepted to [facility] by Dr. Chu. Sending MD is Dr. Price. Tamie confirmed she has received 9.13 legals- NOELLE made copy of 9.13 legals and placed to be scanned. Dr. Price completed certificate of transfer on behalf of the patient. NOELLE placed call to [facility] ([phone]) and spoke with employee, Eneida, whom arranged patient's ambulance to [facility]. TONA Hughes provided handoff to  EMS. NOELLE completed BH transfer packet with 9.13 legals, certificate of transfer, bh assessment, EKG, and facesheet. NOELLE provided patient with transport letter stating patient may be responsible for ambulance bill. Patient will need authorization as patient has [place] [employer]. NOELLE will follow for auth.
SW Note: Completed ins auth for admit to [facility]. [facility] [phone] and auth approved for 2 days 10/29 & 10/30. Auth# [id number]. Initials faxed: [phone]. Info forwarded to [facility] UR dept.
NOELLE Note: NOELLE alerted by  provider pt is in need of inpt trx on 9.13 basis, pt with recent hx at [facility]. SW placed call to [facility], spoke to Hilary who reports beds are available. SW faxed EKG and legals for review, pt made aware [facility] reviewing case for admission today, SW following

## 2021-11-16 ENCOUNTER — EMERGENCY (EMERGENCY)
Facility: HOSPITAL | Age: 51
LOS: 1 days | Discharge: HOME | End: 2021-11-16
Attending: EMERGENCY MEDICINE
Payer: MEDICAID

## 2021-11-16 VITALS
RESPIRATION RATE: 18 BRPM | OXYGEN SATURATION: 96 % | TEMPERATURE: 98 F | SYSTOLIC BLOOD PRESSURE: 130 MMHG | DIASTOLIC BLOOD PRESSURE: 96 MMHG | HEIGHT: 66 IN | HEART RATE: 98 BPM

## 2021-11-16 LAB
ALBUMIN SERPL ELPH-MCNC: 4.1 G/DL — SIGNIFICANT CHANGE UP (ref 3.3–5.2)
ALP SERPL-CCNC: 65 U/L — SIGNIFICANT CHANGE UP (ref 40–120)
ALT FLD-CCNC: 11 U/L — SIGNIFICANT CHANGE UP
AMPHET UR-MCNC: NEGATIVE — SIGNIFICANT CHANGE UP
ANION GAP SERPL CALC-SCNC: 12 MMOL/L — SIGNIFICANT CHANGE UP (ref 5–17)
APAP SERPL-MCNC: <3 UG/ML — LOW (ref 10–26)
APPEARANCE UR: CLEAR — SIGNIFICANT CHANGE UP
AST SERPL-CCNC: 13 U/L — SIGNIFICANT CHANGE UP
BACTERIA # UR AUTO: ABNORMAL
BARBITURATES UR SCN-MCNC: NEGATIVE — SIGNIFICANT CHANGE UP
BASOPHILS # BLD AUTO: 0.03 K/UL — SIGNIFICANT CHANGE UP (ref 0–0.2)
BASOPHILS NFR BLD AUTO: 0.4 % — SIGNIFICANT CHANGE UP (ref 0–2)
BENZODIAZ UR-MCNC: NEGATIVE — SIGNIFICANT CHANGE UP
BILIRUB SERPL-MCNC: 0.2 MG/DL — LOW (ref 0.4–2)
BILIRUB UR-MCNC: NEGATIVE — SIGNIFICANT CHANGE UP
BUN SERPL-MCNC: 12.1 MG/DL — SIGNIFICANT CHANGE UP (ref 8–20)
CALCIUM SERPL-MCNC: 8.7 MG/DL — SIGNIFICANT CHANGE UP (ref 8.6–10.2)
CHLORIDE SERPL-SCNC: 104 MMOL/L — SIGNIFICANT CHANGE UP (ref 98–107)
CO2 SERPL-SCNC: 23 MMOL/L — SIGNIFICANT CHANGE UP (ref 22–29)
COCAINE METAB.OTHER UR-MCNC: POSITIVE
COLOR SPEC: YELLOW — SIGNIFICANT CHANGE UP
COVID-19 SPIKE DOMAIN AB INTERP: POSITIVE
COVID-19 SPIKE DOMAIN ANTIBODY RESULT: >250 U/ML — HIGH
CREAT SERPL-MCNC: 0.69 MG/DL — SIGNIFICANT CHANGE UP (ref 0.5–1.3)
DIFF PNL FLD: NEGATIVE — SIGNIFICANT CHANGE UP
EOSINOPHIL # BLD AUTO: 0.13 K/UL — SIGNIFICANT CHANGE UP (ref 0–0.5)
EOSINOPHIL NFR BLD AUTO: 1.7 % — SIGNIFICANT CHANGE UP (ref 0–6)
EPI CELLS # UR: SIGNIFICANT CHANGE UP
ETHANOL SERPL-MCNC: <10 MG/DL — SIGNIFICANT CHANGE UP (ref 0–9)
GLUCOSE SERPL-MCNC: 154 MG/DL — HIGH (ref 70–99)
GLUCOSE UR QL: 1000 MG/DL
HCT VFR BLD CALC: 43.9 % — SIGNIFICANT CHANGE UP (ref 39–50)
HGB BLD-MCNC: 14.8 G/DL — SIGNIFICANT CHANGE UP (ref 13–17)
IMM GRANULOCYTES NFR BLD AUTO: 0.4 % — SIGNIFICANT CHANGE UP (ref 0–1.5)
KETONES UR-MCNC: NEGATIVE — SIGNIFICANT CHANGE UP
LEUKOCYTE ESTERASE UR-ACNC: NEGATIVE — SIGNIFICANT CHANGE UP
LYMPHOCYTES # BLD AUTO: 2.59 K/UL — SIGNIFICANT CHANGE UP (ref 1–3.3)
LYMPHOCYTES # BLD AUTO: 34.3 % — SIGNIFICANT CHANGE UP (ref 13–44)
MCHC RBC-ENTMCNC: 30.1 PG — SIGNIFICANT CHANGE UP (ref 27–34)
MCHC RBC-ENTMCNC: 33.7 GM/DL — SIGNIFICANT CHANGE UP (ref 32–36)
MCV RBC AUTO: 89.4 FL — SIGNIFICANT CHANGE UP (ref 80–100)
METHADONE UR-MCNC: NEGATIVE — SIGNIFICANT CHANGE UP
MONOCYTES # BLD AUTO: 0.77 K/UL — SIGNIFICANT CHANGE UP (ref 0–0.9)
MONOCYTES NFR BLD AUTO: 10.2 % — SIGNIFICANT CHANGE UP (ref 2–14)
NEUTROPHILS # BLD AUTO: 4.01 K/UL — SIGNIFICANT CHANGE UP (ref 1.8–7.4)
NEUTROPHILS NFR BLD AUTO: 53 % — SIGNIFICANT CHANGE UP (ref 43–77)
NITRITE UR-MCNC: NEGATIVE — SIGNIFICANT CHANGE UP
OPIATES UR-MCNC: NEGATIVE — SIGNIFICANT CHANGE UP
PCP SPEC-MCNC: SIGNIFICANT CHANGE UP
PCP UR-MCNC: NEGATIVE — SIGNIFICANT CHANGE UP
PH UR: 6 — SIGNIFICANT CHANGE UP (ref 5–8)
PLATELET # BLD AUTO: 319 K/UL — SIGNIFICANT CHANGE UP (ref 150–400)
POTASSIUM SERPL-MCNC: 3.6 MMOL/L — SIGNIFICANT CHANGE UP (ref 3.5–5.3)
POTASSIUM SERPL-SCNC: 3.6 MMOL/L — SIGNIFICANT CHANGE UP (ref 3.5–5.3)
PROT SERPL-MCNC: 6.8 G/DL — SIGNIFICANT CHANGE UP (ref 6.6–8.7)
PROT UR-MCNC: 15 MG/DL
RBC # BLD: 4.91 M/UL — SIGNIFICANT CHANGE UP (ref 4.2–5.8)
RBC # FLD: 12.2 % — SIGNIFICANT CHANGE UP (ref 10.3–14.5)
RBC CASTS # UR COMP ASSIST: SIGNIFICANT CHANGE UP /HPF (ref 0–4)
SALICYLATES SERPL-MCNC: <0.6 MG/DL — LOW (ref 10–20)
SARS-COV-2 IGG+IGM SERPL QL IA: >250 U/ML — HIGH
SARS-COV-2 IGG+IGM SERPL QL IA: POSITIVE
SARS-COV-2 RNA SPEC QL NAA+PROBE: SIGNIFICANT CHANGE UP
SODIUM SERPL-SCNC: 139 MMOL/L — SIGNIFICANT CHANGE UP (ref 135–145)
SP GR SPEC: 1.02 — SIGNIFICANT CHANGE UP (ref 1.01–1.02)
THC UR QL: NEGATIVE — SIGNIFICANT CHANGE UP
UROBILINOGEN FLD QL: NEGATIVE MG/DL — SIGNIFICANT CHANGE UP
WBC # BLD: 7.56 K/UL — SIGNIFICANT CHANGE UP (ref 3.8–10.5)
WBC # FLD AUTO: 7.56 K/UL — SIGNIFICANT CHANGE UP (ref 3.8–10.5)
WBC UR QL: SIGNIFICANT CHANGE UP

## 2021-11-16 PROCEDURE — 93010 ELECTROCARDIOGRAM REPORT: CPT

## 2021-11-16 PROCEDURE — 99218: CPT

## 2021-11-16 PROCEDURE — 99284 EMERGENCY DEPT VISIT MOD MDM: CPT

## 2021-11-16 RX ORDER — FLUPHENAZINE HYDROCHLORIDE 1 MG/1
5 TABLET, FILM COATED ORAL
Refills: 0 | Status: DISCONTINUED | OUTPATIENT
Start: 2021-11-16 | End: 2021-11-20

## 2021-11-16 RX ORDER — HYDROXYZINE HCL 10 MG
50 TABLET ORAL EVERY 8 HOURS
Refills: 0 | Status: DISCONTINUED | OUTPATIENT
Start: 2021-11-16 | End: 2021-11-20

## 2021-11-16 RX ORDER — TRAZODONE HCL 50 MG
150 TABLET ORAL ONCE
Refills: 0 | Status: COMPLETED | OUTPATIENT
Start: 2021-11-16 | End: 2021-11-16

## 2021-11-16 RX ADMIN — Medication 150 MILLIGRAM(S): at 06:56

## 2021-11-16 NOTE — ED BEHAVIORAL HEALTH ASSESSMENT NOTE - RISK ASSESSMENT
Chronic elevated risk:  Reports AH, refused to elaborate on AH.     Risks factors: Patient has a history of auditory hallucinations and also has a history of one suicidal attempt. Moderate Acute Suicide Risk

## 2021-11-16 NOTE — ED ADULT NURSE NOTE - OBJECTIVE STATEMENT
Patient states he has been having suicidal ideations x 1 week. He admits to taking two ecstasy pills on Saturday. He states he drank on Monday, but rarely drinks.

## 2021-11-16 NOTE — ED ADULT TRIAGE NOTE - CHIEF COMPLAINT QUOTE
BIBEMS from the store for auditory hallucinations and SI. Calm and cooperative in triage. Endorses psych history. States he has not has a psych provider since the peak of the pandemic and has not been taking his medications. Denies HI or visual hallucinations. Requesting psych evaluation. Pt changed into yellow gown, belongings secured.

## 2021-11-16 NOTE — ED BEHAVIORAL HEALTH ASSESSMENT NOTE - DESCRIPTION
Gout Single, unemployed, disabled, homeless. Has 5 adult children; in touch with them per patient. Patient noted to be asleep, dismissive during assessment, irritable, poor eye contact.    Vital Signs Last 24 Hrs  T(C): 36.7 (16 Nov 2021 07:32), Max: 36.7 (16 Nov 2021 07:32)  T(F): 98.1 (16 Nov 2021 07:32), Max: 98.1 (16 Nov 2021 07:32)  HR: 73 (16 Nov 2021 07:32) (73 - 98)  BP: 108/70 (16 Nov 2021 07:32) (108/70 - 130/96)  BP(mean): --  RR: 18 (16 Nov 2021 07:32) (18 - 18)  SpO2: 94% (16 Nov 2021 07:32) (94% - 96%)
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
## 2021-11-16 NOTE — ED BEHAVIORAL HEALTH ASSESSMENT NOTE - OTHER PAST PSYCHIATRIC HISTORY (INCLUDE DETAILS REGARDING ONSET, COURSE OF ILLNESS, INPATIENT/OUTPATIENT TREATMENT)
History of multiple short term and long term psychiatric hospitalizations at [facility].

## 2021-11-16 NOTE — ED BEHAVIORAL HEALTH ASSESSMENT NOTE - SUMMARY
This is a 51-year-old, single, non-caregiver, male, unemployed, domiciled, with history of schizoaffective disorder (bipolar type); no history of childhood trauma reported; with a medical history of diabetes and gout and a substance use history of alcohol abuse and cocaine use.  No history of alcohol-withdrawals seizures reported; no history of violence or incarcerations noted. No family history of depression or suicidal attempts; no family history of mental illness reported.    On assessment patient noted to be asleep, tired, patient dismissive during assessment, giving one word answers, laying down on stretcher with eyes closed, refusing to sit up and participate in assessment, irritable mood noted. Patient reports he is hearing auditory hallucinations, patient did not go into detail and refused to elaborate on AH. He reports he has suicidal ideations, however patient dismissive and did not go into detail during assessment. Patient has two recent inpatient hospitalizations at [facility], admitted 10/17/21 and 10/31/21, states he hasn't been on his medications, refused to go into detail how long. Patient reports he recently used cannabis, ecstasy, and cocaine, patient appears to be under influence of substances. UTOX pending. Patient to be held in ED for reassessment at this time.

## 2021-11-16 NOTE — ED BEHAVIORAL HEALTH ASSESSMENT NOTE - DETAILS
n/a NA Was admitted to [facility] on 10/31/21 n/a, hold for reassessment Reports AH and passive suicidal ideations
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
## 2021-11-16 NOTE — ED ADULT NURSE REASSESSMENT NOTE - NS ED NURSE REASSESS COMMENT FT1
Patient resting in bed with eyes closed. Patient calm and cooperative at this time. Pt  denies thoughts or wishes of harming himself or others. Pt needs addressed.

## 2021-11-16 NOTE — ED BEHAVIORAL HEALTH ASSESSMENT NOTE - MODIFICATIONS
Nonelikley drug indiuced state recent discharge from [facility] non compliant with medications and using "ecstasy" no drug screen obtained advise re-evaluation as he looks as if he is crashing from stimulant use.

## 2021-11-16 NOTE — ED BEHAVIORAL HEALTH ASSESSMENT NOTE - HPI (INCLUDE ILLNESS QUALITY, SEVERITY, DURATION, TIMING, CONTEXT, MODIFYING FACTORS, ASSOCIATED SIGNS AND SYMPTOMS)
This is a 51-year-old, single, non-caregiver, male, unemployed, domiciled, with history of schizoaffective disorder (bipolar type); no history of childhood trauma reported; with a medical history of diabetes and gout and a substance use history of alcohol abuse and cocaine use. No history of alcohol-withdrawals seizures reported; no history of violence or incarcerations noted. No family history of depression or suicidal attempts; no family history of mental illness reported. Patient was seen per consult request for suicidal ideations.    On assessment patient noted to be asleep, tired, patient dismissive during assessment, giving one word answers, laying down on stretcher with eyes closed, laying towards wall away from writer, refusing to sit up and participate in assessment, irritable mood noted. Patient reports he is hearing auditory hallucinations, patient did not go into detail and refused to elaborate on AH. He reports he has suicidal ideations, however patient dismissive and did not go into detail during assessment. Patient has two recent inpatient hospitalizations at [facility], admitted 10/17/21 and 10/31/21, states he hasn't been on his medications, refused to go into detail how long. Patient reports he recently used cannabis, ecstasy, and cocaine, patient appears to be under influence of substances. UTOX pending. Patient to be held in ED for reassessment at this time.         COVID Exposure Screen- Patient    1.	*In the past 14 days, have you been around anyone with a positive COVID-19 test?*   (  ) Yes   ( X) No   (  ) Unknown- Reason (e.g. patient uncertain, sedated, refusing to answer, etc.):  ______  IF YES PROCEED TO QUESTION #2. IF NO or UNKNOWN, PLEASE SKIP TO QUESTION #7  2.	Were you within 6 feet of them for at least 15 minutes? (  ) Yes   (  ) No   (  ) Unknown- Reason: ______    3.	Have you provided care for them? (  ) Yes   (  ) No   (  ) Unknown- Reason: ______    4.	Have you had direct physical contact with them (touched, hugged, or kissed them)? (  ) Yes   (  ) No    (  ) Unknown- Reason: ______    5.	Have you shared eating or drinking utensils with them? (  ) Yes   (  ) No    (  ) Unknown- Reason: ______    6.	Have they sneezed, coughed, or somehow got respiratory droplets on you? (  ) Yes   (  ) No    (  ) Unknown- Reason: ______    7.	*Have you been out of [place] within the past 14 days?*  (  ) Yes   (X  ) No   (  ) Unknown- Reason (e.g. patient uncertain, sedated, refusing to answer, etc.): _______  IF YES PLEASE ANSWER THE FOLLOWING QUESTIONS:  8.	Which state/country have you been to? ______   9.	Were you there over 24 hours? (  ) Yes   (  ) No    (  ) Unknown- Reason: ______    10.	What date did you return to [facility]? ______

## 2021-11-16 NOTE — ED PROVIDER NOTE - CLINICAL SUMMARY MEDICAL DECISION MAKING FREE TEXT BOX
Patient with suicidal ideations and auditory hallucinations. Currently calm and cooperative. Requesting trazodone and food. Plan for labs, 1:1 and BH eval.

## 2021-11-16 NOTE — ED ADULT NURSE REASSESSMENT NOTE - NS ED NURSE REASSESS COMMENT FT1
Trazadone administered. Patient resting in bed with eyes closed. Patient calm and cooperative at this time.

## 2021-11-16 NOTE — ED PROVIDER NOTE - WET READ LAUNCH FT
You may be receiving a patient experience survey by mail or e-mail from the Urgent Care Staff regarding your visit today.  We value your feedback and hope you can provide us your insight.    Patient Education     Viral Upper Respiratory Illness (Adult)  You have a viral upper respiratory illness (URI), which is another term for the common cold. This illness is contagious during the first few days. It is spread through the air by coughing and sneezing. It may also be spread by direct contact (touching the sick person and then touching your own eyes, nose, or mouth). Frequent handwashing will decrease risk of spread. Most viral illnesses go away within 7 to 10 days with rest and simple home remedies. Sometimes the illness may last for several weeks. Antibiotics will not kill a virus, and they are generally not prescribed for this condition.    Home care  · If symptoms are severe, rest at home for the first 2 to 3 days. When you resume activity, don't let yourself get too tired.  · Avoid being exposed to cigarette smoke (yours or others’).  · You may use acetaminophen or ibuprofen to control pain and fever, unless another medicine was prescribed. If you have chronic liver or kidney disease, have ever had a stomach ulcer or gastrointestinal bleeding, or are taking blood-thinning medicines, talk with your healthcare provider before using these medicines. Aspirin should never be given to anyone under 18 years of age who is ill with a viral infection or fever. It may cause severe liver or brain damage.  · Your appetite may be poor, so a light diet is fine. Avoid dehydration by drinking 6 to 8 glasses of fluids per day (water, soft drinks, juices, tea, or soup). Extra fluids will help loosen secretions in the nose and lungs.  · Over-the-counter cold medicines will not shorten the length of time you’re sick, but they may be helpful for the following symptoms: cough, sore throat, and nasal and sinus congestion. (Note: Do not  use decongestants if you have high blood pressure.)  Follow-up care  Follow up with your healthcare provider, or as advised.  When to seek medical advice  Call your healthcare provider right away if any of these occur:  · Cough with lots of colored sputum (mucus)  · Severe headache; face, neck, or ear pain  · Difficulty swallowing due to throat pain  · Fever of 100.4°F (38°C) or higher, or as directed by your healthcare provider  Call 911  Call 911 if any of these occur:  · Chest pain, shortness of breath, wheezing, or difficulty breathing  · Coughing up blood  · Inability to swallow due to throat pain  Date Last Reviewed: 9/13/2015  © 3943-2294 [employer]. [address] [place] [address], [place] [address]. All rights reserved. This information is not intended as a substitute for professional medical care. Always follow your healthcare professional's instructions.            There are no Wet Read(s) to document.

## 2021-11-16 NOTE — SBIRT NOTE ADULT - NSSBIRTUNABLESCROTHER_GEN_A_CORE
Meeting with patient attempted however Full Screen Not Performed due to  ED Physician's Order: Consult - Psychiatry, Adult; Health  will wait for clearance from [facility] Psychiatry Team

## 2021-11-17 VITALS
TEMPERATURE: 99 F | RESPIRATION RATE: 18 BRPM | DIASTOLIC BLOOD PRESSURE: 63 MMHG | HEART RATE: 64 BPM | OXYGEN SATURATION: 97 % | SYSTOLIC BLOOD PRESSURE: 103 MMHG

## 2021-11-17 PROCEDURE — 80307 DRUG TEST PRSMV CHEM ANLYZR: CPT

## 2021-11-17 PROCEDURE — 93005 ELECTROCARDIOGRAM TRACING: CPT

## 2021-11-17 PROCEDURE — 85025 COMPLETE CBC W/AUTO DIFF WBC: CPT

## 2021-11-17 PROCEDURE — U0003: CPT

## 2021-11-17 PROCEDURE — 99285 EMERGENCY DEPT VISIT HI MDM: CPT

## 2021-11-17 PROCEDURE — G0378: CPT

## 2021-11-17 PROCEDURE — 99217: CPT

## 2021-11-17 PROCEDURE — U0005: CPT

## 2021-11-17 PROCEDURE — 86769 SARS-COV-2 COVID-19 ANTIBODY: CPT

## 2021-11-17 PROCEDURE — 80053 COMPREHEN METABOLIC PANEL: CPT

## 2021-11-17 PROCEDURE — 81001 URINALYSIS AUTO W/SCOPE: CPT

## 2021-11-17 PROCEDURE — 99213 OFFICE O/P EST LOW 20 MIN: CPT

## 2021-11-17 PROCEDURE — 36415 COLL VENOUS BLD VENIPUNCTURE: CPT

## 2021-11-17 RX ORDER — METFORMIN HYDROCHLORIDE 850 MG/1
1000 TABLET ORAL
Refills: 0 | Status: DISCONTINUED | OUTPATIENT
Start: 2021-11-17 | End: 2021-11-20

## 2021-11-17 RX ADMIN — FLUPHENAZINE HYDROCHLORIDE 5 MILLIGRAM(S): 1 TABLET, FILM COATED ORAL at 07:23

## 2021-11-17 NOTE — ED BEHAVIORAL HEALTH NOTE - BEHAVIORAL HEALTH NOTE
PROGRESS NOTE: 21 @ 08:36  	  • Reason for Ongoing Consultation: 	    ID: 51yyo Male with HEALTH ISSUES - PROBLEM Dx:          INTERVAL DATA:   • Interval Chief Complaint:  "I'm hearing voices to kill myself and I have nothing to live for."  • Interval History: This is a 51-year-old, single, non-caregiver, male, unemployed, domiciled, with history of schizoaffective disorder (bipolar type); no history of childhood trauma reported; with a medical history of diabetes and gout and a substance use history of alcohol abuse and cocaine use. No history of alcohol-withdrawals seizures reported; no history of violence or incarcerations noted. No family history of depression or suicidal attempts; no family history of mental illness reported. Patient was seen per consult request for suicidal ideations.    Upon reassessment this morning, patient assessed at bedside, patient noted to be laying down, poor eye contact, however was cooperative with assessment. Patient endorsing +AH, states "im hearing voices to kill myself and I have nothing to live for." He denies plan or intent at this time.  He reports he has been hearing AH for 1 week, and reports he laid down on the train tracks before coming into the ED. Patient stated he took ecstasy, however UTOX came back + Cocaine. . Patient has two recent inpatient hospitalizations at [facility], admitted 10/17/21 and 10/31/21, states he hasn't been on his medications, refused to go into detail how long. He denies VH, denies homicidal ideations, denies symptoms of withdrawal, denies changes in sleep or appetite, denies access to firearms.     REVIEW OF SYSTEMS:   • Constitutional Symptoms	No complaints  • Eyes	No complaints  • Ears / Nose / Throat / Mouth	No complaints  • Cardiovascular	No complaints  • Respiratory	No complaints  • Gastrointestinal	No complaints  • Genitourinary	No complaints  • Musculoskeletal	No complaints  • Skin	No complaints  • Neurological	No complaints  • Psychiatric (see HPI)	See HPI  • Endocrine	No complaints  • Hematologic / Lymphatic	No complaints  • Allergic / Immunologic	No complaints    REVIEW OF VITALS/LABS/IMAGING/INVESTIGATIONS:   • Vital signs reviewed: Yes  • Vital Signs:	    Vital Signs Last 24 Hrs  T(C): 36.5 (2021 07:43), Max: 36.9 (2021 11:02)  T(F): 97.7 (2021 07:43), Max: 98.5 (2021 11:02)  HR: 94 (2021 07:43) (60 - 94)  BP: 133/71 (2021 07:43) (108/67 - 133/71)  BP(mean): --  RR: 18 (2021 07:43) (18 - 19)  SpO2: 96% (2021 07:43) (94% - 98%)    • Available labs reviewed: Yes  • Available Lab Results:                         14.8   7.56  )-----------( 319      ( 2021 06:02 )             43.9     11-16    139  |  104  |  12.1  ----------------------------<  154<H>  3.6   |  23.0  |  0.69    Ca    8.7      2021 06:02    TPro  6.8  /  Alb  4.1  /  TBili  0.2<L>  /  DBili  x   /  AST  13  /  ALT  11  /  AlkPhos  65  11-16    LIVER FUNCTIONS - ( 2021 06:02 )  Alb: 4.1 g/dL / Pro: 6.8 g/dL / ALK PHOS: 65 U/L / ALT: 11 U/L / AST: 13 U/L / GGT: x             Urinalysis Basic - ( 2021 16:10 )    Color: Yellow / Appearance: Clear / S.020 / pH: x  Gluc: x / Ketone: Negative  / Bili: Negative / Urobili: Negative mg/dL   Blood: x / Protein: 15 mg/dL / Nitrite: Negative   Leuk Esterase: Negative / RBC: 0-2 /HPF / WBC 0-2   Sq Epi: x / Non Sq Epi: Occasional / Bacteria: Occasional      MEDICATIONS:      PRN Medications:  • PRN Medications since last evaluation	  • PRN Details	    Current Medications:   fluPHENAZine 5 milliGRAM(s) Oral two times a day  hydrOXYzine hydrochloride 50 milliGRAM(s) Oral every 8 hours PRN  metFORMIN 1000 milliGRAM(s) Oral two times a day     Medication Side Effects:  • Medication Side Effects or Adverse Reactions (new or ongoing)	None known    MENTAL STATUS EXAM:   • Level of Consciousness	Alert  • General Appearance	Average build  • Body Habitus	Well nourished  • Hygiene	Fair  • Grooming	Fair  • Behavior	Cooperative  • Eye Contact	Fair  • Relatedness	Fair  • Impulse Control	Impaired  • Muscle Tone / Strength	Normal muscle tone/strength  • Abnormal Movements	No abnormal movements  • Gait / Station	Normal gait / station  • Speech Volume	Normal  • Speech Rate	Normal  • Speech Spontaneity	Normal  • Speech Articulation	Normal  • Mood	                          Depressed  • Affect Quality	             Constricted  • Affect Range	              Full  • Affect Congruence	Congruent  • Thought Process	                  Linear  • Thought Associations	Normal  • Thought Content	   Passive SI, +command AH  • Perceptions	       +command AH to harm self  • Oriented to Time	               Yes  • Oriented to Place	              Yes  • Oriented to Situation	Yes  • Oriented to Person	Yes  • Attention / Concentration	Impaired  • Estimated Intelligence	Average  • Recent Memory	              Normal  • Remote Memory	             Normal  • Fund of Knowledge	Normal  • Language	No abnormalities noted  • Judgment (regarding everyday events)   Poor  • Insight (regarding psychiatric illness)	    Poor    SUICIDALITY:   • Suicidality (Interval)	Passive SI, denies plan or intent    HOMICIDALITY/AGGRESSION:   • Homicidality/Aggression	none known    DIAGNOSIS DSM-V:    Psychiatric Diagnosis (Corresponds to DSM-IV Axis I, II): Schizoaffective disorder   HEALTH ISSUES - PROBLEM Dx: Diabetes Mellitus            Medical Diagnosis (Corresponds to DSM-IV Axis III):  • Axis III	    COVID Exposure Screen- Patient    1.	*In the past 14 days, have you been around anyone with a positive COVID-19 test?*   (  ) Yes   ( x) No   (  ) Unknown- Reason (e.g. patient uncertain, sedated, refusing to answer, etc.):  ______  IF YES PROCEED TO QUESTION #2. IF NO or UNKNOWN, PLEASE SKIP TO QUESTION #7  2.	Were you within 6 feet of them for at least 15 minutes? (  ) Yes   (  ) No   (  ) Unknown- Reason: ______    3.	Have you provided care for them? (  ) Yes   (  ) No   (  ) Unknown- Reason: ______    4.	Have you had direct physical contact with them (touched, hugged, or kissed them)?  (  ) Yes   (  ) No    (  ) Unknown- Reason: ______    5.	Have you shared eating or drinking utensils with them? (  ) Yes   (  ) No    (  ) Unknown- Reason: ______    6.	Have they sneezed, coughed, or somehow got respiratory droplets on you? (  ) Yes   (  ) No    (  ) Unknown- Reason: ______      7.	*Have you been out of [place] within the past 14 days?*  (  ) Yes   ( x ) No   (  ) Unknown- Reason (e.g. patient uncertain, sedated, refusing to answer, etc.): _______  IF YES PLEASE ANSWER THE FOLLOWING QUESTIONS:  8.	Which state/country have you been to? ______   9.	Were you there over 24 hours? (  ) Yes   (  ) No    (  ) Unknown- Reason: ______    10.	What date did you return to [facility]? ______       ASSESSMENT OF CURRENT CONDITION:   Summary (include case differential, formulation and patient response to therapy):     This is a 51-year-old, single, non-caregiver, male, unemployed, domiciled, with history of schizoaffective disorder (bipolar type); no history of childhood trauma reported; with a medical history of diabetes and gout and a substance use history of alcohol abuse and cocaine use.    Upon reassessment this morning, patient assessed at bedside, patient noted to be laying down, poor eye contact, however was cooperative with assessment. Patient endorsing +AH, states "im hearing voices to kill myself and I have nothing to live for." He denies plan or intent at this time.  He reports he has been hearing AH for 1 week, and reports he laid down on the train tracks before coming into the ED. Patient stated he took ecstasy, however UTOX came back + Cocaine. . Patient has two recent inpatient hospitalizations at [facility], admitted 10/17/21 and 10/31/21, states he hasn't been on his medications, refused to go into detail how long. He denies VH, denies homicidal ideations, denies symptoms of withdrawal, denies changes in sleep or appetite, denies access to firearms.     Differential: Schizoaffective disorder vs substance induced psychosis     Risk Assessment (consider static vs modifiable risk factors and protective factors; comment on level of risk for dangerous behavior): Moderate suicide risk, + cocaine use, states he has nothing to live for, +AH to harm self, patient states he laid on train track before coming into ED.    PLAN: Involuntary Inpatient hospitalization due to +AH to harm self. Involuntary 9.37 admission, pending inpatient bed availability PROGRESS NOTE: 21 @ 08:36  	  • Reason for Ongoing Consultation: 	re-evaluation due to drug use    ID: 51yyo Male with HEALTH ISSUES - PROBLEM Dx:          INTERVAL DATA:   • Interval Chief Complaint:  "I'm hearing voices to kill myself and I have nothing to live for."  • Interval History: This is a 51-year-old, single, non-caregiver, male, unemployed, domiciled, with history of schizoaffective disorder (bipolar type); no history of childhood trauma reported; with a medical history of diabetes and gout and a substance use history of alcohol abuse and cocaine use. No history of alcohol-withdrawals seizures reported; no history of violence or incarcerations noted. No family history of depression or suicidal attempts; no family history of mental illness reported. Patient was seen per consult request for suicidal ideations.    Upon reassessment this morning, patient assessed at bedside, patient noted to be laying down, poor eye contact, however was cooperative with assessment. Patient endorsing +AH, states "im hearing voices to kill myself and I have nothing to live for." He denies plan or intent at this time.  He reports he has been hearing AH for 1 week, and reports he laid down on the train tracks before coming into the ED. Patient stated he took ecstasy, however UTOX came back + Cocaine. . Patient has two recent inpatient hospitalizations at [facility], admitted 10/17/21 and 10/31/21, states he hasn't been on his medications, refused to go into detail how long. He denies VH, denies homicidal ideations, denies symptoms of withdrawal, denies changes in sleep or appetite, denies access to firearms.     REVIEW OF SYSTEMS:   • Constitutional Symptoms	No complaints  • Eyes	No complaints  • Ears / Nose / Throat / Mouth	No complaints  • Cardiovascular	No complaints  • Respiratory	No complaints  • Gastrointestinal	No complaints  • Genitourinary	No complaints  • Musculoskeletal	No complaints  • Skin	No complaints  • Neurological	No complaints  • Psychiatric (see HPI)	See HPI  • Endocrine	No complaints  • Hematologic / Lymphatic	No complaints  • Allergic / Immunologic	No complaints    REVIEW OF VITALS/LABS/IMAGING/INVESTIGATIONS:   • Vital signs reviewed: Yes  • Vital Signs:	    Vital Signs Last 24 Hrs  T(C): 36.5 (2021 07:43), Max: 36.9 (2021 11:02)  T(F): 97.7 (2021 07:43), Max: 98.5 (2021 11:02)  HR: 94 (2021 07:43) (60 - 94)  BP: 133/71 (2021 07:43) (108/67 - 133/71)  BP(mean): --  RR: 18 (2021 07:43) (18 - 19)  SpO2: 96% (2021 07:43) (94% - 98%)    • Available labs reviewed: Yes  • Available Lab Results:                         14.8   7.56  )-----------( 319      ( 2021 06:02 )             43.9     11-16    139  |  104  |  12.1  ----------------------------<  154<H>  3.6   |  23.0  |  0.69    Ca    8.7      2021 06:02    TPro  6.8  /  Alb  4.1  /  TBili  0.2<L>  /  DBili  x   /  AST  13  /  ALT  11  /  AlkPhos  65  11-16    LIVER FUNCTIONS - ( 2021 06:02 )  Alb: 4.1 g/dL / Pro: 6.8 g/dL / ALK PHOS: 65 U/L / ALT: 11 U/L / AST: 13 U/L / GGT: x             Urinalysis Basic - ( 2021 16:10 )    Color: Yellow / Appearance: Clear / S.020 / pH: x  Gluc: x / Ketone: Negative  / Bili: Negative / Urobili: Negative mg/dL   Blood: x / Protein: 15 mg/dL / Nitrite: Negative   Leuk Esterase: Negative / RBC: 0-2 /HPF / WBC 0-2   Sq Epi: x / Non Sq Epi: Occasional / Bacteria: Occasional      MEDICATIONS:      PRN Medications:  • PRN Medications since last evaluation	  • PRN Details	    Current Medications:   fluPHENAZine 5 milliGRAM(s) Oral two times a day  hydrOXYzine hydrochloride 50 milliGRAM(s) Oral every 8 hours PRN  metFORMIN 1000 milliGRAM(s) Oral two times a day     Medication Side Effects:  • Medication Side Effects or Adverse Reactions (new or ongoing)	None known    MENTAL STATUS EXAM:   • Level of Consciousness	Alert  • General Appearance	Average build  • Body Habitus	Well nourished  • Hygiene	Fair  • Grooming	Fair  • Behavior	Cooperative  • Eye Contact	Fair  • Relatedness	Fair  • Impulse Control	Impaired  • Muscle Tone / Strength	Normal muscle tone/strength  • Abnormal Movements	No abnormal movements  • Gait / Station	Normal gait / station  • Speech Volume	Normal  • Speech Rate	Normal  • Speech Spontaneity	Normal  • Speech Articulation	Normal  • Mood	                          Depressed  • Affect Quality	             Constricted  • Affect Range	              Full  • Affect Congruence	Congruent  • Thought Process	                  Linear  • Thought Associations	Normal  • Thought Content	   Passive SI, +command AH  • Perceptions	       +command AH to harm self  • Oriented to Time	               Yes  • Oriented to Place	              Yes  • Oriented to Situation	Yes  • Oriented to Person	Yes  • Attention / Concentration	Impaired  • Estimated Intelligence	Average  • Recent Memory	              Normal  • Remote Memory	             Normal  • Fund of Knowledge	Normal  • Language	No abnormalities noted  • Judgment (regarding everyday events)   Poor  • Insight (regarding psychiatric illness)	    Poor    SUICIDALITY:   • Suicidality (Interval)	Passive SI, denies plan or intent    HOMICIDALITY/AGGRESSION:   • Homicidality/Aggression	none known    DIAGNOSIS DSM-V:    Psychiatric Diagnosis (Corresponds to DSM-IV Axis I, II): Schizoaffective disorder   HEALTH ISSUES - PROBLEM Dx: Diabetes Mellitus            Medical Diagnosis (Corresponds to DSM-IV Axis III):  • Axis III	    COVID Exposure Screen- Patient    1.	*In the past 14 days, have you been around anyone with a positive COVID-19 test?*   (  ) Yes   ( x) No   (  ) Unknown- Reason (e.g. patient uncertain, sedated, refusing to answer, etc.):  ______  IF YES PROCEED TO QUESTION #2. IF NO or UNKNOWN, PLEASE SKIP TO QUESTION #7  2.	Were you within 6 feet of them for at least 15 minutes? (  ) Yes   (  ) No   (  ) Unknown- Reason: ______    3.	Have you provided care for them? (  ) Yes   (  ) No   (  ) Unknown- Reason: ______    4.	Have you had direct physical contact with them (touched, hugged, or kissed them)?  (  ) Yes   (  ) No    (  ) Unknown- Reason: ______    5.	Have you shared eating or drinking utensils with them? (  ) Yes   (  ) No    (  ) Unknown- Reason: ______    6.	Have they sneezed, coughed, or somehow got respiratory droplets on you? (  ) Yes   (  ) No    (  ) Unknown- Reason: ______      7.	*Have you been out of [place] within the past 14 days?*  (  ) Yes   ( x ) No   (  ) Unknown- Reason (e.g. patient uncertain, sedated, refusing to answer, etc.): _______  IF YES PLEASE ANSWER THE FOLLOWING QUESTIONS:  8.	Which state/country have you been to? ______   9.	Were you there over 24 hours? (  ) Yes   (  ) No    (  ) Unknown- Reason: ______    10.	What date did you return to [facility]? ______       ASSESSMENT OF CURRENT CONDITION:   Summary (include case differential, formulation and patient response to therapy):     This is a 51-year-old, single, non-caregiver, male, unemployed, domiciled, with history of schizoaffective disorder (bipolar type); no history of childhood trauma reported; with a medical history of diabetes and gout and a substance use history of alcohol abuse and cocaine use.    Upon reassessment this morning, patient assessed at bedside, patient noted to be laying down, poor eye contact, however was cooperative with assessment. Patient endorsing +AH, states "im hearing voices to kill myself and I have nothing to live for." He denies plan or intent at this time.  He reports he has been hearing AH for 1 week, and reports he laid down on the train tracks before coming into the ED. Patient stated he took ecstasy, however UTOX came back + Cocaine. . Patient has two recent inpatient hospitalizations at [facility], admitted 10/17/21 and 10/31/21, states he hasn't been on his medications, refused to go into detail how long. He denies VH, denies homicidal ideations, denies symptoms of withdrawal, denies changes in sleep or appetite, denies access to firearms.     Differential: Schizoaffective disorder vs substance induced psychosis     Risk Assessment (consider static vs modifiable risk factors and protective factors; comment on level of risk for dangerous behavior): Moderate suicide risk, + cocaine use, states he has nothing to live for, +AH to harm self, patient states he laid on train track before coming into ED.    PLAN: Involuntary Inpatient hospitalization due to +AH to harm self. Involuntary 9.37 admission, pending inpatient bed availability

## 2021-11-17 NOTE — ED CDU PROVIDER DISPOSITION NOTE - CLINICAL COURSE
pt with history of substance abuse with  si ; pt cleared by psychiatry;  education on substance abuse

## 2021-11-17 NOTE — ED BEHAVIORAL HEALTH NOTE - BEHAVIORAL HEALTH NOTE
PROGRESS NOTE: 21 @ 13:59  	  • Reason for Ongoing Consultation: 	    ID: 51yyo Male with HEALTH ISSUES - PROBLEM Dx: Schizoaffective disorder          INTERVAL DATA:   • Interval Chief Complaint: "I'm all better doc"  • Interval History:    This is a 51-year-old, single, non-caregiver, male, unemployed, domiciled, with history of schizoaffective disorder (bipolar type); no history of childhood trauma reported; with a medical history of diabetes and gout and a substance use history of alcohol abuse and cocaine use. No history of alcohol-withdrawals seizures reported; no history of violence or incarcerations noted. No family history of depression or suicidal attempts; no family history of mental illness reported. Patient was seen per consult request for suicidal ideations.    Patient requested reassessment, he states the AH voices have diminished, he is no longer hearing AH to harm himself. He denies SI/HI/AVH at this time. Upon reassessment, patient calm and cooperative, displays brighter affect, smiling while talking to writer and doctor. He denies symptoms of withdrawal, denies changes in sleep or appetite, denies access to firearms.  Patient states he has appointment with ACT team Monday for follow up and monthly CHAIREZ Risperdal Injection. Patient does not display psychotic symptoms at this time. Patient educated on substance abuse and dangers of cocaine use. Patient receptive to education. Patient to be discharged from ED.      REVIEW OF SYSTEMS:   • Constitutional Symptoms	No complaints  • Eyes	No complaints  • Ears / Nose / Throat / Mouth	No complaints  • Cardiovascular	No complaints  • Respiratory	No complaints  • Gastrointestinal	No complaints  • Genitourinary	No complaints  • Musculoskeletal	No complaints  • Skin	No complaints  • Neurological	No complaints  • Psychiatric (see HPI)	See HPI  • Endocrine	No complaints  • Hematologic / Lymphatic	No complaints  • Allergic / Immunologic	No complaints    REVIEW OF VITALS/LABS/IMAGING/INVESTIGATIONS:   • Vital signs reviewed: Yes  • Vital Signs:	    T(C): 37.2 (21 @ 12:06), Max: 37.2 (21 @ 12:06)  HR: 64 (21 @ 12:06) (60 - 94)  BP: 103/63 (21 @ 12:06) (103/63 - 133/71)  RR: 18 (21 @ 12:06) (18 - 19)  SpO2: 97% (- @ 12:06) (94% - 98%)    • Available labs reviewed: Yes  • Available Lab Results:                           14.8   7.56  )-----------( 319      ( 2021 06:02 )             43.9     11-16    139  |  104  |  12.1  ----------------------------<  154<H>  3.6   |  23.0  |  0.69    Ca    8.7      2021 06:02    TPro  6.8  /  Alb  4.1  /  TBili  0.2<L>  /  DBili  x   /  AST  13  /  ALT  11  /  AlkPhos  65  11-16    LIVER FUNCTIONS - ( 2021 06:02 )  Alb: 4.1 g/dL / Pro: 6.8 g/dL / ALK PHOS: 65 U/L / ALT: 11 U/L / AST: 13 U/L / GGT: x             Urinalysis Basic - ( 2021 16:10 )    Color: Yellow / Appearance: Clear / S.020 / pH: x  Gluc: x / Ketone: Negative  / Bili: Negative / Urobili: Negative mg/dL   Blood: x / Protein: 15 mg/dL / Nitrite: Negative   Leuk Esterase: Negative / RBC: 0-2 /HPF / WBC 0-2   Sq Epi: x / Non Sq Epi: Occasional / Bacteria: Occasional          MEDICATIONS:      PRN Medications:  • PRN Medications since last evaluation	  • PRN Details	    Current Medications:   fluPHENAZine 5 milliGRAM(s) Oral two times a day  hydrOXYzine hydrochloride 50 milliGRAM(s) Oral every 8 hours PRN  metFORMIN 1000 milliGRAM(s) Oral two times a day     Medication Side Effects:  • Medication Side Effects or Adverse Reactions (new or ongoing)	None known    MENTAL STATUS EXAM:   • Level of Consciousness	Alert  • General Appearance	Well developed  • Body Habitus	Well nourished  • Hygiene	Good  • Grooming	Fair  • Behavior	Cooperative  • Eye Contact	Good  • Relatedness	Good  • Impulse Control	Normal  • Muscle Tone / Strength	Normal muscle tone/strength  • Abnormal Movements	No abnormal movements  • Gait / Station	Normal gait / station  • Speech Volume	Normal  • Speech Rate	Normal  • Speech Spontaneity	Normal  • Speech Articulation	Normal  • Mood	Normal  • Affect Quality	Euthymic  • Affect Range	Full  • Affect Congruence	Congruent  • Thought Process	Linear  • Thought Associations	Normal  • Thought Content	Unremarkable  • Perceptions	No abnormalities  • Oriented to Time	Yes  • Oriented to Place	Yes  • Oriented to Situation	Yes  • Oriented to Person	Yes  • Attention / Concentration	Normal  • Estimated Intelligence	Average  • Recent Memory	Normal  • Remote Memory	Normal  • Fund of Knowledge	Normal  • Language	No abnormalities noted  • Judgment (regarding everyday events)	Fair  • Insight (regarding psychiatric illness)	Fair    SUICIDALITY:   • Suicidality (Interval)	none known    HOMICIDALITY/AGGRESSION:   • Homicidality/Aggression	none known    DIAGNOSIS DSM-V:    Psychiatric Diagnosis (Corresponds to DSM-IV Axis I, II):   HEALTH ISSUES - PROBLEM Dx: Diabetes Mellitus            Medical Diagnosis (Corresponds to DSM-IV Axis III):  • Axis III	      ASSESSMENT OF CURRENT CONDITION:   Summary (include case differential, formulation and patient response to therapy):  Patient requested reassessment, he states the AH voices have diminished, he is no longer hearing AH to harm himself. He denies SI/HI/AVH at this time. Upon reassessment, patient calm and cooperative, displays brighter affect, smiling while talking to writer and doctor. He denies symptoms of withdrawal, denies changes in sleep or appetite, denies access to firearms.  Patient states he has appointment with ACT team Monday for follow up and monthly CHAIREZ Risperdal Injection. Patient does not display psychotic symptoms at this time. Patient educated on substance abuse and dangers of cocaine use. Patient receptive to education. Patient to be discharged from ED.    Differential: Substance induced psychosis vs Schizoaffective disorder.    Risk Assessment (consider static vs modifiable risk factors and protective factors; comment on level of risk for dangerous behavior): Patient denies suicidal ideations, denies homicidal ideation, denies all hallucinations. Patient displays brighter mood and affect. Calm and cooperative. Low acute suicide risk.     PLAN: Patient to be discharged from ED. Patient to follow up with ACT team on 21. Patient reports medications were picked up from pharmacy by his sister. PROGRESS NOTE: 21 @ 13:59  	  • Reason for Ongoing Consultation: 	patient requesting discharge    ID: [id number] Male with HEALTH ISSUES - PROBLEM Dx: Schizoaffective disorder          INTERVAL DATA:   • Interval Chief Complaint: "I'm all better doc"  • Interval History:    This is a 51-year-old, single, non-caregiver, male, unemployed, domiciled, with history of schizoaffective disorder (bipolar type); no history of childhood trauma reported; with a medical history of diabetes and gout and a substance use history of alcohol abuse and cocaine use. No history of alcohol-withdrawals seizures reported; no history of violence or incarcerations noted. No family history of depression or suicidal attempts; no family history of mental illness reported. Patient was seen per consult request for suicidal ideations.    Patient requested reassessment, he states the AH voices have diminished, he is no longer hearing AH to harm himself. He denies SI/HI/AVH at this time. Upon reassessment, patient calm and cooperative, displays brighter affect, smiling while talking to writer and doctor. He denies symptoms of withdrawal, denies changes in sleep or appetite, denies access to firearms.  Patient states he has appointment with ACT team Monday for follow up and monthly CHAIREZ Risperdal Injection. Patient does not display psychotic symptoms at this time. Patient educated on substance abuse and dangers of cocaine use. Patient receptive to education. Patient to be discharged from ED.      REVIEW OF SYSTEMS:   • Constitutional Symptoms	No complaints  • Eyes	No complaints  • Ears / Nose / Throat / Mouth	No complaints  • Cardiovascular	No complaints  • Respiratory	No complaints  • Gastrointestinal	No complaints  • Genitourinary	No complaints  • Musculoskeletal	No complaints  • Skin	No complaints  • Neurological	No complaints  • Psychiatric (see HPI)	See HPI  • Endocrine	No complaints  • Hematologic / Lymphatic	No complaints  • Allergic / Immunologic	No complaints    REVIEW OF VITALS/LABS/IMAGING/INVESTIGATIONS:   • Vital signs reviewed: Yes  • Vital Signs:	    T(C): 37.2 (21 @ 12:06), Max: 37.2 (21 @ 12:06)  HR: 64 (21 @ 12:06) (60 - 94)  BP: 103/63 (21 @ 12:06) (103/63 - 133/71)  RR: 18 (21 @ 12:06) (18 - 19)  SpO2: 97% (21 @ 12:06) (94% - 98%)    • Available labs reviewed: Yes  • Available Lab Results:                           14.8   7.56  )-----------( 319      ( 2021 06:02 )             43.9     11-16    139  |  104  |  12.1  ----------------------------<  154<H>  3.6   |  23.0  |  0.69    Ca    8.7      2021 06:02    TPro  6.8  /  Alb  4.1  /  TBili  0.2<L>  /  DBili  x   /  AST  13  /  ALT  11  /  AlkPhos  65  11-16    LIVER FUNCTIONS - ( 2021 06:02 )  Alb: 4.1 g/dL / Pro: 6.8 g/dL / ALK PHOS: 65 U/L / ALT: 11 U/L / AST: 13 U/L / GGT: x             Urinalysis Basic - ( 2021 16:10 )    Color: Yellow / Appearance: Clear / S.020 / pH: x  Gluc: x / Ketone: Negative  / Bili: Negative / Urobili: Negative mg/dL   Blood: x / Protein: 15 mg/dL / Nitrite: Negative   Leuk Esterase: Negative / RBC: 0-2 /HPF / WBC 0-2   Sq Epi: x / Non Sq Epi: Occasional / Bacteria: Occasional          MEDICATIONS:      PRN Medications:  • PRN Medications since last evaluation	  • PRN Details	    Current Medications:   fluPHENAZine 5 milliGRAM(s) Oral two times a day  hydrOXYzine hydrochloride 50 milliGRAM(s) Oral every 8 hours PRN  metFORMIN 1000 milliGRAM(s) Oral two times a day     Medication Side Effects:  • Medication Side Effects or Adverse Reactions (new or ongoing)	None known    MENTAL STATUS EXAM:   • Level of Consciousness	Alert  • General Appearance	Well developed  • Body Habitus	Well nourished  • Hygiene	Good  • Grooming	Fair  • Behavior	Cooperative  • Eye Contact	Good  • Relatedness	Good  • Impulse Control	Normal  • Muscle Tone / Strength	Normal muscle tone/strength  • Abnormal Movements	No abnormal movements  • Gait / Station	Normal gait / station  • Speech Volume	Normal  • Speech Rate	Normal  • Speech Spontaneity	Normal  • Speech Articulation	Normal  • Mood	Normal  • Affect Quality	Euthymic  • Affect Range	Full  • Affect Congruence	Congruent  • Thought Process	Linear  • Thought Associations	Normal  • Thought Content	Unremarkable  • Perceptions	No abnormalities  • Oriented to Time	Yes  • Oriented to Place	Yes  • Oriented to Situation	Yes  • Oriented to Person	Yes  • Attention / Concentration	Normal  • Estimated Intelligence	Average  • Recent Memory	Normal  • Remote Memory	Normal  • Fund of Knowledge	Normal  • Language	No abnormalities noted  • Judgment (regarding everyday events)	Fair  • Insight (regarding psychiatric illness)	Fair    SUICIDALITY:   • Suicidality (Interval)	none known    HOMICIDALITY/AGGRESSION:   • Homicidality/Aggression	none known    DIAGNOSIS DSM-V:    Psychiatric Diagnosis (Corresponds to DSM-IV Axis I, II):   HEALTH ISSUES - PROBLEM Dx: Diabetes Mellitus            Medical Diagnosis (Corresponds to DSM-IV Axis III):  • Axis III	      ASSESSMENT OF CURRENT CONDITION:   Summary (include case differential, formulation and patient response to therapy):  Patient requested reassessment, he states the AH voices have diminished, he is no longer hearing AH to harm himself. He denies SI/HI/AVH at this time. Upon reassessment, patient calm and cooperative, displays brighter affect, smiling while talking to writer and doctor. He denies symptoms of withdrawal, denies changes in sleep or appetite, denies access to firearms.  Patient states he has appointment with ACT team Monday for follow up and monthly CHAIREZ Risperdal Injection. Patient does not display psychotic symptoms at this time. Patient educated on substance abuse and dangers of cocaine use. Patient receptive to education. Patient to be discharged from ED.    Differential: Substance induced psychosis vs Schizoaffective disorder.    Risk Assessment (consider static vs modifiable risk factors and protective factors; comment on level of risk for dangerous behavior): Patient denies suicidal ideations, denies homicidal ideation, denies all hallucinations. Patient displays brighter mood and affect. Calm and cooperative. Low acute suicide risk.     PLAN: Patient to be discharged from ED. Patient to follow up with ACT team on 21. Patient reports medications were picked up from pharmacy by his sister. PROGRESS NOTE: 21 @ 13:59  	  • Reason for Ongoing Consultation: 	patient requesting discharge    ID: 51yyo Male with HEALTH ISSUES - PROBLEM Dx: Schizoaffective disorder          INTERVAL DATA:   • Interval Chief Complaint: "I'm all better doc"  • Interval History:    This is a 51-year-old, single, non-caregiver, male, unemployed, domiciled, with history of schizoaffective disorder (bipolar type); no history of childhood trauma reported; with a medical history of diabetes and gout and a substance use history of alcohol abuse and cocaine use. No history of alcohol-withdrawals seizures reported; no history of violence or incarcerations noted. No family history of depression or suicidal attempts; no family history of mental illness reported. Patient was seen per consult request for suicidal ideations.    Patient requested reassessment, he states the AH voices have diminished, he is no longer hearing AH to harm himself. He denies SI/HI/AVH at this time. Upon reassessment, patient calm and cooperative, displays brighter affect, smiling while talking to writer and doctor. He denies symptoms of withdrawal, denies changes in sleep or appetite, denies access to firearms.  Patient states he has appointment with ACT team Monday for follow up and monthly CHAIREZ Risperdal Injection. Patient does not display psychotic symptoms at this time. Patient educated on substance abuse and dangers of cocaine use. Patient receptive to education. Patient to be discharged from ED.      REVIEW OF SYSTEMS:   • Constitutional Symptoms	No complaints  • Eyes	No complaints  • Ears / Nose / Throat / Mouth	No complaints  • Cardiovascular	No complaints  • Respiratory	No complaints  • Gastrointestinal	No complaints  • Genitourinary	No complaints  • Musculoskeletal	No complaints  • Skin	No complaints  • Neurological	No complaints  • Psychiatric (see HPI)	See HPI  • Endocrine	No complaints  • Hematologic / Lymphatic	No complaints  • Allergic / Immunologic	No complaints    REVIEW OF VITALS/LABS/IMAGING/INVESTIGATIONS:   • Vital signs reviewed: Yes  • Vital Signs:	    T(C): 37.2 (21 @ 12:06), Max: 37.2 (21 @ 12:06)  HR: 64 (21 @ 12:06) (60 - 94)  BP: 103/63 (21 @ 12:06) (103/63 - 133/71)  RR: 18 (21 @ 12:06) (18 - 19)  SpO2: 97% (21 @ 12:06) (94% - 98%)    • Available labs reviewed: Yes  • Available Lab Results:                           14.8   7.56  )-----------( 319      ( 2021 06:02 )             43.9     11-16    139  |  104  |  12.1  ----------------------------<  154<H>  3.6   |  23.0  |  0.69    Ca    8.7      2021 06:02    TPro  6.8  /  Alb  4.1  /  TBili  0.2<L>  /  DBili  x   /  AST  13  /  ALT  11  /  AlkPhos  65  11-16    LIVER FUNCTIONS - ( 2021 06:02 )  Alb: 4.1 g/dL / Pro: 6.8 g/dL / ALK PHOS: 65 U/L / ALT: 11 U/L / AST: 13 U/L / GGT: x             Urinalysis Basic - ( 2021 16:10 )    Color: Yellow / Appearance: Clear / S.020 / pH: x  Gluc: x / Ketone: Negative  / Bili: Negative / Urobili: Negative mg/dL   Blood: x / Protein: 15 mg/dL / Nitrite: Negative   Leuk Esterase: Negative / RBC: 0-2 /HPF / WBC 0-2   Sq Epi: x / Non Sq Epi: Occasional / Bacteria: Occasional          MEDICATIONS:      PRN Medications:  • PRN Medications since last evaluation	  • PRN Details	    Current Medications:   fluPHENAZine 5 milliGRAM(s) Oral two times a day  hydrOXYzine hydrochloride 50 milliGRAM(s) Oral every 8 hours PRN  metFORMIN 1000 milliGRAM(s) Oral two times a day     Medication Side Effects:  • Medication Side Effects or Adverse Reactions (new or ongoing)	None known    MENTAL STATUS EXAM:   • Level of Consciousness	Alert  • General Appearance	Well developed  • Body Habitus	Well nourished  • Hygiene	Good  • Grooming	Fair  • Behavior	Cooperative  • Eye Contact	Good  • Relatedness	Good  • Impulse Control	Normal  • Muscle Tone / Strength	Normal muscle tone/strength  • Abnormal Movements	No abnormal movements  • Gait / Station	Normal gait / station  • Speech Volume	Normal  • Speech Rate	Normal  • Speech Spontaneity	Normal  • Speech Articulation	Normal  • Mood	Normal  • Affect Quality	Euthymic  • Affect Range	Full  • Affect Congruence	Congruent  • Thought Process	Linear  • Thought Associations	Normal  • Thought Content	Unremarkable  • Perceptions	No abnormalities  • Oriented to Time	Yes  • Oriented to Place	Yes  • Oriented to Situation	Yes  • Oriented to Person	Yes  • Attention / Concentration	Normal  • Estimated Intelligence	Average  • Recent Memory	Normal  • Remote Memory	Normal  • Fund of Knowledge	Normal  • Language	No abnormalities noted  • Judgment (regarding everyday events)	Fair  • Insight (regarding psychiatric illness)	Fair    SUICIDALITY:   • Suicidality (Interval)	none known    HOMICIDALITY/AGGRESSION:   • Homicidality/Aggression	none known    DIAGNOSIS DSM-V:    Psychiatric Diagnosis (Corresponds to DSM-IV Axis I, II): drug induced psychosis Cocaine abuse   HEALTH ISSUES - PROBLEM Dx:            Medical Diagnosis (Corresponds to DSM-IV Axis III):  • Axis III	  Diabetes Mellitus     ASSESSMENT OF CURRENT CONDITION:   Summary (include case differential, formulation and patient response to therapy):  Patient requested reassessment, he states the AH voices have diminished, he is no longer hearing AH to harm himself. He denies SI/HI/AVH at this time. Upon reassessment, patient calm and cooperative, displays brighter affect, smiling while talking to writer and doctor. He denies symptoms of withdrawal, denies changes in sleep or appetite, denies access to firearms.  Patient states he has appointment with ACT team Monday for follow up and monthly CHAIREZ Risperdal Injection. Patient does not display psychotic symptoms at this time. Patient educated on substance abuse and dangers of cocaine use. Patient receptive to education. Patient to be discharged from ED.      Risk Assessment (consider static vs modifiable risk factors and protective factors; comment on level of risk for dangerous behavior): Patient denies suicidal ideations, denies homicidal ideation, denies all hallucinations. Patient displays brighter mood and affect. Calm and cooperative. Low acute suicide risk.     PLAN: Patient to be discharged from ED. Patient to follow up with ACT team on 21. Patient reports medications were picked up from pharmacy by his sister.

## 2021-11-17 NOTE — ED ADULT NURSE REASSESSMENT NOTE - DESCRIPTION
received pt to  unit in yellow gown waned by security for contraband. pt snapping at writer do I need to go through this again.  pt was made aware not to yell at writer.  pt apologized.  states its the same hearing voices and that other hospital let me out too radha. pt escorted to room.  in route stating  " I need a pillow something to eat, the remote and the phone." while rounding up demands pt to desk again asking for the above pt was informed needs will be met but will need to be patient. pt returned to room. irritable then apologized.

## 2021-11-17 NOTE — ED ADULT NURSE REASSESSMENT NOTE - NS ED NURSE REASSESS COMMENT FT1
Report given to TONA Estrella.   VSS, pt resting at this time. PT denies no complaints. States he is having "some SI" denies HI.

## 2021-11-17 NOTE — ED CDU PROVIDER DISPOSITION NOTE - PATIENT PORTAL LINK FT
You can access the FollowMyHealth Patient Portal offered by [facility] by registering at the following website: [email or web address]. By joining [employer]’s FollowMyHealth portal, you will also be able to view your health information using other applications (apps) compatible with our system.

## 2021-11-18 NOTE — PATIENT PROFILE BEHAVIORAL HEALTH - PACKS PER DAY
21  Momo Harry : 1964 Sex: female  Age 64 y.o. Subjective:  Chief Complaint   Patient presents with    Urinary Tract Infection     3d          HPI:   Momo Harry , 64 y.o. female presents to [facility] care for evaluation of possible urinary tract infection    HPI  77-year-old female presents to [facility] for evaluation of possible urinary tract infection. The patient has had the symptoms ongoing for the last 3 days. The patient has a very strong smell to her urine and has been cloudy. The patient states that it was clear today. She is not really having any burning with urination. The patient has had some increased frequency. The patient not really having back pain, flank pain. No fevers, chills. The patient is not having any other complaints. ROS:   Unless otherwise stated in this report the patient's positive and negative responses for review of systems for constitutional, eyes, ENT, cardiovascular, respiratory, gastrointestinal, neurological, , musculoskeletal, and integument systems and related systems to the presenting problem are either stated in the history of present illness or were not pertinent or were negative for the symptoms and/or complaints related to the presenting medical problem. Positives and pertinent negatives as per HPI. All others reviewed and are negative. PMH:     Past Medical History:   Diagnosis Date    Depression     Gastritis     Hyperlipidemia        History reviewed. No pertinent surgical history.     Family History   Problem Relation Age of Onset    Diabetes Father     Heart Disease Father        Medications:     Current Outpatient Medications:     busPIRone (BUSPAR) 5 MG tablet, Take 1 tablet by mouth 2 times daily, Disp: 180 tablet, Rfl: 1    desvenlafaxine succinate (PRISTIQ) 100 MG TB24 extended release tablet, TAKE 1 TABLET DAILY, Disp: 90 tablet, Rfl: 3    rosuvastatin (CRESTOR) 5 MG tablet, Take 1 tablet by mouth daily, Disp: 90 tablet, Rfl: 3    metFORMIN (GLUCOPHAGE-XR) 500 MG extended release tablet, Take 1 tablet by mouth daily (with breakfast), Disp: 90 tablet, Rfl: 3    pantoprazole (PROTONIX) 40 MG tablet, Take 1 tablet by mouth daily, Disp: 90 tablet, Rfl: 3    amitriptyline (ELAVIL) 25 MG tablet, Take 1 tablet by mouth nightly, Disp: 90 tablet, Rfl: 3    sucralfate (CARAFATE) 1 GM tablet, Prn, Disp: , Rfl:     Allergies: Allergies   Allergen Reactions    Iodine        Social History:     Social History     Tobacco Use    Smoking status: Never Smoker    Smokeless tobacco: Never Used   Substance Use Topics    Alcohol use: Not on file    Drug use: Not on file       Patient lives at home. Physical Exam:     Vitals:    11/18/21 1700   BP: 128/74   Pulse: 94   Temp: 97.1 °F (36.2 °C)   SpO2: 99%   Weight: 229 lb (103.9 kg)       Exam:  Physical Exam  Nurses note and vital signs reviewed and patient is not hypoxic. General: The patient appears well and in no apparent distress. Patient is resting comfortably on cart. Skin: Warm, dry, no pallor noted. There is no rash noted. Head: Normocephalic, atraumatic. Eye: Normal conjunctiva  Ears, Nose, Mouth, and Throat: Wearing a mask  Cardiovascular: Regular Rate and Rhythm  Respiratory: Patient is in no distress, no accessory muscle use, lungs are clear to auscultation, no wheezing, crackles or rhonchi  Back: Non-tender, no CVA tenderness bilaterally to percussion. GI: Normal bowel sounds, no tenderness to palpation, no masses appreciated. No rebound, guarding, or rigidity noted.   Neurological: A&O x4, normal speech        Testing:     Results for orders placed or performed in visit on 11/18/21   POCT Urinalysis no Micro   Result Value Ref Range    Color, UA yellow     Clarity, UA clear     Glucose, UA POC >=1000mg/dl     Bilirubin, UA neg     Ketones, UA neg     Spec Grav, UA 1.020     Blood, UA POC neg     pH, UA 6.0     Protein, UA POC neg     Urobilinogen, UA 0.2eu     Leukocytes, UA neg     Nitrite, UA neg    POCT Glucose   Result Value Ref Range    Glucose 312 mg/dL    QC OK? Medical Decision Making:     Vital signs reviewed    Past medical history reviewed. Allergies reviewed. Medications reviewed. Patient on arrival does not appear to be in any apparent distress or discomfort. The patient has been seen and evaluated. The patient does not appear to be toxic or lethargic. The patient had a urinalysis obtained that showed a large amount of glucose present. But there was no evidence of a UTI. The patient did have her glucose checked here and was noted to be 331. The patient did have quite a bit of carbohydrates today as well as a sweet tea and chocolate just prior to arrival.    The patient does take Metformin. She does not routinely check her blood sugar at home. We will have the patient obtain her laboratory studies. She does have a follow-up appointment with PCP on 12/3/2021. Urine culture is pending at this time. The patient will increase her fluids. Watch her carbohydrates. The patient does not need to increase exercise    The patient is to return to express care or go directly to the emergency department should any of the signs or symptoms worsen. The patient is to followup with primary care physician in 2-3 days for repeat evaluation. The patient has no other questions or concerns at this time the patient will be discharged home. Clinical Impression:   Olivia Clarke was seen today for urinary tract infection. Diagnoses and all orders for this visit:    Dysuria  -     POCT Urinalysis no Micro  -     Culture, Urine; Future    Glucosuria  -     POCT Glucose    Hyperglycemia        The patient is to call for any concerns or return if any of the signs or symptoms worsen. The patient is to follow-up with PCP in the next 2-3 days for repeat evaluation repeat assessment or go directly to the emergency department.      SIGNATURE: Denisha La Nena III, PA-C 1

## 2021-11-20 ENCOUNTER — EMERGENCY (EMERGENCY)
Facility: HOSPITAL | Age: 51
LOS: 1 days | Discharge: HOME | End: 2021-11-20
Attending: EMERGENCY MEDICINE
Payer: MEDICAID

## 2021-11-20 VITALS — HEIGHT: 66 IN

## 2021-11-20 LAB
ALBUMIN SERPL ELPH-MCNC: 4.1 G/DL — SIGNIFICANT CHANGE UP (ref 3.3–5.2)
ALP SERPL-CCNC: 69 U/L — SIGNIFICANT CHANGE UP (ref 40–120)
ALT FLD-CCNC: 13 U/L — SIGNIFICANT CHANGE UP
ANION GAP SERPL CALC-SCNC: 13 MMOL/L — SIGNIFICANT CHANGE UP (ref 5–17)
AST SERPL-CCNC: 21 U/L — SIGNIFICANT CHANGE UP
BASOPHILS # BLD AUTO: 0.03 K/UL — SIGNIFICANT CHANGE UP (ref 0–0.2)
BASOPHILS NFR BLD AUTO: 0.4 % — SIGNIFICANT CHANGE UP (ref 0–2)
BILIRUB SERPL-MCNC: 0.5 MG/DL — SIGNIFICANT CHANGE UP (ref 0.4–2)
BUN SERPL-MCNC: 15.7 MG/DL — SIGNIFICANT CHANGE UP (ref 8–20)
CALCIUM SERPL-MCNC: 8.7 MG/DL — SIGNIFICANT CHANGE UP (ref 8.6–10.2)
CHLORIDE SERPL-SCNC: 99 MMOL/L — SIGNIFICANT CHANGE UP (ref 98–107)
CO2 SERPL-SCNC: 23 MMOL/L — SIGNIFICANT CHANGE UP (ref 22–29)
CREAT SERPL-MCNC: 0.82 MG/DL — SIGNIFICANT CHANGE UP (ref 0.5–1.3)
EOSINOPHIL # BLD AUTO: 0.14 K/UL — SIGNIFICANT CHANGE UP (ref 0–0.5)
EOSINOPHIL NFR BLD AUTO: 2 % — SIGNIFICANT CHANGE UP (ref 0–6)
ETHANOL SERPL-MCNC: <10 MG/DL — SIGNIFICANT CHANGE UP (ref 0–9)
GLUCOSE SERPL-MCNC: 275 MG/DL — HIGH (ref 70–99)
HCT VFR BLD CALC: 45.2 % — SIGNIFICANT CHANGE UP (ref 39–50)
HGB BLD-MCNC: 15.2 G/DL — SIGNIFICANT CHANGE UP (ref 13–17)
IMM GRANULOCYTES NFR BLD AUTO: 0.3 % — SIGNIFICANT CHANGE UP (ref 0–1.5)
LYMPHOCYTES # BLD AUTO: 1.88 K/UL — SIGNIFICANT CHANGE UP (ref 1–3.3)
LYMPHOCYTES # BLD AUTO: 27.1 % — SIGNIFICANT CHANGE UP (ref 13–44)
MCHC RBC-ENTMCNC: 29.7 PG — SIGNIFICANT CHANGE UP (ref 27–34)
MCHC RBC-ENTMCNC: 33.6 GM/DL — SIGNIFICANT CHANGE UP (ref 32–36)
MCV RBC AUTO: 88.5 FL — SIGNIFICANT CHANGE UP (ref 80–100)
MONOCYTES # BLD AUTO: 0.96 K/UL — HIGH (ref 0–0.9)
MONOCYTES NFR BLD AUTO: 13.8 % — SIGNIFICANT CHANGE UP (ref 2–14)
NEUTROPHILS # BLD AUTO: 3.91 K/UL — SIGNIFICANT CHANGE UP (ref 1.8–7.4)
NEUTROPHILS NFR BLD AUTO: 56.4 % — SIGNIFICANT CHANGE UP (ref 43–77)
PLATELET # BLD AUTO: 328 K/UL — SIGNIFICANT CHANGE UP (ref 150–400)
POTASSIUM SERPL-MCNC: 3.8 MMOL/L — SIGNIFICANT CHANGE UP (ref 3.5–5.3)
POTASSIUM SERPL-SCNC: 3.8 MMOL/L — SIGNIFICANT CHANGE UP (ref 3.5–5.3)
PROT SERPL-MCNC: 7.1 G/DL — SIGNIFICANT CHANGE UP (ref 6.6–8.7)
RBC # BLD: 5.11 M/UL — SIGNIFICANT CHANGE UP (ref 4.2–5.8)
RBC # FLD: 12.3 % — SIGNIFICANT CHANGE UP (ref 10.3–14.5)
SARS-COV-2 RNA SPEC QL NAA+PROBE: SIGNIFICANT CHANGE UP
SODIUM SERPL-SCNC: 135 MMOL/L — SIGNIFICANT CHANGE UP (ref 135–145)
VALPROATE SERPL-MCNC: <3.7 UG/ML — LOW (ref 50–100)
WBC # BLD: 6.94 K/UL — SIGNIFICANT CHANGE UP (ref 3.8–10.5)
WBC # FLD AUTO: 6.94 K/UL — SIGNIFICANT CHANGE UP (ref 3.8–10.5)

## 2021-11-20 PROCEDURE — 80307 DRUG TEST PRSMV CHEM ANLYZR: CPT

## 2021-11-20 PROCEDURE — 80164 ASSAY DIPROPYLACETIC ACD TOT: CPT

## 2021-11-20 PROCEDURE — 36415 COLL VENOUS BLD VENIPUNCTURE: CPT

## 2021-11-20 PROCEDURE — 99220: CPT

## 2021-11-20 PROCEDURE — U0003: CPT

## 2021-11-20 PROCEDURE — 93005 ELECTROCARDIOGRAM TRACING: CPT

## 2021-11-20 PROCEDURE — U0005: CPT

## 2021-11-20 PROCEDURE — 80053 COMPREHEN METABOLIC PANEL: CPT

## 2021-11-20 PROCEDURE — G0378: CPT

## 2021-11-20 PROCEDURE — 99285 EMERGENCY DEPT VISIT HI MDM: CPT

## 2021-11-20 PROCEDURE — 82962 GLUCOSE BLOOD TEST: CPT

## 2021-11-20 PROCEDURE — 93010 ELECTROCARDIOGRAM REPORT: CPT

## 2021-11-20 PROCEDURE — 85025 COMPLETE CBC W/AUTO DIFF WBC: CPT

## 2021-11-20 PROCEDURE — 81001 URINALYSIS AUTO W/SCOPE: CPT

## 2021-11-20 RX ORDER — DIVALPROEX SODIUM 500 MG/1
500 TABLET, DELAYED RELEASE ORAL
Refills: 0 | Status: DISCONTINUED | OUTPATIENT
Start: 2021-11-20 | End: 2021-11-24

## 2021-11-20 RX ORDER — METFORMIN HYDROCHLORIDE 850 MG/1
500 TABLET ORAL
Refills: 0 | Status: DISCONTINUED | OUTPATIENT
Start: 2021-11-20 | End: 2021-11-24

## 2021-11-20 RX ADMIN — METFORMIN HYDROCHLORIDE 500 MILLIGRAM(S): 850 TABLET ORAL at 17:04

## 2021-11-20 RX ADMIN — METFORMIN HYDROCHLORIDE 500 MILLIGRAM(S): 850 TABLET ORAL at 11:29

## 2021-11-20 RX ADMIN — Medication 200 MILLIGRAM(S): at 11:29

## 2021-11-20 NOTE — ED BEHAVIORAL HEALTH ASSESSMENT NOTE - NSACTIVEVENT_PSY_ALL_CORE
Substance intoxication or withdrawal/Pending incarceration or homelessness/Inadequate social supports

## 2021-11-20 NOTE — ED ADULT NURSE REASSESSMENT NOTE - COMFORT CARE
meal provided/plan of care explained
darkened lights

## 2021-11-20 NOTE — ED ADULT NURSE NOTE - CHIEF COMPLAINT QUOTE
BIBEMS for auditory hallucinations and SI. States that he is a psych patient who has been off his meds and is now hearing voices that are telling him to hurt himself. Denies attempts today and states that he does not have a plan. Denies HI. Calm and cooperative in triage. No medical complaints at this time.

## 2021-11-20 NOTE — ED ADULT NURSE NOTE - OBJECTIVE STATEMENT
patient states that he is having SI and AH that started right after his last admission to [facility]

## 2021-11-20 NOTE — ED BEHAVIORAL HEALTH ASSESSMENT NOTE - DESCRIPTION
DM See HPI Vital Signs Last 24 Hrs  T(C): 36.5 (20 Nov 2021 11:14), Max: 36.9 (20 Nov 2021 07:21)  T(F): 97.7 (20 Nov 2021 11:14), Max: 98.4 (20 Nov 2021 07:21)  HR: 85 (20 Nov 2021 11:14) (80 - 85)  BP: 124/80 (20 Nov 2021 11:14) (113/68 - 133/81)  BP(mean): --  RR: 18 (20 Nov 2021 11:14) (18 - 18)  SpO2: 98% (20 Nov 2021 11:14) (96% - 98%)

## 2021-11-20 NOTE — ED BEHAVIORAL HEALTH ASSESSMENT NOTE - PSYCHIATRIC ISSUES AND PLAN (INCLUDE STANDING AND PRN MEDICATION)
Schizoaffective d/o RIsperdal consta 50 mg Due 11/22/21; depakote 500 mg bid; trazodone 200 mg hs; prn's haldol/ativan/benadryl

## 2021-11-20 NOTE — ED ADULT NURSE NOTE - HPI (INCLUDE ILLNESS QUALITY, SEVERITY, DURATION, TIMING, CONTEXT, MODIFYING FACTORS, ASSOCIATED SIGNS AND SYMPTOMS)
patient rec'd to  unit in [facility] by security for [employer]..  Pt states having si and hearing voices. patient states that the voices started right after he left the hospital upon discharge.  Pt states that he did not get his medication because he has no ride to [place].  Pt states that he has been homeless since.  Pt not giving information about the voices.

## 2021-11-20 NOTE — ED PROVIDER NOTE - OBJECTIVE STATEMENT
52 yo homeless M with hx of bipolar disorder and NIDDM, non-compliant with meds  presents to ED c/o hearing voices with possible plan of taking pills to hurt himself.  Pt was seen in ED 3 days ago and states he was prescribed meds on d/c, but could not get a ride to pharmacy to get his meds.  Pt denies any HI.  Pt admits to increased thirst and urination.  No assoc fever, N/V, abd pain, CP or SOB.  Pt admits to drinking vodka yesterday afternoon

## 2021-11-20 NOTE — ED BEHAVIORAL HEALTH ASSESSMENT NOTE - CURRENT MEDICATION
Unknown Risperdal COnsta 50  mg im due monday 11/22/21  depakote 500 mg bid  trazodone 200 mg hs  metformin 1000 mg bid

## 2021-11-20 NOTE — ED BEHAVIORAL HEALTH ASSESSMENT NOTE - HPI (INCLUDE ILLNESS QUALITY, SEVERITY, DURATION, TIMING, CONTEXT, MODIFYING FACTORS, ASSOCIATED SIGNS AND SYMPTOMS)
Patient is a 50 y/o M with history of schizoaffective disorder (bipolar type), DM, alcohol and cocaine use who presents with complaint of auditory hallucinations tell him to "kill myself". States he has been off his medications (Trazadone, Depakote, and Metformin) for "1 week and 3 days" and began hearing voices again 1 week ago. The voices are telling him "to kill myself, that I'm worthless, I got nothing to live for". Reports that he has heard these voices before and has history of prior psychiatric hospitalizations, most recently [facility] in June 2021.    Patient minimally cooperative, poor eye contact, asked writer to come back in two hours. When assessing for safety patient reported he felt safe in the hospital, endorses suicidal ideation and urges to self harm. Agreeable to come to staff if he felt like acting on these urges. Patient then pulled blanket over head and went back to sleep. Will reassess at a later time.

## 2021-11-21 VITALS
OXYGEN SATURATION: 98 % | HEART RATE: 79 BPM | TEMPERATURE: 98 F | SYSTOLIC BLOOD PRESSURE: 121 MMHG | DIASTOLIC BLOOD PRESSURE: 65 MMHG | RESPIRATION RATE: 18 BRPM

## 2021-11-21 LAB
AMPHET UR-MCNC: NEGATIVE — SIGNIFICANT CHANGE UP
APPEARANCE UR: CLEAR — SIGNIFICANT CHANGE UP
BACTERIA # UR AUTO: ABNORMAL
BARBITURATES UR SCN-MCNC: NEGATIVE — SIGNIFICANT CHANGE UP
BENZODIAZ UR-MCNC: NEGATIVE — SIGNIFICANT CHANGE UP
BILIRUB UR-MCNC: NEGATIVE — SIGNIFICANT CHANGE UP
COCAINE METAB.OTHER UR-MCNC: POSITIVE
COLOR SPEC: YELLOW — SIGNIFICANT CHANGE UP
DIFF PNL FLD: NEGATIVE — SIGNIFICANT CHANGE UP
EPI CELLS # UR: SIGNIFICANT CHANGE UP
GLUCOSE UR QL: 1000 MG/DL
KETONES UR-MCNC: ABNORMAL
LEUKOCYTE ESTERASE UR-ACNC: NEGATIVE — SIGNIFICANT CHANGE UP
METHADONE UR-MCNC: NEGATIVE — SIGNIFICANT CHANGE UP
NITRITE UR-MCNC: NEGATIVE — SIGNIFICANT CHANGE UP
OPIATES UR-MCNC: NEGATIVE — SIGNIFICANT CHANGE UP
PCP SPEC-MCNC: SIGNIFICANT CHANGE UP
PCP UR-MCNC: NEGATIVE — SIGNIFICANT CHANGE UP
PH UR: 5 — SIGNIFICANT CHANGE UP (ref 5–8)
PROT UR-MCNC: 15 MG/DL
RBC CASTS # UR COMP ASSIST: SIGNIFICANT CHANGE UP /HPF (ref 0–4)
SP GR SPEC: 1.02 — SIGNIFICANT CHANGE UP (ref 1.01–1.02)
THC UR QL: POSITIVE
UROBILINOGEN FLD QL: NEGATIVE MG/DL — SIGNIFICANT CHANGE UP
WBC UR QL: SIGNIFICANT CHANGE UP

## 2021-11-21 PROCEDURE — 99217: CPT

## 2021-11-21 PROCEDURE — 99213 OFFICE O/P EST LOW 20 MIN: CPT

## 2021-11-21 RX ORDER — TRAZODONE HCL 50 MG
200 TABLET ORAL AT BEDTIME
Refills: 0 | Status: DISCONTINUED | OUTPATIENT
Start: 2021-11-21 | End: 2021-11-24

## 2021-11-21 NOTE — ED ADULT NURSE REASSESSMENT NOTE - GENERAL PATIENT STATE
comfortable appearance/cooperative
comfortable appearance/resting/sleeping
resting/sleeping
comfortable appearance/cooperative
comfortable appearance/cooperative

## 2021-11-21 NOTE — ED BEHAVIORAL HEALTH NOTE - BEHAVIORAL HEALTH NOTE
re-assess 11/21/21  Patient is a 50 y/o M with history of schizoaffective disorder (bipolar type), DM, alcohol and cocaine use who presents with complaint of auditory hallucinations tell him to "kill myself". States he has been off his medications (Trazadone, Depakote, and Metformin) for "1 week and 3 days" and began hearing voices again 1 week ago. The voices are telling him "to kill myself, that I'm worthless, I got nothing to live for". Reports that he has heard these voices before and has history of prior psychiatric hospitalizations, most recently [facility] in June 2021.  Patient reports feeling "much better", no longer having CAH to kill himself.  Reports "this is what happens, I hear them, I come into the hospital, they go away".  Admits to chronic substance use (ETOH/Ecstasy) which likely exacerbates psychiatric symptoms.  Refusing voluntary admission.  Denies CAH, does not present internally preoccupied.  Denies SI/HI, plan or intent.  Patient with persistent pattern of coming to he ED with similar presentation, then clearing in the next day or two and requesting to be discharged from ED, declining Voluntary admission.    MENTAL STATUS EXAM:    Mental Status Exam:  · General Appearance	No deformities present  · Body Habitus	Underweight  · Hygiene	Poor  · Grooming	Poor  · Behavior	cooperative  · Eye Contact	good  · Relatedness	good  · Impulse Control	Normal  · Muscle Tone / Strength	normal  · Abnormal Movements	No abnormal movements  · Gait / Station	normal  · Speech	NRRT  · Reported mood	"much better"  · Affect Quality	euthymic  · Affect Range	full  · Affect Congruence	Congruent  · Thought Process	Linear  · Thought Associations	Normal  · Thought Content	normal  · Perceptions	No abnormalities  · Orientation	Oriented to time, place, person, situation  · Attention / Concentration	normal  · Estimated Intelligence	Below Average  · Recent Memory	Normal  · Remote Memory	Normal  · Fund of Knowledge	Impaired  · Language	No abnormalities noted  · Judgment (regarding everyday events)	fair  · Insight (regarding psychiatric illness)	fair

## 2021-11-21 NOTE — ED ADULT NURSE REASSESSMENT NOTE - NS ED NURSE REASSESS COMMENT FT1
assumed care of pt awake alert and oriented x3. pt requested and provided meal. pt refusing medications this am. no attempts to harm self or others.
pt approached nursing and states that he still feels depressed sometimes but is no longer feeling suicidal and would like to be discharged. S. Crilly the NP made aware.
urine sample obtained and sent to lab.  Pt requesting food and po fluids. and patient tolerated well.  Will mnitor and chart changes
assumed care of patient. Plying in bed and not acknowledging writers presence in room.  Pt turned away and not givening any answer.  Pt instructed on need for urine sample and again no acknowledgment  Cup at bedside.  Will monitor and chart changes
Assumed care of patient at 0715.  Patient resting in bed appears to be sleeping with no distress and regular nonlabored breathing.  No attempts to harms self or others and safety maintained.
Patient resting in bed.  Patient ate 100% of his meals.  Received cough syrup as requested at 1129 with good results.  Patient reports he feels suicidal but verbally contracts not to harm himself in the hospital. Safety of patient maintaine.d
Patient remains in bed asleep with no distress.  Safety of patient maintained.
Reeducated about need for urine sample.  Patient continues to isolate to his bed.  No attempts to harm self or others.  Patient ate 100% of his meals.  Patient mood is depressed and feels hopeless.  Safety of patient maintained.

## 2021-11-21 NOTE — ED BEHAVIORAL HEALTH NOTE - BEHAVIORAL HEALTH NOTE
SW NOTE: Pt cleared by psych. [employer] transport arranged to  [address] [place] [address]. Reservation #: [id number]. SW informed RN, no other SW needs.

## 2021-11-30 ENCOUNTER — EMERGENCY (EMERGENCY)
Facility: HOSPITAL | Age: 51
LOS: 1 days | Discharge: HOME | End: 2021-11-30
Attending: EMERGENCY MEDICINE
Payer: MEDICAID

## 2021-11-30 VITALS — HEIGHT: 66 IN

## 2021-11-30 LAB
ALBUMIN SERPL ELPH-MCNC: 3.9 G/DL — SIGNIFICANT CHANGE UP (ref 3.3–5.2)
ALP SERPL-CCNC: 66 U/L — SIGNIFICANT CHANGE UP (ref 40–120)
ALT FLD-CCNC: 17 U/L — SIGNIFICANT CHANGE UP
ANION GAP SERPL CALC-SCNC: 13 MMOL/L — SIGNIFICANT CHANGE UP (ref 5–17)
APAP SERPL-MCNC: <3 UG/ML — LOW (ref 10–26)
AST SERPL-CCNC: 17 U/L — SIGNIFICANT CHANGE UP
BASOPHILS # BLD AUTO: 0.03 K/UL — SIGNIFICANT CHANGE UP (ref 0–0.2)
BASOPHILS NFR BLD AUTO: 0.6 % — SIGNIFICANT CHANGE UP (ref 0–2)
BILIRUB SERPL-MCNC: <0.2 MG/DL — LOW (ref 0.4–2)
BUN SERPL-MCNC: 9.1 MG/DL — SIGNIFICANT CHANGE UP (ref 8–20)
CALCIUM SERPL-MCNC: 8.8 MG/DL — SIGNIFICANT CHANGE UP (ref 8.6–10.2)
CHLORIDE SERPL-SCNC: 99 MMOL/L — SIGNIFICANT CHANGE UP (ref 98–107)
CO2 SERPL-SCNC: 22 MMOL/L — SIGNIFICANT CHANGE UP (ref 22–29)
CREAT SERPL-MCNC: 0.55 MG/DL — SIGNIFICANT CHANGE UP (ref 0.5–1.3)
EOSINOPHIL # BLD AUTO: 0.17 K/UL — SIGNIFICANT CHANGE UP (ref 0–0.5)
EOSINOPHIL NFR BLD AUTO: 3.6 % — SIGNIFICANT CHANGE UP (ref 0–6)
ETHANOL SERPL-MCNC: <10 MG/DL — SIGNIFICANT CHANGE UP (ref 0–9)
GLUCOSE SERPL-MCNC: 145 MG/DL — HIGH (ref 70–99)
HCT VFR BLD CALC: 46.7 % — SIGNIFICANT CHANGE UP (ref 39–50)
HGB BLD-MCNC: 15.9 G/DL — SIGNIFICANT CHANGE UP (ref 13–17)
HIV 1 & 2 AB SERPL IA.RAPID: SIGNIFICANT CHANGE UP
IMM GRANULOCYTES NFR BLD AUTO: 0.4 % — SIGNIFICANT CHANGE UP (ref 0–1.5)
LYMPHOCYTES # BLD AUTO: 2.05 K/UL — SIGNIFICANT CHANGE UP (ref 1–3.3)
LYMPHOCYTES # BLD AUTO: 43.4 % — SIGNIFICANT CHANGE UP (ref 13–44)
MCHC RBC-ENTMCNC: 30.2 PG — SIGNIFICANT CHANGE UP (ref 27–34)
MCHC RBC-ENTMCNC: 34 GM/DL — SIGNIFICANT CHANGE UP (ref 32–36)
MCV RBC AUTO: 88.6 FL — SIGNIFICANT CHANGE UP (ref 80–100)
MONOCYTES # BLD AUTO: 0.51 K/UL — SIGNIFICANT CHANGE UP (ref 0–0.9)
MONOCYTES NFR BLD AUTO: 10.8 % — SIGNIFICANT CHANGE UP (ref 2–14)
NEUTROPHILS # BLD AUTO: 1.94 K/UL — SIGNIFICANT CHANGE UP (ref 1.8–7.4)
NEUTROPHILS NFR BLD AUTO: 41.2 % — LOW (ref 43–77)
PLATELET # BLD AUTO: 308 K/UL — SIGNIFICANT CHANGE UP (ref 150–400)
POTASSIUM SERPL-MCNC: 4.1 MMOL/L — SIGNIFICANT CHANGE UP (ref 3.5–5.3)
POTASSIUM SERPL-SCNC: 4.1 MMOL/L — SIGNIFICANT CHANGE UP (ref 3.5–5.3)
PROT SERPL-MCNC: 6.8 G/DL — SIGNIFICANT CHANGE UP (ref 6.6–8.7)
RBC # BLD: 5.27 M/UL — SIGNIFICANT CHANGE UP (ref 4.2–5.8)
RBC # FLD: 12.2 % — SIGNIFICANT CHANGE UP (ref 10.3–14.5)
SALICYLATES SERPL-MCNC: <0.6 MG/DL — LOW (ref 10–20)
SARS-COV-2 RNA SPEC QL NAA+PROBE: SIGNIFICANT CHANGE UP
SODIUM SERPL-SCNC: 134 MMOL/L — LOW (ref 135–145)
VALPROATE SERPL-MCNC: <3.7 UG/ML — LOW (ref 50–100)
WBC # BLD: 4.63 K/UL — SIGNIFICANT CHANGE UP (ref 3.8–10.5)
WBC # FLD AUTO: 4.63 K/UL — SIGNIFICANT CHANGE UP (ref 3.8–10.5)

## 2021-11-30 PROCEDURE — 99218: CPT

## 2021-11-30 PROCEDURE — 90792 PSYCH DIAG EVAL W/MED SRVCS: CPT

## 2021-11-30 PROCEDURE — 93010 ELECTROCARDIOGRAM REPORT: CPT

## 2021-11-30 RX ORDER — METFORMIN HYDROCHLORIDE 850 MG/1
500 TABLET ORAL
Refills: 0 | Status: DISCONTINUED | OUTPATIENT
Start: 2021-11-30 | End: 2021-12-04

## 2021-11-30 RX ORDER — TRAZODONE HCL 50 MG
100 TABLET ORAL AT BEDTIME
Refills: 0 | Status: DISCONTINUED | OUTPATIENT
Start: 2021-11-30 | End: 2021-12-04

## 2021-11-30 RX ORDER — DIVALPROEX SODIUM 500 MG/1
500 TABLET, DELAYED RELEASE ORAL
Refills: 0 | Status: DISCONTINUED | OUTPATIENT
Start: 2021-11-30 | End: 2021-12-04

## 2021-11-30 RX ADMIN — DIVALPROEX SODIUM 500 MILLIGRAM(S): 500 TABLET, DELAYED RELEASE ORAL at 19:49

## 2021-11-30 RX ADMIN — Medication 100 MILLIGRAM(S): at 22:20

## 2021-11-30 RX ADMIN — METFORMIN HYDROCHLORIDE 500 MILLIGRAM(S): 850 TABLET ORAL at 19:50

## 2021-11-30 NOTE — ED PROVIDER NOTE - ATTENDING CONTRIBUTION TO CARE
Hema: I performed a face to face evaluation of this patient and performed a full history and physical examination on the patient.  I agree with the resident's history, physical examination, and plan of the patient unless otherwise noted. My brief assessment is as follows: hx schizophrenia, homeless, c/o AH and SI. voices telling to hurt self. denies drugs, denies physical complaints. ctab, rrr, abd benign, neuro grossly intact. medical clearance, psych eval.

## 2021-11-30 NOTE — ED PROVIDER NOTE - PHYSICAL EXAMINATION
Constitutional: Awake, alert, in no acute distress  Eyes: no scleral icterus  HENT: normocephalic, atraumatic, moist oral mucosa  Neck: supple  CV: RRR, no murmur  Pulm: non-labored respirations, CTAB  Abdomen: soft, non-tender, non-distended  Extremities: no edema, no deformity  Skin: no rash, no jaundice  Neuro: AAOx3, moving all extremities equally  Psych: calm and cooperative

## 2021-11-30 NOTE — ED ADULT NURSE NOTE - CHIEF COMPLAINT QUOTE
BIBEMS from street reporting auditory hallucinations and suicidal ideations. PMH of Bipolar schizophrenia, depression, and DM unable to take any of his medications related to no insurance and homelessness. Patient calm and cooperative at present, denies homicidal ideations, ETOH/drugs, visual hallucinations. Belongings secured.

## 2021-11-30 NOTE — ED BEHAVIORAL HEALTH ASSESSMENT NOTE - JUDGMENT (REGARDING EVERYDAY EVENTS)
Kaitlin Soto  : 1964  Primary: [facility]  Secondary:  [address]  at   [address], [address], [place] [address] [address] [facility]  [phone]   [id number]          OUTPATIENT PHYSICAL THERAPY:Initial Assessment 2019   ICD-10: Treatment Diagnosis: Pain in left shoulder (M25.512)  Pain in right shoulder (M25.511)  Stiffness of left shoulder, not elsewhere classified (M25.612)  Stiffness of right shoulder, not elsewhere classified (M25.611)  Precautions/Allergies:   Patient has no known allergies. TREATMENT PLAN:  Effective Dates: 2019 TO 2019 (90 days). Frequency/Duration: 2 times a week for 90 Day(s) MEDICAL/REFERRING DIAGNOSIS:  Chronic pain of both shoulders [M25.511, G89.29, M25.512]   DATE OF ONSET: unknown  REFERRING PHYSICIAN: Karlie Estrella*  MD Orders: eval only  Return MD Appointment: unknown     INITIAL ASSESSMENT:  Mr. Norma Fox presents with reports of bilat shld pain as evident of poor ROM, strength, decreased functional activities as sleeping on side, putting on shirts reaching for items. He also reports pain generated in cervical region that radiates down bilat shlds. Pt has poor scap motion mainly in R > L during motion. Palpation sensitivity in R>L infraspinatus, trap and subscapularis. Pt is a good candidate for sophisticated and skilled PT for strength training. [facility] PROBLEM LIST (Impacting functional limitations):  1. Decreased Strength  2. Decreased ADL/Functional Activities  3. Decreased Transfer Abilities  4. Decreased Ambulation Ability/Technique  5. Increased Pain  6. Decreased Activity Tolerance  7. Decreased Flexibility/Joint Mobility INTERVENTIONS PLANNED: (Treatment may consist of any combination of the following)  1. Home Exercise Program (HEP)  2. Manual Therapy  3. Neuromuscular Re-education/Strengthening  4. Range of Motion (ROM)  5. Therapeutic Activites  6.  Therapeutic Exercise/Strengthening GOALS: (Goals have been discussed and agreed upon with patient.)  Short-Term Functional Goals: Time Frame: 45 days  1. Decrease pain below 5/10 for the left shoulder with beginning home ADL's / shoulder mobility. 2. Decrease DASH from 29 to 5 in the right shoulder to indicate functional improvement and to demonstrate improved range of motion and functional improvement of the shoulder. 3. Patient to be independent with an initial HEP for the right shoulder for mobility and strengthening. DISCHARGE GOALS:   90 days      1. Decrease pain below 4/10 for the left shoulder with home ADL's / shoulder mobility. 2. Decrease DASH to 3 in the right shoulder to indicate functional improvement and to demonstrate improved strength and mobility of the right shoulder    3. Patient to be independent with an advanced HEP for the right shoulder for mobility and strengthening. OUTCOME MEASURE:   Tool Used: Disabilities of the Arm, Shoulder and Hand (DASH) Questionnaire - Quick Version  Score:  Initial: 8/55  Most Recent: X/55 (Date: -- )   Interpretation of Score: The DASH is designed to measure the activities of daily living in person's with upper extremity dysfunction or pain. Each section is scored on a 1-5 scale, 5 representing the greatest disability. The scores of each section are added together for a total score of 55. MEDICAL NECESSITY:   · Skilled intervention continues to be required due to decreased function. REASON FOR SERVICES/OTHER COMMENTS:  · Patient continues to require skilled intervention due to medical complications and patient unable to attend/participate in therapy as expected. Total Duration:  PT Patient Time In/Time Out  Time In: 1055  Time Out: 1145    Rehabilitation Potential For Stated Goals: Good  Regarding Guero Bazan's therapy, I certify that the treatment plan above will be carried out by a therapist or under their direction.   Thank you for this referral,  Candice Blake, DPT     Referring Physician Signature: Ernestina Mistry _______________________________ Date _____________     PAIN/SUBJECTIVE:   Initial:   7/10 Post Session:  7/10   HISTORY:   History of Injury/Illness (Reason for Referral):  Pt reports bilat shld and cerv pain of insidious onset. He states that the pain is R is different than L shld pain. He thinks the L shld is more stiff. He has trouble putting on shirt, any reaching above head or out, but mainly sleeping is most painful. Pt is continuing to lift weights. More pain in morning and when cool air is blowing on shld. Past Medical History/Comorbidities:   Mr. Yg Stephens  has a past medical history of Allergic rhinitis due to animal (cat) (dog) hair and dander (11/19/14), Allergic rhinitis due to pollen (11/19/14), Arthritis, Asthma, Chronic kidney disease, stage III (moderate) ([address] Diastolic CHF, chronic ([address] (6/1/2016), Gout, unspecified (10/29/14), Morbid obesity ([address] (08/24/2016), Sleep apnea, Type II or unspecified type diabetes mellitus without mention of complication, uncontrolled (11/03/14), Unspecified asthma(493[address]90) (11/3/2014), Unspecified essential hypertension (11/3/2014), Unspecified sleep apnea, and Unspecified vitamin D deficiency (11/03/14). Mr. Yg Stephens  has a past surgical history that includes hx tonsillectomy; hx premalig/benign skin lesion excision (Right); hx amputation (Right); pr colsc flx w/removal lesion by hot bx forceps (8/26/2016); and colonoscopy (N/A, 8/26/2016). Social History/Living Environment:     Lives with mother, 1 story  Prior Level of Function/Work/Activity:  Pt mancuso not work, minimal activity as reported by pt, but no specifics. Does drive and works out with wts.   Dominant Side:         RIGHT   Ambulatory/Rehab Services H2 Model Falls Risk Assessment   Risk Factors:       (2)  Symptomatic Depression       (1)  Gender [Male] Ability to Rise from Chair:       (1)  Pushes up, successful in one attempt   Falls Prevention Plan:       No modifications necessary   Total: (5 or greater = High Risk): 4   ©2010 [facility] [place]. All Rights Reserved. [facility] Hospital[facility] States Patent #[id number],211. Federal Law prohibits the replication, distribution or use without written permission from [facility] [employer]   Current Medications:       Current Outpatient Medications:     lisinopril-hydroCHLOROthiazide (PRINZIDE, ZESTORETIC) 20-12.5 mg per tablet, Take 2 Tabs by mouth daily. , Disp: 180 Tab, Rfl: 0    rosuvastatin (CRESTOR) 10 mg tablet, Take 1 Tab by mouth nightly., Disp: 90 Tab, Rfl: 0    gabapentin (NEURONTIN) 400 mg capsule, Take 1 Cap by mouth two (2) times a day. Indications: back pain, Disp: 60 Cap, Rfl: 2    cloNIDine HCl (CATAPRES) 0.2 mg tablet, Take 2 Tabs by mouth two (2) times a day., Disp: 120 Tab, Rfl: 5    carvedilol (COREG) 25 mg tablet, Take 1 Tab by mouth two (2) times daily (with meals). , Disp: 60 Tab, Rfl: 5    allopurinol (ZYLOPRIM) 100 mg tablet, Take 2 Tabs by mouth every morning., Disp: 60 Tab, Rfl: 5    metFORMIN (GLUCOPHAGE) 1,000 mg tablet, Take 1 Tab by mouth two (2) times daily (with meals). , Disp: 60 Tab, Rfl: 5    magnesium oxide (MAG-OX) 400 mg tablet, Take 1 Tab by mouth two (2) times a day., Disp: 60 Tab, Rfl: 11    cpap machine kit, by Does Not Apply route., Disp: , Rfl:    Date Last Reviewed:  6/13/2019     Number of Personal Factors/Comorbidities that affect the Plan of Care: 1-2: MODERATE COMPLEXITY   EXAMINATION:   Palpation:          Tender R>L supraspinatur, infraspinatus, subscapularis, traps  ROM:   Range of Motion Tight end ranges Tight end ranges           Joint: Date: 6/13/2019   Date:   Date:       Right Left Right Left Right Left   Cerv   50% SBR, rotR             shld ER 30 45           shld IR 80 45                              STRENGTH  STRENGTH               Joint: Date6/13//2019   Date:   Date:       Right Left Right Left Right Left   shld 4/5 4/5 PROM:  Limited, 2/6 in bilat inf glide, and post glide    *EDU for depresses shld during functional    Body Structures Involved:  1. Nerves  2. Bones  3. Joints  4. Muscles  5. Ligaments Body Functions Affected:  1. Mental  2. Sensory/Pain  3. Neuromusculoskeletal  4. Movement Related Activities and Participation Affected:  1. General Tasks and Demands  2. Mobility  3. Self Care  4. Domestic Life  5.  Community, Social and [place]   Number of elements (examined above) that affect the Plan of Care: 3: MODERATE COMPLEXITY   CLINICAL PRESENTATION:   Presentation: Evolving clinical presentation with changing clinical characteristics: MODERATE COMPLEXITY   CLINICAL DECISION MAKING:   Use of outcome tool(s) and clinical judgement create a POC that gives a: Questionable prediction of patient's progress: MODERATE COMPLEXITY Poor

## 2021-11-30 NOTE — ED PROVIDER NOTE - OBJECTIVE STATEMENT
51y M w/ bipolar disorder and NIDDM, presenting for hallucinations.  Pt is homeless and states that he has been off his medications.  Complains of auditory hallucinations telling him to hurt himself.  Denying any somatic complaints.  Denies illicit drug use.

## 2021-11-30 NOTE — SBIRT NOTE ADULT - NSSBIRTUNABLESCROTHER_GEN_A_CORE
Meeting with patient attempted however Full Screen Not Performed due to  ED Physician's Order: Consult - Psychiatry, Adult; Health  will wait for clearance from [facility] Psychiatry Team

## 2021-11-30 NOTE — ED PROVIDER NOTE - CLINICAL SUMMARY MEDICAL DECISION MAKING FREE TEXT BOX
51y M w/ hx bipolar, schizoaffective, presenting for auditory hallucinations.  Pt calm and cooperative with stable VS.  Medically clear for BH eval.

## 2021-11-30 NOTE — ED BEHAVIORAL HEALTH ASSESSMENT NOTE - RISK ASSESSMENT
risk factors include, cocaine and alcohol use, medication noncompliance, command auditory hallucinations.   PF. Patient is seeking help, future oriented Moderate Acute Suicide Risk

## 2021-11-30 NOTE — ED ADULT NURSE REASSESSMENT NOTE - DESCRIPTION
received report.  received pt ambulatory in unit.  behavior is the same.  requesting food po fluid. and provided.  pt appears comfortable.

## 2021-11-30 NOTE — ED BEHAVIORAL HEALTH ASSESSMENT NOTE - DESCRIPTION
DM Vital Signs Last 24 Hrs  T(C): 36.8 (30 Nov 2021 08:06), Max: 36.8 (30 Nov 2021 08:06)  T(F): 98.3 (30 Nov 2021 08:06), Max: 98.3 (30 Nov 2021 08:06)  HR: 76 (30 Nov 2021 08:06) (76 - 79)  BP: 119/71 (30 Nov 2021 08:06) (108/75 - 119/71)  BP(mean): --  RR: 20 (30 Nov 2021 08:06) (18 - 20)  SpO2: 100% (30 Nov 2021 08:06) (94% - 100%) See HPI

## 2021-11-30 NOTE — ED BEHAVIORAL HEALTH ASSESSMENT NOTE - CURRENT MEDICATION
previously on Risperdal COnsta 50  mg. unclear if he is still taking   Patient reports he was last prescribed   depakote 500 mg bid  trazodone 200 mg hs  metformin 1000 mg bid

## 2021-11-30 NOTE — ED BEHAVIORAL HEALTH ASSESSMENT NOTE - HPI (INCLUDE ILLNESS QUALITY, SEVERITY, DURATION, TIMING, CONTEXT, MODIFYING FACTORS, ASSOCIATED SIGNS AND SYMPTOMS)
Patient is a 52 y/o M with history of schizoaffective disorder (bipolar type), DM, alcohol and cocaine use who presents with complaint of auditory hallucinations and possible suicidal ideation.     Patient seen and evaluated and found to be laying in bed and somnolent. Patient states he has not been doing well, eludes to substance use and voices telling him to hurt himself. Patient also states he has not been on his medication since leaving hospital. Patient somnolent and was unable to  complete interview.

## 2021-11-30 NOTE — ED ADULT NURSE NOTE - HPI (INCLUDE ILLNESS QUALITY, SEVERITY, DURATION, TIMING, CONTEXT, MODIFYING FACTORS, ASSOCIATED SIGNS AND SYMPTOMS)
c/o suicidal ideations with command hallucinations telling him to harm himself. pt denies having a plan. pt has multiple visits to  for same complaint.

## 2021-11-30 NOTE — ED PROVIDER NOTE - NS ED ROS FT
Constitutional: no fever, no chills  Eyes: no vision changes  ENT: no nasal congestion, no sore throat  CV: no chest pain  Resp: no cough, no shortness of breath  GI: no abdominal pain, no vomiting, no diarrhea  : no dysuria  MSK: no joint pain  Skin: no rash  Neuro: no headache, no focal weakness, no paresthesias  Psych: +hallucinations, +SI

## 2021-11-30 NOTE — CHART NOTE - NSCHARTNOTEFT_GEN_A_CORE
SW Note: SW made aware by  provider pt is to be held overnight for reassessment. SW following pending final reccs

## 2021-11-30 NOTE — ED ADULT NURSE NOTE - NS_BH TRG QUESTION8_ED_ALL_ED
Depression (without Suicidality or Psychosis)/Bhavna (includes Bipolar Disorder)/Anxiety (includes Panic, OCD)
yes...

## 2021-11-30 NOTE — ED BEHAVIORAL HEALTH ASSESSMENT NOTE - SUMMARY
Patient is a 52 y/o M with history of schizoaffective disorder (bipolar type), DM, alcohol and cocaine use who presents with complaint of auditory hallucinations and possible suicidal ideation.     Patient seen for evaluation and found to be somnolent and expressing suicidal ideation and CAH. Patient possibly under influence of substance, will hold in ED for observation, restart Depakote 500BID, Trazodone 100QHS and plan for re evaluation in the AM

## 2021-12-01 VITALS
TEMPERATURE: 99 F | OXYGEN SATURATION: 97 % | SYSTOLIC BLOOD PRESSURE: 120 MMHG | HEART RATE: 86 BPM | DIASTOLIC BLOOD PRESSURE: 73 MMHG | RESPIRATION RATE: 18 BRPM

## 2021-12-01 LAB
AMPHET UR-MCNC: NEGATIVE — SIGNIFICANT CHANGE UP
APPEARANCE UR: CLEAR — SIGNIFICANT CHANGE UP
BACTERIA # UR AUTO: NEGATIVE — SIGNIFICANT CHANGE UP
BARBITURATES UR SCN-MCNC: NEGATIVE — SIGNIFICANT CHANGE UP
BENZODIAZ UR-MCNC: NEGATIVE — SIGNIFICANT CHANGE UP
BILIRUB UR-MCNC: NEGATIVE — SIGNIFICANT CHANGE UP
COCAINE METAB.OTHER UR-MCNC: POSITIVE
COLOR SPEC: YELLOW — SIGNIFICANT CHANGE UP
DIFF PNL FLD: NEGATIVE — SIGNIFICANT CHANGE UP
EPI CELLS # UR: NEGATIVE — SIGNIFICANT CHANGE UP
GLUCOSE UR QL: 1000 MG/DL
KETONES UR-MCNC: ABNORMAL
LEUKOCYTE ESTERASE UR-ACNC: NEGATIVE — SIGNIFICANT CHANGE UP
METHADONE UR-MCNC: NEGATIVE — SIGNIFICANT CHANGE UP
NITRITE UR-MCNC: NEGATIVE — SIGNIFICANT CHANGE UP
OPIATES UR-MCNC: NEGATIVE — SIGNIFICANT CHANGE UP
PCP SPEC-MCNC: SIGNIFICANT CHANGE UP
PCP UR-MCNC: NEGATIVE — SIGNIFICANT CHANGE UP
PH UR: 6 — SIGNIFICANT CHANGE UP (ref 5–8)
PROT UR-MCNC: 15 MG/DL
RBC CASTS # UR COMP ASSIST: NEGATIVE /HPF — SIGNIFICANT CHANGE UP (ref 0–4)
SP GR SPEC: 1.02 — SIGNIFICANT CHANGE UP (ref 1.01–1.02)
THC UR QL: NEGATIVE — SIGNIFICANT CHANGE UP
UROBILINOGEN FLD QL: NEGATIVE MG/DL — SIGNIFICANT CHANGE UP
WBC UR QL: SIGNIFICANT CHANGE UP

## 2021-12-01 PROCEDURE — 86703 HIV-1/HIV-2 1 RESULT ANTBDY: CPT

## 2021-12-01 PROCEDURE — 80053 COMPREHEN METABOLIC PANEL: CPT

## 2021-12-01 PROCEDURE — 93005 ELECTROCARDIOGRAM TRACING: CPT

## 2021-12-01 PROCEDURE — 99213 OFFICE O/P EST LOW 20 MIN: CPT

## 2021-12-01 PROCEDURE — 99217: CPT

## 2021-12-01 PROCEDURE — 81001 URINALYSIS AUTO W/SCOPE: CPT

## 2021-12-01 PROCEDURE — 36415 COLL VENOUS BLD VENIPUNCTURE: CPT

## 2021-12-01 PROCEDURE — 99285 EMERGENCY DEPT VISIT HI MDM: CPT

## 2021-12-01 PROCEDURE — 80164 ASSAY DIPROPYLACETIC ACD TOT: CPT

## 2021-12-01 PROCEDURE — U0003: CPT

## 2021-12-01 PROCEDURE — 82962 GLUCOSE BLOOD TEST: CPT

## 2021-12-01 PROCEDURE — 80307 DRUG TEST PRSMV CHEM ANLYZR: CPT

## 2021-12-01 PROCEDURE — 85025 COMPLETE CBC W/AUTO DIFF WBC: CPT

## 2021-12-01 PROCEDURE — G0378: CPT

## 2021-12-01 PROCEDURE — U0005: CPT

## 2021-12-01 RX ADMIN — DIVALPROEX SODIUM 500 MILLIGRAM(S): 500 TABLET, DELAYED RELEASE ORAL at 08:11

## 2021-12-01 RX ADMIN — METFORMIN HYDROCHLORIDE 500 MILLIGRAM(S): 850 TABLET ORAL at 08:11

## 2021-12-01 NOTE — ED ADULT NURSE REASSESSMENT NOTE - COMFORT CARE
darkened lights
darkened lights/meal provided/po fluids offered
meal provided/po fluids offered
meal provided/plan of care explained

## 2021-12-01 NOTE — ED ADULT NURSE REASSESSMENT NOTE - GENERAL PATIENT STATE
[facility]    Patient's Name Erica Rosenberg MRN [id number]    1939 Pulmonologist Kimberly Prasad MD   Location [facility] PCP Denise Braun MD     IMPRESSION:    The PFTs are Abnormal.    There is mild ai
comfortable appearance/resting/sleeping
comfortable appearance/resting/sleeping
comfortable appearance/cooperative
comfortable appearance/resting/sleeping
comfortable appearance/cooperative

## 2021-12-01 NOTE — ED BEHAVIORAL HEALTH NOTE - BEHAVIORAL HEALTH NOTE
PROGRESS NOTE: 21 @ 14:03  	  • Reason for Ongoing Consultation: 	    ID: 51yyo Male with HEALTH ISSUES - PROBLEM Dx:    INTERVAL DATA:   • Interval Chief Complaint: im not good doc   • Interval History:   Patient is a 50 y/o M with history of schizoaffective disorder (bipolar type), DM, alcohol and cocaine use who presents with complaint of auditory hallucinations and possible suicidal ideation.     Patient seen for follow up today and found to be awake and alert. Patient states he has not been doing well, stating he has not been on his medication, depressed, having suicidal thoughts to go in traffic and yesterday was hearing voices to hurt himself. Patient states he did get better sleep last night, still with a poor appetite, denying any homicidal ideation or any current AVH.         REVIEW OF SYSTEMS:   • Constitutional Symptoms	No complaints  • Eyes	No complaints  • Ears / Nose / Throat / Mouth	No complaints  • Cardiovascular	No complaints  • Respiratory	No complaints  • Gastrointestinal	No complaints  • Genitourinary	No complaints  • Musculoskeletal	No complaints  • Skin	No complaints  • Neurological	No complaints  • Psychiatric (see HPI)	See HPI  • Endocrine	No complaints  • Hematologic / Lymphatic	No complaints  • Allergic / Immunologic	No complaints    REVIEW OF VITALS/LABS/IMAGING/INVESTIGATIONS:   • Vital signs reviewed: Yes  • Vital Signs:	    T(C): 36.5 (21 @ 11:05), Max: 37 (21 @ 03:54)  HR: 78 (21 @ 11:05) (77 - 82)  BP: 111/71 (21 @ 11:05) (111/71 - 123/77)  RR: 18 (21 @ 11:05) (17 - 18)  SpO2: 96% (21 @ 11:05) (96% - 98%)    • Available labs reviewed: Yes  • Available Lab Results:                           15.9   4.63  )-----------( 308      ( 2021 08:01 )             46.7     11-    134<L>  |  99  |  9.1  ----------------------------<  145<H>  4.1   |  22.0  |  0.55    Ca    8.8      2021 08:01    TPro  6.8  /  Alb  3.9  /  TBili  <0.2<L>  /  DBili  x   /  AST  17  /  ALT  17  /  AlkPhos  66  11-30    LIVER FUNCTIONS - ( 2021 08:01 )  Alb: 3.9 g/dL / Pro: 6.8 g/dL / ALK PHOS: 66 U/L / ALT: 17 U/L / AST: 17 U/L / GGT: x             Urinalysis Basic - ( 01 Dec 2021 08:30 )    Color: Yellow / Appearance: Clear / S.020 / pH: x  Gluc: x / Ketone: Small  / Bili: Negative / Urobili: Negative mg/dL   Blood: x / Protein: 15 mg/dL / Nitrite: Negative   Leuk Esterase: Negative / RBC: Negative /HPF / WBC 0-2   Sq Epi: x / Non Sq Epi: Negative / Bacteria: Negative          MEDICATIONS:      PRN Medications:  • PRN Medications since last evaluation	  • PRN Details	    Current Medications:   diVALproex  milliGRAM(s) Oral two times a day  metFORMIN 500 milliGRAM(s) Oral two times a day  traZODone 100 milliGRAM(s) Oral at bedtime     Medication Side Effects:  • Medication Side Effects or Adverse Reactions (new or ongoing)	None known    MENTAL STATUS EXAM:   • Level of Consciousness	Alert  • General Appearance	Well developed  • Body Habitus	Well nourished  • Hygiene	Fair   • Grooming	Fair   • Behavior	Cooperative  • Eye Contact	Fair   • Relatedness	Good  • Impulse Control	questionable   • Muscle Tone / Strength	Normal muscle tone/strength  • Abnormal Movements	No abnormal movements  • Gait / Station	Normal gait / station  • Speech Volume	Normal  • Speech Rate	Normal  • Speech Spontaneity	Normal  • Speech Articulation	Normal  • Mood	Depressed   • Affect Quality	Constricted   • Affect Range	Full  • Affect Congruence	Congruent  • Thought Process	Linear  • Thought Associations	Normal  • Thought Content	suicidal ideation   • Perceptions	No abnormalities  • Oriented to Time	Yes  • Oriented to Place	Yes  • Oriented to Situation	Yes  • Oriented to Person	Yes  • Attention / Concentration	Normal  • Estimated Intelligence	Average  • Recent Memory	Normal  • Remote Memory	Normal  • Fund of Knowledge	Normal  • Language	No abnormalities noted  • Judgment (regarding everyday events)	Poor   • Insight (regarding psychiatric illness)	Poor     SUICIDALITY:   • Suicidality (Interval)	see above     HOMICIDALITY/AGGRESSION:   • Homicidality/Aggression	none known    DIAGNOSIS DSM-V:    Psychiatric Diagnosis (Corresponds to DSM-IV Axis I, II):   HEALTH ISSUES - PROBLEM Dx:           Medical Diagnosis (Corresponds to DSM-IV Axis III):  • Axis III	      ASSESSMENT OF CURRENT CONDITION:   Summary (include case differential, formulation and patient response to therapy):     Patient is a 50 y/o M with history of schizoaffective disorder (bipolar type), DM, alcohol and cocaine use who presents with complaint of auditory hallucinations and possible suicidal ideation.     Patient seen for evaluation and expressing depressed mood and suicidal thoughts to go in traffic. Patient to be admitted to inpatient psych under vol status PROGRESS NOTE: 21 @ 14:03  	  • Reason for Ongoing Consultation: 	    ID: 51yyo Male with HEALTH ISSUES - PROBLEM Dx:    INTERVAL DATA:   • Interval Chief Complaint: im not good doc   • Interval History:   Patient is a 52 y/o M with history of schizoaffective disorder (bipolar type), DM, alcohol and cocaine use who presents with complaint of auditory hallucinations and possible suicidal ideation.     Patient seen for follow up today and found to be awake and alert. Patient stating he feels much better today. Patient admit to using cocaine prior to coming to the hospital which has "worne of" and feels much better. Patient states he slept well, with good appetite denying any s/h ideation, symptoms of aron or A VH       REVIEW OF SYSTEMS:   • Constitutional Symptoms	No complaints  • Eyes	No complaints  • Ears / Nose / Throat / Mouth	No complaints  • Cardiovascular	No complaints  • Respiratory	No complaints  • Gastrointestinal	No complaints  • Genitourinary	No complaints  • Musculoskeletal	No complaints  • Skin	No complaints  • Neurological	No complaints  • Psychiatric (see HPI)	See HPI  • Endocrine	No complaints  • Hematologic / Lymphatic	No complaints  • Allergic / Immunologic	No complaints    REVIEW OF VITALS/LABS/IMAGING/INVESTIGATIONS:   • Vital signs reviewed: Yes  • Vital Signs:	    T(C): 36.5 (21 @ 11:05), Max: 37 (21 @ 03:54)  HR: 78 (21 @ 11:05) (77 - 82)  BP: 111/71 (21 @ 11:05) (111/71 - 123/77)  RR: 18 (21 @ 11:05) (17 - 18)  SpO2: 96% (21 @ 11:05) (96% - 98%)    • Available labs reviewed: Yes  • Available Lab Results:                           15.9   4.63  )-----------( 308      ( 2021 08:01 )             46.7     11-30    134<L>  |  99  |  9.1  ----------------------------<  145<H>  4.1   |  22.0  |  0.55    Ca    8.8      2021 08:01    TPro  6.8  /  Alb  3.9  /  TBili  <0.2<L>  /  DBili  x   /  AST  17  /  ALT  17  /  AlkPhos  66  11-30    LIVER FUNCTIONS - ( 2021 08:01 )  Alb: 3.9 g/dL / Pro: 6.8 g/dL / ALK PHOS: 66 U/L / ALT: 17 U/L / AST: 17 U/L / GGT: x             Urinalysis Basic - ( 01 Dec 2021 08:30 )    Color: Yellow / Appearance: Clear / S.020 / pH: x  Gluc: x / Ketone: Small  / Bili: Negative / Urobili: Negative mg/dL   Blood: x / Protein: 15 mg/dL / Nitrite: Negative   Leuk Esterase: Negative / RBC: Negative /HPF / WBC 0-2   Sq Epi: x / Non Sq Epi: Negative / Bacteria: Negative          MEDICATIONS:      PRN Medications:  • PRN Medications since last evaluation	  • PRN Details	    Current Medications:   diVALproex  milliGRAM(s) Oral two times a day  metFORMIN 500 milliGRAM(s) Oral two times a day  traZODone 100 milliGRAM(s) Oral at bedtime     Medication Side Effects:  • Medication Side Effects or Adverse Reactions (new or ongoing)	None known    MENTAL STATUS EXAM:   • Level of Consciousness	Alert  • General Appearance	Well developed  • Body Habitus	Well nourished  • Hygiene	Fair   • Grooming	Fair   • Behavior	Cooperative  • Eye Contact	Fair   • Relatedness	Good  • Impulse Control	Fair   • Muscle Tone / Strength	Normal muscle tone/strength  • Abnormal Movements	No abnormal movements  • Gait / Station	Normal gait / station  • Speech Volume	Normal  • Speech Rate	Normal  • Speech Spontaneity	Normal  • Speech Articulation	Normal  • Mood	Euthymic   • Affect Quality	Full range   • Affect Range	Full  • Affect Congruence	Congruent  • Thought Process	Linear  • Thought Associations	Normal  • Thought Content	Unremarkable    • Perceptions	No abnormalities  • Oriented to Time	Yes  • Oriented to Place	Yes  • Oriented to Situation	Yes  • Oriented to Person	Yes  • Attention / Concentration	Normal  • Estimated Intelligence	Average  • Recent Memory	Normal  • Remote Memory	Normal  • Fund of Knowledge	Normal  • Language	No abnormalities noted  • Judgment (regarding everyday events)	Fair  • Insight (regarding psychiatric illness)	Fair     SUICIDALITY:   • Suicidality (Interval)	none known    HOMICIDALITY/AGGRESSION:   • Homicidality/Aggression	none known    DIAGNOSIS DSM-V:    Psychiatric Diagnosis (Corresponds to DSM-IV Axis I, II):   HEALTH ISSUES - PROBLEM Dx:       Medical Diagnosis (Corresponds to DSM-IV Axis III):  • Axis III	      ASSESSMENT OF CURRENT CONDITION:   Summary (include case differential, formulation and patient response to therapy):     Patient is a 52 y/o M with history of schizoaffective disorder (bipolar type), DM, alcohol and cocaine use who presents with complaint of auditory hallucinations and possible suicidal ideation.     Patient seen for evaluation today. Patient held overnight for observation with suspicion of substance intoxication/ induced psychosis. Patient seen today and found to be awake, alert with a linear thought process and admits to  using cocaine prior to presentation and currently denies any s/h ideation and no current signs of aron or psychosis. Patient offered vol admission or follow up which he declined. Patient to be cleared psychiatrically.

## 2021-12-01 NOTE — ED CDU PROVIDER DISPOSITION NOTE - PATIENT PORTAL LINK FT
You can access the FollowMyHealth Patient Portal offered by [facility] by registering at the following website: [email or web address]. By joining [employer]’s FollowMyHealth portal, you will also be able to view your health information using other applications (apps) compatible with our system.

## 2021-12-01 NOTE — ED CDU PROVIDER DISPOSITION NOTE - CLINICAL COURSE
symptoms improved; psychiatry reassessment noted; patient feels comfortable with discharge and medical plan; PMD or clinic follow up recommended for reassessment. Patient is aware of signs/symptoms to return to the emergency department.

## 2021-12-01 NOTE — ED CDU PROVIDER SUBSEQUENT DAY NOTE - NS ED MD CDU HISTORY DATE TIME DT
[facility] Prenatal Office Note  Subjective:  Declan Alberto is here for initial obstetrical visit. She has had confirmation of pregnancy. Her history has been reviewed and OB complications identified. Today she is 8w1d weeks EGA. She is doing well and has no complaints. She  does not have n/v/d/c, vaginal bleeding, or other common first trimester complaints. Objective: Mother's Prenatal Vitals  BP: 116/72  Weight: 130 lb (59 kg)  Patient Position: Sitting  Prenatal Fetal Information  Fetal Heart Rate: u/s-169  Pt is A&Ox3, in no acute distress. Normocephalic, atraumatic. PERRL. Resp even and non-labored. Skin pink, warm & dry. Gravid abdomen. VELEZ's well. Gait steady. Assessment:    IUP at 8w1d wks      Diagnosis Orders   1. Encounter for supervision of other normal pregnancy in first trimester  C.trachomatis N.gonorrhoeae DNA    Herpes simplex virus (HSV) I/II antibodies IgG & IgM w/ reflex    HIV Obstetric Panel    Varicella Zoster Antibody, IgG    Culture, Urine   2. 8 weeks gestation of pregnancy       Plan:   Pt counseled on balanced nutrition, adequate fluid intake, taking PNV daily, and exercise. Continue with routine prenatal care. RTC in 4 wks for prenatal visit   Prenatal labs today  U/s completed at Dr. Tamika Fernandez office    MEDICATIONS:  No orders of the defined types were placed in this encounter. ORDERS:  No orders of the defined types were placed in this encounter. 01-Dec-2021 06:38

## 2021-12-01 NOTE — ED ADULT NURSE REASSESSMENT NOTE - NS ED NURSE REASSESS COMMENT FT1
Patient denies suicidal or homicidal ideations and requesting to speak to Dr. Michael to be discharged.  Dr. Michael notified of patients request.
pt requesting to leave, reports that he does not feel suicidal anymore. pt denies hi, or a/v/h. dr Michael clears pt to be discharged.
pt resting/sleeping in NAD. pt has a good appetite consuming multiple meals requested. pt is a&o x4 and does not appear to be internally preoccupied when interacting with staff. pt has made no attempts to harm himself or others.
Patient ate 100% of his meals.  No attempts to harm self and others.  Patient endorses desire to be hospitalized for inpatient treatment.  No attempts to harm self or others and safety maintained.
Assumed care patient at 0730.  Patient resting in bed asleep with no distress and regular nonlabored breathing.  Safety of patient maintained.

## 2021-12-02 ENCOUNTER — EMERGENCY (EMERGENCY)
Facility: HOSPITAL | Age: 51
LOS: 1 days | Discharge: HOME | End: 2021-12-02
Attending: EMERGENCY MEDICINE
Payer: MEDICAID

## 2021-12-02 VITALS
HEIGHT: 66 IN | TEMPERATURE: 98 F | RESPIRATION RATE: 17 BRPM | OXYGEN SATURATION: 94 % | WEIGHT: 139.99 LBS | HEART RATE: 105 BPM | SYSTOLIC BLOOD PRESSURE: 130 MMHG | DIASTOLIC BLOOD PRESSURE: 80 MMHG

## 2021-12-02 DIAGNOSIS — F25.9 SCHIZOAFFECTIVE DISORDER, UNSPECIFIED: ICD-10-CM

## 2021-12-02 DIAGNOSIS — F14.10 COCAINE ABUSE, UNCOMPLICATED: ICD-10-CM

## 2021-12-02 LAB
ALBUMIN SERPL ELPH-MCNC: 4.2 G/DL — SIGNIFICANT CHANGE UP (ref 3.3–5.2)
ALP SERPL-CCNC: 77 U/L — SIGNIFICANT CHANGE UP (ref 40–120)
ALT FLD-CCNC: 16 U/L — SIGNIFICANT CHANGE UP
ANION GAP SERPL CALC-SCNC: 14 MMOL/L — SIGNIFICANT CHANGE UP (ref 5–17)
APAP SERPL-MCNC: <3 UG/ML — LOW (ref 10–26)
AST SERPL-CCNC: 17 U/L — SIGNIFICANT CHANGE UP
BASOPHILS # BLD AUTO: 0.04 K/UL — SIGNIFICANT CHANGE UP (ref 0–0.2)
BASOPHILS NFR BLD AUTO: 0.6 % — SIGNIFICANT CHANGE UP (ref 0–2)
BILIRUB SERPL-MCNC: <0.2 MG/DL — LOW (ref 0.4–2)
BUN SERPL-MCNC: 17.6 MG/DL — SIGNIFICANT CHANGE UP (ref 8–20)
CALCIUM SERPL-MCNC: 9.2 MG/DL — SIGNIFICANT CHANGE UP (ref 8.6–10.2)
CHLORIDE SERPL-SCNC: 102 MMOL/L — SIGNIFICANT CHANGE UP (ref 98–107)
CO2 SERPL-SCNC: 24 MMOL/L — SIGNIFICANT CHANGE UP (ref 22–29)
CREAT SERPL-MCNC: 1.15 MG/DL — SIGNIFICANT CHANGE UP (ref 0.5–1.3)
EOSINOPHIL # BLD AUTO: 0.11 K/UL — SIGNIFICANT CHANGE UP (ref 0–0.5)
EOSINOPHIL NFR BLD AUTO: 1.7 % — SIGNIFICANT CHANGE UP (ref 0–6)
ETHANOL SERPL-MCNC: <10 MG/DL — SIGNIFICANT CHANGE UP (ref 0–9)
GLUCOSE SERPL-MCNC: 213 MG/DL — HIGH (ref 70–99)
HCT VFR BLD CALC: 50 % — SIGNIFICANT CHANGE UP (ref 39–50)
HGB BLD-MCNC: 16.7 G/DL — SIGNIFICANT CHANGE UP (ref 13–17)
IMM GRANULOCYTES NFR BLD AUTO: 0.2 % — SIGNIFICANT CHANGE UP (ref 0–1.5)
LYMPHOCYTES # BLD AUTO: 1.83 K/UL — SIGNIFICANT CHANGE UP (ref 1–3.3)
LYMPHOCYTES # BLD AUTO: 29 % — SIGNIFICANT CHANGE UP (ref 13–44)
MCHC RBC-ENTMCNC: 30.3 PG — SIGNIFICANT CHANGE UP (ref 27–34)
MCHC RBC-ENTMCNC: 33.4 GM/DL — SIGNIFICANT CHANGE UP (ref 32–36)
MCV RBC AUTO: 90.6 FL — SIGNIFICANT CHANGE UP (ref 80–100)
MONOCYTES # BLD AUTO: 0.65 K/UL — SIGNIFICANT CHANGE UP (ref 0–0.9)
MONOCYTES NFR BLD AUTO: 10.3 % — SIGNIFICANT CHANGE UP (ref 2–14)
NEUTROPHILS # BLD AUTO: 3.68 K/UL — SIGNIFICANT CHANGE UP (ref 1.8–7.4)
NEUTROPHILS NFR BLD AUTO: 58.2 % — SIGNIFICANT CHANGE UP (ref 43–77)
PLATELET # BLD AUTO: 334 K/UL — SIGNIFICANT CHANGE UP (ref 150–400)
POTASSIUM SERPL-MCNC: 4.4 MMOL/L — SIGNIFICANT CHANGE UP (ref 3.5–5.3)
POTASSIUM SERPL-SCNC: 4.4 MMOL/L — SIGNIFICANT CHANGE UP (ref 3.5–5.3)
PROT SERPL-MCNC: 7.5 G/DL — SIGNIFICANT CHANGE UP (ref 6.6–8.7)
RAPID RVP RESULT: SIGNIFICANT CHANGE UP
RBC # BLD: 5.52 M/UL — SIGNIFICANT CHANGE UP (ref 4.2–5.8)
RBC # FLD: 12.4 % — SIGNIFICANT CHANGE UP (ref 10.3–14.5)
SALICYLATES SERPL-MCNC: <0.6 MG/DL — LOW (ref 10–20)
SARS-COV-2 RNA SPEC QL NAA+PROBE: SIGNIFICANT CHANGE UP
SODIUM SERPL-SCNC: 140 MMOL/L — SIGNIFICANT CHANGE UP (ref 135–145)
WBC # BLD: 6.32 K/UL — SIGNIFICANT CHANGE UP (ref 3.8–10.5)
WBC # FLD AUTO: 6.32 K/UL — SIGNIFICANT CHANGE UP (ref 3.8–10.5)

## 2021-12-02 PROCEDURE — 99218: CPT

## 2021-12-02 PROCEDURE — 90792 PSYCH DIAG EVAL W/MED SRVCS: CPT

## 2021-12-02 PROCEDURE — 93010 ELECTROCARDIOGRAM REPORT: CPT

## 2021-12-02 RX ORDER — METFORMIN HYDROCHLORIDE 850 MG/1
500 TABLET ORAL
Refills: 0 | Status: DISCONTINUED | OUTPATIENT
Start: 2021-12-02 | End: 2021-12-06

## 2021-12-02 RX ORDER — RISPERIDONE 4 MG/1
2 TABLET ORAL DAILY
Refills: 0 | Status: DISCONTINUED | OUTPATIENT
Start: 2021-12-02 | End: 2021-12-06

## 2021-12-02 RX ORDER — DIVALPROEX SODIUM 500 MG/1
500 TABLET, DELAYED RELEASE ORAL
Refills: 0 | Status: DISCONTINUED | OUTPATIENT
Start: 2021-12-02 | End: 2021-12-06

## 2021-12-02 RX ORDER — TRAZODONE HCL 50 MG
100 TABLET ORAL AT BEDTIME
Refills: 0 | Status: DISCONTINUED | OUTPATIENT
Start: 2021-12-02 | End: 2021-12-06

## 2021-12-02 RX ADMIN — METFORMIN HYDROCHLORIDE 500 MILLIGRAM(S): 850 TABLET ORAL at 19:43

## 2021-12-02 RX ADMIN — DIVALPROEX SODIUM 500 MILLIGRAM(S): 500 TABLET, DELAYED RELEASE ORAL at 19:44

## 2021-12-02 RX ADMIN — RISPERIDONE 2 MILLIGRAM(S): 4 TABLET ORAL at 19:44
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
## 2021-12-02 NOTE — ED BEHAVIORAL HEALTH ASSESSMENT NOTE - DESCRIPTION
T(C): 36.9 (12-02-21 @ 11:24), Max: 36.9 (12-02-21 @ 10:47)  T(F): 98.5 (12-02-21 @ 11:24), Max: 98.5 (12-02-21 @ 10:47)  HR: 96 (12-02-21 @ 11:24) (96 - 105)  BP: 137/95 (12-02-21 @ 11:24) (130/80 - 137/95)  RR: 20 (12-02-21 @ 11:24) (17 - 20)  SpO2: 96% (12-02-21 @ 11:24) (94% - 96%) See HPI DM

## 2021-12-02 NOTE — ED BEHAVIORAL HEALTH ASSESSMENT NOTE - OTHER
substance abuse TX Unable to assess, patient laying in bed questionable reports AH however unreliable reports SI however is questionable historian and highly likely he is reporting psych symtpoms for secondary gains SI

## 2021-12-02 NOTE — SBIRT NOTE ADULT - NSSBIRTUNABLESCROTHER_GEN_A_CORE
Meeting with patient attempted however Full Screen Not Performed due to  ED Physician's Order: Consult - Psychiatry, Adult; Health  will wait for clearance from [facility] Psychiatry Team
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
## 2021-12-02 NOTE — ED BEHAVIORAL HEALTH ASSESSMENT NOTE - NSPRESENTSXS_PSY_ALL_CORE
Depressed mood/Anhedonia/Refusal or inability to complete safety plan Depressed mood/Anhedonia/Psychosis/Refusal or inability to complete safety plan
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
## 2021-12-02 NOTE — ED BEHAVIORAL HEALTH ASSESSMENT NOTE - PAST PSYCHOTROPIC MEDICATION
previously on Risperdal COnsta 50  mg. unclear if he is still taking   Patient reports he was last prescribed   depakote 500 mg bid  trazodone 200 mg hs  metformin 1000 mg bid previously on Risperdal COnsta 50  mg. unclear if he is still taking   Patient reports he was last prescribed    metformin 1000 mg bid

## 2021-12-02 NOTE — ED ADULT NURSE NOTE - CHIEF COMPLAINT QUOTE
Pt. BIBA c/o auditory hallucinations that are telling him to harm himself.  Pt. with history of Bipolar non compliant with medications at this time.  Pt. endorses previous attempts to harm self which he was hospitalized at [facility].  PMH DM.  Pt. changed into yellow gown and belongings secured for safety.  MD Carbone called to evaluate pt.  Pt. denies drugs/alcohol intake

## 2021-12-02 NOTE — CHART NOTE - NSCHARTNOTEFT_GEN_A_CORE
NOELLE Note: SW alerted pt is in need of inpt psych trx on 9.13 basis. Pt's covid and lab pending. Of note no male beds at [facility] or [facility], SW following

## 2021-12-02 NOTE — ED CDU PROVIDER INITIAL DAY NOTE - MEDICAL DECISION MAKING DETAILS
Pt. with bipolar hx presenting with auditory hallucination. Pt. requiring inpatient psychiatric admission. Pt. psych hold until beds are available.

## 2021-12-02 NOTE — ED ADULT NURSE NOTE - FINAL NURSING ELECTRONIC SIGNATURE
Reason For Visit    JAMAICA CRUZ presents for an INR check.        Referred By   Dr. Mejias    Patient has been taking warfarin since 2013, previously managed by cardiology for dx of atrial fibrillation. She has h/o hypertension, hypothyroidism, s/p CABG x 4.      History of Present Illness  The last clinic visit was 2 week(s) ago. No changes in management were made at the last visit.   Additional Screening:. Pt states that she is taking 2 mg on MWF and 4 mg ROW using the 2 mg Warfarin tablets.    No. Missed dose(s).    No: Extra dose(s).   No: Significant medication changes since last visit .   No: Vitamin K dietary changes. Pt states she tries to eat about the same amount of vitamin k foods.    No: S/Sx(s) of bleeding or bruising.    No: ETOH Intake. none recently.    No: Falls/Injuries. uses a cane.    No: Acute Illness.    No: Procedure/Hospital/ER Visit.   Other: Pt does not drive and she utilizes the [employer] van to take her to appointments.      Current Meds   1. Acetaminophen-Codeine #3 300-30 MG Oral Tablet; TAKE 1 TABLET DAILY AS NEEDED   FOR PAIN;   Therapy: 24Apr2017 to (Evaluate:64Ewi7004); Last Rx:[id number] Ordered   2. Acetaminophen-Codeine #3 300-30 MG Oral Tablet; TAKE 1 TABLET TWICE DAILY AS   NEEDED FOR PAIN;   Therapy: 18Jan2018 to (Evaluate:06Jun2018)  Requested for: 01Jun2018; Last   Rx:[id number] Ordered   3. Atorvastatin Calcium 80 MG Oral Tablet; TAKE ONE TABLET BY MOUTH ONCE DAILY;   Therapy: 14Nov2013 to (Evaluate:34Lpg3320)  Requested for: 18Wtg2555; Last   Rx:[id number] Ordered   4. Benzonatate 100 MG Oral Capsule; TAKE 1 CAPSULE 2-3 TIMES DAILY AS DIRECTED;   Therapy: 25Jun2018 to (Evaluate:13Gwy4798)  Requested for: 25Jun2018; Last   Rx:[id number] Ordered   5. Fluticasone Propionate 50 MCG/ACT Nasal Suspension; USE 1 SPRAY IN EACH   NOSTRIL TWICE DAILY;   Therapy: 52Whr4764 to (Last Rx:86Dlw5708)  Requested for: 05Kxd5348 Ordered   6. HydroCHLOROthiazide 25 MG Oral Tablet; TAKE 1  TABLET DAILY AS DIRECTED;   Therapy: 23Dgd2081 to (Evaluate:01Jan2019)  Requested for: 13Mif0804; Last   Rx:[id number] Ordered   7. Levothyroxine Sodium 175 MCG Oral Tablet; TAKE 1 TABLET BY MOUTH DAILY;   Therapy: 01Hxx3218 to (Last Rx:84Qjz2764)  Requested for: 61Buf5323 Ordered   8. Losartan Potassium 100 MG Oral Tablet; TAKE 1 TABLET BY MOUTH  DAILY;   Therapy: 63Sfd2733 to (Evaluate:96Agn2625)  Requested for: 69Jov6567; Last   Rx:[id number] Ordered   9. Melatonin TABS;   Therapy: (Recorded:39Yzl8611) to Recorded   10. Metoprolol Succinate ER 25 MG Oral Tablet Extended Release 24 Hour; TAKE 1 TABLET    BY MOUTH EVERY DAY;    Therapy: 54Gnx9143 to (Evaluate:15Wba4858)  Requested for: 20Get9414; Last    Rx:[id number] Ordered   11. Nitroglycerin 0.4 MG Sublingual Tablet Sublingual; PLACE 1 TABLET UNDER THE    TONGUE EVERY 5 MINUTES FOR UP TO 3 DOSES AS NEEDED FOR CHEST    PAIN.CALL 911 IF PAIN PERSISTS;    Therapy: 04Mar2009 to (Evaluate:36Xsv3926)  Requested for: 56Adw3591; Last    Rx:[id number] Ordered   12. Nystatin 551521 UNIT/GM External Cream; APPLY  AND RUB  IN A THIN FILM TO    AFFECTED AREAS TWICE DAILY.(AM AND PM);    Therapy: 08May2018 to (Evaluate:98Krm6880); Last Rx:[id number] Ordered   13. Omeprazole 20 MG Oral Capsule Delayed Release; TAKE ONE CAPSULE BY MOUTH    ONCE DAILY  Requested for: 15May2018; Last Rx:[id number] Ordered   14. Terconazole 80 MG Vaginal Suppository; INSERT 1 SUPPOSITORY INTRAVAGINALLY AT    BEDTIME FOR 3 NIGHTS;    Therapy: 01Usj9652 to (Evaluate:98Fli8932)  Requested for: 02Oct2017; Last    Rx:[id number] Ordered   15. Triamcinolone Acetonide 0.1 % External Cream; APPLY  AND RUB  IN A THIN FILM TO    AFFECTED AREAS TWICE DAILY.(AM AND PM);    Therapy: 67Fuu2028 to (Evaluate:85Qwm7538); Last Rx:08May2018 Ordered   16. Warfarin Sodium 2 MG Oral Tablet; TAKE AS DIRECTED  Requested for: 24Apr2018; Last    Rx:24Apr2018 Ordered   17. Warfarin Sodium 5 MG Oral Tablet; TAKE ONE TABLET BY MOUTH  ONCE DAILY AS    DIRECTED;    Therapy: 14Nov2013 to (Evaluate:12Jqj8930)  Requested for: 38Zgd7635; Last    Rx:[id number] Ordered    Results/Data  Coumadin New    ** Printed in Appendix #1 below.     Assessment    Indication: atrial fibrillation.   Goal INR Range: 2.0-3.0.   Estimated Duration: long term.   Anticoagulation Clinic Order Date: 5/14/18.   INR is supratherapeutic today. Etiology: unknown.      Orders      Additional Dosing Instructions: Do NOT take your Warfarin tonight then continue taking 2 mg (1 tablet) on Monday, Wednesday, and Friday and 4 mg (2 tablets) on all the other days using the 2 mg Warfarin tablets.   Follow-up: on 9/14, same day as PCP Appt.    Provided patient with verbal and written dosing instructions. Pt verbalized understanding.   Comments: Call the clinic if there are any changes to your medications or health prior to your next appointment.      Counseling    Stressed importance of compliance with consistent vitamin K intake:    Instructed patient to notify clinic if any medication changes and/or health status changes occur prior to next appointment:   Instructed to notify clinic if any future procedures are scheduled, especially if anticoagulation treatment must be withheld:   Patient agrees to all of the above       Discussion/Summary    Provider Comments: INR is supratherapeutic at 3.8 today. Pt denies any extra Warfarin doses, changes to her medications, diet or increase in ETOH. After looking at the history it appears that when patient is put on 22 mg/wk her INR goes too high but 20 mg/wk is not enough. Recommended that the patient skip her Warfarin tonight and then rtc in 1 week same day as her appt with Dr. Esparza. This will give her a total of 20 mg/wk. Depending on INR level may keep her on 20 per week or may need to increase to 21 mg/wk. Pt verbalized understanding.      Signatures   Electronically signed by : Mariza Iverson R.N.; Sep  7 2018  1:08PM CST    Appendix #1      Coumadin New   Patient: JAMAICA CRUZVivian; : 1932; MRN: [id number]      [id number]   PROTHROMBIN TIME   28.7 sec   INR 3.8  3.1  2.9    PROTHROMBIN TIME, FINGERSTICK      INR, FINGERSTICK      CURRENT DOSE 22 mg/wk- 2 mg on MWF and 4 mg ROW  22 mg/wk- 2 mg on MWF and 4 mg ROW  had 20 mg past week, 4 mg Tues/Thurs/Sun, 2 mg ROW    COMMENT rtc on   rtc in 2 weeks  lab recheck     RECOMMENDED DOSE No Warfarin tonight and then continue 22 mg/wk  22 mg/wk  22 mg weekly, 2 mg MWF, 4 mg ROW    Warfarin Mon      Warfarin Tues      Warfarin Wed      Warfarin Thurs      Warfarin Fri      Warfarin Sat      Warfarin Sun       03-Dec-2021 15:09

## 2021-12-02 NOTE — ED BEHAVIORAL HEALTH ASSESSMENT NOTE - UNDOMICILED
previously living with sister but now reports being homeless reported homeless however per ACT team living with sister

## 2021-12-02 NOTE — ED BEHAVIORAL HEALTH ASSESSMENT NOTE - CURRENT MEDICATION
none since last admission none since Monday  Yesi Ludwig 89 due Monday Nov 29/21  Depakote 1000 hs, Risperdal 2 mg hs,  trazodone 100 hs  Metformin 1000 qd

## 2021-12-02 NOTE — ED ADULT NURSE NOTE - NSIMPLEMENTINTERV_GEN_ALL_ED
Implemented All Universal Safety Interventions:  [place] to call system. Call bell, personal items and telephone within reach. Instruct patient to call for assistance. Room bathroom lighting operational. Non-slip footwear when patient is off stretcher. Physically safe environment: no spills, clutter or unnecessary equipment. Stretcher in lowest position, wheels locked, appropriate side rails in place.
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
## 2021-12-02 NOTE — ED PROVIDER NOTE - OBJECTIVE STATEMENT
Pt. with hx of DM, Bipolar brought in to the ED by EMS to be evaluated for auditory hallucinations. Pt. states that he is hearing voices telling him to hurt himself. Pt. has been off his medications for months. Pt. was recently evaluated by psych yesterday. Pt. denies any chest pain. NO SOB. No abdominal pain.

## 2021-12-02 NOTE — ED BEHAVIORAL HEALTH ASSESSMENT NOTE - NSACTIVEVENT_PSY_ALL_CORE
Substance intoxication or withdrawal/Pending incarceration or homelessness/Inadequate social supports Substance intoxication or withdrawal/Recent onset of psychiatric illness

## 2021-12-03 VITALS
SYSTOLIC BLOOD PRESSURE: 109 MMHG | TEMPERATURE: 97 F | DIASTOLIC BLOOD PRESSURE: 70 MMHG | HEART RATE: 79 BPM | RESPIRATION RATE: 18 BRPM | OXYGEN SATURATION: 100 %

## 2021-12-03 PROCEDURE — 93005 ELECTROCARDIOGRAM TRACING: CPT

## 2021-12-03 PROCEDURE — G0378: CPT

## 2021-12-03 PROCEDURE — 36415 COLL VENOUS BLD VENIPUNCTURE: CPT

## 2021-12-03 PROCEDURE — 80053 COMPREHEN METABOLIC PANEL: CPT

## 2021-12-03 PROCEDURE — 99211 OFF/OP EST MAY X REQ PHY/QHP: CPT

## 2021-12-03 PROCEDURE — 80307 DRUG TEST PRSMV CHEM ANLYZR: CPT

## 2021-12-03 PROCEDURE — 99217: CPT

## 2021-12-03 PROCEDURE — 0225U NFCT DS DNA&RNA 21 SARSCOV2: CPT

## 2021-12-03 PROCEDURE — 85025 COMPLETE CBC W/AUTO DIFF WBC: CPT

## 2021-12-03 PROCEDURE — 99285 EMERGENCY DEPT VISIT HI MDM: CPT

## 2021-12-03 RX ADMIN — DIVALPROEX SODIUM 500 MILLIGRAM(S): 500 TABLET, DELAYED RELEASE ORAL at 11:53

## 2021-12-03 RX ADMIN — METFORMIN HYDROCHLORIDE 500 MILLIGRAM(S): 850 TABLET ORAL at 11:53

## 2021-12-03 RX ADMIN — RISPERIDONE 2 MILLIGRAM(S): 4 TABLET ORAL at 11:54
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
## 2021-12-03 NOTE — CHART NOTE - NSCHARTNOTEFT_GEN_A_CORE
SW Note: Earlier explored inpt psych options. No beds were available at , [place] and [facility]. Called [facility] admissions [phone] and pt has exhausted tx options at their facility. [facility] admissions reports no beds but took referral info in case any beds opened. [facility] did open a bed and was wiling to offer a bed today once UA/Drug screen completed. The pt refused placement.   Psych NP s[poke with pt and pt is now being discharged. Met with pt to discuss his D/C plans and the pt refused to talk with SW and demanded bus passes and his clothes. The pt is followed by [facility] team [phone]. Spoke with Deann and she was already notified by Psych NP  Bus tickets given and info on the mobile crisis team if needed

## 2021-12-06 ENCOUNTER — EMERGENCY (EMERGENCY)
Facility: HOSPITAL | Age: 51
LOS: 1 days | Discharge: HOME | End: 2021-12-06
Attending: STUDENT IN AN ORGANIZED HEALTH CARE EDUCATION/TRAINING PROGRAM
Payer: MEDICAID

## 2021-12-06 VITALS
RESPIRATION RATE: 18 BRPM | OXYGEN SATURATION: 99 % | SYSTOLIC BLOOD PRESSURE: 131 MMHG | HEART RATE: 71 BPM | DIASTOLIC BLOOD PRESSURE: 71 MMHG | TEMPERATURE: 98 F | HEIGHT: 66 IN

## 2021-12-06 LAB
ALBUMIN SERPL ELPH-MCNC: 3.5 G/DL — SIGNIFICANT CHANGE UP (ref 3.3–5.2)
ALP SERPL-CCNC: 68 U/L — SIGNIFICANT CHANGE UP (ref 40–120)
ALT FLD-CCNC: 14 U/L — SIGNIFICANT CHANGE UP
AMPHET UR-MCNC: NEGATIVE — SIGNIFICANT CHANGE UP
ANION GAP SERPL CALC-SCNC: 12 MMOL/L — SIGNIFICANT CHANGE UP (ref 5–17)
APAP SERPL-MCNC: <3 UG/ML — LOW (ref 10–26)
APPEARANCE UR: CLEAR — SIGNIFICANT CHANGE UP
AST SERPL-CCNC: 21 U/L — SIGNIFICANT CHANGE UP
BACTERIA # UR AUTO: ABNORMAL
BARBITURATES UR SCN-MCNC: NEGATIVE — SIGNIFICANT CHANGE UP
BASOPHILS # BLD AUTO: 0.04 K/UL — SIGNIFICANT CHANGE UP (ref 0–0.2)
BASOPHILS NFR BLD AUTO: 0.5 % — SIGNIFICANT CHANGE UP (ref 0–2)
BENZODIAZ UR-MCNC: NEGATIVE — SIGNIFICANT CHANGE UP
BILIRUB SERPL-MCNC: 0.3 MG/DL — LOW (ref 0.4–2)
BILIRUB UR-MCNC: NEGATIVE — SIGNIFICANT CHANGE UP
BUN SERPL-MCNC: 8.8 MG/DL — SIGNIFICANT CHANGE UP (ref 8–20)
CALCIUM SERPL-MCNC: 8.4 MG/DL — LOW (ref 8.6–10.2)
CHLORIDE SERPL-SCNC: 101 MMOL/L — SIGNIFICANT CHANGE UP (ref 98–107)
CO2 SERPL-SCNC: 23 MMOL/L — SIGNIFICANT CHANGE UP (ref 22–29)
COCAINE METAB.OTHER UR-MCNC: POSITIVE
COLOR SPEC: YELLOW — SIGNIFICANT CHANGE UP
CREAT SERPL-MCNC: 0.67 MG/DL — SIGNIFICANT CHANGE UP (ref 0.5–1.3)
DIFF PNL FLD: NEGATIVE — SIGNIFICANT CHANGE UP
EOSINOPHIL # BLD AUTO: 0.19 K/UL — SIGNIFICANT CHANGE UP (ref 0–0.5)
EOSINOPHIL NFR BLD AUTO: 2.4 % — SIGNIFICANT CHANGE UP (ref 0–6)
EPI CELLS # UR: SIGNIFICANT CHANGE UP
ETHANOL SERPL-MCNC: <10 MG/DL — SIGNIFICANT CHANGE UP (ref 0–9)
GLUCOSE SERPL-MCNC: 210 MG/DL — HIGH (ref 70–99)
GLUCOSE UR QL: 1000 MG/DL
HCT VFR BLD CALC: 43.7 % — SIGNIFICANT CHANGE UP (ref 39–50)
HGB BLD-MCNC: 14.7 G/DL — SIGNIFICANT CHANGE UP (ref 13–17)
IMM GRANULOCYTES NFR BLD AUTO: 0.4 % — SIGNIFICANT CHANGE UP (ref 0–1.5)
KETONES UR-MCNC: NEGATIVE — SIGNIFICANT CHANGE UP
LEUKOCYTE ESTERASE UR-ACNC: NEGATIVE — SIGNIFICANT CHANGE UP
LYMPHOCYTES # BLD AUTO: 2.35 K/UL — SIGNIFICANT CHANGE UP (ref 1–3.3)
LYMPHOCYTES # BLD AUTO: 29.2 % — SIGNIFICANT CHANGE UP (ref 13–44)
MCHC RBC-ENTMCNC: 30.2 PG — SIGNIFICANT CHANGE UP (ref 27–34)
MCHC RBC-ENTMCNC: 33.6 GM/DL — SIGNIFICANT CHANGE UP (ref 32–36)
MCV RBC AUTO: 89.7 FL — SIGNIFICANT CHANGE UP (ref 80–100)
METHADONE UR-MCNC: NEGATIVE — SIGNIFICANT CHANGE UP
MONOCYTES # BLD AUTO: 1 K/UL — HIGH (ref 0–0.9)
MONOCYTES NFR BLD AUTO: 12.4 % — SIGNIFICANT CHANGE UP (ref 2–14)
NEUTROPHILS # BLD AUTO: 4.44 K/UL — SIGNIFICANT CHANGE UP (ref 1.8–7.4)
NEUTROPHILS NFR BLD AUTO: 55.1 % — SIGNIFICANT CHANGE UP (ref 43–77)
NITRITE UR-MCNC: NEGATIVE — SIGNIFICANT CHANGE UP
OPIATES UR-MCNC: NEGATIVE — SIGNIFICANT CHANGE UP
PCP SPEC-MCNC: SIGNIFICANT CHANGE UP
PCP UR-MCNC: NEGATIVE — SIGNIFICANT CHANGE UP
PH UR: 7 — SIGNIFICANT CHANGE UP (ref 5–8)
PLATELET # BLD AUTO: 305 K/UL — SIGNIFICANT CHANGE UP (ref 150–400)
POTASSIUM SERPL-MCNC: 4.4 MMOL/L — SIGNIFICANT CHANGE UP (ref 3.5–5.3)
POTASSIUM SERPL-SCNC: 4.4 MMOL/L — SIGNIFICANT CHANGE UP (ref 3.5–5.3)
PROT SERPL-MCNC: 6.3 G/DL — LOW (ref 6.6–8.7)
PROT UR-MCNC: 15 MG/DL
RBC # BLD: 4.87 M/UL — SIGNIFICANT CHANGE UP (ref 4.2–5.8)
RBC # FLD: 12.2 % — SIGNIFICANT CHANGE UP (ref 10.3–14.5)
RBC CASTS # UR COMP ASSIST: SIGNIFICANT CHANGE UP /HPF (ref 0–4)
SALICYLATES SERPL-MCNC: <0.6 MG/DL — LOW (ref 10–20)
SARS-COV-2 RNA SPEC QL NAA+PROBE: SIGNIFICANT CHANGE UP
SODIUM SERPL-SCNC: 136 MMOL/L — SIGNIFICANT CHANGE UP (ref 135–145)
SP GR SPEC: 1.01 — SIGNIFICANT CHANGE UP (ref 1.01–1.02)
THC UR QL: NEGATIVE — SIGNIFICANT CHANGE UP
UROBILINOGEN FLD QL: NEGATIVE MG/DL — SIGNIFICANT CHANGE UP
WBC # BLD: 8.05 K/UL — SIGNIFICANT CHANGE UP (ref 3.8–10.5)
WBC # FLD AUTO: 8.05 K/UL — SIGNIFICANT CHANGE UP (ref 3.8–10.5)
WBC UR QL: SIGNIFICANT CHANGE UP

## 2021-12-06 PROCEDURE — 90792 PSYCH DIAG EVAL W/MED SRVCS: CPT

## 2021-12-06 PROCEDURE — 93010 ELECTROCARDIOGRAM REPORT: CPT

## 2021-12-06 PROCEDURE — 99285 EMERGENCY DEPT VISIT HI MDM: CPT

## 2021-12-06 NOTE — ED BEHAVIORAL HEALTH ASSESSMENT NOTE - CURRENT MEDICATION
none since Monday  Yesi Ludwig 156 due Monday Nov 29/21  Depakote 1000 hs, Risperdal 2 mg hs,  trazodone 100 hs  Metformin 1000 qd

## 2021-12-06 NOTE — ED BEHAVIORAL HEALTH ASSESSMENT NOTE - DESCRIPTION
See HPI Vital Signs Last 24 Hrs  T(C): 36.7 (06 Dec 2021 02:34), Max: 36.7 (06 Dec 2021 02:34)  T(F): 98.1 (06 Dec 2021 02:34), Max: 98.1 (06 Dec 2021 02:34)  HR: 71 (06 Dec 2021 02:34) (71 - 71)  BP: 131/71 (06 Dec 2021 02:34) (131/71 - 131/71)  BP(mean): --  RR: 18 (06 Dec 2021 02:34) (18 - 18)  SpO2: 99% (06 Dec 2021 02:34) (99% - 99%) DM
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
## 2021-12-06 NOTE — ED BEHAVIORAL HEALTH ASSESSMENT NOTE - OTHER PAST PSYCHIATRIC HISTORY (INCLUDE DETAILS REGARDING ONSET, COURSE OF ILLNESS, INPATIENT/OUTPATIENT TREATMENT)
multiple psych admissions and ER visits for reports of SI last at [facility] 11/1/21  ACT team [facility]   On AOT since last psych admission

## 2021-12-06 NOTE — ED BEHAVIORAL HEALTH ASSESSMENT NOTE - SUMMARY
Patient is a 52 y/o M with history of schizoaffective disorder (bipolar type), DM, alcohol and cocaine use abuse, prior suicide attempt self reported, who presents with complaint of auditory hallucinations and SI     Patient seen and expressing depression with suicidal thoughts to kill himself and requesting inpt admission. Patient to be admitted under vol status

## 2021-12-06 NOTE — ED BEHAVIORAL HEALTH ASSESSMENT NOTE - PAST PSYCHOTROPIC MEDICATION
previously on Risperdal COnsta 50  mg. unclear if he is still taking   Patient reports he was last prescribed    metformin 1000 mg bid

## 2021-12-06 NOTE — ED BEHAVIORAL HEALTH ASSESSMENT NOTE - HPI (INCLUDE ILLNESS QUALITY, SEVERITY, DURATION, TIMING, CONTEXT, MODIFYING FACTORS, ASSOCIATED SIGNS AND SYMPTOMS)
Patient is a 52 y/o M with history of schizoaffective disorder (bipolar type), DM, alcohol and cocaine use abuse, prior suicide attempt self reported, who presents with complaint of auditory hallucinations and SI     Patient seen and evaluated and found to be calm and cooperative. patient states his voices are back can telling him to kill himself. Patient states he has not seen the ACT team this week and was supposed to get his Invega Sustenna today but did not take it but has been taking his Depakote and trazodone. Patient states he has not been sleeping, little appetite and hearing voices to hurt himself with no plan. Patient denies homicidal ideation, symptom of aron or VH.     Attempt made to contact [facility] ACT team at  ex [phone] but no answer. message left

## 2021-12-06 NOTE — CHART NOTE - NSCHARTNOTEFT_GEN_A_CORE
NOELLE Note: Notified by  provider that the plan is to transfer pt for inpt psychiatric care. Covid result and drug screen pending. Currently there are no available beds at [facility], [facility], [facility] ,  and [place]. Pt has exhausted tx options @ [facility]. SW will follow

## 2021-12-06 NOTE — ED ADULT TRIAGE NOTE - CHIEF COMPLAINT QUOTE
pt is hearing voices telling him to hurt himself. pt denies any set plan. pt denies drug or drinking.

## 2021-12-06 NOTE — ED PROVIDER NOTE - CONSTITUTIONAL, MLM
Well appearing, awake, alert, oriented to person, place, time/situation and in no apparent distress. normal... no acute distress, sleeping but arousable

## 2021-12-06 NOTE — ED PROVIDER NOTE - OBJECTIVE STATEMENT
50 y/o male with PMHx of DM, HTN, bipolar, schizoaffective c/o auditory hallucinations telling him hurt himself. Pt states he used ecstasy 2 days ago. Pt denies etoh use. Pt has no acute physical complaints.

## 2021-12-07 PROCEDURE — 93005 ELECTROCARDIOGRAM TRACING: CPT

## 2021-12-07 PROCEDURE — U0003: CPT

## 2021-12-07 PROCEDURE — U0005: CPT

## 2021-12-07 PROCEDURE — G0378: CPT

## 2021-12-07 PROCEDURE — 81001 URINALYSIS AUTO W/SCOPE: CPT

## 2021-12-07 PROCEDURE — 85025 COMPLETE CBC W/AUTO DIFF WBC: CPT

## 2021-12-07 PROCEDURE — 99218: CPT

## 2021-12-07 PROCEDURE — 36415 COLL VENOUS BLD VENIPUNCTURE: CPT

## 2021-12-07 PROCEDURE — 80307 DRUG TEST PRSMV CHEM ANLYZR: CPT

## 2021-12-07 PROCEDURE — 80053 COMPREHEN METABOLIC PANEL: CPT

## 2021-12-07 PROCEDURE — 99285 EMERGENCY DEPT VISIT HI MDM: CPT

## 2021-12-07 NOTE — ED ADULT NURSE REASSESSMENT NOTE - STATUS
awaiting transfer, no change

## 2021-12-07 NOTE — ED CDU PROVIDER INITIAL DAY NOTE - MEDICAL DECISION MAKING DETAILS
Patient with substance abuse and schizoaffective disorder exacerbation pending inpatient psychiatric placement.

## 2021-12-07 NOTE — CHART NOTE - NSCHARTNOTEFT_GEN_A_CORE
SW Note: Plan is to transfer pt for inpt psychiatric care. legal status: 9.13. Met with pt and he does not want a referral to [facility]. Would be open to any other facility. No available beds at [facility], [facility], [facility],  and [facility] exhausted tx options at [facility].  staff aware

## 2021-12-07 NOTE — ED ADULT NURSE REASSESSMENT NOTE - COMFORT CARE
ambulated to bathroom
meal provided/plan of care explained
meal provided/plan of care explained
plan of care explained
plan of care explained
snack and  sandwich/ambulated to bathroom/meal provided/po fluids offered
meal provided/plan of care explained

## 2021-12-07 NOTE — ED CDU PROVIDER DISPOSITION NOTE - PATIENT PORTAL LINK FT
You can access the FollowMyHealth Patient Portal offered by [facility] by registering at the following website: [email or web address]. By joining [employer]’s FollowMyHealth portal, you will also be able to view your health information using other applications (apps) compatible with our system.

## 2021-12-07 NOTE — ED CDU PROVIDER DISPOSITION NOTE - CLINICAL COURSE
in obs for psych medically cleared--pt refused to go to psych facility wants to be dced cleared by  for discharge

## 2021-12-07 NOTE — ED CDU PROVIDER INITIAL DAY NOTE - OBJECTIVE STATEMENT
52 y/o male with PMHx of DM, HTN, bipolar, schizoaffective c/o auditory hallucinations telling him hurt himself. Pt states he used ecstasy 2 days ago. Pt denies etoh use. Pt has no acute physical complaints.

## 2021-12-08 VITALS
DIASTOLIC BLOOD PRESSURE: 92 MMHG | TEMPERATURE: 98 F | OXYGEN SATURATION: 99 % | SYSTOLIC BLOOD PRESSURE: 144 MMHG | HEART RATE: 65 BPM | RESPIRATION RATE: 20 BRPM

## 2021-12-08 PROCEDURE — 99217: CPT

## 2021-12-08 PROCEDURE — 99213 OFFICE O/P EST LOW 20 MIN: CPT

## 2021-12-08 NOTE — ED ADULT NURSE REASSESSMENT NOTE - GENERAL PATIENT STATE
comfortable appearance
comfortable appearance/cooperative
resting/sleeping
comfortable appearance/cooperative/resting/sleeping
comfortable appearance/resting/sleeping
comfortable appearance/resting/sleeping
comfortable appearance/cooperative

## 2021-12-08 NOTE — ED ADULT NURSE REASSESSMENT NOTE - NS ED NURSE REASSESS COMMENT FT1
pt is awake alert and oriented x4. reports to nursing staff "I don't have suicidal thoughts or hear things anymore. I want to go!" pt educated that he would be re-evaluated by a psychiatrist latter this morning. pt has a good appetite consuming all meals while here and offering no complaints. pt has made no attempts to harm himself or others.
received report.  received pt in room resting quietly.  awaiting transfer.  will monitor
sleeping through this time
Patient ate 100% of his dinner.  Patient endorsing he needs inpatient hospitalization to get back on his medications and be safe due to suicidal thoughts.  No attempts to harm self or others and safety maintained.
Patient ate 100% of his breakfast.  Patient verbalizing needs and verbally contracts not to harm self or others in the hospital.  Denies experiencing auditory hallucinations.  Safety of patient maintained.
Patient ate 100% of his meals.  Resting in bed watching television.  Patient offers no complaints, no attempts to harm self or others and safety maintained.
Assumed care of patient at 0720.  Patient resting in bed awake with no distress.  Patient oriented to staff and educated about plan of care.  Patient continues to endorse desire to be hospitalized psychiatrically.  Safety of patient maintained.
Patient presented in  area dressed in hospital gowns.  Patient awake and alert.  Patient reports he is depressed and suicidal.  Patient cooperative security contraband assessment.  Oriented to  area and staff.  Safety of patient maintained.
received report.  no cahnge in pt awaiting transfer

## 2021-12-08 NOTE — ED CDU PROVIDER DISPOSITION NOTE - CLINICAL COURSE
no significant events during obs; psychiatry reassessment noted; patient feels comfortable with discharge and medical plan; PMD or clinic follow up recommended for reassessment. Patient is aware of signs/symptoms to return to the emergency department.

## 2021-12-08 NOTE — ED CDU PROVIDER DISPOSITION NOTE - PATIENT PORTAL LINK FT
You can access the FollowMyHealth Patient Portal offered by [facility] by registering at the following website: [email or web address]. By joining [facility]’s FollowMyHealth portal, you will also be able to view your health information using other applications (apps) compatible with our system.

## 2021-12-08 NOTE — ED BEHAVIORAL HEALTH NOTE - BEHAVIORAL HEALTH NOTE
PROGRESS NOTE: 21 @ 11:30  	  • Reason for Ongoing Consultation: 	    ID: 51yyo Male with HEALTH ISSUES - PROBLEM Dx:    INTERVAL DATA:   • Interval Chief Complaint: im feeling a lot better   • Interval History:   Patient is a 50 y/o M with history of schizoaffective disorder (bipolar type), DM, alcohol and cocaine use abuse, prior suicide attempt self reported, who presents with complaint of auditory hallucinations and SI     Patient seen for follow up and found to be calm, cooperative and pleasant. Patient states he is feeling  much better, has slept and feels the drugs he used wore off stating he took ecstasy and cocaine. Patient denies any       REVIEW OF SYSTEMS:   • Constitutional Symptoms	No complaints  • Eyes	No complaints  • Ears / Nose / Throat / Mouth	No complaints  • Cardiovascular	No complaints  • Respiratory	No complaints  • Gastrointestinal	No complaints  • Genitourinary	No complaints  • Musculoskeletal	No complaints  • Skin	No complaints  • Neurological	No complaints  • Psychiatric (see HPI)	See HPI  • Endocrine	No complaints  • Hematologic / Lymphatic	No complaints  • Allergic / Immunologic	No complaints    REVIEW OF VITALS/LABS/IMAGING/INVESTIGATIONS:   • Vital signs reviewed: Yes  • Vital Signs:	    T(C): 36.6 (21 @ 07:46), Max: 37 (21 @ 23:33)  HR: 64 (21 @ 07:46) (61 - 82)  BP: 119/82 (21 @ 07:46) (117/78 - 151/87)  RR: 18 (21 @ 07:46) (18 - 20)  SpO2: 97% (21 @ 07:46) (96% - 98%)    • Available labs reviewed: Yes  • Available Lab Results:                 Urinalysis Basic - ( 06 Dec 2021 14:36 )    Color: Yellow / Appearance: Clear / S.015 / pH: x  Gluc: x / Ketone: Negative  / Bili: Negative / Urobili: Negative mg/dL   Blood: x / Protein: 15 mg/dL / Nitrite: Negative   Leuk Esterase: Negative / RBC: 0-2 /HPF / WBC 0-2   Sq Epi: x / Non Sq Epi: Occasional / Bacteria: Few          MEDICATIONS:      PRN Medications:  • PRN Medications since last evaluation	  • PRN Details	    Current Medications:      Medication Side Effects:  • Medication Side Effects or Adverse Reactions (new or ongoing)	None known    MENTAL STATUS EXAM:   • Level of Consciousness	Alert  • General Appearance	Well developed  • Body Habitus	Well nourished  • Hygiene	Good  • Grooming	Good  • Behavior	Cooperative  • Eye Contact	Good  • Relatedness	Good  • Impulse Control	Normal  • Muscle Tone / Strength	Normal muscle tone/strength  • Abnormal Movements	No abnormal movements  • Gait / Station	Normal gait / station  • Speech Volume	Normal  • Speech Rate	Normal  • Speech Spontaneity	Normal  • Speech Articulation	Normal  • Mood	Normal  • Affect Quality	Euthymic  • Affect Range	Full  • Affect Congruence	Congruent  • Thought Process	Linear  • Thought Associations	Normal  • Thought Content	Unremarkable  • Perceptions	No abnormalities  • Oriented to Time	Yes  • Oriented to Place	Yes  • Oriented to Situation	Yes  • Oriented to Person	Yes  • Attention / Concentration	Normal  • Estimated Intelligence	Average  • Recent Memory	Normal  • Remote Memory	Normal  • Fund of Knowledge	Normal  • Language	No abnormalities noted  • Judgment (regarding everyday events)	Fair  • Insight (regarding psychiatric illness)	Fair    SUICIDALITY:   • Suicidality (Interval)	none known    HOMICIDALITY/AGGRESSION:   • Homicidality/Aggression	none known    DIAGNOSIS DSM-V:    Psychiatric Diagnosis (Corresponds to DSM-IV Axis I, II):   HEALTH ISSUES - PROBLEM Dx:           Medical Diagnosis (Corresponds to DSM-IV Axis III):  • Axis III	      ASSESSMENT OF CURRENT CONDITION:   Summary (include case differential, formulation and patient response to therapy):     Risk Assessment (consider static vs modifiable risk factors and protective factors; comment on level of risk for dangerous behavior):     PLAN PROGRESS NOTE: 21 @ 11:30  	  • Reason for Ongoing Consultation: 	    ID: 51yyo Male with HEALTH ISSUES - PROBLEM Dx:    INTERVAL DATA:   • Interval Chief Complaint: im feeling a lot better   • Interval History:   Patient is a 52 y/o M with history of schizoaffective disorder (bipolar type), DM, alcohol and cocaine use abuse, prior suicide attempt self reported, who presents with complaint of auditory hallucinations and SI     Patient seen for follow up and found to be calm, cooperative and pleasant. Patient states he is feeling  much better, has slept and feels the drugs he used wore off stating he took ecstasy and cocaine. Patient denies any current s/h ideation and denies any symptoms of aron or AVH     Writer spoke to Ana from ACT team who states patient is due for his Invega CHAIREZ and state they will follow up with him tomorrow      REVIEW OF SYSTEMS:   • Constitutional Symptoms	No complaints  • Eyes	No complaints  • Ears / Nose / Throat / Mouth	No complaints  • Cardiovascular	No complaints  • Respiratory	No complaints  • Gastrointestinal	No complaints  • Genitourinary	No complaints  • Musculoskeletal	No complaints  • Skin	No complaints  • Neurological	No complaints  • Psychiatric (see HPI)	See HPI  • Endocrine	No complaints  • Hematologic / Lymphatic	No complaints  • Allergic / Immunologic	No complaints    REVIEW OF VITALS/LABS/IMAGING/INVESTIGATIONS:   • Vital signs reviewed: Yes  • Vital Signs:	    T(C): 36.6 (21 @ 07:46), Max: 37 (21 @ 23:33)  HR: 64 (21 @ 07:46) (61 - 82)  BP: 119/82 (21 @ 07:46) (117/78 - 151/87)  RR: 18 (21 @ 07:46) (18 - 20)  SpO2: 97% (21 @ 07:46) (96% - 98%)    • Available labs reviewed: Yes  • Available Lab Results:       Urinalysis Basic - ( 06 Dec 2021 14:36 )    Color: Yellow / Appearance: Clear / S.015 / pH: x  Gluc: x / Ketone: Negative  / Bili: Negative / Urobili: Negative mg/dL   Blood: x / Protein: 15 mg/dL / Nitrite: Negative   Leuk Esterase: Negative / RBC: 0-2 /HPF / WBC 0-2   Sq Epi: x / Non Sq Epi: Occasional / Bacteria: Few      MEDICATIONS:      PRN Medications:  • PRN Medications since last evaluation	  • PRN Details	    Current Medications:      Medication Side Effects:  • Medication Side Effects or Adverse Reactions (new or ongoing)	None known    MENTAL STATUS EXAM:   • Level of Consciousness	Alert  • General Appearance	Well developed  • Body Habitus	Well nourished  • Hygiene	Good  • Grooming	Good  • Behavior	Cooperative  • Eye Contact	Good  • Relatedness	Good  • Impulse Control	Normal  • Muscle Tone / Strength	Normal muscle tone/strength  • Abnormal Movements	No abnormal movements  • Gait / Station	Normal gait / station  • Speech Volume	Normal  • Speech Rate	Normal  • Speech Spontaneity	Normal  • Speech Articulation	Normal  • Mood	Normal  • Affect Quality	Euthymic  • Affect Range	Full  • Affect Congruence	Congruent  • Thought Process	Linear  • Thought Associations	Normal  • Thought Content	Unremarkable  • Perceptions	No abnormalities  • Oriented to Time	Yes  • Oriented to Place	Yes  • Oriented to Situation	Yes  • Oriented to Person	Yes  • Attention / Concentration	Normal  • Estimated Intelligence	Average  • Recent Memory	Normal  • Remote Memory	Normal  • Fund of Knowledge	Normal  • Language	No abnormalities noted  • Judgment (regarding everyday events)	Fair  • Insight (regarding psychiatric illness)	Fair    SUICIDALITY:   • Suicidality (Interval)	none known    HOMICIDALITY/AGGRESSION:   • Homicidality/Aggression	none known    DIAGNOSIS DSM-V:    Psychiatric Diagnosis (Corresponds to DSM-IV Axis I, II):   HEALTH ISSUES - PROBLEM Dx:           Medical Diagnosis (Corresponds to DSM-IV Axis III):  • Axis III	      ASSESSMENT OF CURRENT CONDITION:   Summary (include case differential, formulation and patient response to therapy):     Patient is a 52 y/o M with history of schizoaffective disorder (bipolar type), DM, alcohol and cocaine use abuse, prior suicide attempt self reported, who presents with complaint of auditory hallucinations and SI     Patient seen and evaluated for follow up and presents calm, cooperative and denying any s/h ideation. Patient observed in ED and has been denying any s/h ideation, found to be calm, cooperative, eating and sleeping well, with no PRNs needed and requesting Discharge. Patient to be discharged with a plan to follow up with his ACT team in the AM and receive his Ivega CHAIREZ.

## 2021-12-08 NOTE — CHART NOTE - NSCHARTNOTEFT_GEN_A_CORE
SW Note: Pt was seen again by  provider and cleared for discharge. Dr. Michael spoke to the pts ACT team prior to pt leaving the ED. The pt declined  assistance. Reports no family/ friends to involve. pt did not give clear info on where he was going. Pt has knowledge of the [facility] housing program.

## 2021-12-10 ENCOUNTER — EMERGENCY (EMERGENCY)
Facility: HOSPITAL | Age: 51
LOS: 1 days | Discharge: HOME | End: 2021-12-10
Attending: EMERGENCY MEDICINE
Payer: MEDICAID

## 2021-12-10 VITALS
DIASTOLIC BLOOD PRESSURE: 92 MMHG | HEART RATE: 94 BPM | TEMPERATURE: 98 F | SYSTOLIC BLOOD PRESSURE: 138 MMHG | OXYGEN SATURATION: 96 % | HEIGHT: 66 IN | RESPIRATION RATE: 16 BRPM

## 2021-12-10 VITALS
DIASTOLIC BLOOD PRESSURE: 85 MMHG | TEMPERATURE: 98 F | RESPIRATION RATE: 20 BRPM | HEART RATE: 81 BPM | OXYGEN SATURATION: 97 % | SYSTOLIC BLOOD PRESSURE: 126 MMHG

## 2021-12-10 LAB
ANION GAP SERPL CALC-SCNC: 16 MMOL/L — SIGNIFICANT CHANGE UP (ref 5–17)
APAP SERPL-MCNC: <3 UG/ML — LOW (ref 10–26)
BASOPHILS # BLD AUTO: 0.02 K/UL — SIGNIFICANT CHANGE UP (ref 0–0.2)
BASOPHILS NFR BLD AUTO: 0.3 % — SIGNIFICANT CHANGE UP (ref 0–2)
BUN SERPL-MCNC: 14.6 MG/DL — SIGNIFICANT CHANGE UP (ref 8–20)
CALCIUM SERPL-MCNC: 9 MG/DL — SIGNIFICANT CHANGE UP (ref 8.6–10.2)
CHLORIDE SERPL-SCNC: 101 MMOL/L — SIGNIFICANT CHANGE UP (ref 98–107)
CO2 SERPL-SCNC: 21 MMOL/L — LOW (ref 22–29)
CREAT SERPL-MCNC: 0.7 MG/DL — SIGNIFICANT CHANGE UP (ref 0.5–1.3)
EOSINOPHIL # BLD AUTO: 0.07 K/UL — SIGNIFICANT CHANGE UP (ref 0–0.5)
EOSINOPHIL NFR BLD AUTO: 1.2 % — SIGNIFICANT CHANGE UP (ref 0–6)
ETHANOL SERPL-MCNC: <10 MG/DL — SIGNIFICANT CHANGE UP (ref 0–9)
GLUCOSE SERPL-MCNC: 106 MG/DL — HIGH (ref 70–99)
HCT VFR BLD CALC: 46.8 % — SIGNIFICANT CHANGE UP (ref 39–50)
HGB BLD-MCNC: 15.7 G/DL — SIGNIFICANT CHANGE UP (ref 13–17)
IMM GRANULOCYTES NFR BLD AUTO: 0.2 % — SIGNIFICANT CHANGE UP (ref 0–1.5)
LYMPHOCYTES # BLD AUTO: 1.74 K/UL — SIGNIFICANT CHANGE UP (ref 1–3.3)
LYMPHOCYTES # BLD AUTO: 29.7 % — SIGNIFICANT CHANGE UP (ref 13–44)
MCHC RBC-ENTMCNC: 30.1 PG — SIGNIFICANT CHANGE UP (ref 27–34)
MCHC RBC-ENTMCNC: 33.5 GM/DL — SIGNIFICANT CHANGE UP (ref 32–36)
MCV RBC AUTO: 89.8 FL — SIGNIFICANT CHANGE UP (ref 80–100)
MONOCYTES # BLD AUTO: 0.67 K/UL — SIGNIFICANT CHANGE UP (ref 0–0.9)
MONOCYTES NFR BLD AUTO: 11.4 % — SIGNIFICANT CHANGE UP (ref 2–14)
NEUTROPHILS # BLD AUTO: 3.35 K/UL — SIGNIFICANT CHANGE UP (ref 1.8–7.4)
NEUTROPHILS NFR BLD AUTO: 57.2 % — SIGNIFICANT CHANGE UP (ref 43–77)
PLATELET # BLD AUTO: 337 K/UL — SIGNIFICANT CHANGE UP (ref 150–400)
POTASSIUM SERPL-MCNC: 3.7 MMOL/L — SIGNIFICANT CHANGE UP (ref 3.5–5.3)
POTASSIUM SERPL-SCNC: 3.7 MMOL/L — SIGNIFICANT CHANGE UP (ref 3.5–5.3)
RBC # BLD: 5.21 M/UL — SIGNIFICANT CHANGE UP (ref 4.2–5.8)
RBC # FLD: 12 % — SIGNIFICANT CHANGE UP (ref 10.3–14.5)
SALICYLATES SERPL-MCNC: <0.6 MG/DL — LOW (ref 10–20)
SARS-COV-2 RNA SPEC QL NAA+PROBE: SIGNIFICANT CHANGE UP
SODIUM SERPL-SCNC: 138 MMOL/L — SIGNIFICANT CHANGE UP (ref 135–145)
WBC # BLD: 5.86 K/UL — SIGNIFICANT CHANGE UP (ref 3.8–10.5)
WBC # FLD AUTO: 5.86 K/UL — SIGNIFICANT CHANGE UP (ref 3.8–10.5)

## 2021-12-10 PROCEDURE — 85025 COMPLETE CBC W/AUTO DIFF WBC: CPT

## 2021-12-10 PROCEDURE — 93010 ELECTROCARDIOGRAM REPORT: CPT

## 2021-12-10 PROCEDURE — 99285 EMERGENCY DEPT VISIT HI MDM: CPT

## 2021-12-10 PROCEDURE — U0003: CPT

## 2021-12-10 PROCEDURE — 80048 BASIC METABOLIC PNL TOTAL CA: CPT

## 2021-12-10 PROCEDURE — 36415 COLL VENOUS BLD VENIPUNCTURE: CPT

## 2021-12-10 PROCEDURE — 93005 ELECTROCARDIOGRAM TRACING: CPT

## 2021-12-10 PROCEDURE — U0005: CPT

## 2021-12-10 PROCEDURE — 80307 DRUG TEST PRSMV CHEM ANLYZR: CPT

## 2021-12-10 PROCEDURE — 90792 PSYCH DIAG EVAL W/MED SRVCS: CPT

## 2021-12-10 PROCEDURE — 99284 EMERGENCY DEPT VISIT MOD MDM: CPT

## 2021-12-10 RX ORDER — TRAZODONE HCL 50 MG
200 TABLET ORAL ONCE
Refills: 0 | Status: COMPLETED | OUTPATIENT
Start: 2021-12-10 | End: 2021-12-10

## 2021-12-10 NOTE — ED PROVIDER NOTE - OBJECTIVE STATEMENT
51yom w/ bipolar schizophrenia states he has been off his meds because he is homeless and is hearing voices telling him to kill himself. Denies any plan or intent. 51 yom w/ bipolar schizophrenia states he has been off his meds because he is homeless and is hearing voices telling him to kill himself. Denies any plan or intent.
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
## 2021-12-10 NOTE — ED ADULT TRIAGE NOTE - TEMPERATURE IN FAHRENHEIT (DEGREES F)
DRUG ABUSE/DETOX    • Will have no detox symptoms and will verbalize plan for changing drug-related behavior Not Progressing 97.8

## 2021-12-10 NOTE — ED ADULT NURSE NOTE - HISTORY OF COVID-19 VACCINATION
Your Child's Health  7-8 Year-Old Visit      Dennis Knowles  November 27, 2020    Visit Vitals  BP 98/60 (BP Location: RUE - Right upper extremity, Patient Position: Sitting, Cuff Size: Pediatric)   Pulse 100   Ht 4' 4.95\" (1.345 m)   Wt 29.3 kg Comment: 64.6lbs   BMI 16.20 kg/m²     Weight: 64.6 lbs      YOUR CHILD'S 7 and 8 YEAR-OLD VISITS      School / Development / Behavior   Children should be well-adjusted in their school setting this at age. Success in school depends on several skills--communication skills, cooperation, attention, cognitive ability. Problems in one area may affect a child’s overall school experience. It is very important to stay in touch with teachers in order to identify and address any problems as soon as they are recognized. To help your child learn well, be sure they are well rested and have a healthy breakfast every morning.    Children need to be in school if they are going to learn and advance. Missing school frequently will lead to a lot of problems for a child; this needs to be addressed if it is happening. If your child receives any extra services through an IEP, make sure the IEP is reviewed regularly and updated if needed.     With increasing age comes increasing opportunities for activities outside of school (sports, arts, scouting).  Being part of a peer group becomes more important to children as they are growing older. They may encounter friends with different values and beliefs. Discuss differences openly with your children to help them start to understand the diversity of our society. Always listen without interrupting. Your children may still need reminding of the rules and expectations which you have for them, but they are developing more of a conscience and awareness of right and wrong which will affect how they view rules and social expectations. Discuss what consequences are for not following family rules--make sure they are reasonable and be consistent in enforcing  them.     Children should have some definite responsibilities at this age. Simple household tasks like making their bed, setting the table, helping with meals or simple household chores should be an expectation. Tell your child that you notice and appreciate what they are doing so that they become more confident and ready to take on more responsibility as time goes by. Children are motivated to do well when their parents provide a lot of encouragement. Make sure to find time every day for just talking with your children (no TV, no phone, no music!). Be a good role model for them by always acting responsibly, keep promises, and being on time.     Ask your child if they feel safe at school. Bullying is common--it is difficult to know exactly how common it is, but most children probably experience some bullying during their school years. Bullying hurts everyone--the child who is bullied, children who witness it, and the person doing the bullying (those children are likely to develop long term behavior and self-esteem issues). Children should know to report to you and/or teachers if they are being teased or bullied or if they witness another child being bullied. Bullying in schools (or anywhere) should not be tolerated. Talk to teachers, administrators or guidance counselors at the school to help with this issue. Good online resources regarding bullying are StopApplyMapllying.gov and the American electronic media use should be supervised.     Safety on the Road: Once your child weighs more than 40 pounds, they can start riding in a high-backed booster seat. They should ride properly secured in a booster seat in the back seat until they are 4 feet 9 inches tall. High-backed booster seats should be used if there are low seat backs or no head rests in your car; backless boosters can be used if your car has high seat backs and head rests.     Children (and their parents!) should wear properly fitted helmets when biking. Watch your children biking to determine how good their judgement is and set limits about where and when they can be biking based on what you see.     Safety in the Water: This is a good age for swimming lessons if your child is not yet a good swimmer. Even if they are comfortable swimming, they should always be supervised when swimming. They should always wear [employer] approved life jackets when on any sort of boat or watercraft. If you have access to a swimming pool where you live (in an apartment complex, neighborhood or your own yard), be sure it is fenced with a locked, self-closing, self-latching gate which prevents unsupervised access by children. Remember to use sunscreen with an SPF of 15 or higher when outside and reapply after time in the water.  Your child should avoid prolonged time in the sun between 11 AM and 3 PM and wear hats, sunglasses and sun protection clothing.    Personal Safety: A parent’s safety is just as important as a child’s safety. Violence is common in many people’s lives. If you do not feel safe in your home or if a partner has ever hit, kicked, shoved or physically hurt you or your child, it is important for you to get help. Talk to your doctors or a . In [employer], resources include [facility] (614-280-7110) and the [facility] (24 hour hotline is 488- 200-3027); the National Domestic Violence Hotline is  2-409-152-SAFE (6058).    Review with your child that certain body parts (the parts usually covered by a bathing suit) and behaviors are private. For safety purposes, make sure your child knows that they should never keep secrets from parents, and they should always report to you if any adult or older child shows any interest in their private parts (or if an older person discussed or shared their own private parts with a child). Tell them they should talk to you if any adult is doing or saying anything that makes them uncomfortable, especially if an adult is asking them to keep a secret.    Remind your child about he the importance of never opening the door to anyone they don’t know. Make sure your child always knows how to reach you and what to do in the case of a fire or other emergency. Teach your child about using “911”.     Guns and Firearms: Firearms in homes can pose a safety risk; if you need to keep a gun, be sure it is stored safely: locked, unloaded, with ammunition stored separately. Even the best-behaved children are curious--so make sure firearms are stored safely in your home and anywhere they visit. They are too young to be taught to safely handle a weapon. Teach them that if they ever see a gun, they should not touch it, they should leave the immediate area and they should tell an adult.    MEDICATION FOR FEVER OR PAIN:   Acetaminophen liquid (e.g., Tylenol or Tempra) may be given every four hours as needed for pain or fever.  Acetaminophen liquid is less concentrated than the infant dropper bottle type.  Be sure to check which product CONCENTRATION you are using.    CHILDREN’S Tylenol/Acetaminophen  (160 MG/5 mL)    Child’s Weight:  Dose:  36 - 47 pounds:    240 mg (7.5 mL (1 1/2 Teaspoons))  48 - 59 pounds:    320 mg (10.0 mL (2 Teaspoons))  60 - 71 pounds:    400 mg (12.5 mL (2 1/2 Teaspoons))  Greater than 72 pounds:   480 mg (15.0 mL (3 Teaspoons))    CHILDREN’S Tylenol/Acetaminophen MELTAWAYS  ( Health Care Lakeland Medical Center.    Also, check out “Children’s Health” on the [facility] Blog for updates on timely topics regarding children’s health!   No

## 2021-12-10 NOTE — ED BEHAVIORAL HEALTH ASSESSMENT NOTE - OTHER PAST PSYCHIATRIC HISTORY (INCLUDE DETAILS REGARDING ONSET, COURSE OF ILLNESS, INPATIENT/OUTPATIENT TREATMENT)
multiple psych admissions and ER visits for reports of SI last at [facility] 11/1/21  ACT team Federations   On AOT since last psych admission
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
## 2021-12-10 NOTE — ED PROVIDER NOTE - CLINICAL SUMMARY MEDICAL DECISION MAKING FREE TEXT BOX
Hallucinations and suicidal thoughts in the setting of medication nonadherence. Calm, cooperative. Labs sent for medical screening. Psychiatry eval in AM to determine need for admission and stabilization.

## 2021-12-10 NOTE — ED BEHAVIORAL HEALTH ASSESSMENT NOTE - CURRENT MEDICATION
noncompliant with all meds  Invega Sustenna 156 due Monday Nov 29/21  Depakote 1000 hs, Risperdal 2 mg hs,  trazodone 100 hs  Metformin 1000 qd

## 2021-12-10 NOTE — ED ADULT TRIAGE NOTE - CHIEF COMPLAINT QUOTE
Ambulatory brought in by [facility]orm[facility] #[id number]. Patient called 911 on himself stating that he is hearing voices and that he hasn't been taking his medications because he can't get them, he is undominciled and unable to afford prescriptions. PMH of bipolar disorder. Reports the voices are telling him to harm himself. Denies visual hallucinations, HI, ETOH/drug abuse. Calm and cooperative at this time. Belongings secured.

## 2021-12-10 NOTE — ED PROVIDER NOTE - PATIENT PORTAL LINK FT
You can access the FollowMyHealth Patient Portal offered by [facility] by registering at the following website: [email or web address]. By joining [employer]’s FollowMyHealth portal, you will also be able to view your health information using other applications (apps) compatible with our system.
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
## 2021-12-10 NOTE — ED BEHAVIORAL HEALTH ASSESSMENT NOTE - DESCRIPTION
ICU Vital Signs Last 24 Hrs  T(C): 36.7 (10 Dec 2021 11:24), Max: 36.7 (10 Dec 2021 05:29)  T(F): 98 (10 Dec 2021 11:24), Max: 98.1 (10 Dec 2021 05:29)  HR: 81 (10 Dec 2021 11:24) (78 - 94)  BP: 126/85 (10 Dec 2021 11:24) (124/78 - 138/92)  BP(mean): --  ABP: --  ABP(mean): --  RR: 20 (10 Dec 2021 11:24) (16 - 20)  SpO2: 97% (10 Dec 2021 11:24) (96% - 97%) DM See HPI

## 2021-12-11 NOTE — ED CDU PROVIDER DISPOSITION NOTE - PATIENT PORTAL LINK FT
You can access the FollowMyHealth Patient Portal offered by [facility] by registering at the following website: [email or web address]. By joining [employer]’s FollowMyHealth portal, you will also be able to view your health information using other applications (apps) compatible with our system.

## 2021-12-11 NOTE — ED CDU PROVIDER DISPOSITION NOTE - CLINICAL COURSE
Patient is a 50 y/o M with history of schizoaffective disorder (bipolar type), DM, alcohol and cocaine use who presents with complaint of auditory hallucinations tell him to "kill myself". States he has been off his medications (Trazadone, Depakote, and Metformin) for "1 week and 3 days" and began hearing voices again 1 week ago. The voices are telling him "to kill myself, that I'm worthless, I got nothing to live for". seen and cleared by psych.

## 2021-12-12 ENCOUNTER — EMERGENCY (EMERGENCY)
Facility: HOSPITAL | Age: 51
LOS: 1 days | Discharge: HOME | End: 2021-12-12
Attending: EMERGENCY MEDICINE
Payer: MEDICAID

## 2021-12-12 VITALS
HEART RATE: 81 BPM | DIASTOLIC BLOOD PRESSURE: 67 MMHG | TEMPERATURE: 98 F | WEIGHT: 199.96 LBS | OXYGEN SATURATION: 97 % | SYSTOLIC BLOOD PRESSURE: 114 MMHG | RESPIRATION RATE: 18 BRPM | HEIGHT: 66 IN

## 2021-12-12 DIAGNOSIS — F25.0 SCHIZOAFFECTIVE DISORDER, BIPOLAR TYPE: ICD-10-CM

## 2021-12-12 LAB
ANION GAP SERPL CALC-SCNC: 11 MMOL/L — SIGNIFICANT CHANGE UP (ref 5–17)
BUN SERPL-MCNC: 14 MG/DL — SIGNIFICANT CHANGE UP (ref 8–20)
CALCIUM SERPL-MCNC: 8.6 MG/DL — SIGNIFICANT CHANGE UP (ref 8.6–10.2)
CHLORIDE SERPL-SCNC: 101 MMOL/L — SIGNIFICANT CHANGE UP (ref 98–107)
CO2 SERPL-SCNC: 25 MMOL/L — SIGNIFICANT CHANGE UP (ref 22–29)
CREAT SERPL-MCNC: 0.77 MG/DL — SIGNIFICANT CHANGE UP (ref 0.5–1.3)
GLUCOSE SERPL-MCNC: 213 MG/DL — HIGH (ref 70–99)
HCT VFR BLD CALC: 44 % — SIGNIFICANT CHANGE UP (ref 39–50)
HGB BLD-MCNC: 14.7 G/DL — SIGNIFICANT CHANGE UP (ref 13–17)
MCHC RBC-ENTMCNC: 29.8 PG — SIGNIFICANT CHANGE UP (ref 27–34)
MCHC RBC-ENTMCNC: 33.4 GM/DL — SIGNIFICANT CHANGE UP (ref 32–36)
MCV RBC AUTO: 89.1 FL — SIGNIFICANT CHANGE UP (ref 80–100)
PLATELET # BLD AUTO: 316 K/UL — SIGNIFICANT CHANGE UP (ref 150–400)
POTASSIUM SERPL-MCNC: 3.7 MMOL/L — SIGNIFICANT CHANGE UP (ref 3.5–5.3)
POTASSIUM SERPL-SCNC: 3.7 MMOL/L — SIGNIFICANT CHANGE UP (ref 3.5–5.3)
RBC # BLD: 4.94 M/UL — SIGNIFICANT CHANGE UP (ref 4.2–5.8)
RBC # FLD: 11.9 % — SIGNIFICANT CHANGE UP (ref 10.3–14.5)
SARS-COV-2 RNA SPEC QL NAA+PROBE: SIGNIFICANT CHANGE UP
SODIUM SERPL-SCNC: 137 MMOL/L — SIGNIFICANT CHANGE UP (ref 135–145)
WBC # BLD: 5.32 K/UL — SIGNIFICANT CHANGE UP (ref 3.8–10.5)
WBC # FLD AUTO: 5.32 K/UL — SIGNIFICANT CHANGE UP (ref 3.8–10.5)

## 2021-12-12 PROCEDURE — U0005: CPT

## 2021-12-12 PROCEDURE — 99285 EMERGENCY DEPT VISIT HI MDM: CPT

## 2021-12-12 PROCEDURE — G0378: CPT

## 2021-12-12 PROCEDURE — U0003: CPT

## 2021-12-12 PROCEDURE — 93005 ELECTROCARDIOGRAM TRACING: CPT

## 2021-12-12 PROCEDURE — 85027 COMPLETE CBC AUTOMATED: CPT

## 2021-12-12 PROCEDURE — 99220: CPT

## 2021-12-12 PROCEDURE — 36415 COLL VENOUS BLD VENIPUNCTURE: CPT

## 2021-12-12 PROCEDURE — 80048 BASIC METABOLIC PNL TOTAL CA: CPT

## 2021-12-12 PROCEDURE — 93010 ELECTROCARDIOGRAM REPORT: CPT

## 2021-12-12 RX ORDER — DIVALPROEX SODIUM 500 MG/1
500 TABLET, DELAYED RELEASE ORAL
Refills: 0 | Status: DISCONTINUED | OUTPATIENT
Start: 2021-12-12 | End: 2021-12-16

## 2021-12-12 RX ORDER — TRAZODONE HCL 50 MG
50 TABLET ORAL AT BEDTIME
Refills: 0 | Status: DISCONTINUED | OUTPATIENT
Start: 2021-12-12 | End: 2021-12-16

## 2021-12-12 RX ORDER — RISPERIDONE 4 MG/1
1 TABLET ORAL AT BEDTIME
Refills: 0 | Status: DISCONTINUED | OUTPATIENT
Start: 2021-12-12 | End: 2021-12-16

## 2021-12-12 RX ORDER — METFORMIN HYDROCHLORIDE 850 MG/1
500 TABLET ORAL
Refills: 0 | Status: DISCONTINUED | OUTPATIENT
Start: 2021-12-12 | End: 2021-12-16

## 2021-12-12 RX ADMIN — METFORMIN HYDROCHLORIDE 500 MILLIGRAM(S): 850 TABLET ORAL at 10:18

## 2021-12-12 RX ADMIN — Medication 50 MILLIGRAM(S): at 21:04

## 2021-12-12 RX ADMIN — RISPERIDONE 1 MILLIGRAM(S): 4 TABLET ORAL at 21:04

## 2021-12-12 RX ADMIN — DIVALPROEX SODIUM 500 MILLIGRAM(S): 500 TABLET, DELAYED RELEASE ORAL at 17:25

## 2021-12-12 RX ADMIN — METFORMIN HYDROCHLORIDE 500 MILLIGRAM(S): 850 TABLET ORAL at 17:24

## 2021-12-12 NOTE — ED BEHAVIORAL HEALTH ASSESSMENT NOTE - OTHER PAST PSYCHIATRIC HISTORY (INCLUDE DETAILS REGARDING ONSET, COURSE OF ILLNESS, INPATIENT/OUTPATIENT TREATMENT)
multiple psych admissions and ER visits for reports of SI last at [facility] 11/1/21  ACT team Federations   On AOT since last psych admission

## 2021-12-12 NOTE — CHART NOTE - NSCHARTNOTEFT_GEN_A_CORE
As per NP, patient is to attend inpatient on voluntary status. There are no beds currently available at [facility], [facility], [place], or [place]. [facility]'s unit is currently closed due to covid. SW continues to follow for placement.

## 2021-12-12 NOTE — ED PROVIDER NOTE - OBJECTIVE STATEMENT
pt presents to ED stating he is suicidal . no headache no chest pain no sob no abd pain no signs trauma
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
## 2021-12-12 NOTE — ED CDU PROVIDER INITIAL DAY NOTE - OBJECTIVE STATEMENT
50yo male with pmh bipolar schizophrenia, DM, HTN states he has been off his meds because he is homeless and is hearing voices telling him to kill himself. Denies any plan or intent. Pt here for same yesterday was cleared and discharged

## 2021-12-12 NOTE — ED ADULT NURSE NOTE - NSIMPLEMENTINTERV_GEN_ALL_ED
Implemented All Universal Safety Interventions:  [place] to call system. Call bell, personal items and telephone within reach. Instruct patient to call for assistance. Room bathroom lighting operational. Non-slip footwear when patient is off stretcher. Physically safe environment: no spills, clutter or unnecessary equipment. Stretcher in lowest position, wheels locked, appropriate side rails in place.

## 2021-12-12 NOTE — ED BEHAVIORAL HEALTH ASSESSMENT NOTE - HPI (INCLUDE ILLNESS QUALITY, SEVERITY, DURATION, TIMING, CONTEXT, MODIFYING FACTORS, ASSOCIATED SIGNS AND SYMPTOMS)
This is a 52 y/o M with history of schizoaffective disorder (bipolar type), and a history of multiple past psychiatric admissions including a long term stay at [facility] for 1 0 years. With a medical history of diabetes and a substance use history including alcohol abuse and cocaine use. With a history of prior suicide attempts. Patient presented with auditory hallucinations and suicidal and homicidal ideations. Was seen in the ED by this writer per consult request.    Patient presented to the ED 2 days ago with c/o hallucinations and suicidal ideations; was treated and released. However, he returned to the hospital c/o feeling depressed, suicidal and hearing voices and told this writer: "write this down, I am hearing voices telling me to kill myself; I will kill people and myself if you discharge me". Patient insisted that everything he says about being suicidal is writen down because he feels people don't believe him. He states he was discharged from [facility] 10 years ago, but is not asking to go back there. He has a sister who is supportive, but he feels he needs to be admitted to inpatient psychiatry because of the voices  and current suicidal thoughts. He appears irritable and angry, stating a few times "are you going to send me to the hospital?, I need to go in". He reports current poor appetite and poor sleep. Patient is not able to reliably contract for safety at this time and continuously asking to be admitted. States he will sign himself in voluntarily.

## 2021-12-12 NOTE — ED BEHAVIORAL HEALTH ASSESSMENT NOTE - SUMMARY
This is a 52 y/o M with history of schizoaffective disorder (bipolar type), and a history of multiple past psychiatric admissions including a long term stay at [facility] for 1 0 years. With a medical history of diabetes and a substance use history including alcohol abuse and cocaine use. With a history of prior suicide attempts. Patient presented with auditory hallucinations and suicidal and homicidal ideations. Was seen in the ED by this writer per consult request.    Patient is irritable, reporting acute symptoms of suicidality, homicidality, psychosis and requesting to be admitted to inpatient psychiatry. Patient is insisting that he will kill people and himself if he is released from the ED.    PLAN: Admit on a voluntary status to inpatient psychiatry.

## 2021-12-12 NOTE — ED ADULT NURSE REASSESSMENT NOTE - GENERAL PATIENT STATE
comfortable appearance/resting/sleeping
comfortable appearance/cooperative
comfortable appearance/cooperative

## 2021-12-12 NOTE — ED CDU PROVIDER INITIAL DAY NOTE - MEDICAL DECISION MAKING DETAILS
51 yom w/ bipolar schizophrenia states he has been off his meds because he is homeless and is hearing voices telling him to kill himself. Pt medically cleared, eval by psych requiring inpatient management

## 2021-12-12 NOTE — ED BEHAVIORAL HEALTH ASSESSMENT NOTE - RISK ASSESSMENT
High Acute Suicide Risk High risk to self and others given current threats at hurting people and himself.
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
## 2021-12-12 NOTE — ED CDU PROVIDER INITIAL DAY NOTE - PHYSICAL EXAMINATION
Const: Awake, alert and oriented. In no acute distress. Unkempt  HEENT: NC/AT. Moist mucous membranes.  Eyes: No scleral icterus. EOMI.  Neck:. Soft and supple. Full ROM without pain.  Cardiac: Regular rate and regular rhythm. +S1/S2. Peripheral pulses 2+ and symmetric. No LE edema.  Resp: Speaking in full sentences. No evidence of respiratory distress. No wheezes, rales or rhonchi.  Abd: Soft, non-tender, non-distended. Normal bowel sounds in all 4 quadrants. No guarding or rebound.  Back: Spine midline and non-tender. No CVAT.  Neuro: Awake, alert & oriented x 3. Moves all extremities symmetrically.

## 2021-12-12 NOTE — ED BEHAVIORAL HEALTH ASSESSMENT NOTE - PATIENT'S CHIEF COMPLAINT
"I am hearing voices telling me to kill myself; I need to go to the hospital. I will kill people and myself if I don't go to the hospital"

## 2021-12-12 NOTE — ED BEHAVIORAL HEALTH ASSESSMENT NOTE - REASON FOR REFERRAL
Auditory hallucinations and suicidal ideations; with a plan to kill people and himself after leaving the ER.

## 2021-12-12 NOTE — ED BEHAVIORAL HEALTH ASSESSMENT NOTE - DESCRIPTION
diabetes See HPI Patient is irritable and unable to reliably contract for safety.    Vital Signs Last 24 Hrs  T(C): 36.6 (12 Dec 2021 05:04), Max: 36.6 (12 Dec 2021 05:04)  T(F): 97.9 (12 Dec 2021 05:04), Max: 97.9 (12 Dec 2021 05:04)  HR: 80 (12 Dec 2021 11:05) (80 - 81)  BP: 117/77 (12 Dec 2021 11:05) (114/67 - 120/70)  BP(mean): --  RR: 18 (12 Dec 2021 11:05) (18 - 18)  SpO2: 95% (12 Dec 2021 11:05) (95% - 98%)
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
## 2021-12-13 VITALS
TEMPERATURE: 98 F | OXYGEN SATURATION: 99 % | DIASTOLIC BLOOD PRESSURE: 69 MMHG | SYSTOLIC BLOOD PRESSURE: 106 MMHG | HEART RATE: 66 BPM | RESPIRATION RATE: 18 BRPM

## 2021-12-13 PROCEDURE — 99217: CPT

## 2021-12-13 PROCEDURE — 99213 OFFICE O/P EST LOW 20 MIN: CPT

## 2021-12-13 NOTE — ED CDU PROVIDER DISPOSITION NOTE - PATIENT PORTAL LINK FT
You can access the FollowMyHealth Patient Portal offered by [facility] by registering at the following website: [email or web address]. By joining [employer]’s FollowMyHealth portal, you will also be able to view your health information using other applications (apps) compatible with our system.

## 2021-12-13 NOTE — ED ADULT NURSE REASSESSMENT NOTE - NS ED NURSE REASSESS COMMENT FT1
Assumed care of pt @ 720am sleeping in his room in no observable distress. breakfast left at bedside. no attempts to harm self or others noted.
Patient endorses he is still experiencing auditory hallucinations and feels suicidal with no specific plan.  Agrees with plan for transfer when a bed is available for transfer.  Ate 100% of his meal plus snacks.  Safety of patient maintained.
pt awake and alert, comes to the nurses station and states "I'm ready to go" pt also states "no, I don't have suicidal thoughts or hallucinations anymore" pt educated that the psych provider will reassess him this morning.
pt entered unit stating I need  a meal tray,a sandwich extra iglesias, 2 oj and remote. above provided
pt is in BH, no distress , vitals are stable, ambulates well, calm, in room watching TV , safety maintained,
pt is sleeping, refused meds and vitals this morning despite Rn education, safety maintained
pt was re-evaluated by Dr. Branch and cleared to be discharged, pt endorses this plan. Dr Rodríguez the ED attending made aware.
Assumed care of patient at 0720.  Patient resting in bed asleep with no distress and regular non labored breathing.  Safety of patient maintained.
Patient reeducated bout need for urine sample after voiding and not providing a sample reporting "I forgot".  Patient has collection cup at bedside.  Patient complaint with medications and ate 100% of his dinner.  No attempts to harm self or others and safety maintained.

## 2021-12-13 NOTE — ED CDU PROVIDER SUBSEQUENT DAY NOTE - PHYSICAL EXAMINATION
Alert, lucid, and in no apparent distress. Pt is normocephalic, atraumatic.  Pupils are equal, round, lips pink, moist mucous membranes, tongue midline. Neck supple.   Lungs clear to auscultation. Heart regular rate and rhythm, normal S1, S2,   Abdomen is soft, nontender, no pulsatile mass, no masses,    Non-focal sensory, 5 out of 5 motor strength, no dysmetria, fluent, goal directed speech. CN2 to 12 intact. Skin without rash,  No submandibular adenopathy. Normal mentation, does not appear agitated

## 2021-12-13 NOTE — ED BEHAVIORAL HEALTH NOTE - BEHAVIORAL HEALTH NOTE
PROGRESS NOTE: 12-13-21 @ 12:09  	  • Reason for Ongoing Consultation: Possible suicidal and homicidal ideation     ID: 51yyo Male with HEALTH ISSUES - PROBLEM Dx:  Schizoaffective disorder, bipolar type            INTERVAL DATA:   • Interval Chief Complaint: Feeling good and want to go home.    • Interval History:  Patient has not been aggressive or agitated. He was laying in bed. He ate breakfast and lunch gail. He stated he feels good. He reports complete resolution of symptoms. He states "I took my medications yesterday and feel fine". He reports he wants to go back to his sister's home, and he needs bus tokens.  He did not appear to be responding to internal stimuli.  He reported that he plans to continue medications and follow up with his ACT team.  Concerning other psychiatric symptoms, pt denies depressed mood, and  reports he continues to enjoy  pleasurable activities. Pt denies any suicidal ideation, intent or plan as well as any aggressive or violent behavior towards others. Pt denies any episodes of bizarre happiness, unusual energy, unusual nightime excitation or other common symptoms of aron. Pt denies hearing voices or seeing things.  No delusions were elicited.  Patient denies pervasive anxiety,  panic attacks, obsessions or compulsions.     REVIEW OF SYSTEMS:   • Constitutional Symptoms	No complaints  • Eyes	No complaints  • Ears / Nose / Throat / Mouth	No complaints  • Cardiovascular	No complaints  • Respiratory	No complaints  • Gastrointestinal	No complaints  • Genitourinary	No complaints  • Musculoskeletal	No complaints  • Skin	No complaints  • Neurological	No complaints  • Psychiatric (see HPI)	See HPI  • Endocrine	No complaints  • Hematologic / Lymphatic	No complaints  • Allergic / Immunologic	No complaints    REVIEW OF VITALS/LABS/IMAGING/INVESTIGATIONS:   • Vital signs reviewed: Yes  • Vital Signs:	    T(C): 36.7 (12-13-21 @ 07:35), Max: 37 (12-12-21 @ 15:25)  HR: 66 (12-13-21 @ 07:35) (66 - 76)  BP: 106/69 (12-13-21 @ 07:35) (106/69 - 129/72)  RR: 18 (12-13-21 @ 07:35) (18 - 18)  SpO2: 99% (12-13-21 @ 07:35) (98% - 99%)    • Available labs reviewed: Yes  • Available Lab Results:                           14.7   5.32  )-----------( 316      ( 12 Dec 2021 07:02 )             44.0     12-12    137  |  101  |  14.0  ----------------------------<  213<H>  3.7   |  25.0  |  0.77    Ca    8.6      12 Dec 2021 07:02                MEDICATIONS:      PRN Medications:  • PRN Medications since last evaluation	  • PRN Details	    Current Medications:   diVALproex  milliGRAM(s) Oral two times a day  LORazepam     Tablet 2 milliGRAM(s) Oral every 6 hours PRN  metFORMIN 500 milliGRAM(s) Oral two times a day  risperiDONE   Tablet 1 milliGRAM(s) Oral at bedtime  traZODone 50 milliGRAM(s) Oral at bedtime PRN     Medication Side Effects:  • Medication Side Effects or Adverse Reactions (new or ongoing)	None known    MENTAL STATUS EXAM:   • Level of Consciousness	Alert  • General Appearance	Well developed  • Body Habitus	Well nourished  • Hygiene	Good  • Grooming	Good  • Behavior	Cooperative  • Eye Contact	Good  • Relatedness	Good  • Impulse Control	Normal  • Muscle Tone / Strength	Normal muscle tone/strength  • Abnormal Movements	No abnormal movements  • Gait / Station	Normal gait / station  • Speech Volume	Normal  • Speech Rate	Normal  • Speech Spontaneity	Normal  • Speech Articulation	Normal  • Mood	Normal  • Affect Quality	Euthymic  • Affect Range	Full  • Affect Congruence	Congruent  • Thought Process	Linear  • Thought Associations	Normal  • Thought Content	Unremarkable  • Perceptions	No abnormalities  • Oriented to Time	Yes  • Oriented to Place	Yes  • Oriented to Situation	Yes  • Oriented to Person	Yes  • Attention / Concentration	Normal  • Estimated Intelligence	Average  • Recent Memory	Normal  • Remote Memory	Normal  • Fund of Knowledge	Normal  • Language	No abnormalities noted  • Judgment (regarding everyday events)	Fair  • Insight (regarding psychiatric illness)	Fair    SUICIDALITY:   • Suicidality (Interval)	none known    HOMICIDALITY/AGGRESSION:   • Homicidality/Aggression	none known    DIAGNOSIS DSM-V:    Psychiatric Diagnosis (Corresponds to DSM-IV Axis I, II):   HEALTH ISSUES - PROBLEM Dx:           Medical Diagnosis (Corresponds to DSM-IV Axis III):  • Axis III	DM       ASSESSMENT OF CURRENT CONDITION:   Summary (include case differential, formulation and patient response to therapy):     Patient is a 52 y/o M with history of schizoaffective disorder (bipolar type), DM, alcohol and cocaine use abuse, prior suicide attempt self reported, who presents with complaint of auditory hallucinations and suicidal ideation, and aggressive urges.     Patient seen and evaluated for follow up and presents calm, cooperative and denying any s/h ideation. Patient observed in ED and has been denying any s/h ideation, found to be calm, cooperative, eating and sleeping well, with no PRNs needed and requesting Discharge.  No acute psychiatric symptoms were elicited. Patient to be discharged with a plan to follow up with his ACT team in the AM .

## 2021-12-17 ENCOUNTER — EMERGENCY (EMERGENCY)
Facility: HOSPITAL | Age: 51
LOS: 1 days | Discharge: HOME | End: 2021-12-17
Attending: EMERGENCY MEDICINE
Payer: MEDICAID

## 2021-12-17 VITALS
OXYGEN SATURATION: 95 % | RESPIRATION RATE: 16 BRPM | HEART RATE: 92 BPM | HEIGHT: 66 IN | TEMPERATURE: 99 F | DIASTOLIC BLOOD PRESSURE: 86 MMHG | SYSTOLIC BLOOD PRESSURE: 134 MMHG

## 2021-12-17 LAB
ALBUMIN SERPL ELPH-MCNC: 3.8 G/DL — SIGNIFICANT CHANGE UP (ref 3.3–5.2)
ALP SERPL-CCNC: 64 U/L — SIGNIFICANT CHANGE UP (ref 40–120)
ALT FLD-CCNC: 16 U/L — SIGNIFICANT CHANGE UP
ANION GAP SERPL CALC-SCNC: 11 MMOL/L — SIGNIFICANT CHANGE UP (ref 5–17)
APAP SERPL-MCNC: <3 UG/ML — LOW (ref 10–26)
AST SERPL-CCNC: 23 U/L — SIGNIFICANT CHANGE UP
BASOPHILS # BLD AUTO: 0.04 K/UL — SIGNIFICANT CHANGE UP (ref 0–0.2)
BASOPHILS NFR BLD AUTO: 0.6 % — SIGNIFICANT CHANGE UP (ref 0–2)
BILIRUB SERPL-MCNC: <0.2 MG/DL — LOW (ref 0.4–2)
BUN SERPL-MCNC: 16.2 MG/DL — SIGNIFICANT CHANGE UP (ref 8–20)
CALCIUM SERPL-MCNC: 8.6 MG/DL — SIGNIFICANT CHANGE UP (ref 8.6–10.2)
CHLORIDE SERPL-SCNC: 102 MMOL/L — SIGNIFICANT CHANGE UP (ref 98–107)
CO2 SERPL-SCNC: 22 MMOL/L — SIGNIFICANT CHANGE UP (ref 22–29)
CREAT SERPL-MCNC: 0.8 MG/DL — SIGNIFICANT CHANGE UP (ref 0.5–1.3)
EOSINOPHIL # BLD AUTO: 0.09 K/UL — SIGNIFICANT CHANGE UP (ref 0–0.5)
EOSINOPHIL NFR BLD AUTO: 1.4 % — SIGNIFICANT CHANGE UP (ref 0–6)
ETHANOL SERPL-MCNC: <10 MG/DL — SIGNIFICANT CHANGE UP (ref 0–9)
GLUCOSE SERPL-MCNC: 145 MG/DL — HIGH (ref 70–99)
HCT VFR BLD CALC: 42.6 % — SIGNIFICANT CHANGE UP (ref 39–50)
HGB BLD-MCNC: 14.3 G/DL — SIGNIFICANT CHANGE UP (ref 13–17)
HIV 1 & 2 AB SERPL IA.RAPID: SIGNIFICANT CHANGE UP
IMM GRANULOCYTES NFR BLD AUTO: 0.2 % — SIGNIFICANT CHANGE UP (ref 0–1.5)
LYMPHOCYTES # BLD AUTO: 1.82 K/UL — SIGNIFICANT CHANGE UP (ref 1–3.3)
LYMPHOCYTES # BLD AUTO: 28.2 % — SIGNIFICANT CHANGE UP (ref 13–44)
MCHC RBC-ENTMCNC: 30.2 PG — SIGNIFICANT CHANGE UP (ref 27–34)
MCHC RBC-ENTMCNC: 33.6 GM/DL — SIGNIFICANT CHANGE UP (ref 32–36)
MCV RBC AUTO: 90.1 FL — SIGNIFICANT CHANGE UP (ref 80–100)
MONOCYTES # BLD AUTO: 0.95 K/UL — HIGH (ref 0–0.9)
MONOCYTES NFR BLD AUTO: 14.7 % — HIGH (ref 2–14)
NEUTROPHILS # BLD AUTO: 3.55 K/UL — SIGNIFICANT CHANGE UP (ref 1.8–7.4)
NEUTROPHILS NFR BLD AUTO: 54.9 % — SIGNIFICANT CHANGE UP (ref 43–77)
PLATELET # BLD AUTO: 308 K/UL — SIGNIFICANT CHANGE UP (ref 150–400)
POTASSIUM SERPL-MCNC: 4 MMOL/L — SIGNIFICANT CHANGE UP (ref 3.5–5.3)
POTASSIUM SERPL-SCNC: 4 MMOL/L — SIGNIFICANT CHANGE UP (ref 3.5–5.3)
PROT SERPL-MCNC: 6.2 G/DL — LOW (ref 6.6–8.7)
RBC # BLD: 4.73 M/UL — SIGNIFICANT CHANGE UP (ref 4.2–5.8)
RBC # FLD: 11.9 % — SIGNIFICANT CHANGE UP (ref 10.3–14.5)
SALICYLATES SERPL-MCNC: <0.6 MG/DL — LOW (ref 10–20)
SARS-COV-2 RNA SPEC QL NAA+PROBE: SIGNIFICANT CHANGE UP
SODIUM SERPL-SCNC: 135 MMOL/L — SIGNIFICANT CHANGE UP (ref 135–145)
WBC # BLD: 6.46 K/UL — SIGNIFICANT CHANGE UP (ref 3.8–10.5)
WBC # FLD AUTO: 6.46 K/UL — SIGNIFICANT CHANGE UP (ref 3.8–10.5)

## 2021-12-17 PROCEDURE — 99285 EMERGENCY DEPT VISIT HI MDM: CPT | Mod: 25

## 2021-12-17 PROCEDURE — 93010 ELECTROCARDIOGRAM REPORT: CPT

## 2021-12-17 PROCEDURE — 90792 PSYCH DIAG EVAL W/MED SRVCS: CPT

## 2021-12-17 RX ORDER — RISPERIDONE 4 MG/1
1 TABLET ORAL ONCE
Refills: 0 | Status: COMPLETED | OUTPATIENT
Start: 2021-12-17 | End: 2021-12-17

## 2021-12-17 RX ORDER — METFORMIN HYDROCHLORIDE 850 MG/1
0 TABLET ORAL
Qty: 0 | Refills: 0 | DISCHARGE

## 2021-12-17 RX ADMIN — RISPERIDONE 1 MILLIGRAM(S): 4 TABLET ORAL at 12:56

## 2021-12-17 NOTE — ED BEHAVIORAL HEALTH ASSESSMENT NOTE - HPI (INCLUDE ILLNESS QUALITY, SEVERITY, DURATION, TIMING, CONTEXT, MODIFYING FACTORS, ASSOCIATED SIGNS AND SYMPTOMS)
Patient is a 51 year old male who is unemployed, living with sister, with a past psych history of Schizoaffective disorder followed by ACT team also with a history of cocaine use disorder and multiple ER visits with substance induced psychosis who was BIBA activated by self for auditory hallucinations     Patient seen and evaluated and found to be calm and cooperative. patient states 2 days ago he used ecstasy and since then, has not taken his depakote and has been hearing voices telling him to hurt himself so he called 911. Patient reports some depression, poor sleep, good appetite and denies any homicidal ideation as well as any symptoms of aron or VH     Attempted to call ACT team for collateral and was told by  that they have no one on staff until monday.

## 2021-12-17 NOTE — ED ADULT NURSE NOTE - HPI (INCLUDE ILLNESS QUALITY, SEVERITY, DURATION, TIMING, CONTEXT, MODIFYING FACTORS, ASSOCIATED SIGNS AND SYMPTOMS)
received pt in [facility] by security for contraband. pt is ao stating he is hearing voices telling him to kill himself or to kill others. pt states last used drugs a week ago admits to drinking yesterday. pt yelling at this rn angry stating you don't believe me I want to kill myself.

## 2021-12-17 NOTE — ED BEHAVIORAL HEALTH ASSESSMENT NOTE - SUMMARY
Patient is a 51 year old male who is unemployed, living with sister, with a past psych history of Schizoaffective disorder followed by ACT team also with a history of cocaine use disorder and multiple ER visits with substance induced psychosis who was BIBA activated by self for auditory hallucinations   Patient seen and expressing worsening auditory hallucinations to hurt self.     PLAN: Admit on a voluntary status to inpatient psychiatry.

## 2021-12-17 NOTE — ED ADULT NURSE REASSESSMENT NOTE - NS ED NURSE REASSESS COMMENT FT1
Assumed care of patient. Pt sleeping nad noted.  v/s taken and chart no c/o will monitor and chart changes and maintain safe environment

## 2021-12-17 NOTE — ED ADULT NURSE REASSESSMENT NOTE - NS ED NURSE REASSESS COMMENT FT1
patient requesting food.  given and tolerated well patient informed on need for urine sample and cup left with patient.

## 2021-12-17 NOTE — ED BEHAVIORAL HEALTH ASSESSMENT NOTE - NSBHROSSYSTEMS_PSY_ALL_CORE
Outcome Facilitation Team (OFT) round progress note     Patient: Florencia Pacheco, 89 year old       LOS: 4 day(s)  Hours in Observation:     Living situation : Living Arrangements: Other (Comment)  Type of residence : Type of Residence: Group home  Payor: MEDICARE / Plan: PARTA AND B / Product Type: MEDICARE    Needs Assessment:   IV fluids/rate appropriate - Yes  IV antibiotics required - No  Telemetry needs - Telemetry: Off (05/18/18 2333)  Therapy needs - Yes baseline w/c bound   Last bowel movement - Stool Occurrence: 1 (small soft ) (05/22/18 1200),    Centeno present - Additional Parameters: Urinary catheter (05/22/18 0800) acute   Isolation - No active isolations  Adequate pain control - Yes    LACE(10 or more):High            4 LACE Length of Stay    3 LACE Acute Admission    3 LACE Charlson Comorbidity Index Evalution    3 LACE Visits to ED Previous 6 Months          ISAR Elder Alert  Before the illness or injury that brought you to the Emergency, did you need someone to help you on a regular basis?: Yes (05/17/18 2321)  In the last 24 hours, have you needed more help than usual?: Yes (05/17/18 2321)  Have you been hospitalized for one or more nights during the past 6 months?: Yes (05/17/18 2321)  In general, do you have serious problems with your vision, that cannot be corrected with glasses?: No (05/17/18 2321)  In general, do you have serious problems with your memory?: Yes (05/17/18 2321)  Do you take six or more different medications every day?: Yes (05/17/18 2321)  ISAR Score: 5 (05/17/18 2321)    Barriers to discharge: need to complete IV antibiotics, pending procedure and not medically ready for discharge APOA, POD#4 , Hx Dementia, Received Unit blood 5/20, Palliative and nephrology following, poor appetite, O2 2L and none at baseline. Received another unit of blood 5/22, GI to see re blood in stool.     Next Steps: continue to monitor    Pharmacy discussion/recommendations (specify if  any):    Anticipated/Expected discharge      Destination at discharge: Sub-Acute:     Team members present: physician, nurse, , , pharmacist and nurse educator/nurse manager     Endocrine...

## 2021-12-17 NOTE — ED BEHAVIORAL HEALTH ASSESSMENT NOTE - DESCRIPTION
diabetes Vital Signs Last 24 Hrs  T(C): 36.7 (17 Dec 2021 16:15), Max: 37 (17 Dec 2021 03:17)  T(F): 98 (17 Dec 2021 16:15), Max: 98.6 (17 Dec 2021 03:17)  HR: 74 (17 Dec 2021 16:15) (74 - 92)  BP: 132/84 (17 Dec 2021 16:15) (112/67 - 134/86)  BP(mean): --  RR: 20 (17 Dec 2021 16:15) (16 - 20)  SpO2: 97% (17 Dec 2021 16:15) (95% - 98%) See HPI

## 2021-12-17 NOTE — ED PROVIDER NOTE - OBJECTIVE STATEMENT
51 yom w/ bipolar schizophrenia states he has been off his meds because he is homeless and is hearing voices telling him to kill himself.

## 2021-12-17 NOTE — ED BEHAVIORAL HEALTH ASSESSMENT NOTE - OTHER PAST PSYCHIATRIC HISTORY (INCLUDE DETAILS REGARDING ONSET, COURSE OF ILLNESS, INPATIENT/OUTPATIENT TREATMENT)
multiple psych admissions and ER visits for reports of SI last at [facility] 11/1/21  ACT team Federations   On AOT since last psych admission

## 2021-12-17 NOTE — ED PROVIDER NOTE - CLINICAL SUMMARY MEDICAL DECISION MAKING FREE TEXT BOX
50 yo M seen multiple times in the past, back with hallucination, suicidal ideation and homocidal ideation. Denies drugs use. will get blood work, ekg, psych eval.

## 2021-12-17 NOTE — ED ADULT TRIAGE NOTE - CHIEF COMPLAINT QUOTE
Pt. complaining of auditory hallucinations and wanting to hurt himself. Pt. states he hasn't take Pt. changed into yellow gown and belongings secured.

## 2021-12-17 NOTE — ED BEHAVIORAL HEALTH ASSESSMENT NOTE - CURRENT MEDICATION
noncompliant with all meds  Yesi Ludwig 89 last known due Monday Nov 29/2021  Depakote 1000 hs,   Risperdal 2 mg hs,  trazodone 100 hs  Metformin 1000 qd

## 2021-12-17 NOTE — ED PROVIDER NOTE - NS ED ROS FT
Review of Systems  •	CONSTITUTIONAL - no  fever, no diaphoresis, no weight change  •	SKIN - no rash  •	HEMATOLOGIC - no bleeding, no bruising  •	EYES - no eye pain, no blurred vision  •	ENT - no change in hearing, no pain  •	RESPIRATORY - no shortness of breath, no cough  •	CARDIAC - no chest pain, no palpitations  •	GI - no abd pain, no nausea, no vomiting, no diarrhea, no constipation, no bleeding  •	GENITO-URINARY - no discharge, no dysuria; no hematuria,   •	ENDO - no polydipsia, no polyuria, no heat/no cold intolerance  •	MUSCULOSKELETAL - no joint pain, no swelling, no redness  •	NEUROLOGIC - no weakness, no headache, no anesthesia, no paresthesias  •	PSYCH - no anxiety, (+)  suicidal, (+) homicidal, (+) hallucination, no depression

## 2021-12-18 LAB
AMPHET UR-MCNC: NEGATIVE — SIGNIFICANT CHANGE UP
APPEARANCE UR: CLEAR — SIGNIFICANT CHANGE UP
BARBITURATES UR SCN-MCNC: NEGATIVE — SIGNIFICANT CHANGE UP
BENZODIAZ UR-MCNC: NEGATIVE — SIGNIFICANT CHANGE UP
BILIRUB UR-MCNC: NEGATIVE — SIGNIFICANT CHANGE UP
COCAINE METAB.OTHER UR-MCNC: POSITIVE
COLOR SPEC: YELLOW — SIGNIFICANT CHANGE UP
DIFF PNL FLD: NEGATIVE — SIGNIFICANT CHANGE UP
GLUCOSE UR QL: 250 MG/DL
KETONES UR-MCNC: ABNORMAL
LEUKOCYTE ESTERASE UR-ACNC: NEGATIVE — SIGNIFICANT CHANGE UP
METHADONE UR-MCNC: NEGATIVE — SIGNIFICANT CHANGE UP
NITRITE UR-MCNC: NEGATIVE — SIGNIFICANT CHANGE UP
OPIATES UR-MCNC: NEGATIVE — SIGNIFICANT CHANGE UP
PCP SPEC-MCNC: SIGNIFICANT CHANGE UP
PCP UR-MCNC: NEGATIVE — SIGNIFICANT CHANGE UP
PH UR: 7 — SIGNIFICANT CHANGE UP (ref 5–8)
PROT UR-MCNC: NEGATIVE — SIGNIFICANT CHANGE UP
SP GR SPEC: 1.01 — SIGNIFICANT CHANGE UP (ref 1.01–1.02)
THC UR QL: NEGATIVE — SIGNIFICANT CHANGE UP
UROBILINOGEN FLD QL: NEGATIVE MG/DL — SIGNIFICANT CHANGE UP

## 2021-12-18 PROCEDURE — 36415 COLL VENOUS BLD VENIPUNCTURE: CPT

## 2021-12-18 PROCEDURE — 86703 HIV-1/HIV-2 1 RESULT ANTBDY: CPT

## 2021-12-18 PROCEDURE — 90792 PSYCH DIAG EVAL W/MED SRVCS: CPT

## 2021-12-18 PROCEDURE — 80053 COMPREHEN METABOLIC PANEL: CPT

## 2021-12-18 PROCEDURE — 99218: CPT | Mod: 25

## 2021-12-18 PROCEDURE — G0378: CPT

## 2021-12-18 PROCEDURE — 80307 DRUG TEST PRSMV CHEM ANLYZR: CPT

## 2021-12-18 PROCEDURE — 93005 ELECTROCARDIOGRAM TRACING: CPT

## 2021-12-18 PROCEDURE — U0005: CPT

## 2021-12-18 PROCEDURE — 99285 EMERGENCY DEPT VISIT HI MDM: CPT

## 2021-12-18 PROCEDURE — U0003: CPT

## 2021-12-18 PROCEDURE — 85025 COMPLETE CBC W/AUTO DIFF WBC: CPT

## 2021-12-18 PROCEDURE — 81003 URINALYSIS AUTO W/O SCOPE: CPT

## 2021-12-18 RX ORDER — RISPERIDONE 4 MG/1
1 TABLET ORAL ONCE
Refills: 0 | Status: COMPLETED | OUTPATIENT
Start: 2021-12-18 | End: 2021-12-18

## 2021-12-18 RX ORDER — TRAZODONE HCL 50 MG
150 TABLET ORAL AT BEDTIME
Refills: 0 | Status: DISCONTINUED | OUTPATIENT
Start: 2021-12-18 | End: 2021-12-21

## 2021-12-18 RX ADMIN — Medication 150 MILLIGRAM(S): at 22:33

## 2021-12-18 RX ADMIN — RISPERIDONE 1 MILLIGRAM(S): 4 TABLET ORAL at 18:20

## 2021-12-18 NOTE — ED ADULT NURSE REASSESSMENT NOTE - NS ED NURSE REASSESS COMMENT FT1
Assumed care of patient. patient alert and cooperative requesting food and po fluids.  Pt out of bed to bathroom with steady gait.  No c/o will monitor and chart changes and maintain safe environment

## 2021-12-18 NOTE — ED ADULT NURSE REASSESSMENT NOTE - NS ED NURSE REASSESS COMMENT FT1
patient medicated with trazodone as ordered and requesting food tray and juice which was provided.  Will monitor

## 2021-12-18 NOTE — CHART NOTE - NSCHARTNOTEFT_GEN_A_CORE
As per MD, patient is medically stable for discharge. NOELLE placed calls to [facility] and spoke with Bonnie- there are no available beds. NOELLE spoke with Clemente from , Cristel from [facility], and Sarah from [facility]- there are no available beds. NOELLE continues to follow for placement.
Brian; pt for voluntary transfer . No bed available at  [facility](Juliana) or [facility] (BRYNN Martin) this evening. SW to follow.
NOELLE Note: SW alerted by  provider pt is recc. for trx on 9.13 basis, no beds at [facility] or [facility],  following

## 2021-12-19 VITALS
RESPIRATION RATE: 18 BRPM | TEMPERATURE: 98 F | DIASTOLIC BLOOD PRESSURE: 86 MMHG | OXYGEN SATURATION: 100 % | HEART RATE: 85 BPM | SYSTOLIC BLOOD PRESSURE: 132 MMHG

## 2021-12-19 PROCEDURE — 99217: CPT | Mod: 25

## 2021-12-19 PROCEDURE — 99213 OFFICE O/P EST LOW 20 MIN: CPT

## 2021-12-19 NOTE — ED BEHAVIORAL HEALTH NOTE - BEHAVIORAL HEALTH NOTE
Patient is a 51 year old male who is unemployed, living with sister, with a past psych history of Schizoaffective disorder followed by ACT team also with a history of cocaine use disorder and multiple ER visits with substance induced psychosis who was BIBA activated by self for auditory hallucinations.    On current evaluation, patient reports he is "feeling better", has not had any AH since last night, since resuming his psychotropic medications.  He reports he not longer wants to be psychiatrically admitted and would like to leave and go home to his sister's house and is requesting bus tokens for transportation.  He does not present internally preoccupied.  Patient has not been aggressive or agitated.  He has been calm, compliant and cooperative, eating and sleeping without difficulty.   He did not appear to be responding to internal stimuli.  He reported that he plans to continue medications and follow up with his ACT team.  Concerning other psychiatric symptoms, pt denies depressed mood, and  reports he continues to enjoy  pleasurable activities. Pt denies any suicidal ideation, intent or plan as well as any aggressive or violent behavior towards others. Pt denies any episodes of bizarre happiness, unusual energy, unusual nighttime excitation or other common symptoms of aron. Pt denies hearing voices or seeing things.  No delusions were elicited.  Patient denies pervasive anxiety,  panic attacks, obsessions or compulsions.     Vital Signs Last 24 Hrs  T(C): 36.8 (18 Dec 2021 15:35), Max: 36.8 (18 Dec 2021 12:03)  T(F): 98.2 (18 Dec 2021 15:35), Max: 98.3 (18 Dec 2021 12:03)  HR: 76 (18 Dec 2021 15:35) (75 - 76)  BP: 129/70 (18 Dec 2021 15:35) (126/86 - 129/70)  BP(mean): --  RR: 18 (18 Dec 2021 15:35) (18 - 18)  SpO2: 98% (18 Dec 2021 15:35) (96% - 98%)      MEDICATIONS:      PRN Medications:  • PRN Medications since last evaluation	  • PRN Details	    Current Medications:   diVALproex  milliGRAM(s) Oral two times a day  LORazepam     Tablet 2 milliGRAM(s) Oral every 6 hours PRN  metFORMIN 500 milliGRAM(s) Oral two times a day  risperiDONE   Tablet 1 milliGRAM(s) Oral at bedtime  traZODone 50 milliGRAM(s) Oral at bedtime PRN     Medication Side Effects:  • Medication Side Effects or Adverse Reactions (new or ongoing)	None known    MENTAL STATUS EXAM:   • Level of Consciousness	Alert  • General Appearance	Well developed  • Body Habitus	Well nourished  • Hygiene	Good  • Grooming	Good  • Behavior	Cooperative  • Eye Contact	Good  • Relatedness	Good  • Impulse Control	Normal  • Muscle Tone / Strength	Normal muscle tone/strength  • Abnormal Movements	No abnormal movements  • Gait / Station	Normal gait / station  • Speech Volume	Normal  • Speech Rate	Normal  • Speech Spontaneity	Normal  • Speech Articulation	Normal  • Mood	Normal  • Affect Quality	Euthymic  • Affect Range	Full  • Affect Congruence	Congruent  • Thought Process	Linear  • Thought Associations	Normal  • Thought Content	Unremarkable  • Perceptions	No abnormalities  • Oriented to Time	Yes  • Oriented to Place	Yes  • Oriented to Situation	Yes  • Oriented to Person	Yes  • Attention / Concentration	Normal  • Estimated Intelligence	Average  • Recent Memory	Normal  • Remote Memory	Normal  • Fund of Knowledge	Normal  • Language	No abnormalities noted  • Judgment (regarding everyday events)	Fair  • Insight (regarding psychiatric illness)	Fair    SUICIDALITY:   • Suicidality (Interval)	none known    HOMICIDALITY/AGGRESSION:   • Homicidality/Aggression	none known    DIAGNOSIS DSM-V:    Psychiatric Diagnosis (Corresponds to DSM-IV Axis I, II):   HEALTH ISSUES - PROBLEM Dx:           Medical Diagnosis (Corresponds to DSM-IV Axis III):  • Axis III	DM       ASSESSMENT OF CURRENT CONDITION:   Summary (include case differential, formulation and patient response to therapy):     Patient is a 50 y/o M with history of schizoaffective disorder (bipolar type), DM, alcohol and cocaine use abuse, prior suicide attempt self reported, who presents with complaint of auditory hallucinations and suicidal ideation.    Patient seen and evaluated for follow up and presents calm, cooperative and denying any s/h ideation. Patient observed in ED and has been denying any s/h ideation, found to be calm, cooperative, eating and sleeping well, with no PRNs needed and requesting Discharge.  No acute psychiatric symptoms were elicited. Patient to be discharged with a plan to follow up with his ACT team in the AM .

## 2021-12-19 NOTE — ED CDU PROVIDER DISPOSITION NOTE - PATIENT PORTAL LINK FT
You can access the FollowMyHealth Patient Portal offered by [facility] by registering at the following website: [email or web address]. By joining [employer]’s FollowMyHealth portal, you will also be able to view your health information using other applications (apps) compatible with our system.

## 2021-12-19 NOTE — ED CDU PROVIDER DISPOSITION NOTE - CLINICAL COURSE
51 yom w/ bipolar schizophrenia states he has been off his meds because he is homeless and is hearing voices telling him to kill himself. patient received medication during his 30 hr stay and report feeling better. patient cleared by psych to be discharged.

## 2021-12-23 ENCOUNTER — EMERGENCY (EMERGENCY)
Facility: HOSPITAL | Age: 51
LOS: 1 days | Discharge: HOME | End: 2021-12-23
Attending: EMERGENCY MEDICINE
Payer: MEDICAID

## 2021-12-23 VITALS
RESPIRATION RATE: 16 BRPM | HEIGHT: 66 IN | OXYGEN SATURATION: 96 % | TEMPERATURE: 97 F | DIASTOLIC BLOOD PRESSURE: 84 MMHG | SYSTOLIC BLOOD PRESSURE: 132 MMHG | HEART RATE: 82 BPM

## 2021-12-23 DIAGNOSIS — F25.9 SCHIZOAFFECTIVE DISORDER, UNSPECIFIED: ICD-10-CM

## 2021-12-23 LAB
ANION GAP SERPL CALC-SCNC: 13 MMOL/L — SIGNIFICANT CHANGE UP (ref 5–17)
APAP SERPL-MCNC: <3 UG/ML — LOW (ref 10–26)
BUN SERPL-MCNC: 12.7 MG/DL — SIGNIFICANT CHANGE UP (ref 8–20)
CALCIUM SERPL-MCNC: 8.9 MG/DL — SIGNIFICANT CHANGE UP (ref 8.6–10.2)
CHLORIDE SERPL-SCNC: 99 MMOL/L — SIGNIFICANT CHANGE UP (ref 98–107)
CO2 SERPL-SCNC: 27 MMOL/L — SIGNIFICANT CHANGE UP (ref 22–29)
CREAT SERPL-MCNC: 0.58 MG/DL — SIGNIFICANT CHANGE UP (ref 0.5–1.3)
ETHANOL SERPL-MCNC: <10 MG/DL — SIGNIFICANT CHANGE UP (ref 0–9)
GLUCOSE SERPL-MCNC: 131 MG/DL — HIGH (ref 70–99)
HCT VFR BLD CALC: 44.8 % — SIGNIFICANT CHANGE UP (ref 39–50)
HGB BLD-MCNC: 15 G/DL — SIGNIFICANT CHANGE UP (ref 13–17)
MCHC RBC-ENTMCNC: 29.7 PG — SIGNIFICANT CHANGE UP (ref 27–34)
MCHC RBC-ENTMCNC: 33.5 GM/DL — SIGNIFICANT CHANGE UP (ref 32–36)
MCV RBC AUTO: 88.7 FL — SIGNIFICANT CHANGE UP (ref 80–100)
PLATELET # BLD AUTO: 330 K/UL — SIGNIFICANT CHANGE UP (ref 150–400)
POTASSIUM SERPL-MCNC: 4 MMOL/L — SIGNIFICANT CHANGE UP (ref 3.5–5.3)
POTASSIUM SERPL-SCNC: 4 MMOL/L — SIGNIFICANT CHANGE UP (ref 3.5–5.3)
RBC # BLD: 5.05 M/UL — SIGNIFICANT CHANGE UP (ref 4.2–5.8)
RBC # FLD: 11.9 % — SIGNIFICANT CHANGE UP (ref 10.3–14.5)
SALICYLATES SERPL-MCNC: <0.6 MG/DL — LOW (ref 10–20)
SARS-COV-2 RNA SPEC QL NAA+PROBE: SIGNIFICANT CHANGE UP
SODIUM SERPL-SCNC: 139 MMOL/L — SIGNIFICANT CHANGE UP (ref 135–145)
WBC # BLD: 9.13 K/UL — SIGNIFICANT CHANGE UP (ref 3.8–10.5)
WBC # FLD AUTO: 9.13 K/UL — SIGNIFICANT CHANGE UP (ref 3.8–10.5)

## 2021-12-23 PROCEDURE — 99220: CPT

## 2021-12-23 PROCEDURE — 90792 PSYCH DIAG EVAL W/MED SRVCS: CPT

## 2021-12-23 RX ORDER — DIVALPROEX SODIUM 500 MG/1
500 TABLET, DELAYED RELEASE ORAL
Refills: 0 | Status: DISCONTINUED | OUTPATIENT
Start: 2021-12-23 | End: 2021-12-27

## 2021-12-23 RX ORDER — TRAZODONE HCL 50 MG
50 TABLET ORAL AT BEDTIME
Refills: 0 | Status: DISCONTINUED | OUTPATIENT
Start: 2021-12-23 | End: 2021-12-27

## 2021-12-23 RX ORDER — RISPERIDONE 4 MG/1
1 TABLET ORAL
Refills: 0 | Status: DISCONTINUED | OUTPATIENT
Start: 2021-12-23 | End: 2021-12-27

## 2021-12-23 RX ORDER — METFORMIN HYDROCHLORIDE 850 MG/1
500 TABLET ORAL
Refills: 0 | Status: DISCONTINUED | OUTPATIENT
Start: 2021-12-23 | End: 2021-12-27

## 2021-12-23 RX ADMIN — METFORMIN HYDROCHLORIDE 500 MILLIGRAM(S): 850 TABLET ORAL at 17:37

## 2021-12-23 RX ADMIN — RISPERIDONE 1 MILLIGRAM(S): 4 TABLET ORAL at 17:37

## 2021-12-23 RX ADMIN — DIVALPROEX SODIUM 500 MILLIGRAM(S): 500 TABLET, DELAYED RELEASE ORAL at 17:37

## 2021-12-23 NOTE — ED ADULT TRIAGE NOTE - CHIEF COMPLAINT QUOTE
Ambulatory BIBEMS for not taking his psych meds even when they are given to him by outpatient facility, reports that "it's cold out." [place] for approx 1 year. Reports hearing voices and having suicidal ideations, denies visual hallucinations, HI, drugs or alcohol today. Belongings secured, to be brought to .

## 2021-12-23 NOTE — ED PROVIDER NOTE - IV ALTEPLASE INCLUSION HIDDEN
Quality 110: Preventive Care And Screening: Influenza Immunization: Influenza Immunization previously received during influenza season Quality 431: Preventive Care And Screening: Unhealthy Alcohol Use - Screening: Unhealthy alcohol use screening not performed, reason not otherwise specified Detail Level: Detailed Quality 226: Preventive Care And Screening: Tobacco Use: Screening And Cessation Intervention: Tobacco Screening not Performed for Unknown Reasons Quality 130: Documentation Of Current Medications In The Medical Record: Current Medications Documented show

## 2021-12-23 NOTE — ED PROVIDER NOTE - OBJECTIVE STATEMENT
pt with h/o schizophrenia off meds entire year says ACT team he follows with has covid . no headache no chest pain no sob no abd pain no signs trauma + SI

## 2021-12-23 NOTE — ED ADULT NURSE NOTE - HPI (INCLUDE ILLNESS QUALITY, SEVERITY, DURATION, TIMING, CONTEXT, MODIFYING FACTORS, ASSOCIATED SIGNS AND SYMPTOMS)
received pt in [facility] by security rhona baer.  pt is ao. he states he is off his medication and is hearing voices.  then asking if there is any food and juice.  stating I am hungry.

## 2021-12-23 NOTE — SBIRT NOTE ADULT - NSSBIRTUNABLESCROTHER_GEN_A_CORE
Meeting with patient attempted however Full Screen Not Performed due to  ED Physician's Order: Consult - Psychiatry, Adult.

## 2021-12-23 NOTE — ED BEHAVIORAL HEALTH ASSESSMENT NOTE - OTHER PAST PSYCHIATRIC HISTORY (INCLUDE DETAILS REGARDING ONSET, COURSE OF ILLNESS, INPATIENT/OUTPATIENT TREATMENT)
multiple psych admissions and ER visits for reports of SI last at [facility] 11/1/21  ACT team Federations   On AOT since last psych admission

## 2021-12-23 NOTE — ED ADULT NURSE REASSESSMENT NOTE - NS ED NURSE REASSESS COMMENT FT1
Assumed care of patient. Pt alert and cooperative.  Pt resting requesting multiple things to eat. Pt states that he eat a lot when he is depressed.  V/S taken and chart.  Will monitor and chart changes and maintain safe environment
Patient has been in bed and sleeping most of the day, up for meals and to request medications. Assessed by MD, endorses need for inpatient hospitalization to "get back on my meds". Cooperative with staff, ate well at meals and accepted meds as ordered. No distress evident.

## 2021-12-23 NOTE — ED ADULT TRIAGE NOTE - NS ED NURSE DIRECT TO ROOM YN
Chief Complaint   Patient presents with    Surgical Follow-up    UPJ Obstruction    Urinary Retention    Enterovesical Fistula       1. Have you been to the ER, urgent care clinic since your last visit? Hospitalized since your last visit? No    2. Have you seen or consulted any other health care providers outside of the [address] [place] [address] since your last visit? Include any pap smears or colon screening.  No    Visit Vitals  BP (!) 115/59 (BP 1 Location: Left upper arm, BP Patient Position: Sitting, BP Cuff Size: Adult)   Pulse 88   Temp 97.7 °F (36.5 °C) (Temporal)   Resp 12   Ht 5' 4\" (1.626 m)   Wt 113 lb (51.3 kg)   LMP  (LMP Unknown)   SpO2 99%   BMI 19.40 kg/m² Yes

## 2021-12-23 NOTE — CHART NOTE - NSCHARTNOTEFT_GEN_A_CORE
NOELLE Note: Pt in need of inpt psych trx on 9.13 basis, presently no beds at [facility] or [facility], SW following
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
## 2021-12-23 NOTE — ED BEHAVIORAL HEALTH ASSESSMENT NOTE - RISK ASSESSMENT
High Acute Suicide Risk RF past suicide attempt, noncompliance, drug abuse  PF stable housing, no recent suicide attempt since 1992

## 2021-12-23 NOTE — ED BEHAVIORAL HEALTH ASSESSMENT NOTE - DESCRIPTION
See HPI T(C): 36.9 (12-23-21 @ 15:30), Max: 37.1 (12-23-21 @ 11:02)  T(F): 98.4 (12-23-21 @ 15:30), Max: 98.7 (12-23-21 @ 11:02)  HR: 81 (12-23-21 @ 15:30) (76 - 95)  BP: 111/67 (12-23-21 @ 15:30) (89/- - 159/69)  RR: 18 (12-23-21 @ 15:30) (16 - 20)  SpO2: 97% (12-23-21 @ 15:30) (96% - 98%) diabetes

## 2021-12-24 VITALS
DIASTOLIC BLOOD PRESSURE: 78 MMHG | TEMPERATURE: 98 F | OXYGEN SATURATION: 98 % | RESPIRATION RATE: 18 BRPM | HEART RATE: 86 BPM | SYSTOLIC BLOOD PRESSURE: 132 MMHG

## 2021-12-24 LAB
AMPHET UR-MCNC: NEGATIVE — SIGNIFICANT CHANGE UP
APPEARANCE UR: CLEAR — SIGNIFICANT CHANGE UP
BARBITURATES UR SCN-MCNC: NEGATIVE — SIGNIFICANT CHANGE UP
BENZODIAZ UR-MCNC: NEGATIVE — SIGNIFICANT CHANGE UP
BILIRUB UR-MCNC: NEGATIVE — SIGNIFICANT CHANGE UP
COCAINE METAB.OTHER UR-MCNC: POSITIVE
COLOR SPEC: YELLOW — SIGNIFICANT CHANGE UP
DIFF PNL FLD: NEGATIVE — SIGNIFICANT CHANGE UP
GLUCOSE UR QL: 1000 MG/DL
KETONES UR-MCNC: ABNORMAL
LEUKOCYTE ESTERASE UR-ACNC: NEGATIVE — SIGNIFICANT CHANGE UP
METHADONE UR-MCNC: NEGATIVE — SIGNIFICANT CHANGE UP
NITRITE UR-MCNC: NEGATIVE — SIGNIFICANT CHANGE UP
OPIATES UR-MCNC: NEGATIVE — SIGNIFICANT CHANGE UP
PCP SPEC-MCNC: SIGNIFICANT CHANGE UP
PCP UR-MCNC: NEGATIVE — SIGNIFICANT CHANGE UP
PH UR: 7 — SIGNIFICANT CHANGE UP (ref 5–8)
PROT UR-MCNC: NEGATIVE MG/DL — SIGNIFICANT CHANGE UP
SP GR SPEC: 1.01 — SIGNIFICANT CHANGE UP (ref 1.01–1.02)
THC UR QL: NEGATIVE — SIGNIFICANT CHANGE UP
UROBILINOGEN FLD QL: NEGATIVE MG/DL — SIGNIFICANT CHANGE UP

## 2021-12-24 PROCEDURE — 80307 DRUG TEST PRSMV CHEM ANLYZR: CPT

## 2021-12-24 PROCEDURE — U0005: CPT

## 2021-12-24 PROCEDURE — 81003 URINALYSIS AUTO W/O SCOPE: CPT

## 2021-12-24 PROCEDURE — G0378: CPT

## 2021-12-24 PROCEDURE — 80048 BASIC METABOLIC PNL TOTAL CA: CPT

## 2021-12-24 PROCEDURE — 99285 EMERGENCY DEPT VISIT HI MDM: CPT

## 2021-12-24 PROCEDURE — U0003: CPT

## 2021-12-24 PROCEDURE — 99217: CPT

## 2021-12-24 PROCEDURE — 85027 COMPLETE CBC AUTOMATED: CPT

## 2021-12-24 PROCEDURE — 36415 COLL VENOUS BLD VENIPUNCTURE: CPT

## 2021-12-24 RX ADMIN — Medication 50 MILLIGRAM(S): at 01:01

## 2021-12-24 RX ADMIN — METFORMIN HYDROCHLORIDE 500 MILLIGRAM(S): 850 TABLET ORAL at 06:58

## 2021-12-24 RX ADMIN — RISPERIDONE 1 MILLIGRAM(S): 4 TABLET ORAL at 06:57

## 2021-12-24 RX ADMIN — DIVALPROEX SODIUM 500 MILLIGRAM(S): 500 TABLET, DELAYED RELEASE ORAL at 06:58

## 2021-12-24 NOTE — ED CDU PROVIDER DISPOSITION NOTE - CLINICAL COURSE
oral h/o schizophrenia off meds entire year says ACT team he follows with has covid . no headache no chest pain no sob no abd pain no signs trauma + SI. seen by psych and required inpatient tx.

## 2021-12-24 NOTE — CHART NOTE - NSCHARTNOTEFT_GEN_A_CORE
BEHAVIORAL HEALTH SOCIAL WORK NOTE: SPOKE WITH SJ AT [facility] WHOM IS ACCEPTING PATIENT ON VOLUNTARY STATUS TODAY AFTER REVIEWING SENT EKG.  MET WITH PATIENT TO MAKE HIM AWARE. PATIENT IN AGREEMENT TO BE TRANSFERRED AND SIGNED HIS VOLUNTARY LEGALS. AMBULANCE ARRANGED THROUGH [facility] FOR ASAP PICKUP AND RN REPORTS COMPLETED WITH EMS. TRANSPORT FORM LEFT WITH PATIENT FOR ANY FUTURE CONCERNS REGARDING TRANSPORT COVERAGE IF NEEDED.

## 2022-01-01 NOTE — ED BEHAVIORAL HEALTH ASSESSMENT NOTE - CURRENT MEDICATION
Called to Delivery by Ob for FHR Tracing. This is a 39 and 2/7 week male born to a 32 y/o , A+, prenatal labs neg, MR, Immune GBS pos ( in urine ) now  treated with amp x 6 doses, and covid neg via . Maternal history of Thrombocytopenia and HPV. Mother presented in early labor with contractions and admitted for IOL with GDMA1. SROM at 05:15 on  with clear to bloody fluids followed by AROM at 07:55 with bloody fluids. Nuchal x 1 and terminal mec noted at delivery. Infant emerged with poor to fair color, tone, and good cry. W,D,S,S. Color improved. Pulse ox placed and saturations within normal parameters for MOL with 94 % at 8 MOL. Apgars 8,9. EOS 0.45, Mother desires hep B, circ, and breast/bottle feeding. Peds Feldman.    Since admission to the NBN, baby has been feeding well, stooling and making wet diapers. Vitals have remained stable. Baby received routine NBN care. The baby lost an acceptable amount of weight during the nursery stay, down __% from birth weight.  Bilirubin was __ at __ hours of life, which is in the __ risk zone, __ below the light threshold.  See below for CCHD, auditory screening, and Hepatitis B vaccine status.  Patient is stable for discharge to home after receiving routine  care education and instructions to follow up with pediatrician appointment in 1-2 days.  Called to Delivery by Ob for FHR Tracing. This is a 39 and 2/7 week male born to a 34 y/o , A+, prenatal labs neg, MR, Immune GBS pos ( in urine ) now  treated with amp x 6 doses, and covid neg via . Maternal history of Thrombocytopenia and HPV. Mother presented in early labor with contractions and admitted for IOL with GDMA1. SROM at 05:15 on  with clear to bloody fluids followed by AROM at 07:55 with bloody fluids. Nuchal x 1 and terminal mec noted at delivery. Infant emerged with poor to fair color, tone, and good cry. W,D,S,S. Color improved. Pulse ox placed and saturations within normal parameters for MOL with 94 % at 8 MOL. Apgars 8,9. EOS 0.45, Mother desires hep B, circ, and breast/bottle feeding. Peds Feldman.    Since admission to the NBN, baby has been feeding well, stooling and making wet diapers. Vitals have remained stable. Baby received routine NBN care. Baby received 6 hours of phototherapy for HIR bili. Bilirubin was 10.0 at 48 hours of life, which is in the LIR risk zone, 5 below the light threshold, phototherapy was discontinued. The baby lost an acceptable amount of weight during the nursery stay, down -3.87% from birth weight.    See below for CCHD, auditory screening, and Hepatitis B vaccine status.  Patient is stable for discharge to home after receiving routine  care education and instructions to follow up with pediatrician appointment in 1-2 days.  Metformin, Depakote, Trazadone. pt refused to answer current dose Called to Delivery by Ob for FHR Tracing. This is a 39 and 2/7 week male born to a 34 y/o , A+, prenatal labs neg, MR, Immune GBS pos ( in urine ) now  treated with amp x 6 doses, and covid neg via . Maternal history of Thrombocytopenia and HPV. Mother presented in early labor with contractions and admitted for IOL with GDMA1. SROM at 05:15 on  with clear to bloody fluids followed by AROM at 07:55 with bloody fluids. Nuchal x 1 and terminal mec noted at delivery. Infant emerged with poor to fair color, tone, and good cry. W,D,S,S. Color improved. Pulse ox placed and saturations within normal parameters for MOL with 94 % at 8 MOL. Apgars 8,9. EOS 0.45, Mother desires hep B, circ, and breast/bottle feeding. Peds Feldman.    Since admission to the NBN, baby has been feeding well, stooling and making wet diapers. Vitals have remained stable. Baby received routine NBN care. Baby received 6 hours of phototherapy for HIR bili. Bilirubin was 10.0 at 48 hours of life, which is in the LIR risk zone, 5 below the light threshold, phototherapy was discontinued. The baby lost an acceptable amount of weight during the nursery stay, down -3.87% from birth weight.    See below for CCHD, auditory screening, and Hepatitis B vaccine status.  Patient is stable for discharge to home after receiving routine  care education and instructions to follow up with pediatrician appointment in 1-2 days.    Pediatric Attending Addendum:  I have read and agree with above PGY1 Discharge Note, which I have edited as appropriate.  Please see above weight and bilirubin, and follow up plans.    Discharge Exam:  GEN: NAD, alert, active  HEENT: MMM, AFOF, RR +b/l  CV: nml S1/S2, RRR, no murmur noted, 2+ fem pulses, <2 sec CR in toes  LUNGS: CTAB w nml WOB  Abd: s/nt/nd +bs no hsm  umb c/d/i  : T1 normal male, circ healing well, testes down b/l  Neuro: +grasp/suck/devin  Ext: neg B/O  Skin: no rash, no significant jaundice    I have answered parents' questions and reviewed  care, which has been discussed in detail throughout the  hospitalization.  Today we discussed weight loss, feeding (breastfeeding +/- formula feeding), and reviewed signs of adequate hydration.  Had phototherapy x 6hrs - dc'ed on  at 11am.    I reviewed results of screening tests done in the hospital, including bilirubin level (signs of worsening jaundice), CCHD, and hearing test results.  I discussed  screening test and that test results would be available in 1-2 weeks at the PMD office.  Answered parents' questions.     I have spent 32 minutes on direct patient care and discharge planning.    Discharge note will be faxed to appropriate outpatient pediatrician.    Tao Baer MD

## 2022-01-02 NOTE — ED ADULT TRIAGE NOTE - MODE OF ARRIVAL
REFLEXIVE URINE CULTURE 10,000 - 50,000 CFU/mL Mixed bacterial rojas with no predominating type         cephalexin (KEFLEX) 250 MG/5ML suspension 100 mL 0 12/30/2021 1/4/2022     Cell Phone:       Telephone Information:   Mobile [phone]     Okay to leave a message containing results? Yes    Results relayed. Patients mother verbalized understanding of lab findings and plan of care. All questions answered, encouraged to follow up with PCP or UC if symptoms do not improve. Writer emphasized importance of seeking emergency care if CP or SOB arise.  
EMS

## 2022-01-08 ENCOUNTER — EMERGENCY (EMERGENCY)
Facility: HOSPITAL | Age: 52
LOS: 1 days | Discharge: HOME | End: 2022-01-08
Attending: EMERGENCY MEDICINE
Payer: MEDICAID

## 2022-01-08 VITALS
OXYGEN SATURATION: 95 % | TEMPERATURE: 98 F | HEART RATE: 88 BPM | WEIGHT: 199.96 LBS | HEIGHT: 66 IN | DIASTOLIC BLOOD PRESSURE: 93 MMHG | RESPIRATION RATE: 18 BRPM | SYSTOLIC BLOOD PRESSURE: 156 MMHG

## 2022-01-08 DIAGNOSIS — F25.9 SCHIZOAFFECTIVE DISORDER, UNSPECIFIED: ICD-10-CM

## 2022-01-08 DIAGNOSIS — F19.10 OTHER PSYCHOACTIVE SUBSTANCE ABUSE, UNCOMPLICATED: ICD-10-CM

## 2022-01-08 LAB
ALBUMIN SERPL ELPH-MCNC: 4.3 G/DL — SIGNIFICANT CHANGE UP (ref 3.3–5.2)
ALP SERPL-CCNC: 67 U/L — SIGNIFICANT CHANGE UP (ref 40–120)
ALT FLD-CCNC: 13 U/L — SIGNIFICANT CHANGE UP
AMPHET UR-MCNC: NEGATIVE — SIGNIFICANT CHANGE UP
ANION GAP SERPL CALC-SCNC: 15 MMOL/L — SIGNIFICANT CHANGE UP (ref 5–17)
APPEARANCE UR: CLEAR — SIGNIFICANT CHANGE UP
AST SERPL-CCNC: 24 U/L — SIGNIFICANT CHANGE UP
BARBITURATES UR SCN-MCNC: NEGATIVE — SIGNIFICANT CHANGE UP
BASOPHILS # BLD AUTO: 0.03 K/UL — SIGNIFICANT CHANGE UP (ref 0–0.2)
BASOPHILS NFR BLD AUTO: 0.5 % — SIGNIFICANT CHANGE UP (ref 0–2)
BENZODIAZ UR-MCNC: NEGATIVE — SIGNIFICANT CHANGE UP
BILIRUB SERPL-MCNC: 0.3 MG/DL — LOW (ref 0.4–2)
BILIRUB UR-MCNC: NEGATIVE — SIGNIFICANT CHANGE UP
BUN SERPL-MCNC: 12.1 MG/DL — SIGNIFICANT CHANGE UP (ref 8–20)
CALCIUM SERPL-MCNC: 8.7 MG/DL — SIGNIFICANT CHANGE UP (ref 8.6–10.2)
CHLORIDE SERPL-SCNC: 102 MMOL/L — SIGNIFICANT CHANGE UP (ref 98–107)
CO2 SERPL-SCNC: 21 MMOL/L — LOW (ref 22–29)
COCAINE METAB.OTHER UR-MCNC: POSITIVE
COLOR SPEC: YELLOW — SIGNIFICANT CHANGE UP
CREAT SERPL-MCNC: 0.66 MG/DL — SIGNIFICANT CHANGE UP (ref 0.5–1.3)
DIFF PNL FLD: NEGATIVE — SIGNIFICANT CHANGE UP
EOSINOPHIL # BLD AUTO: 0.03 K/UL — SIGNIFICANT CHANGE UP (ref 0–0.5)
EOSINOPHIL NFR BLD AUTO: 0.5 % — SIGNIFICANT CHANGE UP (ref 0–6)
ETHANOL SERPL-MCNC: <10 MG/DL — SIGNIFICANT CHANGE UP (ref 0–9)
GLUCOSE SERPL-MCNC: 98 MG/DL — SIGNIFICANT CHANGE UP (ref 70–99)
GLUCOSE UR QL: 1000 MG/DL
HCT VFR BLD CALC: 45.5 % — SIGNIFICANT CHANGE UP (ref 39–50)
HGB BLD-MCNC: 15.2 G/DL — SIGNIFICANT CHANGE UP (ref 13–17)
IMM GRANULOCYTES NFR BLD AUTO: 0.2 % — SIGNIFICANT CHANGE UP (ref 0–1.5)
KETONES UR-MCNC: ABNORMAL
LEUKOCYTE ESTERASE UR-ACNC: NEGATIVE — SIGNIFICANT CHANGE UP
LYMPHOCYTES # BLD AUTO: 3.02 K/UL — SIGNIFICANT CHANGE UP (ref 1–3.3)
LYMPHOCYTES # BLD AUTO: 54.2 % — HIGH (ref 13–44)
MANUAL SMEAR VERIFICATION: SIGNIFICANT CHANGE UP
MCHC RBC-ENTMCNC: 29.9 PG — SIGNIFICANT CHANGE UP (ref 27–34)
MCHC RBC-ENTMCNC: 33.4 GM/DL — SIGNIFICANT CHANGE UP (ref 32–36)
MCV RBC AUTO: 89.4 FL — SIGNIFICANT CHANGE UP (ref 80–100)
METHADONE UR-MCNC: NEGATIVE — SIGNIFICANT CHANGE UP
MONOCYTES # BLD AUTO: 0.76 K/UL — SIGNIFICANT CHANGE UP (ref 0–0.9)
MONOCYTES NFR BLD AUTO: 13.6 % — SIGNIFICANT CHANGE UP (ref 2–14)
NEUTROPHILS # BLD AUTO: 1.72 K/UL — LOW (ref 1.8–7.4)
NEUTROPHILS NFR BLD AUTO: 31 % — LOW (ref 43–77)
NITRITE UR-MCNC: NEGATIVE — SIGNIFICANT CHANGE UP
OPIATES UR-MCNC: NEGATIVE — SIGNIFICANT CHANGE UP
PCP SPEC-MCNC: SIGNIFICANT CHANGE UP
PCP UR-MCNC: NEGATIVE — SIGNIFICANT CHANGE UP
PH UR: 6 — SIGNIFICANT CHANGE UP (ref 5–8)
PLAT MORPH BLD: NORMAL — SIGNIFICANT CHANGE UP
PLATELET # BLD AUTO: 247 K/UL — SIGNIFICANT CHANGE UP (ref 150–400)
POTASSIUM SERPL-MCNC: 3.7 MMOL/L — SIGNIFICANT CHANGE UP (ref 3.5–5.3)
POTASSIUM SERPL-SCNC: 3.7 MMOL/L — SIGNIFICANT CHANGE UP (ref 3.5–5.3)
PROT SERPL-MCNC: 7.5 G/DL — SIGNIFICANT CHANGE UP (ref 6.6–8.7)
PROT UR-MCNC: NEGATIVE — SIGNIFICANT CHANGE UP
RBC # BLD: 5.09 M/UL — SIGNIFICANT CHANGE UP (ref 4.2–5.8)
RBC # FLD: 12.3 % — SIGNIFICANT CHANGE UP (ref 10.3–14.5)
RBC BLD AUTO: NORMAL — SIGNIFICANT CHANGE UP
SODIUM SERPL-SCNC: 138 MMOL/L — SIGNIFICANT CHANGE UP (ref 135–145)
SP GR SPEC: 1.02 — SIGNIFICANT CHANGE UP (ref 1.01–1.02)
THC UR QL: POSITIVE
UROBILINOGEN FLD QL: 1 MG/DL
VALPROATE SERPL-MCNC: <3.7 UG/ML — LOW (ref 50–100)
WBC # BLD: 5.57 K/UL — SIGNIFICANT CHANGE UP (ref 3.8–10.5)
WBC # FLD AUTO: 5.57 K/UL — SIGNIFICANT CHANGE UP (ref 3.8–10.5)

## 2022-01-08 PROCEDURE — 93005 ELECTROCARDIOGRAM TRACING: CPT

## 2022-01-08 PROCEDURE — 80053 COMPREHEN METABOLIC PANEL: CPT

## 2022-01-08 PROCEDURE — 99234 HOSP IP/OBS SM DT SF/LOW 45: CPT

## 2022-01-08 PROCEDURE — 36415 COLL VENOUS BLD VENIPUNCTURE: CPT

## 2022-01-08 PROCEDURE — 87637 SARSCOV2&INF A&B&RSV AMP PRB: CPT

## 2022-01-08 PROCEDURE — G0378: CPT

## 2022-01-08 PROCEDURE — 93010 ELECTROCARDIOGRAM REPORT: CPT

## 2022-01-08 PROCEDURE — 81003 URINALYSIS AUTO W/O SCOPE: CPT

## 2022-01-08 PROCEDURE — 90792 PSYCH DIAG EVAL W/MED SRVCS: CPT

## 2022-01-08 PROCEDURE — 80164 ASSAY DIPROPYLACETIC ACD TOT: CPT

## 2022-01-08 PROCEDURE — 85025 COMPLETE CBC W/AUTO DIFF WBC: CPT

## 2022-01-08 PROCEDURE — 80307 DRUG TEST PRSMV CHEM ANLYZR: CPT

## 2022-01-08 PROCEDURE — 99285 EMERGENCY DEPT VISIT HI MDM: CPT

## 2022-01-08 NOTE — ED BEHAVIORAL HEALTH ASSESSMENT NOTE - OTHER PAST PSYCHIATRIC HISTORY (INCLUDE DETAILS REGARDING ONSET, COURSE OF ILLNESS, INPATIENT/OUTPATIENT TREATMENT)
multiple psych admissions and ER visits for reports of SI last at [facility] Dec 23  ACT team Federations   On AOT
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
## 2022-01-08 NOTE — ED PROVIDER NOTE - OBJECTIVE STATEMENT
50 yo M with hx of schizoaffective and bipolar disorder, non-compliant with meds and currently homeless presents to ED via EMS c/o being suicidal since being dc'd from  [facility] 2 weeks ago.  Pt denies specific plan at this time.  Pt admits to taking Ecstasy and Sherrell a couple of days ago

## 2022-01-08 NOTE — ED ADULT NURSE NOTE - OBJECTIVE STATEMENT
patient to  unit alert and cooperative become irritable and agitated with questions. patient holding head in hands and keeps stating "I need help"
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
## 2022-01-08 NOTE — ED BEHAVIORAL HEALTH ASSESSMENT NOTE - DESCRIPTION
See HPI diabetes T(C): 36.6 (01-08-22 @ 10:44), Max: 36.7 (01-08-22 @ 03:43)  T(F): 97.9 (01-08-22 @ 10:44), Max: 98 (01-08-22 @ 03:43)  HR: 73 (01-08-22 @ 10:44) (73 - 88)  BP: 125/78 (01-08-22 @ 10:44) (125/78 - 156/93)  RR: 18 (01-08-22 @ 10:44) (18 - 18)  SpO2: 93% (01-08-22 @ 10:44) (93% - 96%)

## 2022-01-08 NOTE — CHART NOTE - NSCHARTNOTEFT_GEN_A_CORE
NOELLE Note: Pt is covid+, in need of inpt psych trx per BH team. NOELLE received call from Madina at [facility], a bed is available on covid+ unit, will need EKG and utox for review. Utox completed, results pending. SW to fax when results are back, SW following

## 2022-01-08 NOTE — ED ADULT NURSE REASSESSMENT NOTE - NS ED NURSE REASSESS COMMENT FT1
Patient educated about plan to move to the Main ED due to positive COVID results.  Patient verbalized understanding and agreed with plan.  Report called to Mitzy Salas RN.  Patient transferred with staff via wheelchair.
Assumed care of patient at 0710.  Patient resting in bed appears to be sleeping with no distress and regular non labored breathing.  Safety of patient maintained.

## 2022-01-08 NOTE — ED BEHAVIORAL HEALTH ASSESSMENT NOTE - CURRENT MEDICATION
noncompliant with all meds for 2 weeks  Yesi Ludwig 156 last known due Monday Nov 29/2021  Depakote 1000 hs,   Risperdal 2 mg hs  trazodone 100 hs  Metformin 1000 qd noncompliant with all meds for 2 weeks  Invramon Ludwig 156 last dose not known    [id number] am and  1000 hs,   Risperdal 2 mg hs  trazodone 100 hs  Metformin 1000 qd

## 2022-01-08 NOTE — ED CDU PROVIDER INITIAL DAY NOTE - OBJECTIVE STATEMENT
50 yo M with hx of schizoaffective and bipolar disorder, non-compliant with meds and currently homeless presents to ED via EMS c/o being suicidal since being dc'd from  [facility] 2 weeks ago.  Pt denies specific plan at this time.  Pt admits to taking Ecstasy and Sherrell a couple of days ago    Patient covid +. no respiratory distress, no hypoxia. patient seen by psych, will need inpatient psych transfer.

## 2022-01-08 NOTE — ED ADULT NURSE NOTE - NSSEPSISSUSPECTED_ED_A_ED
Problem: Mobility Impaired (Adult and Pediatric)  Goal: *Acute Goals and Plan of Care (Insert Text)  Description  Goals to be addressed in 1-4 days:  STG  1. Rolling L to R to L modified independent for positioning. 2. Supine to sit to supine S with HR for meals. 3. Sit to stand to sit S with RW in prep for ambulation. LTG  1. Ambulate >150ft S with RW, WBAT, for home/community mobility. 2. Ascend/descend a >4 stair steps CGA with HR for home entry. 3. Tolerate up in chair 1-2 hours for ADLs. 4. Patient/family to be independent with HEP for follow-up care and safe discharge. Note:   PHYSICAL THERAPY TREATMENT/DISCHARGE    Patient: Jeanine Simon (19 y.o. female)  Date: 2/14/2020  Diagnosis: Localized osteoarthritis of left knee [M17.12]  Localized osteoarthritis of left knee [M17.12]   Primary localized osteoarthritis of left knee  Procedure(s) (LRB):  LEFT TOTAL KNEE REPLACEMENT, \"SPEC POP\" (Left) 1 Day Post-Op  Precautions: Fall, WBAT  Chart, physical therapy assessment, plan of care and goals were reviewed. ASSESSMENT:  Patient has met short and long term goals at this time. Patient performed sit to/from stand with supervision, ambulated 150 ft with RW and supervision, and ascended/descended 5 stairs with handrails and CGA. Pt has been educated on seated and supine therex to be performed 3x/day at home to improve strength and ROM. Pt has met inpatient physical therapy goals at this time and home health physical therapy is recommended upon discharge from hospital.   Progression toward goals:  [x]      Goals met  []      Improving appropriately and progressing toward goals  []      Improving slowly and progressing toward goals  []      Not making progress toward goals and plan of care will be adjusted     PLAN:  Patient will be discharged from physical therapy at this time.   Rationale for discharge:  [x] Goals Achieved  [] [address]  [] Patient not participating in therapy  [] Other:  Discharge Recommendations:  Home Health  Further Equipment Recommendations for Discharge:  N/A     SUBJECTIVE:   Patient stated I am doing ok.     OBJECTIVE DATA SUMMARY:   Critical Behavior:  Neurologic State: Alert  Functional Mobility Training:  Bed Mobility:  Supine to Sit: Supervision  Sit to Supine: Supervision  Transfers:  Sit to Stand: Supervision  Stand to Sit: Supervision  Balance:  Sitting: Intact  Standing: Intact; With support  Ambulation/Gait Training:  Distance (ft): 150 Feet (ft)  Assistive Device: Walker, rolling  Ambulation - Level of Assistance: Supervision  Gait Abnormalities: Decreased step clearance  Left Side Weight Bearing: As tolerated  Base of Support: Shift to right  Stance: Left decreased  Speed/Jessica: Slow  Step Length: Right shortened;Left shortened  Stairs:  Number of Stairs Trained: 5  Stairs - Level of Assistance: Contact guard assistance   Rail Use: Right   Pain:  Pain Scale 1: Numeric (0 - 10)  Pain Intensity 1: 4  Pain Location 1: Knee  Pain Orientation 1: Upper;Posterior  Pain Description 1: Aching  Pain Intervention(s) 1: Medication (see MAR)  Activity Tolerance:   Good  Please refer to the flowsheet for vital signs taken during this treatment.   After treatment:   [] Patient left in no apparent distress sitting up in chair  [x] Patient left in no apparent distress in bed  [x] Call bell left within reach  [x] Nursing notified  [] Caregiver present  [] Bed alarm activated  Garima Martinez   Time Calculation: 25 mins No
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
## 2022-01-08 NOTE — ED BEHAVIORAL HEALTH ASSESSMENT NOTE - VIOLENCE RISK FACTORS:
Antisocial behavior/cognition (past or present)/Substance abuse/Command hallucinations for violent behavior/Noncompliance with treatment/Irritability

## 2022-01-08 NOTE — ED BEHAVIORAL HEALTH ASSESSMENT NOTE - BODY HABITUS
Case Management Discharge Planning Note    Patient name Melvin Lee  Location U 05/APU 05 MRN [id number]  : 1943 Date 2022       Current Admission Date: 1/3/2022  Current Admission Diagnosis:Closed fracture of neck of left femur [facility])   Patient Active Problem List    Diagnosis Date Noted    Elevated d-dimer 2022    Pancreatic mass 2022    Closed fracture of neck of left femur ([place] [address] ) 2022    Primary osteoarthritis of both knees 2021    Hyperlipidemia 2021    Class 2 severe obesity due to excess calories with serious comorbidity and body mass index (BMI) of 39 0 to 39 9 in adult [facility]) 2021    Chronic kidney disease with end stage renal failure on dialysis ([place] [address] ) 2021    Secondary hyperparathyroidism of renal origin ([place] [address] ) 2021    Chronic renal failure 2021    Other fluid overload 2020    Abnormal nuclear stress test 2020    Panlobular emphysema ([place] [address] ) 2020    Disorder of phosphorus metabolism, unspecified 2019    Essential hypertension 2019    Hemodialysis-associated hypotension 2019    AAA (abdominal aortic aneurysm) ([place] [address] ) 2019    Other mechanical complication of surgically created arteriovenous fistula, initial encounter ([place] [address] ) 2019    Anemia in chronic kidney disease 2018    Benign prostatic hyperplasia without lower urinary tract symptoms 2018    Chronic gout, unspecified, with tophus (tophi) 2018    Iron deficiency anemia 2018    Kidney stone 2018    Magnesium deficiency 2018    Other disorders of electrolyte and fluid balance, not elsewhere classified 2018    Personal history of nicotine dependence 2018    Proteinuria, unspecified 2018    Unspecified protein-calorie malnutrition ([facility] ) 2018    Skin lesion of face 2018    Chronic systolic congestive heart failure ([place] [address] ) 2018    ASCVD (arteriosclerotic cardiovascular disease) 12/11/2017    Carotid stenosis, right 12/11/2017    Gastroesophageal reflux disease 12/11/2017    Vitamin D deficiency 12/11/2017    Osteoarthritis of both knees 09/02/2015      LOS (days): 4  Geometric Mean LOS (GMLOS) (days): 4 60  Days to GMLOS:0 8     OBJECTIVE:  Risk of Unplanned Readmission Score: 20     Current admission status: Inpatient   Preferred Pharmacy:   [facility]/pharmacy #[id number]- [address], [address]  [address] [place] [address], [address] [facility]  Phone: [phone] Fax: [address] [place] [address]  Phone: [phone] Fax: [phone]    Primary Care Provider: Gennaro Patel DO    Primary Insurance: Nettie Hinds [facility] REP  Secondary Insurance:     DISCHARGE DETAILS:  [facility] had accepted the pt  Spoke to the pt at the bedside [facility]  [address]   Notified ELIA of same  Family will transport home today  Underweight

## 2022-01-08 NOTE — ED CDU PROVIDER INITIAL DAY NOTE - MEDICAL DECISION MAKING DETAILS
50 yo M hx of schizoaffective and bipolar disorder, homeless, report suicidal. covid +. no hypoxia. seen by psych, will need inpatient psych treatment.

## 2022-01-08 NOTE — ED ADULT TRIAGE NOTE - CHIEF COMPLAINT QUOTE
Patient presents to ED stating he wants to see a psychiatrist and that he has not taken his Depakote, Trazadone or Risperdal in 2 weeks. States he is homeless and has not eaten in days.

## 2022-01-08 NOTE — ED BEHAVIORAL HEALTH ASSESSMENT NOTE - GROOMING
Pt is asymptomatic at this time. Denies light headedness, headache. All other VSS. Urine output adequate 620ml for shift. Pt drowsy, denies pain at this time. Poor

## 2022-01-09 VITALS
RESPIRATION RATE: 16 BRPM | DIASTOLIC BLOOD PRESSURE: 69 MMHG | TEMPERATURE: 99 F | SYSTOLIC BLOOD PRESSURE: 123 MMHG | OXYGEN SATURATION: 95 % | HEART RATE: 63 BPM

## 2022-02-19 ENCOUNTER — EMERGENCY (EMERGENCY)
Facility: HOSPITAL | Age: 52
LOS: 1 days | Discharge: HOME | End: 2022-02-19
Attending: EMERGENCY MEDICINE
Payer: MEDICAID

## 2022-02-19 VITALS
TEMPERATURE: 98 F | OXYGEN SATURATION: 98 % | HEART RATE: 73 BPM | DIASTOLIC BLOOD PRESSURE: 77 MMHG | RESPIRATION RATE: 18 BRPM | HEIGHT: 66 IN | SYSTOLIC BLOOD PRESSURE: 114 MMHG

## 2022-02-19 DIAGNOSIS — F19.10 OTHER PSYCHOACTIVE SUBSTANCE ABUSE, UNCOMPLICATED: ICD-10-CM

## 2022-02-19 DIAGNOSIS — F25.9 SCHIZOAFFECTIVE DISORDER, UNSPECIFIED: ICD-10-CM

## 2022-02-19 LAB
ALBUMIN SERPL ELPH-MCNC: 3.6 G/DL — SIGNIFICANT CHANGE UP (ref 3.3–5.2)
ALP SERPL-CCNC: 71 U/L — SIGNIFICANT CHANGE UP (ref 40–120)
ALT FLD-CCNC: 22 U/L — SIGNIFICANT CHANGE UP
ANION GAP SERPL CALC-SCNC: 16 MMOL/L — SIGNIFICANT CHANGE UP (ref 5–17)
APAP SERPL-MCNC: <3 UG/ML — LOW (ref 10–26)
AST SERPL-CCNC: 40 U/L — HIGH
BASOPHILS # BLD AUTO: 0.05 K/UL — SIGNIFICANT CHANGE UP (ref 0–0.2)
BASOPHILS NFR BLD AUTO: 0.3 % — SIGNIFICANT CHANGE UP (ref 0–2)
BILIRUB SERPL-MCNC: 0.3 MG/DL — LOW (ref 0.4–2)
BUN SERPL-MCNC: 12.6 MG/DL — SIGNIFICANT CHANGE UP (ref 8–20)
CALCIUM SERPL-MCNC: 9 MG/DL — SIGNIFICANT CHANGE UP (ref 8.6–10.2)
CHLORIDE SERPL-SCNC: 99 MMOL/L — SIGNIFICANT CHANGE UP (ref 98–107)
CO2 SERPL-SCNC: 22 MMOL/L — SIGNIFICANT CHANGE UP (ref 22–29)
CREAT SERPL-MCNC: 0.59 MG/DL — SIGNIFICANT CHANGE UP (ref 0.5–1.3)
EOSINOPHIL # BLD AUTO: 0.12 K/UL — SIGNIFICANT CHANGE UP (ref 0–0.5)
EOSINOPHIL NFR BLD AUTO: 0.8 % — SIGNIFICANT CHANGE UP (ref 0–6)
ETHANOL SERPL-MCNC: <10 MG/DL — SIGNIFICANT CHANGE UP (ref 0–9)
GLUCOSE SERPL-MCNC: 196 MG/DL — HIGH (ref 70–99)
HCT VFR BLD CALC: 44 % — SIGNIFICANT CHANGE UP (ref 39–50)
HGB BLD-MCNC: 14.5 G/DL — SIGNIFICANT CHANGE UP (ref 13–17)
IMM GRANULOCYTES NFR BLD AUTO: 1.3 % — SIGNIFICANT CHANGE UP (ref 0–1.5)
LYMPHOCYTES # BLD AUTO: 15.4 % — SIGNIFICANT CHANGE UP (ref 13–44)
LYMPHOCYTES # BLD AUTO: 2.25 K/UL — SIGNIFICANT CHANGE UP (ref 1–3.3)
MCHC RBC-ENTMCNC: 29.9 PG — SIGNIFICANT CHANGE UP (ref 27–34)
MCHC RBC-ENTMCNC: 33 GM/DL — SIGNIFICANT CHANGE UP (ref 32–36)
MCV RBC AUTO: 90.7 FL — SIGNIFICANT CHANGE UP (ref 80–100)
MONOCYTES # BLD AUTO: 1.06 K/UL — HIGH (ref 0–0.9)
MONOCYTES NFR BLD AUTO: 7.3 % — SIGNIFICANT CHANGE UP (ref 2–14)
NEUTROPHILS # BLD AUTO: 10.94 K/UL — HIGH (ref 1.8–7.4)
NEUTROPHILS NFR BLD AUTO: 74.9 % — SIGNIFICANT CHANGE UP (ref 43–77)
PLATELET # BLD AUTO: 390 K/UL — SIGNIFICANT CHANGE UP (ref 150–400)
POTASSIUM SERPL-MCNC: 3.9 MMOL/L — SIGNIFICANT CHANGE UP (ref 3.5–5.3)
POTASSIUM SERPL-SCNC: 3.9 MMOL/L — SIGNIFICANT CHANGE UP (ref 3.5–5.3)
PROT SERPL-MCNC: 7.9 G/DL — SIGNIFICANT CHANGE UP (ref 6.6–8.7)
RBC # BLD: 4.85 M/UL — SIGNIFICANT CHANGE UP (ref 4.2–5.8)
RBC # FLD: 13.2 % — SIGNIFICANT CHANGE UP (ref 10.3–14.5)
SALICYLATES SERPL-MCNC: <0.6 MG/DL — LOW (ref 10–20)
SARS-COV-2 RNA SPEC QL NAA+PROBE: SIGNIFICANT CHANGE UP
SODIUM SERPL-SCNC: 137 MMOL/L — SIGNIFICANT CHANGE UP (ref 135–145)
WBC # BLD: 14.61 K/UL — HIGH (ref 3.8–10.5)
WBC # FLD AUTO: 14.61 K/UL — HIGH (ref 3.8–10.5)

## 2022-02-19 PROCEDURE — 93010 ELECTROCARDIOGRAM REPORT: CPT

## 2022-02-19 PROCEDURE — 90792 PSYCH DIAG EVAL W/MED SRVCS: CPT

## 2022-02-19 PROCEDURE — 99218: CPT

## 2022-02-19 RX ORDER — METFORMIN HYDROCHLORIDE 850 MG/1
500 TABLET ORAL
Refills: 0 | Status: DISCONTINUED | OUTPATIENT
Start: 2022-02-19 | End: 2022-02-23

## 2022-02-19 RX ADMIN — METFORMIN HYDROCHLORIDE 500 MILLIGRAM(S): 850 TABLET ORAL at 13:49

## 2022-02-19 NOTE — ED PROVIDER NOTE - ATTENDING CONTRIBUTION TO CARE
52yo M with hx of bipolar disorder non-compliant with meds and homeless c/o with auditory hallucinations telling him to hurt himself and others, but has not acted on these thoughts. On exam awake and alert in NAD, PERRL, mm moist, Neck supple, Cor Reg, Lungs clear b/l, Abd soft, NT, Ext FROM, Neuro non-focal.  Will check labs and EKG for BH eval       .
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
## 2022-02-19 NOTE — ED PROVIDER NOTE - OBJECTIVE STATEMENT
Patient is a 52yo M presenting with auditory hallucinations. He states that he lives on the street and has not been taking his medications. He reports that he has been hearing voices to hurt himself and others, no specific person. He states he feels ill but would not elaborate on further symptoms.
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
## 2022-02-19 NOTE — ED ADULT NURSE NOTE - OBJECTIVE STATEMENT
Pt. presents w/auditory hallucinations, states hearing voices telling him to hurt himself/others.  Pt. unwilling to elaborate further, wants to sleep.  Denies pain/drug use/alcohol us.

## 2022-02-19 NOTE — ED PROVIDER NOTE - CLINICAL SUMMARY MEDICAL DECISION MAKING FREE TEXT BOX
Patient presenting for psychiatric complaints from [place]. Will evaluate with labwork and call telepsychiatry when suitable.

## 2022-02-19 NOTE — ED CDU PROVIDER INITIAL DAY NOTE - MEDICAL DECISION MAKING DETAILS
Patient with h/o bipolar disorder, DM, HTN, schizoaffective disorder presenting with depression, auditory hallucinations. Pending psychiatric reassessment in the morning. Aisha Duncan DO

## 2022-02-19 NOTE — ED CDU PROVIDER INITIAL DAY NOTE - OBJECTIVE STATEMENT
Patient is a 50yo M presenting with auditory hallucinations. He states that he lives on the street and has not been taking his medications. He reports that he has been hearing voices to hurt himself and others, no specific person. He states he feels ill but would not elaborate on further symptoms.    Patient evaluated by psychiatry- plan to hold for reassessment in morning.

## 2022-02-19 NOTE — ED BEHAVIORAL HEALTH ASSESSMENT NOTE - DESCRIPTION
diabetes See HPI T(C): 37.2 (02-19-22 @ 15:15), Max: 37.2 (02-19-22 @ 15:15)  T(F): 99 (02-19-22 @ 15:15), Max: 99 (02-19-22 @ 15:15)  HR: 105 (02-19-22 @ 15:15) (70 - 105)  BP: 120/67 (02-19-22 @ 15:15) (102/65 - 120/67)  RR: 16 (02-19-22 @ 15:15) (16 - 18)  SpO2: 95% (02-19-22 @ 15:15) (95% - 100%)

## 2022-02-19 NOTE — ED BEHAVIORAL HEALTH ASSESSMENT NOTE - RISK ASSESSMENT
RF h/o suicide attempt, drug abuse, noncompliance  PF no recent SIB, ACT team Moderate Acute Suicide Risk
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
## 2022-02-19 NOTE — ED PROVIDER NOTE - PHYSICAL EXAMINATION
Gen: Unkempt male in NAD  Head: NC/AT  Neck: Trachea midline  Resp: No distress  Ext: No deformities  Neuro: Moves all extremities  Skin: Warm and dry as visualized  Psych: +AH/-VH.

## 2022-02-19 NOTE — ED BEHAVIORAL HEALTH ASSESSMENT NOTE - CURRENT MEDICATION
noncompliant with all meds for 2 weeks  Invramon Ludwig 156 last dose not known    [id number] am and  1000 hs,   Risperdal 2 mg hs  trazodone 100 hs  Metformin 1000 qd

## 2022-02-19 NOTE — ED ADULT TRIAGE NOTE - CHIEF COMPLAINT QUOTE
patient states that he has been off of his medication, patient also states that he has not taken his medication for diabetes  states he also has pain to his right foot. patient undressed and placed in gown

## 2022-02-20 LAB
AMPHET UR-MCNC: NEGATIVE — SIGNIFICANT CHANGE UP
APPEARANCE UR: CLEAR — SIGNIFICANT CHANGE UP
BARBITURATES UR SCN-MCNC: NEGATIVE — SIGNIFICANT CHANGE UP
BENZODIAZ UR-MCNC: NEGATIVE — SIGNIFICANT CHANGE UP
BILIRUB UR-MCNC: NEGATIVE — SIGNIFICANT CHANGE UP
COCAINE METAB.OTHER UR-MCNC: POSITIVE
COLOR SPEC: YELLOW — SIGNIFICANT CHANGE UP
DIFF PNL FLD: NEGATIVE — SIGNIFICANT CHANGE UP
GLUCOSE UR QL: 1000 MG/DL
KETONES UR-MCNC: ABNORMAL
LEUKOCYTE ESTERASE UR-ACNC: NEGATIVE — SIGNIFICANT CHANGE UP
METHADONE UR-MCNC: NEGATIVE — SIGNIFICANT CHANGE UP
NITRITE UR-MCNC: NEGATIVE — SIGNIFICANT CHANGE UP
OPIATES UR-MCNC: NEGATIVE — SIGNIFICANT CHANGE UP
PCP SPEC-MCNC: SIGNIFICANT CHANGE UP
PCP UR-MCNC: NEGATIVE — SIGNIFICANT CHANGE UP
PH UR: 7 — SIGNIFICANT CHANGE UP (ref 5–8)
PROT UR-MCNC: NEGATIVE — SIGNIFICANT CHANGE UP
SP GR SPEC: 1.01 — SIGNIFICANT CHANGE UP (ref 1.01–1.02)
THC UR QL: NEGATIVE — SIGNIFICANT CHANGE UP
UROBILINOGEN FLD QL: NEGATIVE MG/DL — SIGNIFICANT CHANGE UP

## 2022-02-20 PROCEDURE — 99211 OFF/OP EST MAY X REQ PHY/QHP: CPT

## 2022-02-20 PROCEDURE — 99226: CPT

## 2022-02-20 RX ORDER — DIVALPROEX SODIUM 500 MG/1
500 TABLET, DELAYED RELEASE ORAL ONCE
Refills: 0 | Status: COMPLETED | OUTPATIENT
Start: 2022-02-20 | End: 2022-02-20

## 2022-02-20 RX ORDER — TRAZODONE HCL 50 MG
200 TABLET ORAL AT BEDTIME
Refills: 0 | Status: DISCONTINUED | OUTPATIENT
Start: 2022-02-20 | End: 2022-02-23

## 2022-02-20 RX ADMIN — METFORMIN HYDROCHLORIDE 500 MILLIGRAM(S): 850 TABLET ORAL at 18:38

## 2022-02-20 RX ADMIN — DIVALPROEX SODIUM 500 MILLIGRAM(S): 500 TABLET, DELAYED RELEASE ORAL at 18:37

## 2022-02-20 RX ADMIN — METFORMIN HYDROCHLORIDE 500 MILLIGRAM(S): 850 TABLET ORAL at 06:03

## 2022-02-20 RX ADMIN — Medication 200 MILLIGRAM(S): at 21:16

## 2022-02-20 RX ADMIN — DIVALPROEX SODIUM 500 MILLIGRAM(S): 500 TABLET, DELAYED RELEASE ORAL at 07:16
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
## 2022-02-20 NOTE — CHART NOTE - NSCHARTNOTEFT_GEN_A_CORE
NOELLE Note: NOELLE made aware by  provider pt is in need of inpt psych trx on 9.13 basis. Presently no beds at [facility] (Ashok) [facility] (SCOTT Bosch) or [facility] (Maritza), SW following

## 2022-02-21 LAB — GLUCOSE BLDC GLUCOMTR-MCNC: 264 MG/DL — HIGH (ref 70–99)

## 2022-02-21 PROCEDURE — 99226: CPT

## 2022-02-21 RX ORDER — IBUPROFEN 200 MG
600 TABLET ORAL ONCE
Refills: 0 | Status: COMPLETED | OUTPATIENT
Start: 2022-02-21 | End: 2022-02-21

## 2022-02-21 RX ADMIN — Medication 200 MILLIGRAM(S): at 22:36

## 2022-02-21 RX ADMIN — METFORMIN HYDROCHLORIDE 500 MILLIGRAM(S): 850 TABLET ORAL at 09:36

## 2022-02-21 RX ADMIN — Medication 600 MILLIGRAM(S): at 00:38

## 2022-02-21 NOTE — ED BEHAVIORAL HEALTH NOTE - BEHAVIORAL HEALTH NOTE
Patient was seen in the main ED today 02/21/22 where he is awaiting for psychiatric bed for transfer. He continues to report current auditory hallucinations; however, he does not want to talk much as he is preoccupied with obtaining food. He is noted to have a good appetite; asking for food quite frequently. He appears impulsive but verbalizes continued interest in inpatient psychiatric admission. He has been evaluated by other ED psych providers, and the plan for inpatient psychiatric admission remains unchanged.     Dx: Schizoaffective disorder, bipolar type (F25.0)    PLAN:   1. Transfer to inpatient psychiatry when a bed becomes available.  2. Start Olanzapine 10mg PO HS.  3. Start Haldol 5 mg PO Q 6 hours PRN for agitation.    Vital Signs Last 24 Hrs  T(C): 36.7 (21 Feb 2022 20:39), Max: 36.8 (21 Feb 2022 05:52)  T(F): 98.1 (21 Feb 2022 20:39), Max: 98.2 (21 Feb 2022 05:52)  HR: 84 (21 Feb 2022 20:39) (68 - 84)  BP: 109/65 (21 Feb 2022 20:39) (101/68 - 129/67)  BP(mean): --  RR: 18 (21 Feb 2022 20:39) (14 - 19)  SpO2: 95% (21 Feb 2022 20:39) (95% - 98%)

## 2022-02-21 NOTE — ED CDU PROVIDER SUBSEQUENT DAY NOTE - ATTENDING CONTRIBUTION TO CARE
I personally saw the patient with the resident, and completed the key components of the history and physical exam. I then discussed the management plan with the resident.   gen in nad resp clear cardiac no murmur abd softr neuro intact

## 2022-02-21 NOTE — ED ADULT NURSE REASSESSMENT NOTE - NS ED NURSE REASSESS COMMENT FT1
Assumed pt. care at 1930 from off going RN. Pt. A&Ox3. Pt. Pt. sleeping comfortably in stretcher. Pt. respirations even and unlabored. Pt. in no apparent distress. Pt. waiting for AM psych consult.
Pt report received, resting in stretcher pending disposition khadijah rai
Pt resting  with VSS, no complaints at this time. Will continue to monitor.
Pt resting comfortably with VSS, meal provided, plan of care explained to patient, patient verbalized understanding, no complaints at this time. Will continue to monitor.
Pt resting in stretcher denies any discomfort at this time pending dispo khadijah rn
Pt stated "nurse, I am hearing voices to kill myself and other people", MD notified, came to see pt at bedside, charge nurse notified, pt will be placed on 1:1 sitter. Will continue to monitor.
Received pt in b7r. Pt a&ox4 rr and unlabored. Pt awaiting inpatient psych. No complains at this time. pt educated on plan of care, pt able to successfully teach back plan of care to RN, RN will continue to reeducate pt during hospital stay.
Report received from off-going RN at 21:30, VSS, pt resting comfortably in stretcher. No s/s of apparent distress noted. Will continue to monitor at this time.
Received report and assumed care of patient.  He is calm and cooperative, resting comfortably.  Vital signs stable.  Patient requesting some sandwiches, crackers and water.  Awaiting transfer to psychiatric facility.

## 2022-02-22 VITALS
DIASTOLIC BLOOD PRESSURE: 95 MMHG | RESPIRATION RATE: 18 BRPM | HEART RATE: 75 BPM | OXYGEN SATURATION: 97 % | TEMPERATURE: 98 F | SYSTOLIC BLOOD PRESSURE: 135 MMHG

## 2022-02-22 LAB — GLUCOSE BLDC GLUCOMTR-MCNC: 363 MG/DL — HIGH (ref 70–99)

## 2022-02-22 PROCEDURE — 93005 ELECTROCARDIOGRAM TRACING: CPT

## 2022-02-22 PROCEDURE — U0005: CPT

## 2022-02-22 PROCEDURE — 99217: CPT

## 2022-02-22 PROCEDURE — 80053 COMPREHEN METABOLIC PANEL: CPT

## 2022-02-22 PROCEDURE — 36415 COLL VENOUS BLD VENIPUNCTURE: CPT

## 2022-02-22 PROCEDURE — 85025 COMPLETE CBC W/AUTO DIFF WBC: CPT

## 2022-02-22 PROCEDURE — G0378: CPT

## 2022-02-22 PROCEDURE — 99213 OFFICE O/P EST LOW 20 MIN: CPT

## 2022-02-22 PROCEDURE — U0003: CPT

## 2022-02-22 PROCEDURE — 82962 GLUCOSE BLOOD TEST: CPT

## 2022-02-22 PROCEDURE — 81003 URINALYSIS AUTO W/O SCOPE: CPT

## 2022-02-22 PROCEDURE — 99285 EMERGENCY DEPT VISIT HI MDM: CPT | Mod: 25

## 2022-02-22 PROCEDURE — 80307 DRUG TEST PRSMV CHEM ANLYZR: CPT

## 2022-02-22 RX ADMIN — METFORMIN HYDROCHLORIDE 500 MILLIGRAM(S): 850 TABLET ORAL at 08:22

## 2022-02-22 NOTE — ED CDU PROVIDER SUBSEQUENT DAY NOTE - NSICDXNOFAMILYHX_GEN_ALL_ED
<-- Click to add NO pertinent Family History

## 2022-02-22 NOTE — ED CDU PROVIDER SUBSEQUENT DAY NOTE - PROGRESS NOTE DETAILS
Patient states he is no longer hearing voices and he wants to leave. Psych made aware. Patient is not psychiatrically cleared to leave. Patient cleared by psych.

## 2022-02-22 NOTE — ED CDU PROVIDER SUBSEQUENT DAY NOTE - WET READ LAUNCH FT
There are no Wet Read(s) to document.

## 2022-02-22 NOTE — ED CDU PROVIDER SUBSEQUENT DAY NOTE - NSICDXNOPASTSURGICALHX_GEN_ALL_ED
<-- Click to add NO significant Past Surgical History

## 2022-02-22 NOTE — ED CDU PROVIDER DISPOSITION NOTE - NS ED MD EM OBS MULTI DAY STAY
[de-identified] : Oriented to time, place, person\par Mood: Normal\par Affect: Normal\par Appearance: Healthy, well appearing, no acute distress.\par Gait: Normal\par Assistive Devices: None\par \par Right Knee Exam:\par \par Skin: Clean, dry, intact\par Inspection: No obvious malalignment, no masses, no swelling, moderate effusion\par Pulses: 2+ DP/PT pulses \par ROM: 0-140 degrees of flexion. No pain with deep knee flexion/extension.\par Tenderness: No MJLT. No LJLT. No pain over the patella facets. No pain to the quadriceps tendon. No pain to the patella tendon. No posterior knee tenderness.\par Stability: Stable to varus, valgus. Negative Lachman testing. Negative anterior drawer, negative posterior drawer.\par Strength: 5/5 Q/H/TA/GS/EHL, without atrophy\par Neuro: Intact to light touch throughout, DTRs normal\par Additional Tests: Negative Don's test, Negative patellar grind test  [de-identified] : The following radiographs were ordered and read by me during this patients visit. I reviewed each radiograph in detail with the patient and discussed the findings as highlighted below. \par \par 3 views of the right knee were obtained today, 10/05/2021, that show no acute fracture or dislocation. There is trace medial, trace lateral and no patellofemoral degenerative changes seen. There is no significant malalignment. No significant other obvious osseous abnormality, otherwise unremarkable.  [id number] - Moderate Complexity

## 2022-02-22 NOTE — ED CDU PROVIDER SUBSEQUENT DAY NOTE - HISTORY
Patient starting to refuse treatments - pending inpatient treatment placement.
Patient denies hallucinations anymore, wants to go home.
No acute events overnight.  Pt's AM Depakote ordered
No acute events overnight.  Pending bed availability for voluntary admission

## 2022-02-22 NOTE — ED BEHAVIORAL HEALTH NOTE - BEHAVIORAL HEALTH NOTE
PROGRESS NOTE: 22 @ 15:32  	  • Reason for Ongoing Consultation: 	Psychosis     ID: 51yyo Male with HEALTH ISSUES - PROBLEM Dx:  Schizoaffective disorder, chronic condition    Polysubstance abuse            INTERVAL DATA:   • Interval Chief Complaint:  "I feel good doc....I am ready to go home"  • Interval History:  Patient reports that voices as have gone away.  He states he slept well and his appetite is good. He denies any S/H I/I/P. He stated that after being in ER for 3 days he feels good. He denies any auditory/visual hallucinations, no delusions were elicited.  He has not appeared to be responding to internal stimuli.  He reports that he feels ready to go back home and follow back with ACT team. He reports he has his medications at home.      REVIEW OF SYSTEMS:   • Constitutional Symptoms	No complaints  • Eyes	No complaints  • Ears / Nose / Throat / Mouth	No complaints  • Cardiovascular	No complaints  • Respiratory	No complaints  • Gastrointestinal	No complaints  • Genitourinary	No complaints  • Musculoskeletal	No complaints  • Skin	No complaints  • Neurological	No complaints  • Psychiatric (see HPI)	See HPI  • Endocrine	No complaints  • Hematologic / Lymphatic	No complaints  • Allergic / Immunologic	No complaints    REVIEW OF VITALS/LABS/IMAGING/INVESTIGATIONS:   • Vital signs reviewed: Yes  • Vital Signs:	    T(C): 36.5 (22 @ 11:55), Max: 36.8 (22 @ 06:55)  HR: 75 (22 @ 11:55) (75 - 84)  BP: 135/95 (22 @ 11:55) (109/65 - 135/95)  RR: 18 (22 @ 11:55) (17 - 19)  SpO2: 97% (22 @ 11:55) (95% - 97%)    • Available labs reviewed: Yes  • Available Lab Results:                 Urinalysis Basic - ( 2022 17:16 )    Color: Yellow / Appearance: Clear / S.010 / pH: x  Gluc: x / Ketone: Trace  / Bili: Negative / Urobili: Negative mg/dL   Blood: x / Protein: Negative / Nitrite: Negative   Leuk Esterase: Negative / RBC: x / WBC x   Sq Epi: x / Non Sq Epi: x / Bacteria: x          MEDICATIONS:      PRN Medications:  • PRN Medications since last evaluation	  • PRN Details	    Current Medications:   metFORMIN 500 milliGRAM(s) Oral two times a day  traZODone 200 milliGRAM(s) Oral at bedtime     Medication Side Effects:  • Medication Side Effects or Adverse Reactions (new or ongoing)	None known    SUICIDALITY:   • Suicidality (Interval)	none known    HOMICIDALITY/AGGRESSION:   • Homicidality/Aggression	none known    MENTAL STATUS EXAM:   Level of Consciousness	Alert  • General Appearance	Well developed  • Body Habitus	Well nourished  • Hygiene	Good  • Grooming	Good  • Behavior	Cooperative  • Eye Contact	fair  • Relatedness	Good  • Impulse Control	Normal  • Muscle Tone / Strength	Normal muscle tone/strength  • Abnormal Movements	No abnormal movements  • Gait / Station	not assessed  • Speech Volume	Normal  • Speech Rate	Normal  • Speech Spontaneity	Normal  • Speech Articulation	Normal  • Mood	Normal  • Affect Quality	Euthymic  • Affect Range	labile/flat  • Affect Congruence	Congruent  • Thought Process	Linear  • Thought Associations	Normal  • Thought Content	Unremarkable  • Perceptions	Unremarkable  • Oriented to Time	Yes  • Oriented to Place	Yes  • Oriented to Situation	Yes  • Oriented to Person	Yes  • Attention / Concentration	Normal  • Estimated Intelligence	Average  • Recent Memory	Normal  • Remote Memory	Normal  • Fund of Knowledge	Normal  • Language	No abnormalities noted  • Judgment (regarding everyday events)	Fair  • Insight (regarding psychiatric illness)	Fair      DIAGNOSIS DSM-V:    Psychiatric Diagnosis (Corresponds to DSM-IV Axis I, II):   HEALTH ISSUES - PROBLEM Dx:  Schizoaffective disorder, chronic condition    Polysubstance abuse             Medical Diagnosis (Corresponds to DSM-IV Axis III): DM  • Axis III	      ASSESSMENT OF CURRENT CONDITION:   Summary (include case differential, formulation and patient response to therapy):       Patient is a 51 year old male who is unemployed, living with sister, with a past psych history of Schizoaffective disorder followed by ACT team also with a history of cocaine use disorder and multiple ER visits with substance induced psychosis, questionable secondary gain of admission, poor compliance with tx,  who was BIB EMS activated by self for auditory hallucinations and SI/HI.  Pt states he is off his psych meds for 2 weeks.  Repeatedly reports he was not given meds and that he is homeless.  Pt is with ACT team who delivers his meds and Pt lives with sister however is often not found by ACT team.  Pt is poor and unreliable historian.   Claims he needs psych admission.    Pt claims he hears voices to kill himself and others.  Does not report any specific person to kill.  Pt seeking psych admission.  Admits to using drugs, usually is cocaine pos .      Poor insight and judgement and is noncompliant with meds in community despite being on ACT team.       22:  Pt evaluated by this writer, he continues to self report AH of SI/HI with no specific plan at this time.  He reports he needs help and has been off his medications for 2 weeks. He reports his appetite and sleep is okay.  Pt has a flat affect and stating; voices are telling him" to go off".  Denies any paranoid  delusions or suspicions at this time.      22: Patient observed for 3 days.  He has not been aggressive or agitated.  He has not appeared to be responding to internal stimuli. Denies Any S/H I/I/P.  No acute symptoms were elicited. Patient appears at baseline.      Risk Assessment (consider static vs modifiable risk factors and protective factors; comment on level of risk for dangerous behavior):     Currently at baseline, no acute symptoms elicited, denies any S/H I/I/P, no acute psychotic sx's were elicited.     PLAN  Patient is cleared for outpatient follow up, currently at baseline functioning  Will follow up with ACT team.

## 2022-02-22 NOTE — ED CDU PROVIDER SUBSEQUENT DAY NOTE - NSICDXFAMILYHX_GEN_ALL_CORE_FT
FAMILY HISTORY:  No pertinent family history in first degree relatives    

## 2022-02-22 NOTE — ED CDU PROVIDER DISPOSITION NOTE - NSFOLLOWUPINSTRUCTIONS_ED_ALL_ED_FT
Prepped for cors/RH/LH.  Denies pain.   in room with her.  H & P current.  Consent being discussed now.  Labs current.     Schizoaffective Disorder    WHAT YOU NEED TO KNOW:    Schizoaffective disorder is a long-term mental illness that may change how you think, feel, and act around others. You may not know what is real and what is not real.     DISCHARGE INSTRUCTIONS:    Medicines:   •Antipsychotics: These medicines help decrease psychotic symptoms or severe agitation. You may need antiparkinson medicine to control muscle stiffness, twitches, and restlessness caused by antipsychotic medicines.      •Antianxiety medicine: This medicine may be given to decrease anxiety and help you feel calm and relaxed.       •Antidepressants: These medicines are given to decrease or stop the symptoms of depression, anxiety, and behavior problems.       •Mood stabilizers:  These medicines help control mood swings.      •Anticonvulsants: This medicine is given to control seizures. It may also be used to decrease violent behavior and control your mood swings.      •Blood pressure medicines: These may be used to help decrease motor tics (uncontrolled movements). They may also help you feel calmer, more focused, and less irritable.      •Anticholinergics: This medicine may be given to decrease the side effects of other needed medicines.      •Take your medicine as directed. Contact your healthcare provider if you think your medicine is not helping or if you have side effects. Tell him of her if you are allergic to any medicine. Keep a list of the medicines, vitamins, and herbs you take. Include the amounts, and when and why you take them. Bring the list or the pill bottles to follow-up visits. Carry your medicine list with you in case of an emergency.      Manage your symptoms: The following may help you feel better or prevent symptoms of schizoaffective disorder from coming back:  •Find support for yourself and your family: Talk with others to help you cope with your illness better. This may also help to improve how you relate to others.      •Keep all medical appointments: This will help manage your disease and the side-effects from medicines you may be taking.       •Use your medicines as directed: Put your medicines in a pillbox placed in an area you can easily see. Use a watch with an alarm to help you remember when it is time to take your medicine. Tell your healthcare provider if you know or think you might be pregnant. Do not stop taking your medicines without your healthcare provider's okay. A sudden stop can cause serious medical problems.       •Watch for early signs of a relapse and seek help immediately: ?How you think, feel, and see things has changed.      ?You behave differently than usual.      ?You become more nervous and upset, but do not know why.      ?You eat less and have trouble sleeping.      ?You have little or no interest in friends or activities.        Contact your healthcare provider or psychiatrist if:   •You think you are having a relapse.      •You are having side effects from your medicine, or they are not helping.      •You are not sleeping well or are sleeping more than usual.      •You cannot eat or are eating more than usual.      •You have muscle spasms, stiffness, or trouble walking.      •Your sad feelings or thoughts change the way you function during the day.      •You have questions or concerns about your condition or care.      Return to the emergency department if:   •You feel like hurting or killing yourself or others.      •You feel that your condition is getting worse.      •You feel very upset, threaten someone, or you feel violent.      •You suddenly have changes in your vision.      •You suddenly have chest pain, trouble breathing, or a fever.

## 2022-02-22 NOTE — ED CDU PROVIDER SUBSEQUENT DAY NOTE - MEDICAL DECISION MAKING DETAILS
Pt remains on OBS awaiting psych bed availability for transfer
Pt remains on OBS awaiting psych bed availability for transfer
Patient pending inpatient psych placement. Now becoming agitated with wait time for bed - wants to leave. Attempting verbal redirection.
Patient pending voluntary inpatient psych treatment.

## 2022-02-22 NOTE — ED CDU PROVIDER SUBSEQUENT DAY NOTE - NS ED ROS FT
General: Denies fever, chills  MSK: Denies back pain, joint pain  Resp: Denies cough, SOB  CV: Denies CP, palpitations  GI: Denies nausea, vomiting  Neuro: Denies numbness, tingling  Skin: Denies rashes, lacerations

## 2022-02-22 NOTE — ED CDU PROVIDER DISPOSITION NOTE - PATIENT PORTAL LINK FT
You can access the FollowMyHealth Patient Portal offered by [facility] by registering at the following website: [email or web address]. By joining [facility]’s FollowMyHealth portal, you will also be able to view your health information using other applications (apps) compatible with our system.

## 2022-02-22 NOTE — ED CDU PROVIDER DISPOSITION NOTE - CLINICAL COURSE
Patient presented for SI, depression, was medically cleared for inpatient psych, after waiting multiple days for placement, patient improved, cleared for discharge psychiatrically.

## 2022-03-04 ENCOUNTER — EMERGENCY (EMERGENCY)
Facility: HOSPITAL | Age: 52
LOS: 1 days | Discharge: HOME | End: 2022-03-04
Attending: EMERGENCY MEDICINE
Payer: MEDICAID

## 2022-03-04 VITALS
RESPIRATION RATE: 18 BRPM | HEIGHT: 66 IN | OXYGEN SATURATION: 98 % | DIASTOLIC BLOOD PRESSURE: 72 MMHG | TEMPERATURE: 98 F | WEIGHT: 149.91 LBS | SYSTOLIC BLOOD PRESSURE: 128 MMHG | HEART RATE: 98 BPM

## 2022-03-04 DIAGNOSIS — F25.9 SCHIZOAFFECTIVE DISORDER, UNSPECIFIED: ICD-10-CM

## 2022-03-04 LAB
ANION GAP SERPL CALC-SCNC: 16 MMOL/L — SIGNIFICANT CHANGE UP (ref 5–17)
APAP SERPL-MCNC: <3 UG/ML — LOW (ref 10–26)
BASOPHILS # BLD AUTO: 0.02 K/UL — SIGNIFICANT CHANGE UP (ref 0–0.2)
BASOPHILS NFR BLD AUTO: 0.4 % — SIGNIFICANT CHANGE UP (ref 0–2)
BUN SERPL-MCNC: 13.4 MG/DL — SIGNIFICANT CHANGE UP (ref 8–20)
CALCIUM SERPL-MCNC: 8.8 MG/DL — SIGNIFICANT CHANGE UP (ref 8.6–10.2)
CHLORIDE SERPL-SCNC: 99 MMOL/L — SIGNIFICANT CHANGE UP (ref 98–107)
CO2 SERPL-SCNC: 22 MMOL/L — SIGNIFICANT CHANGE UP (ref 22–29)
CREAT SERPL-MCNC: 0.67 MG/DL — SIGNIFICANT CHANGE UP (ref 0.5–1.3)
EGFR: 112 ML/MIN/1.73M2 — SIGNIFICANT CHANGE UP
EOSINOPHIL # BLD AUTO: 0.17 K/UL — SIGNIFICANT CHANGE UP (ref 0–0.5)
EOSINOPHIL NFR BLD AUTO: 3.2 % — SIGNIFICANT CHANGE UP (ref 0–6)
ETHANOL SERPL-MCNC: <10 MG/DL — SIGNIFICANT CHANGE UP (ref 0–9)
GLUCOSE SERPL-MCNC: 166 MG/DL — HIGH (ref 70–99)
HCT VFR BLD CALC: 43.1 % — SIGNIFICANT CHANGE UP (ref 39–50)
HGB BLD-MCNC: 14.3 G/DL — SIGNIFICANT CHANGE UP (ref 13–17)
IMM GRANULOCYTES NFR BLD AUTO: 0.2 % — SIGNIFICANT CHANGE UP (ref 0–1.5)
LYMPHOCYTES # BLD AUTO: 1.67 K/UL — SIGNIFICANT CHANGE UP (ref 1–3.3)
LYMPHOCYTES # BLD AUTO: 31 % — SIGNIFICANT CHANGE UP (ref 13–44)
MCHC RBC-ENTMCNC: 29.6 PG — SIGNIFICANT CHANGE UP (ref 27–34)
MCHC RBC-ENTMCNC: 33.2 GM/DL — SIGNIFICANT CHANGE UP (ref 32–36)
MCV RBC AUTO: 89.2 FL — SIGNIFICANT CHANGE UP (ref 80–100)
MONOCYTES # BLD AUTO: 0.58 K/UL — SIGNIFICANT CHANGE UP (ref 0–0.9)
MONOCYTES NFR BLD AUTO: 10.8 % — SIGNIFICANT CHANGE UP (ref 2–14)
NEUTROPHILS # BLD AUTO: 2.93 K/UL — SIGNIFICANT CHANGE UP (ref 1.8–7.4)
NEUTROPHILS NFR BLD AUTO: 54.4 % — SIGNIFICANT CHANGE UP (ref 43–77)
PLATELET # BLD AUTO: 358 K/UL — SIGNIFICANT CHANGE UP (ref 150–400)
POTASSIUM SERPL-MCNC: 3.9 MMOL/L — SIGNIFICANT CHANGE UP (ref 3.5–5.3)
POTASSIUM SERPL-SCNC: 3.9 MMOL/L — SIGNIFICANT CHANGE UP (ref 3.5–5.3)
RBC # BLD: 4.83 M/UL — SIGNIFICANT CHANGE UP (ref 4.2–5.8)
RBC # FLD: 12.8 % — SIGNIFICANT CHANGE UP (ref 10.3–14.5)
SALICYLATES SERPL-MCNC: <0.6 MG/DL — LOW (ref 10–20)
SARS-COV-2 RNA SPEC QL NAA+PROBE: SIGNIFICANT CHANGE UP
SODIUM SERPL-SCNC: 137 MMOL/L — SIGNIFICANT CHANGE UP (ref 135–145)
WBC # BLD: 5.38 K/UL — SIGNIFICANT CHANGE UP (ref 3.8–10.5)
WBC # FLD AUTO: 5.38 K/UL — SIGNIFICANT CHANGE UP (ref 3.8–10.5)

## 2022-03-04 PROCEDURE — 90792 PSYCH DIAG EVAL W/MED SRVCS: CPT

## 2022-03-04 PROCEDURE — 93010 ELECTROCARDIOGRAM REPORT: CPT

## 2022-03-04 PROCEDURE — 99218: CPT

## 2022-03-04 RX ORDER — DIPHENHYDRAMINE HCL 50 MG
50 CAPSULE ORAL EVERY 6 HOURS
Refills: 0 | Status: DISCONTINUED | OUTPATIENT
Start: 2022-03-04 | End: 2022-03-08

## 2022-03-04 RX ORDER — CEPHALEXIN 500 MG
500 CAPSULE ORAL
Refills: 0 | Status: DISCONTINUED | OUTPATIENT
Start: 2022-03-04 | End: 2022-03-08

## 2022-03-04 RX ORDER — METFORMIN HYDROCHLORIDE 850 MG/1
1000 TABLET ORAL DAILY
Refills: 0 | Status: DISCONTINUED | OUTPATIENT
Start: 2022-03-04 | End: 2022-03-08

## 2022-03-04 RX ORDER — TRAZODONE HCL 50 MG
250 TABLET ORAL ONCE
Refills: 0 | Status: DISCONTINUED | OUTPATIENT
Start: 2022-03-04 | End: 2022-03-04

## 2022-03-04 RX ORDER — RISPERIDONE 4 MG/1
2 TABLET ORAL AT BEDTIME
Refills: 0 | Status: DISCONTINUED | OUTPATIENT
Start: 2022-03-04 | End: 2022-03-08

## 2022-03-04 RX ORDER — DIVALPROEX SODIUM 500 MG/1
500 TABLET, DELAYED RELEASE ORAL ONCE
Refills: 0 | Status: DISCONTINUED | OUTPATIENT
Start: 2022-03-04 | End: 2022-03-08

## 2022-03-04 RX ORDER — TRAZODONE HCL 50 MG
100 TABLET ORAL AT BEDTIME
Refills: 0 | Status: DISCONTINUED | OUTPATIENT
Start: 2022-03-04 | End: 2022-03-04

## 2022-03-04 RX ORDER — DIVALPROEX SODIUM 500 MG/1
1000 TABLET, DELAYED RELEASE ORAL AT BEDTIME
Refills: 0 | Status: DISCONTINUED | OUTPATIENT
Start: 2022-03-04 | End: 2022-03-08

## 2022-03-04 RX ORDER — FLUPHENAZINE HYDROCHLORIDE 1 MG/1
5 TABLET, FILM COATED ORAL EVERY 12 HOURS
Refills: 0 | Status: DISCONTINUED | OUTPATIENT
Start: 2022-03-04 | End: 2022-03-04

## 2022-03-04 RX ORDER — FLUPHENAZINE HYDROCHLORIDE 1 MG/1
5 TABLET, FILM COATED ORAL EVERY 12 HOURS
Refills: 0 | Status: DISCONTINUED | OUTPATIENT
Start: 2022-03-04 | End: 2022-03-08

## 2022-03-04 RX ORDER — DIVALPROEX SODIUM 500 MG/1
500 TABLET, DELAYED RELEASE ORAL DAILY
Refills: 0 | Status: DISCONTINUED | OUTPATIENT
Start: 2022-03-04 | End: 2022-03-08

## 2022-03-04 RX ORDER — TRAZODONE HCL 50 MG
150 TABLET ORAL AT BEDTIME
Refills: 0 | Status: DISCONTINUED | OUTPATIENT
Start: 2022-03-04 | End: 2022-03-08

## 2022-03-04 RX ADMIN — Medication 500 MILLIGRAM(S): at 12:53

## 2022-03-04 RX ADMIN — Medication 500 MILLIGRAM(S): at 17:24

## 2022-03-04 RX ADMIN — METFORMIN HYDROCHLORIDE 1000 MILLIGRAM(S): 850 TABLET ORAL at 17:21

## 2022-03-04 RX ADMIN — DIVALPROEX SODIUM 500 MILLIGRAM(S): 500 TABLET, DELAYED RELEASE ORAL at 17:25

## 2022-03-04 RX ADMIN — RISPERIDONE 2 MILLIGRAM(S): 4 TABLET ORAL at 22:08

## 2022-03-04 RX ADMIN — DIVALPROEX SODIUM 1000 MILLIGRAM(S): 500 TABLET, DELAYED RELEASE ORAL at 22:11

## 2022-03-04 RX ADMIN — Medication 500 MILLIGRAM(S): at 23:27

## 2022-03-04 RX ADMIN — Medication 150 MILLIGRAM(S): at 23:29

## 2022-03-04 NOTE — ED PROVIDER NOTE - OBJECTIVE STATEMENT
52yom w/ bipolar, schizoaffective complains of auditory hallucinations and suicidal thoughts. Has been off his medications. Also has a chronic wound to the right 3rd toe. No trauma.

## 2022-03-04 NOTE — ED PROVIDER NOTE - SKIN, MLM
Skin normal color for race, warm, dry. 1cm ulcerated wound to the dorsum of the right 3rd toe, appears chronic, no surrounding cellulitic change.

## 2022-03-04 NOTE — ED BEHAVIORAL HEALTH ASSESSMENT NOTE - CURRENT MEDICATION
noncompliant with medications for unknown duration of time  Yesi Ludwig 156 last dose not known    [id number] am and  1000 hs,   Risperdal 2 mg hs  trazodone 100 hs  Metformin 1000 qd

## 2022-03-04 NOTE — ED BEHAVIORAL HEALTH ASSESSMENT NOTE - DESCRIPTION
Patient was calm and cooperative in the ED and did not exhibit any aggression. Patient did not require any PRN medications or any physical restraints. diabetes, chronic wound infection to right foot, HTN See HPI
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
## 2022-03-04 NOTE — ED BEHAVIORAL HEALTH NOTE - BEHAVIORAL HEALTH NOTE
Meds confirmed with Shira from ACT team.  [phone]  Pt remains on AOT.  Invega Inj 156 IM q month, last dose 2/24/22  Risperdal 2 mg hs  Depakote 500 am and 1000 hs

## 2022-03-04 NOTE — ED CDU PROVIDER INITIAL DAY NOTE - MEDICAL DECISION MAKING DETAILS
medically cleared; will need PO keflex for potential minor foot infection; seen by , recommending hold in ED for reassessment.

## 2022-03-04 NOTE — ED PEDIATRIC NURSE REASSESSMENT NOTE - NS ED NURSE REASSESS COMMENT FT2
Trazodone given for insomnia as per patient request, medicated with all medications ordered, Risperidone Dekapote, Keflex, urinal given to patient, patient was also educated about no to use the garbage can to pi, patient was told about the existence of a bathroom on the unit and staff avaibility to provide assistance as needed, patient verbalized understanding, nursing to monitor.
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
## 2022-03-04 NOTE — ED PROVIDER NOTE - CLINICAL SUMMARY MEDICAL DECISION MAKING FREE TEXT BOX
Psychotic symptoms in the setting of medication nonadherence. Wound on the foot is chronic appearing, likely cold injury/trenchfoot, will start PO abx. pending psychiatry assessment for disposition planning.
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
## 2022-03-04 NOTE — ED BEHAVIORAL HEALTH ASSESSMENT NOTE - OTHER PAST PSYCHIATRIC HISTORY (INCLUDE DETAILS REGARDING ONSET, COURSE OF ILLNESS, INPATIENT/OUTPATIENT TREATMENT)
multiple psych admissions and ER visits for reports of SI last at [facility] 1/8/22  ACT team Federations   On AOT
No

## 2022-03-04 NOTE — ED PROVIDER NOTE - PROGRESS NOTE DETAILS
Christopher: received sign out, medically cleared; will need PO keflex for potential minor foot infection; seen by , recommending hold in ED for reassessment

## 2022-03-04 NOTE — ED ADULT TRIAGE NOTE - CHIEF COMPLAINT QUOTE
Patient BIBEMS c/o suicideal ideations. States he has been off of his psych medications and voices are telling him to kill himself.

## 2022-03-05 LAB
AMPHET UR-MCNC: NEGATIVE — SIGNIFICANT CHANGE UP
APPEARANCE UR: CLEAR — SIGNIFICANT CHANGE UP
BACTERIA # UR AUTO: ABNORMAL
BARBITURATES UR SCN-MCNC: NEGATIVE — SIGNIFICANT CHANGE UP
BENZODIAZ UR-MCNC: NEGATIVE — SIGNIFICANT CHANGE UP
BILIRUB UR-MCNC: NEGATIVE — SIGNIFICANT CHANGE UP
COCAINE METAB.OTHER UR-MCNC: POSITIVE
COLOR SPEC: YELLOW — SIGNIFICANT CHANGE UP
DIFF PNL FLD: NEGATIVE — SIGNIFICANT CHANGE UP
EPI CELLS # UR: SIGNIFICANT CHANGE UP
GLUCOSE UR QL: 1000 MG/DL
KETONES UR-MCNC: ABNORMAL
LEUKOCYTE ESTERASE UR-ACNC: NEGATIVE — SIGNIFICANT CHANGE UP
METHADONE UR-MCNC: NEGATIVE — SIGNIFICANT CHANGE UP
NITRITE UR-MCNC: POSITIVE
OPIATES UR-MCNC: NEGATIVE — SIGNIFICANT CHANGE UP
PCP SPEC-MCNC: SIGNIFICANT CHANGE UP
PCP UR-MCNC: NEGATIVE — SIGNIFICANT CHANGE UP
PH UR: 6 — SIGNIFICANT CHANGE UP (ref 5–8)
PROT UR-MCNC: NEGATIVE — SIGNIFICANT CHANGE UP
RBC CASTS # UR COMP ASSIST: NEGATIVE /HPF — SIGNIFICANT CHANGE UP (ref 0–4)
SP GR SPEC: 1.02 — SIGNIFICANT CHANGE UP (ref 1.01–1.02)
THC UR QL: NEGATIVE — SIGNIFICANT CHANGE UP
UROBILINOGEN FLD QL: NEGATIVE — SIGNIFICANT CHANGE UP
WBC UR QL: SIGNIFICANT CHANGE UP /HPF (ref 0–5)

## 2022-03-05 PROCEDURE — 99226: CPT

## 2022-03-05 PROCEDURE — 99211 OFF/OP EST MAY X REQ PHY/QHP: CPT

## 2022-03-05 RX ADMIN — DIVALPROEX SODIUM 1000 MILLIGRAM(S): 500 TABLET, DELAYED RELEASE ORAL at 22:26

## 2022-03-05 RX ADMIN — METFORMIN HYDROCHLORIDE 1000 MILLIGRAM(S): 850 TABLET ORAL at 12:09

## 2022-03-05 RX ADMIN — Medication 500 MILLIGRAM(S): at 05:59

## 2022-03-05 RX ADMIN — Medication 500 MILLIGRAM(S): at 23:57

## 2022-03-05 RX ADMIN — DIVALPROEX SODIUM 500 MILLIGRAM(S): 500 TABLET, DELAYED RELEASE ORAL at 12:10

## 2022-03-05 RX ADMIN — Medication 500 MILLIGRAM(S): at 12:09

## 2022-03-05 RX ADMIN — Medication 500 MILLIGRAM(S): at 17:48

## 2022-03-05 RX ADMIN — RISPERIDONE 2 MILLIGRAM(S): 4 TABLET ORAL at 22:26

## 2022-03-05 RX ADMIN — Medication 150 MILLIGRAM(S): at 22:27

## 2022-03-05 NOTE — ED BEHAVIORAL HEALTH NOTE - BEHAVIORAL HEALTH NOTE
PROGRESS NOTE: 22 @ 11:59  	  • Reason for Ongoing Consultation: 	    ID: [id number] Male with HEALTH ISSUES - PROBLEM Dx:  Schizoaffective disorder, chronic condition            INTERVAL DATA:   • Interval Chief Complaint: "I'm still hearing voices..."  • Interval History:   Seen on follow up.  Pt in bed, irritable, yesterday requesting large quantities of food, ie 2 trays, 4 soda, multiple cookie packets, at time demanding and ordering staff to get him things ie remote.  Pt continues to report command AH to kill himself.  Suspect Pt is coming to ED for secondary gains however due to remote suicide attempt is entrenched in mental health system, is on AOT and with ACT team.  Pt has no interest in stopping drugs.  Denies he uses cocaine, only Sherrell.  Spoke with Shira from ACT team who reports he received his Invega Shot last week (see prior note).  He has however off his meds for past week and is noncompliant with po meds.  Hold for reassessment in am vs tx when bed available.  Suggest  Team review/plan of Pt patterns of behavior, overutilizing of services with recurrent noncompliance  of rx including refusing to address drug abuse.  • Constitutional Symptoms	No complaints  • Eyes	No complaints  • Ears / Nose / Throat / Mouth	No complaints  • Cardiovascular	No complaints  • Respiratory	No complaints  • Gastrointestinal	No complaints  • Genitourinary	No complaints  • Musculoskeletal	No complaints  • Skin	No complaints  • Neurological	No complaints  • Psychiatric (see HPI)	See HPI  • Endocrine	No complaints  • Hematologic / Lymphatic	No complaints  • Allergic / Immunologic	No complaints    REVIEW OF VITALS/LABS/IMAGING/INVESTIGATIONS:   • Vital signs reviewed: Yes  • Vital Signs:	    T(C): 36.9 (22 @ 11:05), Max: 37 (22 @ 23:35)  HR: 76 (22 @ 11:05) (76 - 93)  BP: 117/72 (22 @ 11:05) (103/66 - 117/72)  RR: 20 (22 @ 11:05) (18 - 20)  SpO2: 96% (22 @ 11:05) (96% - 98%)    • Available labs reviewed: Yes  • Available Lab Results:                           14.3   5.38  )-----------( 358      ( 04 Mar 2022 06:16 )             43.1     03-04    137  |  99  |  13.4  ----------------------------<  166<H>  3.9   |  22.0  |  0.67    Ca    8.8      04 Mar 2022 06:16          Urinalysis Basic - ( 05 Mar 2022 11:17 )    Color: Yellow / Appearance: Clear / S.020 / pH: x  Gluc: x / Ketone: Small  / Bili: Negative / Urobili: Negative   Blood: x / Protein: Negative / Nitrite: Positive   Leuk Esterase: Negative / RBC: Negative /HPF / WBC 0-2 /HPF   Sq Epi: x / Non Sq Epi: Occasional / Bacteria: Occasional          MEDICATIONS:      PRN Medications:  • PRN Medications since last evaluation	  • PRN Details	    Current Medications:   cephalexin 500 milliGRAM(s) Oral four times a day  diphenhydrAMINE Injectable 50 milliGRAM(s) IntraMuscular every 6 hours PRN  diVALproex  milliGRAM(s) Oral Once  diVALproex  milliGRAM(s) Oral daily  diVALproex DR 1000 milliGRAM(s) Oral at bedtime  fluPHENAZine 5 milliGRAM(s) Oral every 12 hours PRN  LORazepam   Injectable 2 milliGRAM(s) IntraMuscular every 6 hours PRN  metFORMIN 1000 milliGRAM(s) Oral daily  risperiDONE   Tablet 2 milliGRAM(s) Oral at bedtime  traZODone 150 milliGRAM(s) Oral at bedtime     Medication Side Effects:  • Medication Side Effects or Adverse Reactions (new or ongoing)	None known    MENTAL STATUS EXAM:   • Level of Consciousness	Alert  • General Appearance	Well developed  • Body Habitus	Well nourished  • Hygiene	poor  • Grooming	poor  • Behavior	min cooperative  • Eye Contact	poor  • Relatedness	poor  • Impulse Control	Normal  • Muscle Tone / Strength	Normal muscle tone/strength  • Abnormal Movements	No abnormal movements  • Gait / Station	abnormal gait / station  • Speech Volume	Normal to loud  • Speech Rate	Normal  • Speech Spontaneity	Normal  • Speech Articulation	Normal  • Mood	depressed angry  • Affect Quality	depressed  • Affect Range	constricted  • Affect Congruence	Congruent  • Thought Process	Linear  • Thought Associations	Normal  • Thought Content	SI  • Perceptions	reports command AH to kill self  • Oriented to Time	Yes  • Oriented to Place	Yes  • Oriented to Situation	Yes  • Oriented to Person	Yes  • Attention / Concentration	Normal  • Estimated Intelligence	Average  • Recent Memory	Normal  • Remote Memory	Normal  • Fund of Knowledge	Normal  • Language	No abnormalities noted  • Judgment (regarding everyday events)	Fair  • Insight (regarding psychiatric illness)	Fair    SUICIDALITY:   • Suicidality (Interval)	endorsed SI    HOMICIDALITY/AGGRESSION:   • Homicidality/Aggression	none known    DIAGNOSIS DSM-V:    Psychiatric Diagnosis (Corresponds to DSM-IV Axis I, II):   HEALTH ISSUES - PROBLEM Dx:  Schizoaffective disorder, chronic condition  Sherrell/cocaine abuse             Medical Diagnosis (Corresponds to DSM-IV Axis III):  • Axis III	  infected foot on AB, NIDDM    ASSESSMENT OF CURRENT CONDITION:   Hold for reassessment or available bed

## 2022-03-05 NOTE — CHART NOTE - NSCHARTNOTEFT_GEN_A_CORE
As per NP, patient is hold for reassessment. SW will continue to follow for safe discharge planning.

## 2022-03-06 VITALS
OXYGEN SATURATION: 97 % | RESPIRATION RATE: 20 BRPM | TEMPERATURE: 98 F | SYSTOLIC BLOOD PRESSURE: 133 MMHG | DIASTOLIC BLOOD PRESSURE: 80 MMHG | HEART RATE: 84 BPM

## 2022-03-06 PROCEDURE — 80048 BASIC METABOLIC PNL TOTAL CA: CPT

## 2022-03-06 PROCEDURE — 99285 EMERGENCY DEPT VISIT HI MDM: CPT | Mod: 25

## 2022-03-06 PROCEDURE — 93005 ELECTROCARDIOGRAM TRACING: CPT

## 2022-03-06 PROCEDURE — G0378: CPT

## 2022-03-06 PROCEDURE — U0005: CPT

## 2022-03-06 PROCEDURE — 99211 OFF/OP EST MAY X REQ PHY/QHP: CPT

## 2022-03-06 PROCEDURE — 99217: CPT

## 2022-03-06 PROCEDURE — 85025 COMPLETE CBC W/AUTO DIFF WBC: CPT

## 2022-03-06 PROCEDURE — 80307 DRUG TEST PRSMV CHEM ANLYZR: CPT

## 2022-03-06 PROCEDURE — 87086 URINE CULTURE/COLONY COUNT: CPT

## 2022-03-06 PROCEDURE — 36415 COLL VENOUS BLD VENIPUNCTURE: CPT

## 2022-03-06 PROCEDURE — U0003: CPT

## 2022-03-06 PROCEDURE — 81001 URINALYSIS AUTO W/SCOPE: CPT

## 2022-03-06 RX ORDER — CEPHALEXIN 500 MG
1 CAPSULE ORAL
Qty: 28 | Refills: 0
Start: 2022-03-06 | End: 2022-03-12

## 2022-03-06 RX ADMIN — Medication 500 MILLIGRAM(S): at 11:35

## 2022-03-06 RX ADMIN — Medication 500 MILLIGRAM(S): at 06:16

## 2022-03-06 RX ADMIN — METFORMIN HYDROCHLORIDE 1000 MILLIGRAM(S): 850 TABLET ORAL at 11:35

## 2022-03-06 RX ADMIN — DIVALPROEX SODIUM 500 MILLIGRAM(S): 500 TABLET, DELAYED RELEASE ORAL at 11:35

## 2022-03-06 NOTE — ED CDU PROVIDER SUBSEQUENT DAY NOTE - HISTORY
No events overnight. Pending reassessment by psychiatry for disposition
No events overnight. Pending reassessment by psychiatry for disposition

## 2022-03-06 NOTE — ED CDU PROVIDER DISPOSITION NOTE - PATIENT PORTAL LINK FT
You can access the FollowMyHealth Patient Portal offered by [facility] by registering at the following website: [email or web address]. By joining [facility]’s FollowMyHealth portal, you will also be able to view your health information using other applications (apps) compatible with our system.

## 2022-03-06 NOTE — ED ADULT NURSE REASSESSMENT NOTE - STATUS
awaiting discharge, no change
awaiting transfer, no change
awaiting transfer, no change
reassessment

## 2022-03-06 NOTE — ED CDU PROVIDER DISPOSITION NOTE - NSFOLLOWUPINSTRUCTIONS_ED_ALL_ED_FT
return to the ED if symptoms worsen, fever/chills, nausea/vomiting, foot swellign worsening draining. follow up with podiatry within 1 week

## 2022-03-06 NOTE — ED CDU PROVIDER DISPOSITION NOTE - CLINICAL COURSE
pt with chronic foot wound- on keflex; no new drainage- medially cleared ;was sent to  for schizoaffective evaluation - cleared by psych ok to dc

## 2022-03-06 NOTE — ED ADULT NURSE REASSESSMENT NOTE - GENERAL PATIENT STATE
comfortable appearance/cooperative
resting/sleeping
comfortable appearance/cooperative
comfortable appearance/cooperative
comfortable appearance/cooperative/resting/sleeping
comfortable appearance/resting/sleeping

## 2022-03-06 NOTE — ED CDU PROVIDER SUBSEQUENT DAY NOTE - NSICDXPASTSURGICALHX_GEN_ALL_CORE_FT
PAST SURGICAL HISTORY:  No significant past surgical history     No significant past surgical history     
PAST SURGICAL HISTORY:  No significant past surgical history     No significant past surgical history

## 2022-03-06 NOTE — ED BEHAVIORAL HEALTH NOTE - BEHAVIORAL HEALTH NOTE
PROGRESS NOTE: 22 @ 12:01  	  • Reason for Ongoing Consultation: 	    ID: 52yyo Male with HEALTH ISSUES - PROBLEM Dx:  Schizoaffective disorder, chronic condition            INTERVAL DATA:   • Interval Chief Complaint: "I want to go.  I'm not hearing voices anymore."  • Interval History:   Seen of follow up.  Requesting discharge.  Denies SI/HI/AH/VH.  Pt denies having meds.  Was advised ACT team will be informed and will contact for med dispending tomorrow.  Pt offered and declined tx for substance abuse.  Pt smiled and said he will take the tx when he is ready.  To be discussed with team regarding misuse of ED for secondary gains.  REVIEW OF SYSTEMS:   • Constitutional Symptoms	No complaints  • Eyes	No complaints  • Ears / Nose / Throat / Mouth	No complaints  • Cardiovascular	No complaints  • Respiratory	No complaints  • Gastrointestinal	No complaints  • Genitourinary	No complaints  • Musculoskeletal	No complaints  • Skin	No complaints  • Neurological	No complaints  • Psychiatric (see HPI)	See HPI  • Endocrine	No complaints  • Hematologic / Lymphatic	No complaints  • Allergic / Immunologic	No complaints    REVIEW OF VITALS/LABS/IMAGING/INVESTIGATIONS:   • Vital signs reviewed: Yes  • Vital Signs:	    T(C): 36.6 (22 @ 07:49), Max: 37 (22 @ 19:12)  HR: 81 (22 @ 07:49) (74 - 87)  BP: 126/82 (22 @ 07:49) (105/67 - 126/82)  RR: 18 (22 @ 07:49) (18 - 20)  SpO2: 96% (22 @ 07:49) (96% - 97%)    • Available labs reviewed: Yes  • Available Lab Results:                 Urinalysis Basic - ( 05 Mar 2022 11:17 )    Color: Yellow / Appearance: Clear / S.020 / pH: x  Gluc: x / Ketone: Small  / Bili: Negative / Urobili: Negative   Blood: x / Protein: Negative / Nitrite: Positive   Leuk Esterase: Negative / RBC: Negative /HPF / WBC 0-2 /HPF   Sq Epi: x / Non Sq Epi: Occasional / Bacteria: Occasional          MEDICATIONS:      PRN Medications:  • PRN Medications since last evaluation	  • PRN Details	    Current Medications:   cephalexin 500 milliGRAM(s) Oral four times a day  diphenhydrAMINE Injectable 50 milliGRAM(s) IntraMuscular every 6 hours PRN  diVALproex  milliGRAM(s) Oral Once  diVALproex  milliGRAM(s) Oral daily  diVALproex DR 1000 milliGRAM(s) Oral at bedtime  fluPHENAZine 5 milliGRAM(s) Oral every 12 hours PRN  LORazepam   Injectable 2 milliGRAM(s) IntraMuscular every 6 hours PRN  metFORMIN 1000 milliGRAM(s) Oral daily  risperiDONE   Tablet 2 milliGRAM(s) Oral at bedtime  traZODone 150 milliGRAM(s) Oral at bedtime     Medication Side Effects:  • Medication Side Effects or Adverse Reactions (new or ongoing)	None known    MENTAL STATUS EXAM:   • Level of Consciousness	Alert  • General Appearance	Well developed  • Body Habitus	Well nourished  • Hygiene	fair  • Grooming	poor  • Behavior	Cooperative  • Eye Contact	Good  • Relatedness	fair  • Impulse Control	Normal  • Muscle Tone / Strength	Normal muscle tone/strength  • Abnormal Movements	No abnormal movements  • Gait / Station	Normal gait / station  • Speech Volume	Normal  • Speech Rate	Normal  • Speech Spontaneity	Normal  • Speech Articulation	Normal  • Mood	Normal  • Affect Quality	Euthymic  • Affect Range	Full  • Affect Congruence	Congruent  • Thought Process	Linear  • Thought Associations	Normal  • Thought Content	Unremarkable  • Perceptions	No abnormalities  • Oriented to Time	Yes  • Oriented to Place	Yes  • Oriented to Situation	Yes  • Oriented to Person	Yes  • Attention / Concentration	Normal  • Estimated Intelligence	Average  • Recent Memory	Normal  • Remote Memory	Normal  • Fund of Knowledge	Normal  • Language	No abnormalities noted  • Judgment (regarding everyday events)	Fair  • Insight (regarding psychiatric illness)	Fair    SUICIDALITY:   • Suicidality (Interval)	none known    HOMICIDALITY/AGGRESSION:   • Homicidality/Aggression	none known    DIAGNOSIS DSM-V:    Psychiatric Diagnosis (Corresponds to DSM-IV Axis I, II):   HEALTH ISSUES - PROBLEM Dx:  Schizoaffective disorder, chronic condition             Medical Diagnosis (Corresponds to DSM-IV Axis III):  • Axis III	      ASSESSMENT OF CURRENT CONDITION:   Summary (include case differential, formulation and patient response to therapy):   Cleared for discharge to ACT team.  Risk Assessment (consider static vs modifiable risk factors and protective factors; comment on level of risk for dangerous behavior):     PLAN    T&R

## 2022-03-06 NOTE — ED ADULT NURSE REASSESSMENT NOTE - COMFORT CARE
ambulated to bathroom/meal provided/plan of care explained
darkened lights
meal provided/plan of care explained

## 2022-03-06 NOTE — ED CDU PROVIDER SUBSEQUENT DAY NOTE - NSICDXPASTMEDICALHX_GEN_ALL_CORE_FT
PAST MEDICAL HISTORY:  Bipolar disorder     DM (diabetes mellitus)     DM (diabetes mellitus)     HTN (hypertension)     Schizoaffective disorder, unspecified type     
PAST MEDICAL HISTORY:  Bipolar disorder     DM (diabetes mellitus)     DM (diabetes mellitus)     HTN (hypertension)     Schizoaffective disorder, unspecified type

## 2022-03-06 NOTE — ED CDU PROVIDER SUBSEQUENT DAY NOTE - CONSTITUTIONAL MENTATION
awake/alert/oriented to person, place, time/situation
awake/alert/oriented to person, place, time/situation

## 2022-03-06 NOTE — ED CDU PROVIDER DISPOSITION NOTE - CARE PROVIDER_API CALL
Jose D Conklin (DPM)  Podiatric Medicine and Surgery  [address] [place] [address]  Phone: [phone]  Fax: [phone]  Follow Up Time:

## 2022-03-06 NOTE — ED ADULT NURSE REASSESSMENT NOTE - NS ED NURSE REASSESS COMMENT FT1
Assumed care of patient.  Pt alert and cooperative at this time.  Pt states that he is hearing voices and its been going on for 2 weeks.  Pt having right foot wound with a hong wrap dressing in place.  Pt refused to have sock removed .  Pt made aware of need for urine sample.  Will monitor and chart changes and maintain safe environment
Patient in bed, alert and oriented with complain of suicidal tought with no clear plan, patient stated hearing voiced telling him to kill himself. Patient requested soda and sandwich, patient educated about diabetic diet to comply with, verbalized understanding. Urinalysis positive, MD request a new sample of urine for culture. Container given to patient, awaiting for specimen.
Toe wound on right 3rd toe assessed as documented.  Purulent like drainage on old dressing.  Wound covers top of toe oval in shape with irregular edges approximately 2.5 cm by 1.2 cm.  Wound cleaned with normal saline, wound bed pink in color.  Wound dressed with xeroform and sterile gauze.  Patient denies any pain at wound site and reports wound present for approximately 2.5 weeks.  Safety of patient maintained.
Wound to right toe cleaned with normal saline and dressed with xeroform and sterile gauze.  No drainage on old dressing.  Wound bed is pink. wound size is 2.5 cm by 1.2 cm. No signs of infection.  Patient educated about signs of infection and antibiotic regimen he will continue post discharge.  Patient reports he will follow up with his medical doctor post discharge.   Denies suicidal or homicidal ideations.
Patient in bed sleeping, no complain voiced, awaiting urine sample from patient.
Patient slept well, compliant with medication, no complain voiced overnight, POC remain in progress, nursing to follow up.
Patient slept well, no complain of pain voiced, all schedule medications administered and tolerated, refused vital signs, PO fluids and meals tolerated, calm and cooperative, compliant with nursing care, continue to voice auditory hallucination. Voiced telling me to kill myself, will continue to monitor
Reviewed discharge plans and verbalized understanding of plans.  Patient agrees to return to local ED or call 911 if symptoms worsen or thoughts to harm self or others develop.  Wound care reviewed and signs of infection discussed.
Dinner provided at bedside.  Patient intermittently sleeping in bed.  Patient educated about diet order be he is non complaint frequently requesting more food.  No attempts to harm self or others and safety maintained.
Assumed care of patient at 0715.  Patient resting in bed appears to be sleeping with no distress and regular nonlabored breathing.  Safety of patient maintained.
Assumed care of patient at 0720.  Patient resting in bed awake.   Oriented to staff and educated about plan of care.  Patient provided breakfast at bedside.  no complaints or distress.  Patient endorses he feels safe in the hospital at this time.  No attempts to harm self or others and safety maintained.  Urine sample obtained for testing.
Patient has been withdrawn to his bed.  Patient ate 100% of his breakfast.  No attempts to harm self or others and safety maintained.
Patient oriented to staff and educated about plan of care when awoken for vital sign assessment.  Patient offers no complaints.  Dressing intake on toe.  Patient provided breakfast and ate 100% of his meal.  No attempts to harm self or others and safety maintained.
Patient provided urine for testing.  Patient ate 100% of his meals.  No attempts to harm self or others and safety maintained.

## 2022-03-06 NOTE — ED CDU PROVIDER SUBSEQUENT DAY NOTE - MEDICAL DECISION MAKING DETAILS
medically cleared; will need PO keflex for potential minor foot infection; seen by , recommending hold in ED for reassessment.
no acute events, awaiting psych admission

## 2022-03-06 NOTE — ED ADULT NURSE REASSESSMENT NOTE - CONDITION
unchanged

## 2022-03-07 LAB
CULTURE RESULTS: NO GROWTH — SIGNIFICANT CHANGE UP
SPECIMEN SOURCE: SIGNIFICANT CHANGE UP

## 2022-03-08 ENCOUNTER — EMERGENCY (EMERGENCY)
Facility: HOSPITAL | Age: 52
LOS: 1 days | Discharge: HOME | End: 2022-03-08
Attending: EMERGENCY MEDICINE
Payer: MEDICAID

## 2022-03-08 VITALS
DIASTOLIC BLOOD PRESSURE: 75 MMHG | HEART RATE: 99 BPM | SYSTOLIC BLOOD PRESSURE: 116 MMHG | RESPIRATION RATE: 18 BRPM | TEMPERATURE: 98 F | HEIGHT: 66 IN | OXYGEN SATURATION: 99 %

## 2022-03-08 LAB
ALBUMIN SERPL ELPH-MCNC: 3.5 G/DL — SIGNIFICANT CHANGE UP (ref 3.3–5.2)
ALP SERPL-CCNC: 56 U/L — SIGNIFICANT CHANGE UP (ref 40–120)
ALT FLD-CCNC: 13 U/L — SIGNIFICANT CHANGE UP
ANION GAP SERPL CALC-SCNC: 10 MMOL/L — SIGNIFICANT CHANGE UP (ref 5–17)
APAP SERPL-MCNC: <3 UG/ML — LOW (ref 10–26)
AST SERPL-CCNC: 21 U/L — SIGNIFICANT CHANGE UP
BASOPHILS # BLD AUTO: 0.03 K/UL — SIGNIFICANT CHANGE UP (ref 0–0.2)
BASOPHILS NFR BLD AUTO: 0.7 % — SIGNIFICANT CHANGE UP (ref 0–2)
BILIRUB SERPL-MCNC: 0.3 MG/DL — LOW (ref 0.4–2)
BUN SERPL-MCNC: 14.3 MG/DL — SIGNIFICANT CHANGE UP (ref 8–20)
CALCIUM SERPL-MCNC: 8.8 MG/DL — SIGNIFICANT CHANGE UP (ref 8.6–10.2)
CHLORIDE SERPL-SCNC: 101 MMOL/L — SIGNIFICANT CHANGE UP (ref 98–107)
CO2 SERPL-SCNC: 26 MMOL/L — SIGNIFICANT CHANGE UP (ref 22–29)
CREAT SERPL-MCNC: 0.67 MG/DL — SIGNIFICANT CHANGE UP (ref 0.5–1.3)
EGFR: 112 ML/MIN/1.73M2 — SIGNIFICANT CHANGE UP
EOSINOPHIL # BLD AUTO: 0.11 K/UL — SIGNIFICANT CHANGE UP (ref 0–0.5)
EOSINOPHIL NFR BLD AUTO: 2.4 % — SIGNIFICANT CHANGE UP (ref 0–6)
ETHANOL SERPL-MCNC: <10 MG/DL — SIGNIFICANT CHANGE UP (ref 0–9)
GLUCOSE SERPL-MCNC: 272 MG/DL — HIGH (ref 70–99)
HCT VFR BLD CALC: 40.6 % — SIGNIFICANT CHANGE UP (ref 39–50)
HGB BLD-MCNC: 13.6 G/DL — SIGNIFICANT CHANGE UP (ref 13–17)
IMM GRANULOCYTES NFR BLD AUTO: 0.2 % — SIGNIFICANT CHANGE UP (ref 0–1.5)
LYMPHOCYTES # BLD AUTO: 1.85 K/UL — SIGNIFICANT CHANGE UP (ref 1–3.3)
LYMPHOCYTES # BLD AUTO: 40.7 % — SIGNIFICANT CHANGE UP (ref 13–44)
MCHC RBC-ENTMCNC: 30.2 PG — SIGNIFICANT CHANGE UP (ref 27–34)
MCHC RBC-ENTMCNC: 33.5 GM/DL — SIGNIFICANT CHANGE UP (ref 32–36)
MCV RBC AUTO: 90 FL — SIGNIFICANT CHANGE UP (ref 80–100)
MONOCYTES # BLD AUTO: 0.53 K/UL — SIGNIFICANT CHANGE UP (ref 0–0.9)
MONOCYTES NFR BLD AUTO: 11.7 % — SIGNIFICANT CHANGE UP (ref 2–14)
NEUTROPHILS # BLD AUTO: 2.01 K/UL — SIGNIFICANT CHANGE UP (ref 1.8–7.4)
NEUTROPHILS NFR BLD AUTO: 44.3 % — SIGNIFICANT CHANGE UP (ref 43–77)
PLATELET # BLD AUTO: 309 K/UL — SIGNIFICANT CHANGE UP (ref 150–400)
POTASSIUM SERPL-MCNC: 3.7 MMOL/L — SIGNIFICANT CHANGE UP (ref 3.5–5.3)
POTASSIUM SERPL-SCNC: 3.7 MMOL/L — SIGNIFICANT CHANGE UP (ref 3.5–5.3)
PROT SERPL-MCNC: 7.1 G/DL — SIGNIFICANT CHANGE UP (ref 6.6–8.7)
RBC # BLD: 4.51 M/UL — SIGNIFICANT CHANGE UP (ref 4.2–5.8)
RBC # FLD: 12.8 % — SIGNIFICANT CHANGE UP (ref 10.3–14.5)
SALICYLATES SERPL-MCNC: <0.6 MG/DL — LOW (ref 10–20)
SARS-COV-2 RNA SPEC QL NAA+PROBE: SIGNIFICANT CHANGE UP
SODIUM SERPL-SCNC: 137 MMOL/L — SIGNIFICANT CHANGE UP (ref 135–145)
WBC # BLD: 4.54 K/UL — SIGNIFICANT CHANGE UP (ref 3.8–10.5)
WBC # FLD AUTO: 4.54 K/UL — SIGNIFICANT CHANGE UP (ref 3.8–10.5)

## 2022-03-08 PROCEDURE — 99218: CPT

## 2022-03-08 PROCEDURE — 73630 X-RAY EXAM OF FOOT: CPT | Mod: 26,RT

## 2022-03-08 PROCEDURE — 93010 ELECTROCARDIOGRAM REPORT: CPT

## 2022-03-08 PROCEDURE — 90792 PSYCH DIAG EVAL W/MED SRVCS: CPT

## 2022-03-08 RX ORDER — FLUPHENAZINE HYDROCHLORIDE 1 MG/1
5 TABLET, FILM COATED ORAL AT BEDTIME
Refills: 0 | Status: DISCONTINUED | OUTPATIENT
Start: 2022-03-08 | End: 2022-03-12

## 2022-03-08 RX ORDER — METFORMIN HYDROCHLORIDE 850 MG/1
1000 TABLET ORAL
Refills: 0 | Status: DISCONTINUED | OUTPATIENT
Start: 2022-03-08 | End: 2022-03-12

## 2022-03-08 RX ORDER — CEPHALEXIN 500 MG
500 CAPSULE ORAL EVERY 12 HOURS
Refills: 0 | Status: DISCONTINUED | OUTPATIENT
Start: 2022-03-08 | End: 2022-03-12

## 2022-03-08 RX ORDER — NYSTATIN CREAM 100000 [USP'U]/G
1 CREAM TOPICAL
Refills: 0 | Status: DISCONTINUED | OUTPATIENT
Start: 2022-03-08 | End: 2022-03-12

## 2022-03-08 RX ADMIN — FLUPHENAZINE HYDROCHLORIDE 5 MILLIGRAM(S): 1 TABLET, FILM COATED ORAL at 22:05

## 2022-03-08 RX ADMIN — NYSTATIN CREAM 1 APPLICATION(S): 100000 CREAM TOPICAL at 22:05

## 2022-03-08 RX ADMIN — Medication 500 MILLIGRAM(S): at 19:44

## 2022-03-08 RX ADMIN — METFORMIN HYDROCHLORIDE 1000 MILLIGRAM(S): 850 TABLET ORAL at 19:44

## 2022-03-08 NOTE — CONSULT NOTE ADULT - SUBJECTIVE AND OBJECTIVE BOX
HPI: PT with SPMHX of bipolar, schizoaffective, DM, HTN presents to the ED with complaint of hearing voices and wanting to die. Pt states that for the last 2 wk he has been hearing voices that tell him to kill himself. Pt states that he feels sad and does not want to live anymore. Pt dines fever, chills, weakness, numbness, tingling HA, dizziness, SOB, diff breathing.    Podiatry HPI: Hx as noted above. Podiatry consulted regarding a 53 yo male who presents with right 2nd toe ulceration. Pt states that he noticed the ulcer about 2 weeks ago, when it used to be a blister at first. Pt denies pain to the right 2nd toe. Pt seen without any dressing on the foot. Denies any other complaints regarding his feet. Denies constitutional symptoms of N/V/C/F/Sob.       Medications   cephalexin 500 milliGRAM(s) Oral every 12 hours  metFORMIN 1000 milliGRAM(s) Oral two times a day  nystatin Powder 1 Application(s) Topical two times a day    FH: No pertinent family history in first degree relatives    PMH: DM (diabetes mellitus)    Bipolar disorder    HTN (hypertension)    DM (diabetes mellitus)    Schizoaffective disorder, unspecified type    PSH: No significant past surgical history    No significant past surgical history    Labs                      13.6   4.54  )-----------( 309      ( 08 Mar 2022 04:20 )             40.6      03-08    137  |  101  |  14.3  ----------------------------<  272<H>  3.7   |  26.0  |  0.67    Ca    8.8      08 Mar 2022 04:20    TPro  7.1  /  Alb  3.5  /  TBili  0.3<L>  /  DBili  x   /  AST  21  /  ALT  13  /  AlkPhos  56  03-08     Vital Signs Last 24 Hrs  T(C): 36.9 (08 Mar 2022 03:06), Max: 36.9 (08 Mar 2022 03:06)  T(F): 98.5 (08 Mar 2022 03:06), Max: 98.5 (08 Mar 2022 03:06)  HR: 85 (08 Mar 2022 06:16) (85 - 99)  BP: 118/77 (08 Mar 2022 06:16) (116/75 - 118/77)  BP(mean): --  RR: 18 (08 Mar 2022 06:16) (18 - 18)  SpO2: 100% (08 Mar 2022 06:16) (99% - 100%)         WBC Count: 4.54 K/uL (03-08-22 @ 04:20)    CAPILLARY BLOOD GLUCOSE    ROS: All others negative unless otherwise stated in the HPI      Physical Exam  Pt seen resting in bed AAOx3  Vasc: DP/PT pulses palpable b/l, CFT brisk to all digits, TG wnl, localized swelling to the right 2nd toe   Derm: Superficial ulceration noted to the right 2nd digit dorsally, no PTB, fibrotic base, no localized erythema, no active drainage, no warmth, no clinical signs of infection noted   Neuro: Protective sensation grossly intact b/l   MSK: Able to wiggles toes, muscle strength intact b/l      Imaging:    ACC: [id number] EXAM:  XR FOOT COMP MIN 3 VIEWS RT                          PROCEDURE DATE:  03/08/2022      INTERPRETATION:  Clinical history: 52-year-old male, second toe infection   evaluate for osteopenia.    Three views of the right foot without comparison demonstrate no fracture,   dislocation, gross cortical destruction, soft tissue emphysema.    Soft tissue swelling at the second digit is noted.    IMPRESSION:  Soft tissue swelling at the second digit with no gross cortical   destruction or soft tissue emphysema.    If there is continued clinical concern follow-up MRI can be ordered            Assessment:  Right foot ulceration    Plan:  -Pt seen and evaluated  -Xray of the right foot reviewed - no acute pathologies noted   -Labs reviewed   -Discussed clinical findings with the pt   -Right 2nd toe evaluated - superficial in nature  -Applied betadine, DSD   -Advised pt to keep the dressing C/D/I  -Pt can WBAT   Pod plan:  -C/w LWC  -Will follow while in house  -D/w and evaluated at bedside with attending Dr. Hollingsworth

## 2022-03-08 NOTE — ED ADULT NURSE NOTE - OBJECTIVE STATEMENT
Patient calm, stable, sleeping in bed, in yellow gown, placed in direct view of nursing station. No SI at this time. Safety maintained. Will continue to monitor. Patient calm, stable, sleeping in bed, in yellow gown, placed in direct view of nursing station. No SI/HI at this time. Safety maintained. Will continue to monitor.

## 2022-03-08 NOTE — ED ADULT TRIAGE NOTE - CHIEF COMPLAINT QUOTE
patient found outside of [employer] stating that he is hearing voices, patient also states that he has a "hole" in his right foot and would like it wrapped up. patient denies any SI/HI at this time, calm and cooperative during triage, patient undressed and placed in gown

## 2022-03-08 NOTE — ED BEHAVIORAL HEALTH ASSESSMENT NOTE - DESCRIPTION
diabetes See HPI Patient calm and cooperative upon assessment.    Vital Signs Last 24 Hrs  T(C): 36.9 (08 Mar 2022 03:06), Max: 36.9 (08 Mar 2022 03:06)  T(F): 98.5 (08 Mar 2022 03:06), Max: 98.5 (08 Mar 2022 03:06)  HR: 85 (08 Mar 2022 06:16) (85 - 99)  BP: 118/77 (08 Mar 2022 06:16) (116/75 - 118/77)  BP(mean): --  RR: 18 (08 Mar 2022 06:16) (18 - 18)  SpO2: 100% (08 Mar 2022 06:16) (99% - 100%)

## 2022-03-08 NOTE — ED BEHAVIORAL HEALTH ASSESSMENT NOTE - RISK ASSESSMENT
Histrory of suicide attempt, drug abuse, noncompliance with tx  No recent SIB, currently on ACT team Moderate Acute Suicide Risk

## 2022-03-08 NOTE — ED BEHAVIORAL HEALTH ASSESSMENT NOTE - CURRENT MEDICATION
reports noncompliant with all meds for 2 weeks  [id number] am and  1000 hs,   Risperdal 2 mg hs  trazodone 100 hs  Metformin 1000 qd

## 2022-03-08 NOTE — ED PROVIDER NOTE - NS ED ATTENDING STATEMENT MOD
This was a shared visit with the ROSA ELENA. I reviewed and verified the documentation and independently performed the documented:
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
## 2022-03-08 NOTE — ED PROVIDER NOTE - ATTENDING CONTRIBUTION TO CARE
I personally saw the patient with the PA, and completed the key components of the history and physical exam. I then discussed the management plan with the PA.   gen in nad resp clear cardiac no murmur abd soft neuro itnact psych + hallucinations

## 2022-03-08 NOTE — ED BEHAVIORAL HEALTH ASSESSMENT NOTE - NSPRESENTSXS_PSY_ALL_CORE
Depressed mood/Anhedonia/Psychosis/Hopelessness or despair/Command hallucinations to hurt self normal...

## 2022-03-08 NOTE — ED PROVIDER NOTE - NS ED ROS FT
ROS: CONTUSIONAL: Denies fever, chills, fatigue, wt loss. HEAD: Denies trauma, HA, Dizziness. EYE: Denies Acute visual changes, diplopia. ENMT: Denies change in hearing, tinnitus, epistaxis, difficulty swallowing, sore throat. CARDIO: Denies CP, palpitations, edema. RESP: Denies Cough, SOB , Diff breathing, hemoptysis. GI: Denies N/V, ABD pain, change in bowel movement. URINARY: Denies difficulty urinating, pelvic pain. MS:  Denies joint pain, back pain, weakness, decreased ROM, swelling. NEURO: Denies change in gait, seizures, loss of sensation, dizziness, confusion LOC.  PSY: +verbal hallucination  NO SI/HI. SKIN: Denies Rash, bruising.

## 2022-03-08 NOTE — ED BEHAVIORAL HEALTH ASSESSMENT NOTE - PAST PSYCHOTROPIC MEDICATION
previously on Risperdal Consta 50  mg. unclear if he is still taking, pt is poor historian   Patient reports he was last prescribed Metformin 1000 mg bid

## 2022-03-08 NOTE — ED PROVIDER NOTE - CLINICAL SUMMARY MEDICAL DECISION MAKING FREE TEXT BOX
Pt with multiple visits requiring stabilizations verbalizes that he has hallucinations, and does not want to live will hold for am PSYC team.

## 2022-03-08 NOTE — ED BEHAVIORAL HEALTH ASSESSMENT NOTE - OTHER PAST PSYCHIATRIC HISTORY (INCLUDE DETAILS REGARDING ONSET, COURSE OF ILLNESS, INPATIENT/OUTPATIENT TREATMENT)
multiple psych admissions and ER visits for reports of SI last at [facility] 1/8/22  ACT team [employer]   On AOT

## 2022-03-08 NOTE — ED PROVIDER NOTE - OBJECTIVE STATEMENT
PT with SPMHX of bipolar, schizoaffective, DM, HTN presents to the ED with complaint of hearing voices and wanting to die. Pt states that for the last 2 wk he has been hearing voices that tell him to kill himself. Pt states that he feels sad and does not want to live anymore. Pt dines fever, chills, weakness, numbness, tingling HA, dizziness, SOB, diff breathing.

## 2022-03-09 LAB
AMPHET UR-MCNC: NEGATIVE — SIGNIFICANT CHANGE UP
APPEARANCE UR: CLEAR — SIGNIFICANT CHANGE UP
BARBITURATES UR SCN-MCNC: NEGATIVE — SIGNIFICANT CHANGE UP
BENZODIAZ UR-MCNC: NEGATIVE — SIGNIFICANT CHANGE UP
BILIRUB UR-MCNC: NEGATIVE — SIGNIFICANT CHANGE UP
COCAINE METAB.OTHER UR-MCNC: POSITIVE
COLOR SPEC: YELLOW — SIGNIFICANT CHANGE UP
CRP SERPL-MCNC: 6 MG/L — HIGH
DIFF PNL FLD: NEGATIVE — SIGNIFICANT CHANGE UP
ERYTHROCYTE [SEDIMENTATION RATE] IN BLOOD: 25 MM/HR — HIGH (ref 0–20)
GLUCOSE UR QL: 1000 MG/DL
KETONES UR-MCNC: NEGATIVE — SIGNIFICANT CHANGE UP
LEUKOCYTE ESTERASE UR-ACNC: NEGATIVE — SIGNIFICANT CHANGE UP
METHADONE UR-MCNC: NEGATIVE — SIGNIFICANT CHANGE UP
NITRITE UR-MCNC: NEGATIVE — SIGNIFICANT CHANGE UP
OPIATES UR-MCNC: NEGATIVE — SIGNIFICANT CHANGE UP
PCP SPEC-MCNC: SIGNIFICANT CHANGE UP
PCP UR-MCNC: NEGATIVE — SIGNIFICANT CHANGE UP
PH UR: 6 — SIGNIFICANT CHANGE UP (ref 5–8)
PROT UR-MCNC: NEGATIVE — SIGNIFICANT CHANGE UP
SP GR SPEC: 1.01 — SIGNIFICANT CHANGE UP (ref 1.01–1.02)
THC UR QL: NEGATIVE — SIGNIFICANT CHANGE UP
UROBILINOGEN FLD QL: NEGATIVE MG/DL — SIGNIFICANT CHANGE UP

## 2022-03-09 PROCEDURE — 99226: CPT

## 2022-03-09 PROCEDURE — 99213 OFFICE O/P EST LOW 20 MIN: CPT

## 2022-03-09 RX ORDER — TRAZODONE HCL 50 MG
100 TABLET ORAL AT BEDTIME
Refills: 0 | Status: DISCONTINUED | OUTPATIENT
Start: 2022-03-09 | End: 2022-03-12

## 2022-03-09 RX ADMIN — METFORMIN HYDROCHLORIDE 1000 MILLIGRAM(S): 850 TABLET ORAL at 18:46

## 2022-03-09 RX ADMIN — FLUPHENAZINE HYDROCHLORIDE 5 MILLIGRAM(S): 1 TABLET, FILM COATED ORAL at 23:07

## 2022-03-09 RX ADMIN — Medication 500 MILLIGRAM(S): at 18:46

## 2022-03-09 RX ADMIN — METFORMIN HYDROCHLORIDE 1000 MILLIGRAM(S): 850 TABLET ORAL at 06:46

## 2022-03-09 RX ADMIN — Medication 100 MILLIGRAM(S): at 23:07

## 2022-03-09 RX ADMIN — Medication 500 MILLIGRAM(S): at 06:51

## 2022-03-09 RX ADMIN — NYSTATIN CREAM 1 APPLICATION(S): 100000 CREAM TOPICAL at 17:43

## 2022-03-09 NOTE — CHART NOTE - NSCHARTNOTEFT_GEN_A_CORE
NOELLE Note: Notified by the  team that the plan is to transfer for inpt psychiatric care. Covid neg. legals 9.37. No beds at [facility]. Called [facility], [phone]. Bed available and chart faxed for MD review.
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
## 2022-03-10 VITALS
RESPIRATION RATE: 18 BRPM | TEMPERATURE: 98 F | OXYGEN SATURATION: 99 % | HEART RATE: 82 BPM | SYSTOLIC BLOOD PRESSURE: 110 MMHG | DIASTOLIC BLOOD PRESSURE: 72 MMHG

## 2022-03-10 PROCEDURE — G0378: CPT

## 2022-03-10 PROCEDURE — 81003 URINALYSIS AUTO W/O SCOPE: CPT

## 2022-03-10 PROCEDURE — 85652 RBC SED RATE AUTOMATED: CPT

## 2022-03-10 PROCEDURE — 36415 COLL VENOUS BLD VENIPUNCTURE: CPT

## 2022-03-10 PROCEDURE — 73630 X-RAY EXAM OF FOOT: CPT

## 2022-03-10 PROCEDURE — 80053 COMPREHEN METABOLIC PANEL: CPT

## 2022-03-10 PROCEDURE — 93005 ELECTROCARDIOGRAM TRACING: CPT

## 2022-03-10 PROCEDURE — 86140 C-REACTIVE PROTEIN: CPT

## 2022-03-10 PROCEDURE — U0003: CPT

## 2022-03-10 PROCEDURE — 99213 OFFICE O/P EST LOW 20 MIN: CPT

## 2022-03-10 PROCEDURE — 99217: CPT

## 2022-03-10 PROCEDURE — U0005: CPT

## 2022-03-10 PROCEDURE — 85025 COMPLETE CBC W/AUTO DIFF WBC: CPT

## 2022-03-10 PROCEDURE — 99285 EMERGENCY DEPT VISIT HI MDM: CPT | Mod: 25

## 2022-03-10 PROCEDURE — 80307 DRUG TEST PRSMV CHEM ANLYZR: CPT

## 2022-03-10 RX ADMIN — METFORMIN HYDROCHLORIDE 1000 MILLIGRAM(S): 850 TABLET ORAL at 06:22

## 2022-03-10 RX ADMIN — Medication 500 MILLIGRAM(S): at 06:22

## 2022-03-10 RX ADMIN — NYSTATIN CREAM 1 APPLICATION(S): 100000 CREAM TOPICAL at 08:13
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
## 2022-03-10 NOTE — ED CDU PROVIDER DISPOSITION NOTE - CLINICAL COURSE
PT with SPMHX of bipolar, schizoaffective, DM, HTN presents to the ED with complaint of hearing voices and wanting to die. Pt states that for the last 2 wk he has been hearing voices that tell him to kill himself. Pt states that he feels sad and does not want to live anymore. seen by psych who recommends admission inpatient.

## 2022-03-10 NOTE — ED ADULT NURSE REASSESSMENT NOTE - GENERAL PATIENT STATE
comfortable appearance/cooperative
comfortable appearance/resting/sleeping
comfortable appearance/cooperative
cooperative/no change observed/resting/sleeping

## 2022-03-10 NOTE — ED BEHAVIORAL HEALTH NOTE - BEHAVIORAL HEALTH NOTE
PROGRESS NOTE: 03-10-22 @ 08:47  	  • Reason for Ongoing Consultation:   	    ID: 52yyo Male with HEALTH ISSUES - PROBLEM Dx:      INTERVAL DATA:   • Interval Chief Complaint: "I'm fine. I want to get out of here"  • Interval History:   Patient is a 52 year old male, unemployed, formerly living with sister with a PPHx of Schizoaffective disorder who is being followed by the ACT teams. He has a history of cocaine use disorder, polysubstance use, and multiple ER visits for substance induced psychosis, and poor compliance with treatment.     Patient was seen at bedtime, laying supine. Patient states he is feeling "fine" now and "I no longer hear voices". He denies SI/HI at this time. No overnight events. He states he can leave and go back to living with his sister. He states that he spoke to his  Remington, that's states he can go live with his sister. Discussed with him that he needs to be inpatient due to poor compliance with treatment, cocaine use disorder, violating his AOT. Awaiting bed availability for transfer.      REVIEW OF SYSTEMS:   • Constitutional Symptoms	No complaints  • Eyes	No complaints  • Ears / Nose / Throat / Mouth	No complaints  • Cardiovascular	No complaints  • Respiratory	No complaints  • Gastrointestinal	No complaints  • Genitourinary	No complaints  • Musculoskeletal	No complaints  • Skin	No complaints  • Neurological	No complaints  • Psychiatric (see HPI)	See HPI  • Endocrine	No complaints  • Hematologic / Lymphatic	No complaints  • Allergic / Immunologic	No complaints    REVIEW OF VITALS/LABS/IMAGING/INVESTIGATIONS:   • Vital signs reviewed: Yes  • Vital Signs:	    T(C): 36.8 (03-10-22 @ 08:21), Max: 37 (22 @ 23:17)  HR: 75 (03-10-22 @ 08:21) (68 - 92)  BP: 106/67 (03-10-22 @ 08:21) (106/67 - 124/80)  RR: 18 (03-10-22 @ 08:21) (18 - 18)  SpO2: 96% (03-10-22 @ 08:21) (95% - 99%)    • Available labs reviewed: Yes  • Available Lab Results:                 Urinalysis Basic - ( 09 Mar 2022 15:56 )    Color: Yellow / Appearance: Clear / S.015 / pH: x  Gluc: x / Ketone: Negative  / Bili: Negative / Urobili: Negative mg/dL   Blood: x / Protein: Negative / Nitrite: Negative   Leuk Esterase: Negative / RBC: x / WBC x   Sq Epi: x / Non Sq Epi: x / Bacteria: x          MEDICATIONS:      PRN Medications:  • PRN Medications since last evaluation	  • PRN Details	    Current Medications:   cephalexin 500 milliGRAM(s) Oral every 12 hours  fluPHENAZine 5 milliGRAM(s) Oral at bedtime  metFORMIN 1000 milliGRAM(s) Oral two times a day  nystatin Powder 1 Application(s) Topical two times a day  traZODone 100 milliGRAM(s) Oral at bedtime     Medication Side Effects:  • Medication Side Effects or Adverse Reactions (new or ongoing)	None known    MENTAL STATUS EXAM:   • Level of Consciousness	Alert  • General Appearance	Well developed  • Body Habitus	Well nourished  • Hygiene	Good  • Grooming	Good  • Behavior	Cooperative  • Eye Contact	Good  • Relatedness	Good  • Impulse Control	Normal  • Muscle Tone / Strength	Normal muscle tone/strength  • Abnormal Movements	No abnormal movements  • Gait / Station	Normal gait / station  • Speech Volume	Normal  • Speech Rate	Normal  • Speech Spontaneity	Normal  • Speech Articulation	Normal  • Mood	Normal  • Affect Quality	Euthymic  • Affect Range	Full  • Affect Congruence	Congruent  • Thought Process	Linear  • Thought Associations	Normal  • Thought Content	Unremarkable  • Perceptions	No abnormalities  • Oriented to Time	Yes  • Oriented to Place	Yes  • Oriented to Situation	Yes  • Oriented to Person	Yes  • Attention / Concentration	Normal  • Estimated Intelligence	Average  • Recent Memory	Normal  • Remote Memory	Normal  • Fund of Knowledge	Normal  • Language	No abnormalities noted  • Judgment (regarding everyday events)	Fair  • Insight (regarding psychiatric illness)	Fair    SUICIDALITY:   • Suicidality (Interval)	none known    HOMICIDALITY/AGGRESSION:   • Homicidality/Aggression	none known    DIAGNOSIS DSM-V:    Psychiatric Diagnosis (Corresponds to DSM-IV Axis I, II):   HEALTH ISSUES - PROBLEM Dx:    Shizoaffective Disorder  Cocaine use disorder       Medical Diagnosis (Corresponds to DSM-IV Axis III):  • Axis III	      ASSESSMENT OF CURRENT CONDITION:   Summary (include case differential, formulation and patient response to therapy):     51 yo M with Schizoaffective disorder and cocaine use disorder. He had no overnight events. Awaiting transfer to [facility].     Risk Assessment (consider static vs modifiable risk factors and protective factors; comment on level of risk for dangerous behavior): Multiple ER visits for substance induced psychosis with suicidal ideations and thoughts of hurting others. Poor compliance with current treatment plan.     PLAN    Continue treatment  Awaiting transfer PROGRESS NOTE: 03-10-22 @ 08:47  	  • Reason for Ongoing Consultation:   	    ID: 52yyo Male with HEALTH ISSUES - PROBLEM Dx:      INTERVAL DATA:   • Interval Chief Complaint: "I'm fine. I want to get out of here"  • Interval History:   Patient is a 52 year old male, unemployed, formerly living with sister with a PPHx of Schizoaffective disorder who is being followed by the ACT teams. He has a history of cocaine use disorder, polysubstance use, and multiple ER visits for psychosis, and poor compliance with treatment.     Patient was seen at bedtime, laying supine. Patient states he is feeling "fine" now and "I no longer hear voices". He denies SI/HI at this time. No overnight events. He states he can leave and go back to living with his sister. He states that he spoke to his  Remington, that's states he can go live with his sister. Discussed with him that he needs to be inpatient due to poor compliance with treatment, command hallucinations to harm self repeatedly.. Awaiting bed availability for transfer.      REVIEW OF SYSTEMS:   • Constitutional Symptoms	No complaints  • Eyes	No complaints  • Ears / Nose / Throat / Mouth	No complaints  • Cardiovascular	No complaints  • Respiratory	No complaints  • Gastrointestinal	No complaints  • Genitourinary	No complaints  • Musculoskeletal	No complaints  • Skin	No complaints  • Neurological	No complaints  • Psychiatric (see HPI)	See HPI  • Endocrine	No complaints  • Hematologic / Lymphatic	No complaints  • Allergic / Immunologic	No complaints    REVIEW OF VITALS/LABS/IMAGING/INVESTIGATIONS:   • Vital signs reviewed: Yes  • Vital Signs:	    T(C): 36.8 (03-10-22 @ 08:21), Max: 37 (22 @ 23:17)  HR: 75 (03-10-22 @ 08:21) (68 - 92)  BP: 106/67 (03-10-22 @ 08:21) (106/67 - 124/80)  RR: 18 (03-10-22 @ 08:21) (18 - 18)  SpO2: 96% (03-10-22 @ 08:21) (95% - 99%)    • Available labs reviewed: Yes  • Available Lab Results:                 Urinalysis Basic - ( 09 Mar 2022 15:56 )    Color: Yellow / Appearance: Clear / S.015 / pH: x  Gluc: x / Ketone: Negative  / Bili: Negative / Urobili: Negative mg/dL   Blood: x / Protein: Negative / Nitrite: Negative   Leuk Esterase: Negative / RBC: x / WBC x   Sq Epi: x / Non Sq Epi: x / Bacteria: x          MEDICATIONS:      PRN Medications:  • PRN Medications since last evaluation	  • PRN Details	    Current Medications:   cephalexin 500 milliGRAM(s) Oral every 12 hours  fluPHENAZine 5 milliGRAM(s) Oral at bedtime  metFORMIN 1000 milliGRAM(s) Oral two times a day  nystatin Powder 1 Application(s) Topical two times a day  traZODone 100 milliGRAM(s) Oral at bedtime     Medication Side Effects:  • Medication Side Effects or Adverse Reactions (new or ongoing)	None known    MENTAL STATUS EXAM:   • Level of Consciousness	Alert  • General Appearance	Well developed  • Body Habitus	Well nourished  • Hygiene	Good  • Grooming	Good  • Behavior	Cooperative  • Eye Contact	Good  • Relatedness	Good  • Impulse Control	Normal  • Muscle Tone / Strength	Normal muscle tone/strength  • Abnormal Movements	No abnormal movements  • Gait / Station	Normal gait / station  • Speech Volume	Normal  • Speech Rate	Normal  • Speech Spontaneity	Normal  • Speech Articulation	Normal  • Mood	angry  • Affect Quality	anxious  • Affect Range	constricted  • Affect Congruence	Congruent  • Thought Process	Linear  • Thought Associations	Normal  • Thought Content	Unremarkable  • Perceptions	No abnormalities  • Oriented to Time	Yes  • Oriented to Place	Yes  • Oriented to Situation	Yes  • Oriented to Person	Yes  • Attention / Concentration	Normal  • Estimated Intelligence	Average  • Recent Memory	Normal  • Remote Memory	Normal  • Fund of Knowledge	Normal  • Language	No abnormalities noted  • Judgment (regarding everyday events)	Fair  • Insight (regarding psychiatric illness)	Fair    SUICIDALITY:   • Suicidality (Interval)	none known    HOMICIDALITY/AGGRESSION:   • Homicidality/Aggression	none known    DIAGNOSIS DSM-V:    Psychiatric Diagnosis (Corresponds to DSM-IV Axis I, II):   HEALTH ISSUES - PROBLEM Dx:    Schizoaffective Disorder  Cocaine use disorder       Medical Diagnosis (Corresponds to DSM-IV Axis III):  • Axis III	      ASSESSMENT OF CURRENT CONDITION:   Summary (include case differential, formulation and patient response to therapy):     51 yo M with Schizoaffective disorder and cocaine use disorder. He had no overnight events. Awaiting transfer to [facility].     Risk Assessment (consider static vs modifiable risk factors and protective factors; comment on level of risk for dangerous behavior): Multiple ER visits for  psychosis with suicidal ideations and thoughts of hurting others. Poor compliance with current treatment plan.     PLAN  9.37 status  Continue treatment  Awaiting transfer

## 2022-03-10 NOTE — ED CDU PROVIDER SUBSEQUENT DAY NOTE - NS ED ROS FT

## 2022-03-10 NOTE — ED ADULT NURSE REASSESSMENT NOTE - COMFORT CARE
meal provided/plan of care explained
plan of care explained
plan of care explained
ambulated to bathroom/darkened lights/meal provided/plan of care explained/po fluids offered/warm blanket provided

## 2022-03-10 NOTE — CHART NOTE - NSCHARTNOTEFT_GEN_A_CORE
SW Note: Pt will be transferred to [facility] for inpt psychiatric care. Accepting MD Dr. Montelongo. Legal status: 9.37. [facility] ambulance arranged. [facility] transportation letter sent with pt. Notified pts ACT team, spoke with Deann [phone]. Confirmed pt is on an active AOT order and deann will notify them. Pt has [place] ins, [facility] UR will pursue auth.

## 2022-03-23 NOTE — BH INPATIENT PSYCHIATRY ASSESSMENT NOTE - NSSUICRSKFACTOR_PSY_ALL_CORE
Improve shanae LE strength to >/= 4+/5 to improve stability with standing and walking. Long term goal 2: Pt will ambulate with LRD >/= 200' with improved overall quality S/I. Long term goal 3: Sanchez >/= 40/56 to reduce pt's risk for falls. Long term goal 4: TUG with LRD </= 20 sec to improve safety with ambulation. Long term goal 5: Pt will be independent with transfers from all surfaces with minimal to no UE use. Long term goal 6: Pt will complete 1 curb step S/I with use of s/c with good safety. Progress toward goals:progress towards goals    POST-PAIN       Pain Rating (0-10 pain scale):  0 /10   Location and pain description same as pre-treatment unless indicated. Action: [x] NA   [] Perform HEP  [] Meds as prescribed  [] Modalities as prescribed   [] Call Physician     Frequency/Duration:  Plan  Times per week: 2  Plan weeks: 6  Current Treatment Recommendations: Strengthening,ROM,Balance Training,Functional Mobility Training,Transfer Training,Gait Training,Stair training,Neuromuscular Re-education,Manual Therapy - Soft Tissue Mobilization,Home Exercise Program,Safety Education & Training,Patient/Caregiver Education & Training,Equipment Evaluation, Education, & procurement,Modalities,Integrated Dry Needling,Aquatics     Pt to continue current HEP. See objective section for any therapeutic exercise changes, additions or modifications this date.        PT Individual Minutes  Time In: 6051  Time Out: 2601  Minutes: 56  Timed Code Treatment Minutes: 56 Minutes  Procedure Minutes:0     Timed Activity Minutes Units   Ther Ex 26 1   Gait  30 3       Signature:  Electronically signed by Cynthia Medeiros PTA on 3/23/22 at 4:01 PM EDT Current and Past Psychiatric Diagnoses/Presenting Symptoms

## 2022-04-06 NOTE — ED ADULT TRIAGE NOTE - DOMESTIC TRAVEL HIGH RISK QUESTION
Brisa No

## 2022-05-23 ENCOUNTER — EMERGENCY (EMERGENCY)
Facility: HOSPITAL | Age: 52
LOS: 1 days | Discharge: HOME | End: 2022-05-23
Attending: STUDENT IN AN ORGANIZED HEALTH CARE EDUCATION/TRAINING PROGRAM
Payer: MEDICAID

## 2022-05-23 VITALS
SYSTOLIC BLOOD PRESSURE: 147 MMHG | HEART RATE: 74 BPM | TEMPERATURE: 98 F | DIASTOLIC BLOOD PRESSURE: 78 MMHG | OXYGEN SATURATION: 96 % | HEIGHT: 66 IN | RESPIRATION RATE: 16 BRPM

## 2022-05-23 PROCEDURE — 99285 EMERGENCY DEPT VISIT HI MDM: CPT

## 2022-05-23 RX ORDER — KETOROLAC TROMETHAMINE 30 MG/ML
15 SYRINGE (ML) INJECTION ONCE
Refills: 0 | Status: DISCONTINUED | OUTPATIENT
Start: 2022-05-23 | End: 2022-05-23

## 2022-05-23 RX ORDER — SODIUM CHLORIDE 9 MG/ML
1500 INJECTION, SOLUTION INTRAVENOUS ONCE
Refills: 0 | Status: COMPLETED | OUTPATIENT
Start: 2022-05-23 | End: 2022-05-23

## 2022-05-23 NOTE — ED PROVIDER NOTE - OBJECTIVE STATEMENT
51 y/o M with PMHx of HTN, DM BIBA from [facility] c/o abdominal pain. States he's been throwing up all day along with having the "runs" as stated by the pt, chest pain. States he threw up yesterday but when he threw up with his stomach was burning. States that [facility] at the [facility] people have been getting covid, and a stomach bug has been going around. States he was unable to keep anything down in his stomach today. States today he's been feeling unwell all day. No other complaints. No other complaints. 51 y/o M with PMHx of HTN, DM, BIBA from [facility] c/o abdominal pain. States he's been throwing up all day along with having the "runs" as stated by the pt. States he threw up yesterday but when he threw up with his stomach was burning. States that over at the [facility] people have been getting covid, and a stomach bug has been going around. States he was unable to keep anything down in his stomach today. States today he's been feeling unwell all day. No other complaints.

## 2022-05-23 NOTE — ED ADULT TRIAGE NOTE - CHIEF COMPLAINT QUOTE
Pt. BIBA from [facility]. Pt. complaining of abdominal pain with N/V/D since yesterday. Pt. has 1:1 at bedside.

## 2022-05-23 NOTE — ED PROVIDER NOTE - PROGRESS NOTE DETAILS
Labs are as noted. Patient has tolerated oral intake without difficulty or vomiting. No significant diarrhea. Case discussed with the on-call physician at [facility] and he agrees to accept the patient back.

## 2022-05-23 NOTE — ED ADULT NURSE NOTE - OBJECTIVE STATEMENT
Pt presents to ED c/o abdominal pain. Pt reports n/v/d beginning yesterday and a burning sensation in his stomach after vomiting. Pt reports he is unable to tolerate anything po. Pt denies fever, chills, blood in emesis or stool, and urinary symptoms. A/O x3. Respirations are even and unlabored. Pt educated on plan of care and expresses understanding.

## 2022-05-23 NOTE — ED PROVIDER NOTE - PATIENT PORTAL LINK FT
You can access the FollowMyHealth Patient Portal offered by [facility] by registering at the following website: [email or web address]. By joining [employer]’s FollowMyHealth portal, you will also be able to view your health information using other applications (apps) compatible with our system.

## 2022-05-23 NOTE — ED PROVIDER NOTE - PSYCHIATRIC, MLM
Alert and oriented to person, place, time/situation. normal mood and affect. no apparent risk to self or others. Pressured speech but overall appropriate.

## 2022-05-23 NOTE — ED ADULT NURSE NOTE - CHIEF COMPLAINT QUOTE
Pt. BIBA from [facility]. Pt. complaining of abdominal pain with N/V/D since yesterday. Pt. has 1:1 at bedside.

## 2022-05-23 NOTE — ED PROVIDER NOTE - NSFOLLOWUPINSTRUCTIONS_ED_ALL_ED_FT
1) Follow up with your doctor in 1-2 days  2) Return to the ER for worsening or concerning symptoms      Diarrhea, Adult      Diarrhea is when you pass loose and watery poop (stool) often. Diarrhea can make you feel weak and cause you to lose water in your body (get dehydrated). Losing water in your body can cause you to:  •Feel tired and thirsty.      •Have a dry mouth.      •Go pee (urinate) less often.      Diarrhea often lasts 2–3 days. However, it can last longer if it is a sign of something more serious. It is important to treat your diarrhea as told by your doctor.      Follow these instructions at home:      Eating and drinking                   Follow these instructions as told by your doctor:  •Take an ORS (oral rehydration solution). This is a drink that helps you replace fluids and minerals your body lost. It is sold at pharmacies and stores.    •Drink plenty of fluids, such as:  •Water.      •Ice chips.      •Diluted fruit juice.      •Low-calorie sports drinks.      •Milk, if you want.        •Avoid drinking fluids that have a lot of sugar or caffeine in them.    •Eat bland, easy-to-digest foods in small amounts as you are able. These foods include:  •Bananas.      •Applesauce.      •Rice.      •Low-fat (lean) meats.      •Toast.      •Crackers.        •Avoid alcohol.      •Avoid spicy or fatty foods.      Medicines     •Take over-the-counter and prescription medicines only as told by your doctor.      •If you were prescribed an antibiotic medicine, take it as told by your doctor. Do not stop using the antibiotic even if you start to feel better.        General instructions    •Wash your hands often using soap and water. If soap and water are not available, use a hand . Others in your home should wash their hands as well. Hands should be washed:  •After using the toilet or changing a diaper.      •Before preparing, cooking, or serving food.      •While caring for a sick person.      •While visiting someone in a hospital.        •Drink enough fluid to keep your pee (urine) pale yellow.      •Rest at home while you get better.      •Watch your condition for any changes.      •Take a warm bath to help with any burning or pain from having diarrhea.      •Keep all follow-up visits as told by your doctor. This is important.        Contact a doctor if:    •You have a fever.      •Your diarrhea gets worse.      •You have new symptoms.      •You cannot keep fluids down.      •You feel light-headed or dizzy.      •You have a headache.      •You have muscle cramps.        Get help right away if:    •You have chest pain.      •You feel very weak or you pass out (faint).      •You have bloody or black poop or poop that looks like tar.      •You have very bad pain, cramping, or bloating in your belly (abdomen).      •You have trouble breathing or you are breathing very quickly.      •Your heart is beating very quickly.      •Your skin feels cold and clammy.      •You feel confused.    •You have signs of losing too much water in your body, such as:  •Dark pee, very little pee, or no pee.      •Cracked lips.      •Dry mouth.      •Sunken eyes.      •Sleepiness.      •Weakness.          Summary    •Diarrhea is when you pass loose and watery poop (stool) often.      •Diarrhea can make you feel weak and cause you to lose water in your body (get dehydrated).      •Take an ORS (oral rehydration solution). This is a drink that is sold at pharmacies and stores.      •Eat bland, easy-to-digest foods in small amounts as you are able.      •Contact a doctor if your condition gets worse. Get help right away if you have signs that you have lost too much water in your body.      This information is not intended to replace advice given to you by your health care provider. Make sure you discuss any questions you have with your health care provider.

## 2022-05-23 NOTE — ED PROVIDER NOTE - CLINICAL SUMMARY MEDICAL DECISION MAKING FREE TEXT BOX
pt with acute GI upset, with associated symptoms such as nausea, vomiting and multiple episodes of diarrhea. Will obtain labs, +- stool culture if normal pt stable for d/c over at [facility].

## 2022-05-24 VITALS
TEMPERATURE: 98 F | DIASTOLIC BLOOD PRESSURE: 69 MMHG | OXYGEN SATURATION: 97 % | HEART RATE: 80 BPM | RESPIRATION RATE: 18 BRPM | SYSTOLIC BLOOD PRESSURE: 135 MMHG

## 2022-05-24 LAB
ALBUMIN SERPL ELPH-MCNC: 4.1 G/DL — SIGNIFICANT CHANGE UP (ref 3.3–5.2)
ALP SERPL-CCNC: 51 U/L — SIGNIFICANT CHANGE UP (ref 40–120)
ALT FLD-CCNC: 14 U/L — SIGNIFICANT CHANGE UP
ANION GAP SERPL CALC-SCNC: 10 MMOL/L — SIGNIFICANT CHANGE UP (ref 5–17)
APPEARANCE UR: CLEAR — SIGNIFICANT CHANGE UP
AST SERPL-CCNC: 20 U/L — SIGNIFICANT CHANGE UP
BASOPHILS # BLD AUTO: 0.03 K/UL — SIGNIFICANT CHANGE UP (ref 0–0.2)
BASOPHILS NFR BLD AUTO: 0.4 % — SIGNIFICANT CHANGE UP (ref 0–2)
BILIRUB SERPL-MCNC: <0.2 MG/DL — LOW (ref 0.4–2)
BILIRUB UR-MCNC: NEGATIVE — SIGNIFICANT CHANGE UP
BUN SERPL-MCNC: 12.1 MG/DL — SIGNIFICANT CHANGE UP (ref 8–20)
CALCIUM SERPL-MCNC: 9.3 MG/DL — SIGNIFICANT CHANGE UP (ref 8.6–10.2)
CHLORIDE SERPL-SCNC: 99 MMOL/L — SIGNIFICANT CHANGE UP (ref 98–107)
CO2 SERPL-SCNC: 28 MMOL/L — SIGNIFICANT CHANGE UP (ref 22–29)
COLOR SPEC: YELLOW — SIGNIFICANT CHANGE UP
CREAT SERPL-MCNC: 0.75 MG/DL — SIGNIFICANT CHANGE UP (ref 0.5–1.3)
DIFF PNL FLD: NEGATIVE — SIGNIFICANT CHANGE UP
EGFR: 109 ML/MIN/1.73M2 — SIGNIFICANT CHANGE UP
EOSINOPHIL # BLD AUTO: 0.06 K/UL — SIGNIFICANT CHANGE UP (ref 0–0.5)
EOSINOPHIL NFR BLD AUTO: 0.9 % — SIGNIFICANT CHANGE UP (ref 0–6)
EPI CELLS # UR: SIGNIFICANT CHANGE UP
GLUCOSE SERPL-MCNC: 98 MG/DL — SIGNIFICANT CHANGE UP (ref 70–99)
GLUCOSE UR QL: 50 MG/DL
HCT VFR BLD CALC: 44.5 % — SIGNIFICANT CHANGE UP (ref 39–50)
HGB BLD-MCNC: 14.8 G/DL — SIGNIFICANT CHANGE UP (ref 13–17)
HIV 1 & 2 AB SERPL IA.RAPID: SIGNIFICANT CHANGE UP
IMM GRANULOCYTES NFR BLD AUTO: 0.4 % — SIGNIFICANT CHANGE UP (ref 0–1.5)
KETONES UR-MCNC: ABNORMAL
LACTATE BLDV-MCNC: 1.7 MMOL/L — SIGNIFICANT CHANGE UP (ref 0.5–2)
LEUKOCYTE ESTERASE UR-ACNC: ABNORMAL
LIDOCAIN IGE QN: 52 U/L — HIGH (ref 22–51)
LYMPHOCYTES # BLD AUTO: 3.58 K/UL — HIGH (ref 1–3.3)
LYMPHOCYTES # BLD AUTO: 53.7 % — HIGH (ref 13–44)
MAGNESIUM SERPL-MCNC: 1.7 MG/DL — SIGNIFICANT CHANGE UP (ref 1.6–2.6)
MCHC RBC-ENTMCNC: 29.2 PG — SIGNIFICANT CHANGE UP (ref 27–34)
MCHC RBC-ENTMCNC: 33.3 GM/DL — SIGNIFICANT CHANGE UP (ref 32–36)
MCV RBC AUTO: 87.8 FL — SIGNIFICANT CHANGE UP (ref 80–100)
MONOCYTES # BLD AUTO: 0.68 K/UL — SIGNIFICANT CHANGE UP (ref 0–0.9)
MONOCYTES NFR BLD AUTO: 10.2 % — SIGNIFICANT CHANGE UP (ref 2–14)
NEUTROPHILS # BLD AUTO: 2.29 K/UL — SIGNIFICANT CHANGE UP (ref 1.8–7.4)
NEUTROPHILS NFR BLD AUTO: 34.4 % — LOW (ref 43–77)
NITRITE UR-MCNC: NEGATIVE — SIGNIFICANT CHANGE UP
PH UR: 6.5 — SIGNIFICANT CHANGE UP (ref 5–8)
PLATELET # BLD AUTO: 238 K/UL — SIGNIFICANT CHANGE UP (ref 150–400)
POTASSIUM SERPL-MCNC: 4.3 MMOL/L — SIGNIFICANT CHANGE UP (ref 3.5–5.3)
POTASSIUM SERPL-SCNC: 4.3 MMOL/L — SIGNIFICANT CHANGE UP (ref 3.5–5.3)
PROT SERPL-MCNC: 7.2 G/DL — SIGNIFICANT CHANGE UP (ref 6.6–8.7)
PROT UR-MCNC: NEGATIVE — SIGNIFICANT CHANGE UP
RBC # BLD: 5.07 M/UL — SIGNIFICANT CHANGE UP (ref 4.2–5.8)
RBC # FLD: 12 % — SIGNIFICANT CHANGE UP (ref 10.3–14.5)
RBC CASTS # UR COMP ASSIST: NEGATIVE /HPF — SIGNIFICANT CHANGE UP (ref 0–4)
SODIUM SERPL-SCNC: 137 MMOL/L — SIGNIFICANT CHANGE UP (ref 135–145)
SP GR SPEC: 1.01 — SIGNIFICANT CHANGE UP (ref 1.01–1.02)
UROBILINOGEN FLD QL: NEGATIVE MG/DL — SIGNIFICANT CHANGE UP
WBC # BLD: 6.67 K/UL — SIGNIFICANT CHANGE UP (ref 3.8–10.5)
WBC # FLD AUTO: 6.67 K/UL — SIGNIFICANT CHANGE UP (ref 3.8–10.5)
WBC UR QL: SIGNIFICANT CHANGE UP /HPF (ref 0–5)

## 2022-05-24 PROCEDURE — 81001 URINALYSIS AUTO W/SCOPE: CPT

## 2022-05-24 PROCEDURE — 96374 THER/PROPH/DIAG INJ IV PUSH: CPT

## 2022-05-24 PROCEDURE — 83735 ASSAY OF MAGNESIUM: CPT

## 2022-05-24 PROCEDURE — 83605 ASSAY OF LACTIC ACID: CPT

## 2022-05-24 PROCEDURE — 93010 ELECTROCARDIOGRAM REPORT: CPT

## 2022-05-24 PROCEDURE — 86703 HIV-1/HIV-2 1 RESULT ANTBDY: CPT

## 2022-05-24 PROCEDURE — 99284 EMERGENCY DEPT VISIT MOD MDM: CPT | Mod: 25

## 2022-05-24 PROCEDURE — 85025 COMPLETE CBC W/AUTO DIFF WBC: CPT

## 2022-05-24 PROCEDURE — 83690 ASSAY OF LIPASE: CPT

## 2022-05-24 PROCEDURE — 93005 ELECTROCARDIOGRAM TRACING: CPT

## 2022-05-24 PROCEDURE — 36415 COLL VENOUS BLD VENIPUNCTURE: CPT

## 2022-05-24 PROCEDURE — 80053 COMPREHEN METABOLIC PANEL: CPT

## 2022-05-24 RX ADMIN — Medication 15 MILLIGRAM(S): at 00:45

## 2022-05-24 RX ADMIN — Medication 15 MILLIGRAM(S): at 00:01

## 2022-05-29 ENCOUNTER — EMERGENCY (EMERGENCY)
Facility: HOSPITAL | Age: 52
LOS: 1 days | End: 2022-05-29
Attending: EMERGENCY MEDICINE
Payer: MEDICAID

## 2022-05-29 VITALS
RESPIRATION RATE: 18 BRPM | TEMPERATURE: 97 F | DIASTOLIC BLOOD PRESSURE: 91 MMHG | OXYGEN SATURATION: 98 % | SYSTOLIC BLOOD PRESSURE: 137 MMHG | HEIGHT: 66 IN | HEART RATE: 87 BPM

## 2022-05-29 PROCEDURE — 99283 EMERGENCY DEPT VISIT LOW MDM: CPT

## 2022-05-29 RX ORDER — IBUPROFEN 200 MG
600 TABLET ORAL ONCE
Refills: 0 | Status: COMPLETED | OUTPATIENT
Start: 2022-05-29 | End: 2022-05-29

## 2022-05-29 RX ORDER — METHOCARBAMOL 500 MG/1
1500 TABLET, FILM COATED ORAL ONCE
Refills: 0 | Status: COMPLETED | OUTPATIENT
Start: 2022-05-29 | End: 2022-05-29

## 2022-05-29 RX ADMIN — METHOCARBAMOL 1500 MILLIGRAM(S): 500 TABLET, FILM COATED ORAL at 19:10

## 2022-05-29 RX ADMIN — Medication 600 MILLIGRAM(S): at 19:10

## 2022-05-29 NOTE — ED ADULT NURSE REASSESSMENT NOTE - NS ED NURSE REASSESS COMMENT FT1
recvd report from day rn, Pt in no apparent distress at this time. Airway patent, breathing spontaneous and nonlabored. Pt A&Ox3 resting in stretcher. Pt c/o       , fall in shower hitting head, no LOC. no neuro deficits,  pt cooperative, aid at bedside.

## 2022-05-29 NOTE — ED PROVIDER NOTE - PROGRESS NOTE DETAILS
Dk ARMSTRONG: Patient has [place] 1:1 at bedside, 1:1 approached me that he cannot find the patient, patient was looking for food. [employer] flight called, security looking for patient with no success. [employer] called. Dk ARMSTRONG: [employer] has not yet found patient. [facility] aware.

## 2022-05-29 NOTE — ED ADULT NURSE NOTE - NSIMPLEMENTINTERV_GEN_ALL_ED
Implemented All Universal Safety Interventions:  [place] to call system. Call bell, personal items and telephone within reach. Instruct patient to call for assistance. Room bathroom lighting operational. Non-slip footwear when patient is off stretcher. Physically safe environment: no spills, clutter or unnecessary equipment. Stretcher in lowest position, wheels locked, appropriate side rails in place.

## 2022-05-29 NOTE — ED PROVIDER NOTE - OBJECTIVE STATEMENT
52M h/o Bipolar, DM, HTN presents from [facility] after a fall in shower. Did hit his head. Endorses L sided neck pain. Denies LOC, vomiting, vision changes, gait difficulty.

## 2022-05-29 NOTE — ED PROVIDER NOTE - PHYSICAL EXAMINATION
Called patient to see if he has new pcp. No answer left message to return my call. Outreach letter send out.    Gen: Well appearing in NAD  Head: NC/AT  Neck: trachea midline, no midlien ttp, +L paracervical/trapezius ttp, FROM  Resp:  No distress, CTAB  CV: RRR  GI: soft, NTND  Ext: no deformities, FROM, ambulating well, no midline spinal ttp  Neuro:  A&O appears non focal  Skin:  Warm and dry as visualized  Psych:  Normal affect and mood

## 2022-05-29 NOTE — ED PROVIDER NOTE - CLINICAL SUMMARY MEDICAL DECISION MAKING FREE TEXT BOX
Patient with neck pain s/p mechanical fall. Well appearing. Plan for CTH and pain control. Patient with neck pain s/p mechanical fall. Well appearing, ambulating well, benign neuro exam. Plan for CTH and pain control.  See Progress Notes.

## 2022-05-29 NOTE — ED ADULT NURSE NOTE - OBJECTIVE STATEMENT
pt alert and oriented x4 from [facility]. aid at bedside. sent in for mechanical fall In the shower, hit back of head " saw starts". denies loc denies blood thinners. pt ambulatory, hx schizophrenia. no obvious trauma. denies curren si/hi

## 2022-05-30 ENCOUNTER — EMERGENCY (EMERGENCY)
Facility: HOSPITAL | Age: 52
LOS: 1 days | Discharge: OTHER INSTITUTION | End: 2022-05-30
Attending: EMERGENCY MEDICINE
Payer: MEDICAID

## 2022-05-30 VITALS
HEART RATE: 123 BPM | RESPIRATION RATE: 18 BRPM | OXYGEN SATURATION: 94 % | TEMPERATURE: 99 F | HEIGHT: 66 IN | DIASTOLIC BLOOD PRESSURE: 104 MMHG | SYSTOLIC BLOOD PRESSURE: 145 MMHG

## 2022-05-30 LAB
ALBUMIN SERPL ELPH-MCNC: 4.7 G/DL — SIGNIFICANT CHANGE UP (ref 3.3–5.2)
ALP SERPL-CCNC: 63 U/L — SIGNIFICANT CHANGE UP (ref 40–120)
ALT FLD-CCNC: 22 U/L — SIGNIFICANT CHANGE UP
ANION GAP SERPL CALC-SCNC: 20 MMOL/L — HIGH (ref 5–17)
APAP SERPL-MCNC: <3 UG/ML — LOW (ref 10–26)
AST SERPL-CCNC: 44 U/L — HIGH
BASOPHILS # BLD AUTO: 0.04 K/UL — SIGNIFICANT CHANGE UP (ref 0–0.2)
BASOPHILS NFR BLD AUTO: 0.4 % — SIGNIFICANT CHANGE UP (ref 0–2)
BILIRUB SERPL-MCNC: 0.4 MG/DL — SIGNIFICANT CHANGE UP (ref 0.4–2)
BUN SERPL-MCNC: 15.7 MG/DL — SIGNIFICANT CHANGE UP (ref 8–20)
CALCIUM SERPL-MCNC: 9.7 MG/DL — SIGNIFICANT CHANGE UP (ref 8.6–10.2)
CHLORIDE SERPL-SCNC: 94 MMOL/L — LOW (ref 98–107)
CO2 SERPL-SCNC: 19 MMOL/L — LOW (ref 22–29)
CREAT SERPL-MCNC: 0.79 MG/DL — SIGNIFICANT CHANGE UP (ref 0.5–1.3)
EGFR: 107 ML/MIN/1.73M2 — SIGNIFICANT CHANGE UP
EOSINOPHIL # BLD AUTO: 0 K/UL — SIGNIFICANT CHANGE UP (ref 0–0.5)
EOSINOPHIL NFR BLD AUTO: 0 % — SIGNIFICANT CHANGE UP (ref 0–6)
ETHANOL SERPL-MCNC: <10 MG/DL — SIGNIFICANT CHANGE UP (ref 0–9)
FLUAV AG NPH QL: SIGNIFICANT CHANGE UP
FLUBV AG NPH QL: SIGNIFICANT CHANGE UP
GLUCOSE SERPL-MCNC: 319 MG/DL — HIGH (ref 70–99)
HCT VFR BLD CALC: 47.1 % — SIGNIFICANT CHANGE UP (ref 39–50)
HGB BLD-MCNC: 15.9 G/DL — SIGNIFICANT CHANGE UP (ref 13–17)
IMM GRANULOCYTES NFR BLD AUTO: 0.2 % — SIGNIFICANT CHANGE UP (ref 0–1.5)
LYMPHOCYTES # BLD AUTO: 1.32 K/UL — SIGNIFICANT CHANGE UP (ref 1–3.3)
LYMPHOCYTES # BLD AUTO: 13.4 % — SIGNIFICANT CHANGE UP (ref 13–44)
MCHC RBC-ENTMCNC: 29.3 PG — SIGNIFICANT CHANGE UP (ref 27–34)
MCHC RBC-ENTMCNC: 33.8 GM/DL — SIGNIFICANT CHANGE UP (ref 32–36)
MCV RBC AUTO: 86.9 FL — SIGNIFICANT CHANGE UP (ref 80–100)
MONOCYTES # BLD AUTO: 1.23 K/UL — HIGH (ref 0–0.9)
MONOCYTES NFR BLD AUTO: 12.5 % — SIGNIFICANT CHANGE UP (ref 2–14)
NEUTROPHILS # BLD AUTO: 7.23 K/UL — SIGNIFICANT CHANGE UP (ref 1.8–7.4)
NEUTROPHILS NFR BLD AUTO: 73.5 % — SIGNIFICANT CHANGE UP (ref 43–77)
PLATELET # BLD AUTO: 261 K/UL — SIGNIFICANT CHANGE UP (ref 150–400)
POTASSIUM SERPL-MCNC: 4.8 MMOL/L — SIGNIFICANT CHANGE UP (ref 3.5–5.3)
POTASSIUM SERPL-SCNC: 4.8 MMOL/L — SIGNIFICANT CHANGE UP (ref 3.5–5.3)
PROT SERPL-MCNC: 8.4 G/DL — SIGNIFICANT CHANGE UP (ref 6.6–8.7)
RBC # BLD: 5.42 M/UL — SIGNIFICANT CHANGE UP (ref 4.2–5.8)
RBC # FLD: 12.7 % — SIGNIFICANT CHANGE UP (ref 10.3–14.5)
RSV RNA NPH QL NAA+NON-PROBE: SIGNIFICANT CHANGE UP
SALICYLATES SERPL-MCNC: <0.6 MG/DL — LOW (ref 10–20)
SARS-COV-2 RNA SPEC QL NAA+PROBE: SIGNIFICANT CHANGE UP
SODIUM SERPL-SCNC: 133 MMOL/L — LOW (ref 135–145)
WBC # BLD: 9.84 K/UL — SIGNIFICANT CHANGE UP (ref 3.8–10.5)
WBC # FLD AUTO: 9.84 K/UL — SIGNIFICANT CHANGE UP (ref 3.8–10.5)

## 2022-05-30 PROCEDURE — 93010 ELECTROCARDIOGRAM REPORT: CPT

## 2022-05-30 PROCEDURE — 99285 EMERGENCY DEPT VISIT HI MDM: CPT

## 2022-05-30 RX ORDER — METFORMIN HYDROCHLORIDE 850 MG/1
1000 TABLET ORAL ONCE
Refills: 0 | Status: COMPLETED | OUTPATIENT
Start: 2022-05-30 | End: 2022-05-30

## 2022-05-30 RX ORDER — TRAZODONE HCL 50 MG
200 TABLET ORAL ONCE
Refills: 0 | Status: COMPLETED | OUTPATIENT
Start: 2022-05-30 | End: 2022-05-30

## 2022-05-30 RX ADMIN — METFORMIN HYDROCHLORIDE 1000 MILLIGRAM(S): 850 TABLET ORAL at 22:19

## 2022-05-30 RX ADMIN — Medication 200 MILLIGRAM(S): at 22:18

## 2022-05-30 NOTE — ED PROVIDER NOTE - PROGRESS NOTE DETAILS
Dk ARMSTRONG: discussed with  at [facility], will send back to [facility].

## 2022-05-30 NOTE — ED PROVIDER NOTE - PHYSICAL EXAMINATION
Gen: No acute distress, non toxic  HEENT: Mucous membranes moist, pink conjunctivae, EOMI. NCAT  Neck: no midline ttp  CV: RRR, nl s1/s2.  Resp: CTAB, normal rate and effort  GI: Abdomen soft, NT, ND. No rebound, no guarding  : No CVAT  Neuro: A&O x 3, moving all 4 extremities  MSK: No spine or joint tenderness to palpation  Skin: No rashes. intact and perfused.

## 2022-05-30 NOTE — ED PROVIDER NOTE - PATIENT PORTAL LINK FT
You can access the FollowMyHealth Patient Portal offered by [facility] by registering at the following website: [email or web address]. By joining [employer]’s FollowMyHealth portal, you will also be able to view your health information using other applications (apps) compatible with our system.

## 2022-05-30 NOTE — ED PROVIDER NOTE - CLINICAL SUMMARY MEDICAL DECISION MAKING FREE TEXT BOX
pt of [facility] inpt, with fall yesterday. spoke to [place], needs medical clearance and can return after medical clearance faxed. SO number , fax

## 2022-05-30 NOTE — ED PROVIDER NOTE - OBJECTIVE STATEMENT
51 y/o male hx dm, schizoaffective, bib [facility] after pt eloped yesterday. Pt with fall yesterday sent from [facility] at that time for ct/eval, eloped despite 1:1 from SO per note. [facility] involved and brought pt back today. pt states with some back/neck discomfort, mild, reports minor etoh. no si/hi. no other complaints. intermittently gets loud/slightly agitated but easily redirected    ROS: No fever/chills. No eye pain/changes in vision, No ear pain/sore throat/dysphagia, No chest pain/palpitations. No SOB/cough/. No abdominal pain, N/V/D, no black/bloody bm. No dysuria/frequency/discharge, No Dizziness.    No rashes or breaks in skin. No numbness/tingling/weakness.

## 2022-05-30 NOTE — ED ADULT NURSE NOTE - NSFALLRSKASSESSTYPE_ED_ALL_ED
Otolaryngologist Procedure Text (A): After obtaining clear surgical margins the patient was sent to otolaryngology for surgical repair.  The patient understands they will receive post-surgical care and follow-up from the referring physician's office. Initial (On Arrival)

## 2022-05-30 NOTE — ED ADULT TRIAGE NOTE - CHIEF COMPLAINT QUOTE
BIBEMS by PD from home s/p elopement yesterday. Patient requiring SW for placement in a new facility. Admits to "drinking some beers today." Denies pain, drug use, physiatric complaints at this time.

## 2022-05-30 NOTE — ED ADULT NURSE NOTE - OBJECTIVE STATEMENT
Pt BIB [facility] after elopement from [facility]. Pt is awaiting social work consult for placement in a new facility. Pt reports mild back pain, denies chest pain, n/v, headache, weakness, or SI/HI. Pt admits to mild ETOH use. Pt with 1:1 aide at bedside. Environment checked and safety measures maintained.

## 2022-05-30 NOTE — ED ADULT NURSE NOTE - NSIMPLEMENTINTERV_GEN_ALL_ED
Implemented All Fall Risk Interventions:  [place] to call system. Call bell, personal items and telephone within reach. Instruct patient to call for assistance. Room bathroom lighting operational. Non-slip footwear when patient is off stretcher. Physically safe environment: no spills, clutter or unnecessary equipment. Stretcher in lowest position, wheels locked, appropriate side rails in place. Provide visual cue, wrist band, yellow gown, etc. Monitor gait and stability. Monitor for mental status changes and reorient to person, place, and time. Review medications for side effects contributing to fall risk. Reinforce activity limits and safety measures with patient and family.

## 2022-05-31 VITALS
OXYGEN SATURATION: 92 % | RESPIRATION RATE: 18 BRPM | HEART RATE: 96 BPM | DIASTOLIC BLOOD PRESSURE: 82 MMHG | TEMPERATURE: 98 F | SYSTOLIC BLOOD PRESSURE: 126 MMHG

## 2022-05-31 PROCEDURE — 82962 GLUCOSE BLOOD TEST: CPT

## 2022-05-31 PROCEDURE — 72125 CT NECK SPINE W/O DYE: CPT | Mod: 26,MG

## 2022-05-31 PROCEDURE — 70450 CT HEAD/BRAIN W/O DYE: CPT | Mod: MG

## 2022-05-31 PROCEDURE — 99283 EMERGENCY DEPT VISIT LOW MDM: CPT

## 2022-05-31 PROCEDURE — 72125 CT NECK SPINE W/O DYE: CPT | Mod: MG

## 2022-05-31 PROCEDURE — G1004: CPT

## 2022-05-31 PROCEDURE — 80307 DRUG TEST PRSMV CHEM ANLYZR: CPT

## 2022-05-31 PROCEDURE — 80053 COMPREHEN METABOLIC PANEL: CPT

## 2022-05-31 PROCEDURE — 99285 EMERGENCY DEPT VISIT HI MDM: CPT

## 2022-05-31 PROCEDURE — 70450 CT HEAD/BRAIN W/O DYE: CPT | Mod: 26,MG

## 2022-05-31 PROCEDURE — 87637 SARSCOV2&INF A&B&RSV AMP PRB: CPT

## 2022-05-31 PROCEDURE — 99285 EMERGENCY DEPT VISIT HI MDM: CPT | Mod: 25

## 2022-05-31 PROCEDURE — 36415 COLL VENOUS BLD VENIPUNCTURE: CPT

## 2022-05-31 PROCEDURE — 85025 COMPLETE CBC W/AUTO DIFF WBC: CPT

## 2022-05-31 PROCEDURE — 93005 ELECTROCARDIOGRAM TRACING: CPT

## 2022-05-31 RX ORDER — INSULIN LISPRO 100/ML
8 VIAL (ML) SUBCUTANEOUS ONCE
Refills: 0 | Status: COMPLETED | OUTPATIENT
Start: 2022-05-31 | End: 2022-05-31

## 2022-05-31 RX ORDER — METFORMIN HYDROCHLORIDE 850 MG/1
1000 TABLET ORAL ONCE
Refills: 0 | Status: COMPLETED | OUTPATIENT
Start: 2022-05-31 | End: 2022-05-31

## 2022-05-31 RX ORDER — METFORMIN HYDROCHLORIDE 850 MG/1
1000 TABLET ORAL
Refills: 0 | Status: DISCONTINUED | OUTPATIENT
Start: 2022-05-31 | End: 2022-06-04

## 2022-05-31 RX ORDER — INSULIN LISPRO 100/ML
15 VIAL (ML) SUBCUTANEOUS ONCE
Refills: 0 | Status: COMPLETED | OUTPATIENT
Start: 2022-05-31 | End: 2022-05-31

## 2022-05-31 RX ORDER — DIPHENHYDRAMINE HCL 50 MG
50 CAPSULE ORAL ONCE
Refills: 0 | Status: COMPLETED | OUTPATIENT
Start: 2022-05-31 | End: 2022-05-31

## 2022-05-31 RX ADMIN — METFORMIN HYDROCHLORIDE 1000 MILLIGRAM(S): 850 TABLET ORAL at 12:35

## 2022-05-31 RX ADMIN — Medication 15 UNIT(S): at 06:59

## 2022-05-31 RX ADMIN — METFORMIN HYDROCHLORIDE 1000 MILLIGRAM(S): 850 TABLET ORAL at 07:00

## 2022-05-31 NOTE — ED BEHAVIORAL HEALTH ASSESSMENT NOTE - NS ED BHA PLAN ADMIT TO PSYCHIATRY BH CONTACTED FT
- pt  has not been taking his HCTZ as prescribed; states that he was told months ago that he could discontinue the medication because his BP was normal- this is not indicated in any of the notes from [address]   - advised pt  to start taking HCTZ again at the same dose; will renew this prescription for him   - he denies headaches, nausea, vomiting, confusion, vision changes, and tinnitus [facility]

## 2022-05-31 NOTE — ED BEHAVIORAL HEALTH ASSESSMENT NOTE - DESCRIPTION
diabetes See HPI on 1 to 1 had eloped day prior  Vital Signs Last 24 Hrs  T(C): 36.6 (31 May 2022 07:48), Max: 37 (30 May 2022 20:23)  T(F): 97.9 (31 May 2022 07:48), Max: 98.6 (30 May 2022 20:23)  HR: 90 (31 May 2022 06:38) (90 - 123)  BP: 123/74 (31 May 2022 06:38) (117/73 - 145/104)  BP(mean): --  RR: 18 (31 May 2022 04:10) (18 - 18)  SpO2: 97% (31 May 2022 06:38) (92% - 97%)

## 2022-05-31 NOTE — ED ADULT NURSE REASSESSMENT NOTE - NS ED NURSE REASSESS COMMENT FT1
Pt resting in stretcher on 1:1. Refused 2nd dose of insulin. Case mgt is working on return to [facility].

## 2022-05-31 NOTE — ED BEHAVIORAL HEALTH ASSESSMENT NOTE - HPI (INCLUDE ILLNESS QUALITY, SEVERITY, DURATION, TIMING, CONTEXT, MODIFYING FACTORS, ASSOCIATED SIGNS AND SYMPTOMS)
sent from [facility] due to fall During ED evaluation eloped and [facility] returned him to ED  Patient states he "escaped" as he feared [facility] was going to send him to [place] He reports drinking beer and using ecstasy while on elopement  He rep[orts recent auditory hallucinations and suicidal thoughts He feels he can be discharged to live with his sister  Dr Montelongo contacted at [facility] who confirms plan for [place] and confirms available bed at their facility She believes readmission is appropriate

## 2022-05-31 NOTE — CHART NOTE - NSCHARTNOTEFT_GEN_A_CORE
SW Note: Pt will be transferred to [facility]. Dr. Mondragon spoke to [facility] psychiatrist Dr. Montelongo, pt accepted. Spoke with [facility] in admissions and bed is available. [facility] arranged. Dr. Mondragon ordered 4pt restraint. ED RN Gillian aware. [facility] transportation letter sent with pt. Auth for admit deferred to [facility] UR., kavin plan listed.

## 2022-05-31 NOTE — ED BEHAVIORAL HEALTH ASSESSMENT NOTE - SUMMARY
patient with schizoaffective disorder severe substance use disorder despite AOT non complaint w9ith numerous ED presentations  plan is for transfer to [place] from [facility] wherein he eloped from ED during medical evaluation for a fall

## 2022-06-03 DIAGNOSIS — F25.9 SCHIZOAFFECTIVE DISORDER, UNSPECIFIED: ICD-10-CM

## 2022-06-03 DIAGNOSIS — E11.9 TYPE 2 DIABETES MELLITUS WITHOUT COMPLICATIONS: ICD-10-CM

## 2022-06-03 DIAGNOSIS — M54.9 DORSALGIA, UNSPECIFIED: ICD-10-CM
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
## 2022-06-03 DIAGNOSIS — M54.2 CERVICALGIA: ICD-10-CM

## 2022-06-03 DIAGNOSIS — Z79.84 LONG TERM (CURRENT) USE OF ORAL HYPOGLYCEMIC DRUGS: ICD-10-CM

## 2022-06-03 DIAGNOSIS — R07.9 CHEST PAIN, UNSPECIFIED: ICD-10-CM

## 2022-06-03 DIAGNOSIS — Z88.8 ALLERGY STATUS TO OTHER DRUGS, MEDICAMENTS AND BIOLOGICAL SUBSTANCES: ICD-10-CM

## 2022-06-03 DIAGNOSIS — I10 ESSENTIAL (PRIMARY) HYPERTENSION: ICD-10-CM

## 2022-06-03 DIAGNOSIS — F31.9 BIPOLAR DISORDER, UNSPECIFIED: ICD-10-CM

## 2022-06-03 DIAGNOSIS — F10.10 ALCOHOL ABUSE, UNCOMPLICATED: ICD-10-CM

## 2022-06-03 DIAGNOSIS — Z20.822 CONTACT WITH AND (SUSPECTED) EXPOSURE TO COVID-19: ICD-10-CM

## 2022-06-03 NOTE — ED CDU PROVIDER SUBSEQUENT DAY NOTE - GASTROINTESTINAL, MLM
Have we ever prescribed this med? Yes.  If yes, what date? 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

## 2022-06-13 NOTE — ED ADULT NURSE NOTE - CCCP TRG CHIEF CMPLNT
Patient arrived for injection appointment on time and by himself.   Vitals not taken at this visit.     Side effects:   [x]??? Denies  []???  Reports:   Reaction to last injection:   [x]??? Denies  []???  Rports:     Suicidal ideation/homicidal ideation:   []??? Denies  [x]???  Reports:  History of chronic suicidal ideation.  Says it has improved somewhat since hospitalization and medication changes.  Denies feeling suicidal today  Symptoms of psychosis/christie/depression/anxiety/etc:   []??? Denies  [x]???  Reports: Reports depression is about the same. Over all doing well.    Concerns with sleep:  []??? Denies  [x]???  Reports:  Sleep has improved with new medications. Continues to improve.  Concerns with appetite:   [x]??? Denies  []???  Reports:     Demeanor, hygiene: Patient is pleasant and cooperative throughout appointment..   Alert and oriented to person, place, and time. Dressed in shorts and T-shirt  Other: Pt reports much improved memory issues since stopping his Trazodone 2 weeks ago.      AIMS : []???  Assessed  score:             [x]???  Not assessed during this visit.  No uncontrolled movement noted or reported.     Aristada 662mg was injected IM into right  gluteal muscle per patient preference.  Patient tolerated procedure with some burning during instillation.   NDC # 73390-225-94  Lot # [id number]  Exp date 3/2024  Patient supplied his own medication.     Patient verbalized understanding and agreement to contact clinic or [facility] if side effects or psychiatric symptoms develop/worsen or with further questions.      Next injection appointment scheduled for 7/11/22 at 11:00     
psychiatric evaluation

## 2022-06-21 NOTE — ED BEHAVIORAL HEALTH ASSESSMENT NOTE - PREPARATORY ACTS:
None known Clofazimine Counseling:  I discussed with the patient the risks of clofazimine including but not limited to skin and eye pigmentation, liver damage, nausea/vomiting, gastrointestinal bleeding and allergy.

## 2022-06-24 NOTE — ED ADULT TRIAGE NOTE - BSA (M2)
[FreeTextEntry1] : Eleanor Lilly presents for hearing evaluation. Mild cerumen was removed bilaterally. Otoscopic exam was otherwise normal. Audiogram today was reviewed, showing type A tymps AU, normal sloping to mild SNHL AU. No intervention is needed at this time. She will follow up in 3 months for cerumen removal and we will obtain another audiogram in one year.\par \par - Follow up in 3 months.\par  1.91

## 2022-06-28 NOTE — ED CDU PROVIDER INITIAL DAY NOTE - CHIEF COMPLAINT
[facility] pharmacy calling back in regards to message below. Caller was transferred to triage for assistance    The patient is a 51y Male complaining of homicidal thoughts.

## 2022-07-09 NOTE — ED ADULT NURSE NOTE - NS ED NURSE LEVEL OF CONSCIOUSNESS SPEECH
Speaking Coherently Implemented All Fall with Harm Risk Interventions:  [place] to call system. Call bell, personal items and telephone within reach. Instruct patient to call for assistance. Room bathroom lighting operational. Non-slip footwear when patient is off stretcher. Physically safe environment: no spills, clutter or unnecessary equipment. Stretcher in lowest position, wheels locked, appropriate side rails in place. Provide visual cue, wrist band, yellow gown, etc. Monitor gait and stability. Monitor for mental status changes and reorient to person, place, and time. Review medications for side effects contributing to fall risk. Reinforce activity limits and safety measures with patient and family. Provide visual clues: red socks.

## 2022-07-12 ENCOUNTER — EMERGENCY (EMERGENCY)
Facility: HOSPITAL | Age: 52
LOS: 1 days | Discharge: HOME | End: 2022-07-12
Attending: EMERGENCY MEDICINE
Payer: MEDICAID

## 2022-07-12 VITALS
SYSTOLIC BLOOD PRESSURE: 107 MMHG | RESPIRATION RATE: 17 BRPM | HEART RATE: 68 BPM | OXYGEN SATURATION: 96 % | DIASTOLIC BLOOD PRESSURE: 71 MMHG | TEMPERATURE: 98 F

## 2022-07-12 VITALS
RESPIRATION RATE: 16 BRPM | OXYGEN SATURATION: 97 % | DIASTOLIC BLOOD PRESSURE: 82 MMHG | TEMPERATURE: 98 F | SYSTOLIC BLOOD PRESSURE: 121 MMHG | HEIGHT: 66 IN | HEART RATE: 62 BPM | WEIGHT: 139.99 LBS

## 2022-07-12 PROCEDURE — 70450 CT HEAD/BRAIN W/O DYE: CPT | Mod: MG

## 2022-07-12 PROCEDURE — G1004: CPT

## 2022-07-12 PROCEDURE — 99284 EMERGENCY DEPT VISIT MOD MDM: CPT | Mod: 25

## 2022-07-12 PROCEDURE — 99284 EMERGENCY DEPT VISIT MOD MDM: CPT

## 2022-07-12 PROCEDURE — 70450 CT HEAD/BRAIN W/O DYE: CPT | Mod: 26,MG

## 2022-07-12 RX ORDER — ACETAMINOPHEN 500 MG
650 TABLET ORAL ONCE
Refills: 0 | Status: COMPLETED | OUTPATIENT
Start: 2022-07-12 | End: 2022-07-12

## 2022-07-12 RX ORDER — LIDOCAINE 4 G/100G
1 CREAM TOPICAL ONCE
Refills: 0 | Status: COMPLETED | OUTPATIENT
Start: 2022-07-12 | End: 2022-07-12

## 2022-07-12 RX ORDER — METHOCARBAMOL 500 MG/1
1500 TABLET, FILM COATED ORAL ONCE
Refills: 0 | Status: COMPLETED | OUTPATIENT
Start: 2022-07-12 | End: 2022-07-12

## 2022-07-12 RX ADMIN — Medication 650 MILLIGRAM(S): at 08:14

## 2022-07-12 RX ADMIN — LIDOCAINE 1 PATCH: 4 CREAM TOPICAL at 08:15

## 2022-07-12 RX ADMIN — METHOCARBAMOL 1500 MILLIGRAM(S): 500 TABLET, FILM COATED ORAL at 08:14

## 2022-07-12 NOTE — ED PROVIDER NOTE - CLINICAL SUMMARY MEDICAL DECISION MAKING FREE TEXT BOX
51 y/o M PMH DM, schizoaffective d/o, and bipolar d/o BIBEMS to ED from [facility] s/p falling out of bed and hitting his head and lower back at 8:30 PM last night. Pt a poor historian. No neurological deficits on exam, TTP of b/l lumbar paraspinal muscles. CT scan head no contrast and pain control. Will continue to monitor and reassess.

## 2022-07-12 NOTE — ED PROVIDER NOTE - PROGRESS NOTE DETAILS
No evidence of acute traumatic injuries. Will carlene Spoke with David at [place]- aware patient will be returning to [place]. Will carlene Duncan DO

## 2022-07-12 NOTE — ED PROVIDER NOTE - PATIENT PORTAL LINK FT
You can access the FollowMyHealth Patient Portal offered by [facility] by registering at the following website: [email or web address]. By joining [employer]’s FollowMyHealth portal, you will also be able to view your health information using other applications (apps) compatible with our system.

## 2022-07-12 NOTE — ED ADULT NURSE NOTE - OBJECTIVE STATEMENT
Pt came to ED s/p mechanical fall. Pt reports hitting his head but denies LOC. Denies any bruises or laceration. Pt is A&Ox3, communicates effectively, follows command. Skin warm and dry, resps reg nonlabored, sensation intact. All meds given as ordered. Lidocaine patch applied to lower back. Pt appears comfortable and in no distress. Caregiver at bedside. Nursing will continue to monitor.

## 2022-07-12 NOTE — ED ADULT TRIAGE NOTE - CHIEF COMPLAINT QUOTE
BIBEMS c/o fall from bed last PM at [facility]. Pt endorsing back pain and head pain this AM, denies LOC, denies dizziness, confusion, visual difficulties at this time. No blood thinner use noted on MAR. [facility] aide at bedside.

## 2022-07-12 NOTE — ED PROVIDER NOTE - ATTENDING CONTRIBUTION TO CARE
HPI: 51yo male with PMH DM, schizoaffective disorder presenting with low back pain and head injury s/p mechanical fall backward and hit head, no loss of consciousness.     PE:  Gen: NAD  Head: NCAT  HEENT: Oral mucosa moist, normal conjunctiva  CV: RRR no murmurs, normal perfusion  Resp: CTA b/l, no wheezing, rales, rhonchi, no respiratory distress  GI: Abd Soft NTND  Neuro: No midline c/t/l spine tenderness, No focal neuro deficits  MSK: +TTP paraspinal muscles b/l lumbar spine, FROM all 4 extremities, no deformity  Skin: No rash, no bruising  Psych: Normal affect    MDM: Fall with head injury- CT r/o ICH, also likely muscle spasm, tx with lindy schuler. Aisha Duncan DO         I performed a history and physical exam of the patient and discussed their management with the student. I reviewed the student's note and agree with the documented findings and plan of care. My medical decision making and observations are found above.

## 2022-07-12 NOTE — ED PROVIDER NOTE - OBJECTIVE STATEMENT
51 y/o M PMH DM, schizoaffective d/o, and bipolar d/o BIBEMS to ED from [facility] s/p falling out of bed and hitting his head and lower back at 8:30PM last night. Pt denies LOC and use of blood thinners. Pt was offered tylenol at [facility] but declined. Blood sugar 110 at time of fall. Reports LBP is 10/10, and admits to HA and mild dizziness. Denies fever, chills, confusion, vision changes, chest pain, sob, n/v/d/c, abdominal pain, bowel/bladder incontinence, saddle anesthesia, extremity numbness/tingling.   Pt a poor historian. Accompanied by [facility] susan Terry.

## 2022-07-12 NOTE — ED PROVIDER NOTE - NSFOLLOWUPINSTRUCTIONS_ED_ALL_ED_FT
1. Follow up with your primary care physician within 2-3days for reevaluation.  2.  Return to the Emergency Department for worsening, progressive or any other concerning symptoms.   3. Please take tylenol 650 mg every 4 hours as needed for pain. Please do not exceed more than 4,000mg of Tylenol in a day   4. Please take Motrin 600mg by mouth every 6 hours as needed for pain. Please take this medication with food.     Back Pain    Back pain is very common in adults. The cause of back pain is rarely dangerous and the pain often gets better over time. The cause of your back pain may not be known and may include strain of muscles or ligaments, degeneration of the spinal disks, or arthritis. Occasionally the pain may radiate down your leg(s). Over-the-counter medicines to reduce pain and inflammation are often the most helpful. Stretching and remaining active frequently helps the healing process.     SEEK IMMEDIATE MEDICAL CARE IF YOU HAVE ANY OF THE FOLLOWING SYMPTOMS: bowel or bladder control problems, unusual weakness or numbness in your arms or legs, nausea or vomiting, abdominal pain, fever, dizziness/lightheadedness.

## 2022-08-12 NOTE — BH INPATIENT PSYCHIATRY PROGRESS NOTE - NSTXPSYCHOPROGRES_PSY_ALL_CORE
Routing refill request to provider for review/approval because:  Pt needs A1C  Lab Results   Component Value Date    A1C 8.3 03/02/2022    A1C 7.4 08/04/2021    A1C 8.3 02/25/2021    A1C 8.0 08/28/2020    A1C 7.5 02/28/2020    A1C 6.4 08/02/2019    A1C 6.7 01/25/2019      Oriana Mcdermott RN, BSN, PHN  [facility]        
Met - goal discontinued
No Change

## 2022-08-28 ENCOUNTER — EMERGENCY (EMERGENCY)
Facility: HOSPITAL | Age: 52
LOS: 1 days | Discharge: HOME | End: 2022-08-28
Attending: EMERGENCY MEDICINE
Payer: MEDICAID

## 2022-08-28 VITALS
SYSTOLIC BLOOD PRESSURE: 114 MMHG | TEMPERATURE: 98 F | RESPIRATION RATE: 20 BRPM | OXYGEN SATURATION: 96 % | DIASTOLIC BLOOD PRESSURE: 63 MMHG | HEART RATE: 89 BPM

## 2022-08-28 VITALS
OXYGEN SATURATION: 97 % | HEART RATE: 86 BPM | RESPIRATION RATE: 18 BRPM | DIASTOLIC BLOOD PRESSURE: 84 MMHG | SYSTOLIC BLOOD PRESSURE: 150 MMHG | TEMPERATURE: 98 F | HEIGHT: 66 IN

## 2022-08-28 DIAGNOSIS — F19.10 OTHER PSYCHOACTIVE SUBSTANCE ABUSE, UNCOMPLICATED: ICD-10-CM

## 2022-08-28 DIAGNOSIS — F25.0 SCHIZOAFFECTIVE DISORDER, BIPOLAR TYPE: ICD-10-CM

## 2022-08-28 LAB
ALBUMIN SERPL ELPH-MCNC: 3.5 G/DL — SIGNIFICANT CHANGE UP (ref 3.3–5.2)
ALP SERPL-CCNC: 53 U/L — SIGNIFICANT CHANGE UP (ref 40–120)
ALT FLD-CCNC: 39 U/L — SIGNIFICANT CHANGE UP
AMPHET UR-MCNC: NEGATIVE — SIGNIFICANT CHANGE UP
ANION GAP SERPL CALC-SCNC: 11 MMOL/L — SIGNIFICANT CHANGE UP (ref 5–17)
AST SERPL-CCNC: 41 U/L — HIGH
BARBITURATES UR SCN-MCNC: NEGATIVE — SIGNIFICANT CHANGE UP
BENZODIAZ UR-MCNC: NEGATIVE — SIGNIFICANT CHANGE UP
BILIRUB SERPL-MCNC: <0.2 MG/DL — LOW (ref 0.4–2)
BUN SERPL-MCNC: 14.2 MG/DL — SIGNIFICANT CHANGE UP (ref 8–20)
CALCIUM SERPL-MCNC: 8.2 MG/DL — LOW (ref 8.4–10.5)
CHLORIDE SERPL-SCNC: 99 MMOL/L — SIGNIFICANT CHANGE UP (ref 98–107)
CO2 SERPL-SCNC: 23 MMOL/L — SIGNIFICANT CHANGE UP (ref 22–29)
COCAINE METAB.OTHER UR-MCNC: POSITIVE
CREAT SERPL-MCNC: 0.72 MG/DL — SIGNIFICANT CHANGE UP (ref 0.5–1.3)
EGFR: 110 ML/MIN/1.73M2 — SIGNIFICANT CHANGE UP
ETHANOL SERPL-MCNC: <10 MG/DL — SIGNIFICANT CHANGE UP (ref 0–9)
GLUCOSE SERPL-MCNC: 270 MG/DL — HIGH (ref 70–99)
HCT VFR BLD CALC: 39.8 % — SIGNIFICANT CHANGE UP (ref 39–50)
HGB BLD-MCNC: 13.5 G/DL — SIGNIFICANT CHANGE UP (ref 13–17)
MCHC RBC-ENTMCNC: 30.3 PG — SIGNIFICANT CHANGE UP (ref 27–34)
MCHC RBC-ENTMCNC: 33.9 GM/DL — SIGNIFICANT CHANGE UP (ref 32–36)
MCV RBC AUTO: 89.2 FL — SIGNIFICANT CHANGE UP (ref 80–100)
METHADONE UR-MCNC: NEGATIVE — SIGNIFICANT CHANGE UP
OPIATES UR-MCNC: NEGATIVE — SIGNIFICANT CHANGE UP
PCP SPEC-MCNC: SIGNIFICANT CHANGE UP
PCP UR-MCNC: NEGATIVE — SIGNIFICANT CHANGE UP
PLATELET # BLD AUTO: 303 K/UL — SIGNIFICANT CHANGE UP (ref 150–400)
POTASSIUM SERPL-MCNC: 4 MMOL/L — SIGNIFICANT CHANGE UP (ref 3.5–5.3)
POTASSIUM SERPL-SCNC: 4 MMOL/L — SIGNIFICANT CHANGE UP (ref 3.5–5.3)
PROT SERPL-MCNC: 6.1 G/DL — LOW (ref 6.6–8.7)
RBC # BLD: 4.46 M/UL — SIGNIFICANT CHANGE UP (ref 4.2–5.8)
RBC # FLD: 13.2 % — SIGNIFICANT CHANGE UP (ref 10.3–14.5)
SALICYLATES SERPL-MCNC: <0.6 MG/DL — LOW (ref 10–20)
SODIUM SERPL-SCNC: 133 MMOL/L — LOW (ref 135–145)
THC UR QL: NEGATIVE — SIGNIFICANT CHANGE UP
WBC # BLD: 5.06 K/UL — SIGNIFICANT CHANGE UP (ref 3.8–10.5)
WBC # FLD AUTO: 5.06 K/UL — SIGNIFICANT CHANGE UP (ref 3.8–10.5)

## 2022-08-28 PROCEDURE — 85027 COMPLETE CBC AUTOMATED: CPT

## 2022-08-28 PROCEDURE — 99285 EMERGENCY DEPT VISIT HI MDM: CPT

## 2022-08-28 PROCEDURE — 80307 DRUG TEST PRSMV CHEM ANLYZR: CPT

## 2022-08-28 PROCEDURE — 36415 COLL VENOUS BLD VENIPUNCTURE: CPT

## 2022-08-28 PROCEDURE — 80053 COMPREHEN METABOLIC PANEL: CPT

## 2022-08-28 PROCEDURE — 80299 QUANTITATIVE ASSAY DRUG: CPT

## 2022-08-28 PROCEDURE — 99283 EMERGENCY DEPT VISIT LOW MDM: CPT

## 2022-08-28 NOTE — ED BEHAVIORAL HEALTH ASSESSMENT NOTE - OTHER PAST PSYCHIATRIC HISTORY (INCLUDE DETAILS REGARDING ONSET, COURSE OF ILLNESS, INPATIENT/OUTPATIENT TREATMENT)
multiple psych admissions and ER visits for reports of SI last at [facility] 3/8/22  Recent [place] [facility] admission 5/22-8/22;  ACT team [facility]   On AOT

## 2022-08-28 NOTE — ED ADULT TRIAGE NOTE - CHIEF COMPLAINT QUOTE
Pt with significant psychiatric history presents to ED for auditory hallucinations. States that he was released from [facility] 1 week ago and had not taken his prescribed medications. Denies visual hallucinations, but endorses that the auditory hallucinations are telling him to hurt himself and other people. States that he does not have a plan.

## 2022-08-28 NOTE — ED ADULT NURSE NOTE - HPI (INCLUDE ILLNESS QUALITY, SEVERITY, DURATION, TIMING, CONTEXT, MODIFYING FACTORS, ASSOCIATED SIGNS AND SYMPTOMS)
Patient comes to  after being medically cleared in main ED. He is well known to  staff due to frequent ED visits for similar symptoms. Initially reported hearing voices and having suicidal ideation. Pt later admitted to taking "extacy" recently and not adhering to treatment plan since leaving [facility] about a week ago. He denied need for inpatient treatment, and was deemed appropriate for d/c home.

## 2022-08-28 NOTE — ED BEHAVIORAL HEALTH ASSESSMENT NOTE - RISK ASSESSMENT
Chronic risk factors: ongoing substance use; psychosocial stressors;  single. Protective factors: no suicide attempts; no self-injurious behavior;  no legal issues; motivated for help; articulate; strong family support; access to health services. No acute risk factors identified Low Acute Suicide Risk

## 2022-08-28 NOTE — ED ADULT NURSE NOTE - CHIEF COMPLAINT QUOTE
Pt with significant psychiatric history presents to ED for auditory hallucinations. States that he was released from [place] 1 week ago and had not taken his prescribed medications. Denies visual hallucinations, but endorses that the auditory hallucinations are telling him to hurt himself and other people. States that he does not have a plan.

## 2022-08-28 NOTE — ED BEHAVIORAL HEALTH ASSESSMENT NOTE - SAFETY PLAN ADDT'L DETAILS
Safety plan discussed with.../Education provided regarding environmental safety / lethal means restriction/Provision of National Suicide Prevention Lifeline 9-698-741-PQEO (7887)

## 2022-08-28 NOTE — ED BEHAVIORAL HEALTH ASSESSMENT NOTE - DESCRIPTION
diabetes See HPI calm, cooperative  Vital Signs Last 24 Hrs  T(C): 36.7 (28 Aug 2022 07:03), Max: 36.7 (28 Aug 2022 07:03)  T(F): 98.1 (28 Aug 2022 07:03), Max: 98.1 (28 Aug 2022 07:03)  HR: 86 (28 Aug 2022 07:03) (86 - 86)  BP: 150/84 (28 Aug 2022 07:03) (150/84 - 150/84)  BP(mean): --  RR: 18 (28 Aug 2022 07:03) (18 - 18)  SpO2: 97% (28 Aug 2022 07:03) (97% - 97%)    Parameters below as of 28 Aug 2022 07:03  Patient On (Oxygen Delivery Method): room air

## 2022-08-28 NOTE — CHART NOTE - NSCHARTNOTEFT_GEN_A_CORE
SWNote: pt seen by Behavioral NP (Geronimo) pt T&R , pt has services in place in the community ([facility]/ACT team) . Pt states he is living at his sister 'house at [address] [place] [address]. [employer] called(Eliana) michael requested,reserv#[phone]. A router will  with [employer] company and ETA. Pt will be waiting in the ED lobby . Aware to call 911 or to go to nearest hospital if symptoms worsen. No other SW needs reported at this moment.
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
## 2022-08-28 NOTE — ED BEHAVIORAL HEALTH ASSESSMENT NOTE - CURRENT MEDICATION
invega sustenna 234 mg IM Q 4 weeks, reports received "a few weeks ago"  depakote 500 mg bid  Trazodone 200 mg hs  Metformin 1000
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
## 2022-09-05 ENCOUNTER — EMERGENCY (EMERGENCY)
Facility: HOSPITAL | Age: 52
LOS: 1 days | Discharge: HOME | End: 2022-09-05
Attending: EMERGENCY MEDICINE
Payer: MEDICAID

## 2022-09-05 VITALS
DIASTOLIC BLOOD PRESSURE: 80 MMHG | SYSTOLIC BLOOD PRESSURE: 120 MMHG | OXYGEN SATURATION: 96 % | TEMPERATURE: 98 F | RESPIRATION RATE: 16 BRPM | HEART RATE: 85 BPM | HEIGHT: 66 IN | WEIGHT: 160.06 LBS

## 2022-09-05 LAB
ALBUMIN SERPL ELPH-MCNC: 3.8 G/DL — SIGNIFICANT CHANGE UP (ref 3.3–5.2)
ALP SERPL-CCNC: 55 U/L — SIGNIFICANT CHANGE UP (ref 40–120)
ALT FLD-CCNC: 30 U/L — SIGNIFICANT CHANGE UP
AMPHET UR-MCNC: NEGATIVE — SIGNIFICANT CHANGE UP
ANION GAP SERPL CALC-SCNC: 11 MMOL/L — SIGNIFICANT CHANGE UP (ref 5–17)
AST SERPL-CCNC: 31 U/L — SIGNIFICANT CHANGE UP
BARBITURATES UR SCN-MCNC: NEGATIVE — SIGNIFICANT CHANGE UP
BASOPHILS # BLD AUTO: 0.02 K/UL — SIGNIFICANT CHANGE UP (ref 0–0.2)
BASOPHILS NFR BLD AUTO: 0.4 % — SIGNIFICANT CHANGE UP (ref 0–2)
BENZODIAZ UR-MCNC: NEGATIVE — SIGNIFICANT CHANGE UP
BILIRUB SERPL-MCNC: <0.2 MG/DL — LOW (ref 0.4–2)
BUN SERPL-MCNC: 18.2 MG/DL — SIGNIFICANT CHANGE UP (ref 8–20)
CALCIUM SERPL-MCNC: 8.7 MG/DL — SIGNIFICANT CHANGE UP (ref 8.4–10.5)
CHLORIDE SERPL-SCNC: 104 MMOL/L — SIGNIFICANT CHANGE UP (ref 98–107)
CO2 SERPL-SCNC: 22 MMOL/L — SIGNIFICANT CHANGE UP (ref 22–29)
COCAINE METAB.OTHER UR-MCNC: POSITIVE
CREAT SERPL-MCNC: 0.83 MG/DL — SIGNIFICANT CHANGE UP (ref 0.5–1.3)
EGFR: 105 ML/MIN/1.73M2 — SIGNIFICANT CHANGE UP
EOSINOPHIL # BLD AUTO: 0.29 K/UL — SIGNIFICANT CHANGE UP (ref 0–0.5)
EOSINOPHIL NFR BLD AUTO: 6.3 % — HIGH (ref 0–6)
ETHANOL SERPL-MCNC: <10 MG/DL — SIGNIFICANT CHANGE UP (ref 0–9)
GLUCOSE SERPL-MCNC: 159 MG/DL — HIGH (ref 70–99)
HCT VFR BLD CALC: 44.7 % — SIGNIFICANT CHANGE UP (ref 39–50)
HGB BLD-MCNC: 15.2 G/DL — SIGNIFICANT CHANGE UP (ref 13–17)
IMM GRANULOCYTES NFR BLD AUTO: 0.2 % — SIGNIFICANT CHANGE UP (ref 0–1.5)
LYMPHOCYTES # BLD AUTO: 1.93 K/UL — SIGNIFICANT CHANGE UP (ref 1–3.3)
LYMPHOCYTES # BLD AUTO: 41.6 % — SIGNIFICANT CHANGE UP (ref 13–44)
MCHC RBC-ENTMCNC: 30.6 PG — SIGNIFICANT CHANGE UP (ref 27–34)
MCHC RBC-ENTMCNC: 34 GM/DL — SIGNIFICANT CHANGE UP (ref 32–36)
MCV RBC AUTO: 90.1 FL — SIGNIFICANT CHANGE UP (ref 80–100)
METHADONE UR-MCNC: NEGATIVE — SIGNIFICANT CHANGE UP
MONOCYTES # BLD AUTO: 0.67 K/UL — SIGNIFICANT CHANGE UP (ref 0–0.9)
MONOCYTES NFR BLD AUTO: 14.4 % — HIGH (ref 2–14)
NEUTROPHILS # BLD AUTO: 1.72 K/UL — LOW (ref 1.8–7.4)
NEUTROPHILS NFR BLD AUTO: 37.1 % — LOW (ref 43–77)
OPIATES UR-MCNC: NEGATIVE — SIGNIFICANT CHANGE UP
PCP SPEC-MCNC: SIGNIFICANT CHANGE UP
PCP UR-MCNC: NEGATIVE — SIGNIFICANT CHANGE UP
PLATELET # BLD AUTO: 354 K/UL — SIGNIFICANT CHANGE UP (ref 150–400)
POTASSIUM SERPL-MCNC: 3.8 MMOL/L — SIGNIFICANT CHANGE UP (ref 3.5–5.3)
POTASSIUM SERPL-SCNC: 3.8 MMOL/L — SIGNIFICANT CHANGE UP (ref 3.5–5.3)
PROT SERPL-MCNC: 6.3 G/DL — LOW (ref 6.6–8.7)
RBC # BLD: 4.96 M/UL — SIGNIFICANT CHANGE UP (ref 4.2–5.8)
RBC # FLD: 13 % — SIGNIFICANT CHANGE UP (ref 10.3–14.5)
SARS-COV-2 RNA SPEC QL NAA+PROBE: SIGNIFICANT CHANGE UP
SODIUM SERPL-SCNC: 137 MMOL/L — SIGNIFICANT CHANGE UP (ref 135–145)
THC UR QL: NEGATIVE — SIGNIFICANT CHANGE UP
WBC # BLD: 4.64 K/UL — SIGNIFICANT CHANGE UP (ref 3.8–10.5)
WBC # FLD AUTO: 4.64 K/UL — SIGNIFICANT CHANGE UP (ref 3.8–10.5)

## 2022-09-05 PROCEDURE — 93010 ELECTROCARDIOGRAM REPORT: CPT

## 2022-09-05 PROCEDURE — 90792 PSYCH DIAG EVAL W/MED SRVCS: CPT

## 2022-09-05 PROCEDURE — 99218: CPT

## 2022-09-05 RX ORDER — TRAZODONE HCL 50 MG
200 TABLET ORAL AT BEDTIME
Refills: 0 | Status: DISCONTINUED | OUTPATIENT
Start: 2022-09-05 | End: 2022-09-09

## 2022-09-05 RX ORDER — DIVALPROEX SODIUM 500 MG/1
500 TABLET, DELAYED RELEASE ORAL
Refills: 0 | Status: DISCONTINUED | OUTPATIENT
Start: 2022-09-05 | End: 2022-09-09

## 2022-09-05 RX ADMIN — DIVALPROEX SODIUM 500 MILLIGRAM(S): 500 TABLET, DELAYED RELEASE ORAL at 15:37
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
## 2022-09-05 NOTE — CHART NOTE - NSCHARTNOTEFT_GEN_A_CORE
SWNote: pt seen by behavioral MD(Dr Branch) pt needs transfer 9.13 status. No bed available today .SW to follow in the am. SWNote: pt seen by behavioral MD(Dr Branch) pt needs transfer 9.13 status. No bed available today at [facility] (Tam[facility]), [facility] (Sosa), [facility] (Maritza), [facility] (Miguel), [facility] (MD unable to get name)  .SW to follow in the am.

## 2022-09-05 NOTE — ED BEHAVIORAL HEALTH ASSESSMENT NOTE - DETAILS
NA completed with patient see HPI self referred reports hearing command auditory hallucination to kill self and others as above self

## 2022-09-05 NOTE — ED ADULT NURSE REASSESSMENT NOTE - NS ED NURSE REASSESS COMMENT FT1
Assumed care of patient at approx 19:15. Patient AxOx4. Patient resting in calmy and comfortably in stretcher. Patient denies pain/discomfort, denies CP/SOB. Patient has been updated on the plan of care. Patient offers no complaints at this time. No visible signs of acute distress noted, safety maintained. Patient awaiting inpatient psychiatric placement.

## 2022-09-05 NOTE — ED BEHAVIORAL HEALTH ASSESSMENT NOTE - DESCRIPTION
Patient was irritable with poor eye contact, reporting he was hearing voices telling him to kill himself and kill other people. He did not appear to be in physical distress.     ICU Vital Signs Last 24 Hrs  T(C): 36.6 (05 Sep 2022 07:24), Max: 36.6 (05 Sep 2022 05:39)  T(F): 97.8 (05 Sep 2022 07:24), Max: 97.9 (05 Sep 2022 05:39)  HR: 88 (05 Sep 2022 07:24) (85 - 88)  BP: 133/80 (05 Sep 2022 07:24) (120/80 - 133/80)  BP(mean): --  ABP: --  ABP(mean): --  RR: 18 (05 Sep 2022 07:24) (16 - 18)  SpO2: 97% (05 Sep 2022 07:24) (96% - 97%)    O2 Parameters below as of 05 Sep 2022 07:24  Patient On (Oxygen Delivery Method): room air diabetes See HPI

## 2022-09-05 NOTE — ED ADULT TRIAGE NOTE - CHIEF COMPLAINT QUOTE
BIBEMS found on street, activated 911 requesting psychiatric evaluation. Patient states he is hearing voices telling him to harm himself and others, requesting placement to [facility]. Patient states he has not taken his prescribed medications "for awhile now". Patient well-known to ED w/ similar complaints. Patient changed into yellow gown w/ no belongings at bedside, placed on 1:1 at triage.

## 2022-09-05 NOTE — ED BEHAVIORAL HEALTH ASSESSMENT NOTE - RISK ASSESSMENT
Chronic risk factors: ongoing substance use; psychosocial stressors;  single, reports prior suicide attempt  Risk factors: reports non compliant with medication, suicidal ideation, CAH   Protective factors: n no self-injurious behavior;  no legal issues; seeking help Low Acute Suicide Risk

## 2022-09-05 NOTE — ED CDU PROVIDER INITIAL DAY NOTE - OBJECTIVE STATEMENT
51 yo M presents to ED stating he wants to go back to [place].  Pt with hx of schizoaffective disorder and has been non-compliant with his psych meds.  Admits to taking ectasy and hearing voices telling him to kill himself to SI with no active plan.  Pt seen in ED recently for same complaint and subsequently dc'd

## 2022-09-05 NOTE — ED ADULT NURSE NOTE - CHIEF COMPLAINT QUOTE
BIBEMS found on street, activated 911 requesting psychiatric evaluation. Patient states he is hearing voices telling him to harm himself and others, requesting placement to [facility]. Patient states he has not taken his prescribed medications "for awhile now". Patient well-known to ED w/ similar complaints. Patient changed into yellow gown w/ no belongings at bedside, placed on 1:1 at triage.

## 2022-09-05 NOTE — ED CDU PROVIDER INITIAL DAY NOTE - DETAILS
Patient with schizoaffective d/o not on medications presented with SI without plan, medically stable for inpatient psych admission, to be admitted to inpatient psych on voluntary basis.

## 2022-09-05 NOTE — ED BEHAVIORAL HEALTH ASSESSMENT NOTE - CURRENT MEDICATION
invega sustenna 234 mg IM Q 4 weeks, reports received "a few weeks ago" as per documentation 10/28/22.  As per documentation most recent medication is below:   depakote 500 mg bid  Trazodone 200 mg hs  Metformin 1000

## 2022-09-05 NOTE — ED BEHAVIORAL HEALTH ASSESSMENT NOTE - SUMMARY
Patient is a 52 year old male who is unemployed, living with sister, with a past psych history of Schizoaffective disorder followed by ACT team also with a history of cocaine use disorder, polysubstance abuse, and multiple ER visits with substance induced psychosis, poor compliance with tx, reports discharged from [facility] 1 week ago.    Patient activated 911 himself for AH after Substance use reports hearing voices telling him to kill himself with others.  States he is having suicidal thought w/out specific plan and unab le to contract for safety.  . Reports currently homelessness, kicked out of family house, non compliance with medications and possible substance use (UDS pending).  Patient is seeking hospitalization. Will admit for safety and stabilization on voluntary basis. Called ACT team for further collateral.

## 2022-09-05 NOTE — ED ADULT NURSE NOTE - OBJECTIVE STATEMENT
rcd pt sleeping easily rousable, irritable when awake refusing to participate in interview, able to express needs, 1:1 discontinued by MD

## 2022-09-05 NOTE — ED BEHAVIORAL HEALTH ASSESSMENT NOTE - OTHER PAST PSYCHIATRIC HISTORY (INCLUDE DETAILS REGARDING ONSET, COURSE OF ILLNESS, INPATIENT/OUTPATIENT TREATMENT)
multiple psych admissions and ER visits for reports of SI last at [facility] 3/8/22  Recent [place] state admission 5/22-8/22;  ACT team Federations   On AOT

## 2022-09-05 NOTE — ED ADULT NURSE NOTE - NSIMPLEMENTINTERV_GEN_ALL_ED
Implemented All Fall Risk Interventions:  [place] to call system. Call bell, personal items and telephone within reach. Instruct patient to call for assistance. Room bathroom lighting operational. Non-slip footwear when patient is off stretcher. Physically safe environment: no spills, clutter or unnecessary equipment. Stretcher in lowest position, wheels locked, appropriate side rails in place. Provide visual cue, wrist band, yellow gown, etc. Monitor gait and stability. Monitor for mental status changes and reorient to person, place, and time. Review medications for side effects contributing to fall risk. Reinforce activity limits and safety measures with patient and family.

## 2022-09-05 NOTE — ED ADULT NURSE REASSESSMENT NOTE - NS ED NURSE REASSESS COMMENT FT1
pt has been resting comfortably while in my care, ambulating to bathroom, tolerating meals, pt has also been refusing vital signs, continues to be uncooperative when asked questions, will say "I don't want to talk anymore, I want to sleep"

## 2022-09-05 NOTE — ED PROVIDER NOTE - OBJECTIVE STATEMENT
51 yo M presents to ED stating he wants to go back to [place].  Pt with hx of schizoaffective disorder and has been non-compliant with his psych meds.  Admits to taking ectasy and hearing voices telling him to kill himself to SI with no active plan.  Pt seen in ED recently for same complaint and subsequently dc'd

## 2022-09-05 NOTE — ED BEHAVIORAL HEALTH ASSESSMENT NOTE - SAFETY PLAN ADDT'L DETAILS
Safety plan discussed with.../Education provided regarding environmental safety / lethal means restriction/Provision of National Suicide Prevention Lifeline 9-036-486-RPCW (1026)

## 2022-09-06 VITALS
TEMPERATURE: 98 F | DIASTOLIC BLOOD PRESSURE: 66 MMHG | HEART RATE: 76 BPM | SYSTOLIC BLOOD PRESSURE: 108 MMHG | RESPIRATION RATE: 18 BRPM | OXYGEN SATURATION: 96 %

## 2022-09-06 PROCEDURE — 99285 EMERGENCY DEPT VISIT HI MDM: CPT | Mod: 25

## 2022-09-06 PROCEDURE — U0003: CPT

## 2022-09-06 PROCEDURE — 99213 OFFICE O/P EST LOW 20 MIN: CPT

## 2022-09-06 PROCEDURE — 36415 COLL VENOUS BLD VENIPUNCTURE: CPT

## 2022-09-06 PROCEDURE — 99217: CPT

## 2022-09-06 PROCEDURE — 93005 ELECTROCARDIOGRAM TRACING: CPT

## 2022-09-06 PROCEDURE — 80053 COMPREHEN METABOLIC PANEL: CPT

## 2022-09-06 PROCEDURE — 80307 DRUG TEST PRSMV CHEM ANLYZR: CPT

## 2022-09-06 PROCEDURE — 85025 COMPLETE CBC W/AUTO DIFF WBC: CPT

## 2022-09-06 PROCEDURE — U0005: CPT

## 2022-09-06 PROCEDURE — G0378: CPT

## 2022-09-06 RX ADMIN — DIVALPROEX SODIUM 500 MILLIGRAM(S): 500 TABLET, DELAYED RELEASE ORAL at 01:39

## 2022-09-06 RX ADMIN — DIVALPROEX SODIUM 500 MILLIGRAM(S): 500 TABLET, DELAYED RELEASE ORAL at 09:51

## 2022-09-06 NOTE — ED ADULT NURSE REASSESSMENT NOTE - NS ED NURSE REASSESS COMMENT FT1
Patient awake and alert, calm and cooperative, VSS, awaiting for dispo, safety maintained, will continue to monitor, denies SI/HI.

## 2022-09-06 NOTE — ED CDU PROVIDER SUBSEQUENT DAY NOTE - RESPIRATORY NEGATIVE STATEMENT, MLM
Subjective   Patient states he has been using IV meth.  He states there are 3 areas to his arms (2 on the left, 1 on the right) where he thinks he missed and the area is now red tender swollen.  Has not taken any medications for the symptoms.          Review of Systems   Skin: Positive for color change.       Past Medical History:   Diagnosis Date   • Asthma    • Hypertension        No Known Allergies    History reviewed. No pertinent surgical history.    History reviewed. No pertinent family history.    Social History     Socioeconomic History   • Marital status:    Tobacco Use   • Smoking status: Current Every Day Smoker     Packs/day: 1.00     Types: Cigarettes   • Smokeless tobacco: Never Used   Vaping Use   • Vaping Use: Never used   Substance and Sexual Activity   • Alcohol use: Never   • Drug use: Yes     Types: Methamphetamines     Comment: uses meth everyday   • Sexual activity: Defer           Objective   Physical Exam  Constitutional:       Appearance: He is obese. He is not ill-appearing.   HENT:      Head: Normocephalic and atraumatic.   Cardiovascular:      Rate and Rhythm: Normal rate.      Pulses: Normal pulses.   Pulmonary:      Effort: Pulmonary effort is normal.   Musculoskeletal:         General: Normal range of motion.   Skin:     General: Skin is warm and dry.      Capillary Refill: Capillary refill takes less than 2 seconds.      Findings: Erythema present.             Comments: Increased warmth and tenderness.   Neurological:      Mental Status: He is alert. He is disoriented.   Psychiatric:         Mood and Affect: Mood normal.         Behavior: Behavior normal.         Procedures           ED Course  ED Course as of 06/09/22 1353   Thu Jun 09, 2022   1351 Discussed with patient warm compresses to the areas and to avoid IV drug use.  Patient follow-up with primary care provider for reevaluation. [SH]      ED Course User Index  [SH] Paola Adams APRN                                                  MDM    Final diagnoses:   Cellulitis of multiple sites       ED Disposition  ED Disposition     ED Disposition   Discharge    Condition   Stable    Comment   --             BDM  RESIDENT [address] [place] [address] [phone]  Schedule an appointment as soon as possible for a visit   ER follow up         Medication List      New Prescriptions    cephalexin 500 MG capsule  Commonly known as: KEFLEX  Take 1 capsule by mouth 3 (Three) Times a Day for 10 days.           Where to Get Your Medications      These medications were sent to [address] [place] - [phone]  - [phone] [address] [address]    Phone: [phone]   · cephalexin 500 MG capsule          Paola Adams, APRN  06/13/22 1408     no chest pain, no cough, and no shortness of breath.

## 2022-09-06 NOTE — ED CDU PROVIDER DISPOSITION NOTE - NSFOLLOWUPINSTRUCTIONS_ED_ALL_ED_FT
avoid all drugs and alcohol   follow up with psychiatry as scheduled  return to ed with worse symtpoms

## 2022-09-06 NOTE — ED BEHAVIORAL HEALTH NOTE - BEHAVIORAL HEALTH NOTE
PROGRESS NOTE: 09-06-22 @ 11:21  	  • Reason for Ongoing Consultation: 	Auditory hallucinations/possible suicidal ideation     ID: 52yyo Male with HEALTH ISSUES - PROBLEM Dx:          INTERVAL DATA:   • Interval Chief Complaint:  "I'm ready to leave"   • Interval History: Pt's reports that he is feeling much better.  He admitted to recent cocaine use prior to admission. UDS was positive for cocaine. He reports he is no longer hearing voices.  He denies any S/H I/I/P. He slept well over night and was eating normally. He states he wants to follow up w/ACT team. He reports he is planning on staying w/ sister. Patient denies any auditory/visual hallucinations, no delusions were elicted. Denies manic or psychotic sx's and has been in good behavioral control. Patient smiled when advised to avoid the cocaine and stated "I know".  He feels ready to go and is requesting bus tickets.      REVIEW OF SYSTEMS:   • Constitutional Symptoms	No complaints  • Eyes	No complaints  • Ears / Nose / Throat / Mouth	No complaints  • Cardiovascular	No complaints  • Respiratory	No complaints  • Gastrointestinal	No complaints  • Genitourinary	No complaints  • Musculoskeletal	No complaints  • Skin	No complaints  • Neurological	No complaints  • Psychiatric (see HPI)	See HPI  • Endocrine	No complaints  • Hematologic / Lymphatic	No complaints  • Allergic / Immunologic	No complaints    REVIEW OF VITALS/LABS/IMAGING/INVESTIGATIONS:   • Vital signs reviewed: Yes  • Vital Signs:	    T(C): 36.6 (09-06-22 @ 08:08), Max: 36.8 (09-05-22 @ 20:05)  HR: 76 (09-06-22 @ 08:08) (67 - 82)  BP: 108/66 (09-06-22 @ 08:08) (108/66 - 124/78)  RR: 18 (09-06-22 @ 08:08) (18 - 19)  SpO2: 96% (09-06-22 @ 08:08) (96% - 97%)    • Available labs reviewed: Yes  • Available Lab Results:                           15.2   4.64  )-----------( 354      ( 05 Sep 2022 06:50 )             44.7     09-05    137  |  104  |  18.2  ----------------------------<  159<H>  3.8   |  22.0  |  0.83    Ca    8.7      05 Sep 2022 06:50    TPro  6.3<L>  /  Alb  3.8  /  TBili  <0.2<L>  /  DBili  x   /  AST  31  /  ALT  30  /  AlkPhos  55  09-05    LIVER FUNCTIONS - ( 05 Sep 2022 06:50 )  Alb: 3.8 g/dL / Pro: 6.3 g/dL / ALK PHOS: 55 U/L / ALT: 30 U/L / AST: 31 U/L / GGT: x                   MEDICATIONS:      PRN Medications:  • PRN Medications since last evaluation	  • PRN Details	    Current Medications:   diVALproex  milliGRAM(s) Oral two times a day  traZODone 200 milliGRAM(s) Oral at bedtime PRN     Medication Side Effects:  • Medication Side Effects or Adverse Reactions (new or ongoing)	None known    SUICIDALITY:   • Suicidality (Interval)	none known    HOMICIDALITY/AGGRESSION:   • Homicidality/Aggression	none known    MENTAL STATUS EXAM:   · Summary (brief):	Patient is a 52 year old male who is unemployed, living with sister, with a past psych history of Schizoaffective disorder followed by ACT team also with a history of cocaine use disorder, polysubstance abuse, and multiple ER visits with substance induced psychosis, poor compliance with tx, reports discharged from [facility] 1 week ago.    Patient activated 911 himself for AH after Substance use reports hearing voices telling him to kill himself with others.  States he is having suicidal thought w/out specific plan and unab le to contract for safety.  . Reports currently homelessness, kicked out of family house, non compliance with medications and possible substance use. Patient UDS was positive for cocaine. He was observed overnight and reported resolution of symptoms and requesting to be discharged. He denies any auditory/visual hallucinations, denies delusions, reports plan to follow up w/ ACT team and stay with sister.  He has been in good behavioral control.  Cleared for outpatient discharge.   · Differential	NA  · Rationale/Summary (include warning signs, risk factors (static vs modifiable), protective factors, access to lethal means, comment on LEVEL of risk for ALL dangerous behavior, etc.):	Chronic risk factors: ongoing substance use; psychosocial stressors;  single, reports prior suicide attempt  Risk factors: reports non compliant with medication, presented with suicidal ideation that resolved,also initially reported hearing voices that resolved, he presented + cocaine in likely substance induced state, currently appears at baseline   Protective factors: n no self-injurious behavior;  no legal issues; seeking help  · Risk Assessment	Low Acute Suicide Risk  · Elevated Chronic Suicide Risk	Yes    AXIS:    Diagnosis (Corresponds to DSM-IV Axis I, II):  Primary Dx  Schizoaffective disorder, bipolar type F25.0. Secondary Dx 1 Polysubstance abuse F19.10.     Medical Diagnosis (Corresponds to DSM IV - Axis III):  · Axis III	DM     DSM-IV Axes:  · Axis I, II, and III	See above  · Axis IV	psychosocial  · Axis V (GAF)	60    PLAN:    Plan:  Patient currently at baseline   Denies S/H I/I/P, reports resolution of psychotic sx's.   Will clear to follow up with ACT team.

## 2022-09-06 NOTE — CHART NOTE - NSCHARTNOTEFT_GEN_A_CORE
SW Note: Pt requested to speak to a  provider again and after interview he was cleared for discharge. The pt is denying S/I and H/I. The pt is connected to an ACT team for o/p MH tx. The pt provided contact info for his sister Parisa and stated he can stay with her. Called and spoke with Parisa [phone] and confirmed pt can stay at her residence: [address] [facility]. Notified ED provider.

## 2022-09-06 NOTE — ED CDU PROVIDER DISPOSITION NOTE - PATIENT PORTAL LINK FT
You can access the FollowMyHealth Patient Portal offered by [facility] by registering at the following website: [email or web address]. By joining [email or web address]’s FollowMyHealth portal, you will also be able to view your health information using other applications (apps) compatible with our system.

## 2022-09-08 NOTE — ED PROVIDER NOTE - NS ED ATTENDING STATEMENT MOD
Attending Only No respiratory distress. No stridor, Lungs sounds clear with good aeration bilaterally.

## 2022-09-10 ENCOUNTER — EMERGENCY (EMERGENCY)
Facility: HOSPITAL | Age: 52
LOS: 1 days | Discharge: HOME | End: 2022-09-10
Attending: EMERGENCY MEDICINE
Payer: MEDICAID

## 2022-09-10 VITALS
HEIGHT: 66 IN | SYSTOLIC BLOOD PRESSURE: 136 MMHG | OXYGEN SATURATION: 96 % | RESPIRATION RATE: 16 BRPM | TEMPERATURE: 98 F | HEART RATE: 86 BPM | DIASTOLIC BLOOD PRESSURE: 84 MMHG

## 2022-09-10 LAB
AMPHET UR-MCNC: NEGATIVE — SIGNIFICANT CHANGE UP
ANION GAP SERPL CALC-SCNC: 9 MMOL/L — SIGNIFICANT CHANGE UP (ref 5–17)
APAP SERPL-MCNC: <3 UG/ML — LOW (ref 10–26)
APPEARANCE UR: CLEAR — SIGNIFICANT CHANGE UP
BARBITURATES UR SCN-MCNC: NEGATIVE — SIGNIFICANT CHANGE UP
BASOPHILS # BLD AUTO: 0.03 K/UL — SIGNIFICANT CHANGE UP (ref 0–0.2)
BASOPHILS NFR BLD AUTO: 0.7 % — SIGNIFICANT CHANGE UP (ref 0–2)
BENZODIAZ UR-MCNC: NEGATIVE — SIGNIFICANT CHANGE UP
BILIRUB UR-MCNC: NEGATIVE — SIGNIFICANT CHANGE UP
BUN SERPL-MCNC: 11.9 MG/DL — SIGNIFICANT CHANGE UP (ref 8–20)
CALCIUM SERPL-MCNC: 8.4 MG/DL — SIGNIFICANT CHANGE UP (ref 8.4–10.5)
CHLORIDE SERPL-SCNC: 105 MMOL/L — SIGNIFICANT CHANGE UP (ref 98–107)
CO2 SERPL-SCNC: 25 MMOL/L — SIGNIFICANT CHANGE UP (ref 22–29)
COCAINE METAB.OTHER UR-MCNC: POSITIVE
COLOR SPEC: YELLOW — SIGNIFICANT CHANGE UP
CREAT SERPL-MCNC: 0.82 MG/DL — SIGNIFICANT CHANGE UP (ref 0.5–1.3)
DIFF PNL FLD: NEGATIVE — SIGNIFICANT CHANGE UP
EGFR: 106 ML/MIN/1.73M2 — SIGNIFICANT CHANGE UP
EOSINOPHIL # BLD AUTO: 0.29 K/UL — SIGNIFICANT CHANGE UP (ref 0–0.5)
EOSINOPHIL NFR BLD AUTO: 7.1 % — HIGH (ref 0–6)
ETHANOL SERPL-MCNC: <10 MG/DL — SIGNIFICANT CHANGE UP (ref 0–9)
GLUCOSE SERPL-MCNC: 212 MG/DL — HIGH (ref 70–99)
GLUCOSE UR QL: 1000 MG/DL
HCT VFR BLD CALC: 44.2 % — SIGNIFICANT CHANGE UP (ref 39–50)
HGB BLD-MCNC: 14.6 G/DL — SIGNIFICANT CHANGE UP (ref 13–17)
IMM GRANULOCYTES NFR BLD AUTO: 0 % — SIGNIFICANT CHANGE UP (ref 0–1.5)
KETONES UR-MCNC: ABNORMAL
LEUKOCYTE ESTERASE UR-ACNC: NEGATIVE — SIGNIFICANT CHANGE UP
LYMPHOCYTES # BLD AUTO: 1.91 K/UL — SIGNIFICANT CHANGE UP (ref 1–3.3)
LYMPHOCYTES # BLD AUTO: 46.8 % — HIGH (ref 13–44)
MCHC RBC-ENTMCNC: 30 PG — SIGNIFICANT CHANGE UP (ref 27–34)
MCHC RBC-ENTMCNC: 33 GM/DL — SIGNIFICANT CHANGE UP (ref 32–36)
MCV RBC AUTO: 90.9 FL — SIGNIFICANT CHANGE UP (ref 80–100)
METHADONE UR-MCNC: NEGATIVE — SIGNIFICANT CHANGE UP
MONOCYTES # BLD AUTO: 0.48 K/UL — SIGNIFICANT CHANGE UP (ref 0–0.9)
MONOCYTES NFR BLD AUTO: 11.8 % — SIGNIFICANT CHANGE UP (ref 2–14)
NEUTROPHILS # BLD AUTO: 1.37 K/UL — LOW (ref 1.8–7.4)
NEUTROPHILS NFR BLD AUTO: 33.6 % — LOW (ref 43–77)
NITRITE UR-MCNC: NEGATIVE — SIGNIFICANT CHANGE UP
OPIATES UR-MCNC: NEGATIVE — SIGNIFICANT CHANGE UP
PCP SPEC-MCNC: SIGNIFICANT CHANGE UP
PCP UR-MCNC: NEGATIVE — SIGNIFICANT CHANGE UP
PH UR: 7 — SIGNIFICANT CHANGE UP (ref 5–8)
PLATELET # BLD AUTO: 287 K/UL — SIGNIFICANT CHANGE UP (ref 150–400)
POTASSIUM SERPL-MCNC: 4.4 MMOL/L — SIGNIFICANT CHANGE UP (ref 3.5–5.3)
POTASSIUM SERPL-SCNC: 4.4 MMOL/L — SIGNIFICANT CHANGE UP (ref 3.5–5.3)
PROT UR-MCNC: NEGATIVE — SIGNIFICANT CHANGE UP
RBC # BLD: 4.86 M/UL — SIGNIFICANT CHANGE UP (ref 4.2–5.8)
RBC # FLD: 12.7 % — SIGNIFICANT CHANGE UP (ref 10.3–14.5)
SALICYLATES SERPL-MCNC: <0.6 MG/DL — LOW (ref 10–20)
SARS-COV-2 RNA SPEC QL NAA+PROBE: SIGNIFICANT CHANGE UP
SODIUM SERPL-SCNC: 139 MMOL/L — SIGNIFICANT CHANGE UP (ref 135–145)
SP GR SPEC: 1.01 — SIGNIFICANT CHANGE UP (ref 1.01–1.02)
THC UR QL: NEGATIVE — SIGNIFICANT CHANGE UP
UROBILINOGEN FLD QL: NEGATIVE MG/DL — SIGNIFICANT CHANGE UP
WBC # BLD: 4.08 K/UL — SIGNIFICANT CHANGE UP (ref 3.8–10.5)
WBC # FLD AUTO: 4.08 K/UL — SIGNIFICANT CHANGE UP (ref 3.8–10.5)

## 2022-09-10 PROCEDURE — 99284 EMERGENCY DEPT VISIT MOD MDM: CPT

## 2022-09-10 PROCEDURE — 81003 URINALYSIS AUTO W/O SCOPE: CPT

## 2022-09-10 PROCEDURE — 93005 ELECTROCARDIOGRAM TRACING: CPT

## 2022-09-10 PROCEDURE — 93010 ELECTROCARDIOGRAM REPORT: CPT

## 2022-09-10 PROCEDURE — 99218: CPT

## 2022-09-10 PROCEDURE — 80048 BASIC METABOLIC PNL TOTAL CA: CPT

## 2022-09-10 PROCEDURE — 36415 COLL VENOUS BLD VENIPUNCTURE: CPT

## 2022-09-10 PROCEDURE — G0378: CPT

## 2022-09-10 PROCEDURE — U0005: CPT

## 2022-09-10 PROCEDURE — 80307 DRUG TEST PRSMV CHEM ANLYZR: CPT

## 2022-09-10 PROCEDURE — 85025 COMPLETE CBC W/AUTO DIFF WBC: CPT

## 2022-09-10 PROCEDURE — U0003: CPT

## 2022-09-10 NOTE — ED ADULT NURSE NOTE - SUICIDE RISK FACTORS
Hopelessness or despair/Command hallucinations/Alcohol/Substance abuse disorders/Mood Disorder current/past

## 2022-09-10 NOTE — ED ADULT NURSE NOTE - NS ED NURSE LEVEL OF CONSCIOUSNESS ORIENTATION
Oriented - self; Oriented - place; Oriented - time/Hallucination - visual/Hallucination - audio/Ideation - homicidal/Ideation - suicidal

## 2022-09-10 NOTE — ED ADULT NURSE NOTE - OBJECTIVE STATEMENT
Presented in  area dressed in yellow gowns.  Patient awake and alert and oriented times four.  Patient reports he has been abusing drugs "ecstasy" and alcohol intermittently since his discharge from [facility] ED last week.  Patient reports he come to the hospital because he is hearing three voices telling him to kill himself and others.  Patient denies plan or intent to act on command hallucinations.  Patient reports he has been living with his sister but has been homeless for the past week.  Patient has not been complaint with his psychiatric treatment.  Patient stating "I need to go back to [facility]".  Patient has multiple past inpatient psychiatric admissions and ED presentations.  Cooperative with security contraband assessment.

## 2022-09-10 NOTE — ED ADULT NURSE NOTE - NSPATIENTFLAG_GEN_A_ER
Hydroxychloroquine Counseling:  I discussed with the patient that a baseline ophthalmologic exam is needed at the start of therapy and every year thereafter while on therapy. A CBC may also be warranted for monitoring.  The side effects of this medication were discussed with the patient, including but not limited to agranulocytosis, aplastic anemia, seizures, rashes, retinopathy, and liver toxicity. Patient instructed to call the office should any adverse effect occur.  The patient verbalized understanding of the proper use and possible adverse effects of Plaquenil.  All the patient's questions and concerns were addressed. Purple DH (Discharge Huddle; Vulnerable Patient)

## 2022-09-10 NOTE — ED PROVIDER NOTE - OBJECTIVE STATEMENT
53 yo male with hx of schizoaffective disorder, HTN, DM presents to the ED for hallucinations. Patient states that he is suicidal and having thoughts of hurting others. Hearing voices. Patient with extensive psych history and recently seen and discharged. Denies physical complaints. Endorses alcohol use, none today.

## 2022-09-10 NOTE — ED ADULT NURSE NOTE - NS ED BHA MSE SPEECH RATE
Faxed MRI order to [facility]. They will auth and schedule. Pt is asking for pain relief since max tylenol isn't helping and pred pack is down to 1/2 tabs. Note mentions tramadol possibly. Normal

## 2022-09-10 NOTE — ED PROVIDER NOTE - ATTENDING CONTRIBUTION TO CARE
Hema: I performed a face to face evaluation of this patient and performed a full history and physical examination on the patient.  I agree with the resident's history, physical examination, and plan of the patient unless otherwise noted. My brief assessment is as follows: pmh as documented, seen here 5 days ago for similar c/o "needing help". wouldn't clarify, when asked if having si/ah, pt states "yes". sleeping during h and p and not always wanting to answer. denies drugs/etoh. no other complaints. ncat, ctab, rrr, abd benign, moving all extremities neuro intact. c/o si/ah, mediclaly cleared, for psych eval.

## 2022-09-10 NOTE — ED PROVIDER NOTE - CLINICAL SUMMARY MEDICAL DECISION MAKING FREE TEXT BOX
+SI without plan. +HI without plan. +auditory hallucinations. +psych hx. Denies alcohol or drug use today. Stable, well appearing. Will medically clear.  to see, Patient in yellow gown and will go to  section for constant observation.

## 2022-09-10 NOTE — ED ADULT TRIAGE NOTE - CHIEF COMPLAINT QUOTE
Pt. BIBA for SI and hearing voices. Pt. denies HI or drug use today. Pt. changed into yellow gown and belongings secured.

## 2022-09-10 NOTE — ED BEHAVIORAL HEALTH NOTE - BEHAVIORAL HEALTH NOTE
At 7:10PM: Attempted to conduct psychiatric assessment to no avail as patient adamantly refused to talk to undersigned, stating "I don't want to talk" then closed his eyes while ignoring undersigned who inquired about why he did not feel like talking. Of note patient's utox is positive for cocaine. Per ED triage note, Mr. Yanez [id number] to the ED with c/o hallucinations; however, he is not currently amenable to psychiatric eval at this time. Nurse Sanjuanita also attempted to speak to patient to no avail as patient refused to engage in assessment at this time. At 7:10PM: Attempted to conduct psychiatric assessment to no avail as patient adamantly refused to talk to undersigned, stating "I don't want to talk" then closed his eyes while ignoring undersigned who inquired about why he did not feel like talking. Of note patient's utox is positive for cocaine. Per ED triage note, Mr. Yanez presented to the ED with c/o hallucinations; however, he is not currently amenable to psychiatric eval. Nurse Sanjuanita also attempted to speak to patient to no avail as patient refused to engage in assessment at this time.

## 2022-09-11 VITALS
SYSTOLIC BLOOD PRESSURE: 121 MMHG | DIASTOLIC BLOOD PRESSURE: 76 MMHG | OXYGEN SATURATION: 99 % | HEART RATE: 70 BPM | RESPIRATION RATE: 18 BRPM | TEMPERATURE: 98 F

## 2022-09-11 PROCEDURE — 99217: CPT

## 2022-09-11 NOTE — ED BEHAVIORAL HEALTH ASSESSMENT NOTE - DESCRIPTION
See HPI diabetes calm, coopeartive    Vital Signs Last 24 Hrs  T(C): 36.7 (11 Sep 2022 10:54), Max: 36.8 (11 Sep 2022 07:46)  T(F): 98.1 (11 Sep 2022 10:54), Max: 98.3 (11 Sep 2022 07:46)  HR: 70 (11 Sep 2022 10:54) (70 - 75)  BP: 119/71 (11 Sep 2022 10:54) (115/72 - 122/75)  BP(mean): --  RR: 18 (11 Sep 2022 10:54) (18 - 18)  SpO2: 97% (11 Sep 2022 10:54) (97% - 98%)    Parameters below as of 11 Sep 2022 10:54  Patient On (Oxygen Delivery Method): room air

## 2022-09-11 NOTE — ED BEHAVIORAL HEALTH ASSESSMENT NOTE - NS ED BHA AXIS I SECONDARY1 CODE FT
Assessment/Plan:    No problem-specific Assessment & Plan notes found for this encounter  Diagnoses and all orders for this visit:    GERD without esophagitis  -     omeprazole (PriLOSEC) 40 MG capsule; Take 1 capsule (40 mg total) by mouth daily    Essential hypertension  -     lisinopril (ZESTRIL) 5 mg tablet; Take 1 tablet (5 mg total) by mouth daily    Hyperlipidemia, unspecified hyperlipidemia type  -     simvastatin (ZOCOR) 40 mg tablet; Take 1 tablet (40 mg total) by mouth daily    Other orders  -     Discontinue: lisinopril (ZESTRIL) 5 mg tablet; Take 1 tablet by mouth daily  -     Discontinue: omeprazole (PriLOSEC) 40 MG capsule; Take 1 capsule by mouth daily  -     Discontinue: simvastatin (ZOCOR) 40 mg tablet; Take 1 tablet by mouth daily          Subjective:      Patient ID: Cornelius Powell is a 79 y o  male  He is doing well overall  He has no CP or SOB   He has no HA or vision changes  His BP is at goal     His lipids are at goal on his current regimen  He has no myalgia or muscle weakness  He has no RUQ pain  His GERD is well controlled on his current regimen  He has no side effects  The following portions of the patient's history were reviewed and updated as appropriate:   He  has a past medical history of Malignant neoplasm of prostate ([facility] [address] )  He   Patient Active Problem List    Diagnosis Date Noted    Hypertension 05/02/2017    GERD without esophagitis 11/21/2016    Hyperlipidemia 11/21/2016     He  has a past surgical history that includes Appendectomy; Hernia repair; Neuroplasty / transposition median nerve at carpal tunnel; and Prostate surgery  His family history includes Other in his father  He  reports that he has never smoked  He does not have any smokeless tobacco history on file  His alcohol and drug histories are not on file    Current Outpatient Prescriptions   Medication Sig Dispense Refill    lisinopril (ZESTRIL) 5 mg tablet Take 1 tablet (5 mg total) by mouth daily 90 tablet 3    omeprazole (PriLOSEC) 40 MG capsule Take 1 capsule (40 mg total) by mouth daily 90 capsule 3    simvastatin (ZOCOR) 40 mg tablet Take 1 tablet (40 mg total) by mouth daily 90 tablet 3     No current facility-administered medications for this visit  No current outpatient prescriptions on file prior to visit  No current facility-administered medications on file prior to visit  He has No Known Allergies       Review of Systems   All other systems reviewed and are negative  Objective:      /76 (BP Location: Left arm, Patient Position: Sitting)   Pulse 68   Resp 14   Ht 5' 6" (1 676 m)   Wt 94 8 kg (209 lb)   SpO2 98%   BMI 33 73 kg/m²          Physical Exam   Constitutional: He is oriented to person, place, and time  He appears well-developed and well-nourished  Neck: Normal range of motion  Neck supple  Cardiovascular: Normal rate, regular rhythm, normal heart sounds and intact distal pulses  Pulmonary/Chest: Effort normal and breath sounds normal    Abdominal: Soft  Bowel sounds are normal    Musculoskeletal: Normal range of motion  Neurological: He is alert and oriented to person, place, and time  He has normal reflexes  Skin: Skin is warm and dry  Psychiatric: He has a normal mood and affect  His behavior is normal  Judgment and thought content normal    Nursing note and vitals reviewed  F19.10

## 2022-09-11 NOTE — ED CDU PROVIDER DISPOSITION NOTE - NSFOLLOWUPINSTRUCTIONS_ED_ALL_ED_FT
You are advised to please follow up with your primary care doctor within the next 24 hours and return to the Emergency Department for worsening symptoms or any other concerns.  Your doctor may call [phone] to follow up on the specific results of the tests performed today in the emergency department.

## 2022-09-11 NOTE — ED BEHAVIORAL HEALTH ASSESSMENT NOTE - HPI (INCLUDE ILLNESS QUALITY, SEVERITY, DURATION, TIMING, CONTEXT, MODIFYING FACTORS, ASSOCIATED SIGNS AND SYMPTOMS)
Patient is a 52 year old male who is unemployed,  reports undomic[place] woods (reports he got kicked out of families house), with a past psych history of Schizoaffective disorder, [facility], followed by ACT team also with a history of cocaine use disorder, polysubstance abuse, and multiple ER visits with substance induced psychosis, poor compliance with tx. [facility] after called 911 reporting he was hearing voices and having suicidal thoughts.    He was attempted to be evaluated last night, he refused to engage in the assessment.  He slept overnight, ate all meals and now is reporting he "feels much better" and would like to go home.  reports he used cocaine and "that makes it worse for me".  He is compliant with treatment, taking his medication.  He denies any current ah/vh, paranoia and no overt delusions elicited.  He denies SI/HI, plan or intent.        L/m for ACT team, emergency number, no return call

## 2022-09-11 NOTE — ED BEHAVIORAL HEALTH ASSESSMENT NOTE - RISK ASSESSMENT
Low Acute Suicide Risk Chronic risk factors: ongoing substance use; psychosocial stressors;  single, reports prior suicide attempt  Risk factors: reports non compliant with medication, suicidal ideation, CAH   Protective factors: n no self-injurious behavior;  no legal issues; seeking help

## 2022-09-11 NOTE — ED ADULT NURSE REASSESSMENT NOTE - NS ED NURSE REASSESS COMMENT FT1
Assumed care of patient.  Pt lying in bed in darken room awaiting to be seen for eval.  Pt told PNP that he "does not want to talk."  Spoke with patient and states "I don't feel good", would not elaborate on why he doesn't feel good.  Pt left resting in bed in darken room.  Will monitor and chart changes and maintain a safe environment
Patient educated about ordered lab work.  Patient provided urine collection cup.  Patient refused to cooperate with blood work reporting "I'll do it later".  Patient ate 100% of his breakfast and resting in bed.
Pt has been cleared for discharge. Pt refusing discharge vs. Pt belongings returned.
Pt resting in bed. Pt awaiting psych eval. Pt "wants to sleep". VS obtained by aide.
Patient ate 100% of his lunch.  Patient cooperated with obtain blood sample for testing.  Patient reeducated about need for urine sample.  Patient reports he feels better after sleeping and auditory hallucinations decreased in volume and frequency.  No attempts to harm self or others and safety maintained.
Pt resting at time.  Pt refusing v/s and requesting not to be woken up for 3 am v/s  Will monitor and chart changes
Patient resting in bed asleep with no distress.  Safety of patient maintained.
Patient ate 100% of his dinner.  Patient reports he continues to experience intermittent auditory hallucinations directing self harm.  Patient verbally contracts not to harm self in the hospital.  No attempts to harm self or others and safety maintained.

## 2022-09-11 NOTE — CHART NOTE - NSCHARTNOTEFT_GEN_A_CORE
SW Note: Notified by  provider that the pt has been cleared for discharge. pt states that he is staying with his sister Parisa and can return. Requested a medicaid taxi. Msg left for Parisa [phone]. Earlier this week pt was in the ED and this worker spoke with his sister Parisa 9/6 and confirmed pt is staying with her at [address] [facility]. Pt has knowledge of the [facility] after hours line if housing is needed after d/c.   Msg left for pts ACT team, Keshia Stephenson. [phone] informing of ED visit and request to outreach with pt in the community.   Pt was given [facility] DASH info with the number for their crisis team if needed.

## 2022-09-11 NOTE — ED BEHAVIORAL HEALTH ASSESSMENT NOTE - SUMMARY
Patient is a 52 year old male who is unemployed,  reports undomiciled living [place] the woods (reports he got kicked out of families house), with a past psych history of Schizoaffective disorder, [place] [facility] [place], followed by ACT team also with a history of cocaine use disorder, polysubstance abuse, and multiple ER visits with substance induced psychosis, poor compliance with tx. [facility] after called 911 reporting he was hearing voices and having suicidal thoughts.    He was attempted to be evaluated last night, he refused to engage in the assessment.  He slept overnight, ate all meals and now is reporting he "feels much better" and would like to go home.  reports he used cocaine and "that makes it worse for me".  He is compliant with treatment, taking his medication.  He denies any current ah/vh, paranoia and no overt delusions elicited.  He denies SI/HI, plan or intent.        L/m for ACT team, emergency number, no return call

## 2022-09-11 NOTE — ED CDU PROVIDER DISPOSITION NOTE - PATIENT PORTAL LINK FT
You can access the FollowMyHealth Patient Portal offered by [facility] by registering at the following website: [email or web address]. By joining [employer]’s FollowMyHealth portal, you will also be able to view your health information using other applications (apps) compatible with our system.

## 2022-09-11 NOTE — ED BEHAVIORAL HEALTH ASSESSMENT NOTE - NS ED BHA REVIEW OF ED CHART VITAL SIGNS REVIEWED
Yes [Mother] : mother [Father] : father [Grade:  _____] : Grade: [unfilled] [IEP/504] : does not have an IEP/504 currently in place [Sexually Active] : not sexually active

## 2022-09-11 NOTE — ED ADULT NURSE REASSESSMENT NOTE - GENERAL PATIENT STATE
comfortable appearance/cooperative
comfortable appearance/cooperative
irritable/resting/sleeping
comfortable appearance/resting/sleeping
comfortable appearance/resting/sleeping

## 2022-09-11 NOTE — ED BEHAVIORAL HEALTH ASSESSMENT NOTE - OTHER PAST PSYCHIATRIC HISTORY (INCLUDE DETAILS REGARDING ONSET, COURSE OF ILLNESS, INPATIENT/OUTPATIENT TREATMENT)
multiple psych admissions and ER visits for reports of SI last at [facility] 3/8/22  Recent [place] [facility] admission 5/22-8/22;  ACT team Federations   On AOT

## 2022-09-15 NOTE — ED PROVIDER NOTE - OBJECTIVE STATEMENT
51 yo male pmh schizoaffective disorder comes to ed with auditory hallucinations; pt noted noncompliant with medications x 1 week;

## 2022-09-15 NOTE — ED PROVIDER NOTE - PATIENT PORTAL LINK FT
You can access the FollowMyHealth Patient Portal offered by [facility] by registering at the following website: [email or web address]. By joining [employer]’s FollowMyHealth portal, you will also be able to view your health information using other applications (apps) compatible with our system.

## 2022-09-16 LAB — ACETAZOLAMIDE LEVEL: <0.5 CD:431152031 — LOW

## 2022-09-19 ENCOUNTER — EMERGENCY (EMERGENCY)
Facility: HOSPITAL | Age: 52
LOS: 1 days | End: 2022-09-19
Attending: EMERGENCY MEDICINE
Payer: MEDICAID

## 2022-09-19 VITALS
HEART RATE: 92 BPM | HEIGHT: 66 IN | WEIGHT: 130.07 LBS | RESPIRATION RATE: 16 BRPM | SYSTOLIC BLOOD PRESSURE: 118 MMHG | OXYGEN SATURATION: 96 % | TEMPERATURE: 98 F | DIASTOLIC BLOOD PRESSURE: 82 MMHG

## 2022-09-19 LAB
ALBUMIN SERPL ELPH-MCNC: 3.7 G/DL — SIGNIFICANT CHANGE UP (ref 3.3–5.2)
ALP SERPL-CCNC: 49 U/L — SIGNIFICANT CHANGE UP (ref 40–120)
ALT FLD-CCNC: 25 U/L — SIGNIFICANT CHANGE UP
AMPHET UR-MCNC: NEGATIVE — SIGNIFICANT CHANGE UP
ANION GAP SERPL CALC-SCNC: 11 MMOL/L — SIGNIFICANT CHANGE UP (ref 5–17)
APAP SERPL-MCNC: <3 UG/ML — LOW (ref 10–26)
APPEARANCE UR: CLEAR — SIGNIFICANT CHANGE UP
AST SERPL-CCNC: 27 U/L — SIGNIFICANT CHANGE UP
BARBITURATES UR SCN-MCNC: NEGATIVE — SIGNIFICANT CHANGE UP
BASOPHILS # BLD AUTO: 0.02 K/UL — SIGNIFICANT CHANGE UP (ref 0–0.2)
BASOPHILS NFR BLD AUTO: 0.4 % — SIGNIFICANT CHANGE UP (ref 0–2)
BENZODIAZ UR-MCNC: NEGATIVE — SIGNIFICANT CHANGE UP
BILIRUB SERPL-MCNC: <0.2 MG/DL — LOW (ref 0.4–2)
BILIRUB UR-MCNC: NEGATIVE — SIGNIFICANT CHANGE UP
BUN SERPL-MCNC: 11.2 MG/DL — SIGNIFICANT CHANGE UP (ref 8–20)
CALCIUM SERPL-MCNC: 8.5 MG/DL — SIGNIFICANT CHANGE UP (ref 8.4–10.5)
CHLORIDE SERPL-SCNC: 104 MMOL/L — SIGNIFICANT CHANGE UP (ref 98–107)
CO2 SERPL-SCNC: 24 MMOL/L — SIGNIFICANT CHANGE UP (ref 22–29)
COCAINE METAB.OTHER UR-MCNC: POSITIVE
COLOR SPEC: YELLOW — SIGNIFICANT CHANGE UP
CREAT SERPL-MCNC: 0.68 MG/DL — SIGNIFICANT CHANGE UP (ref 0.5–1.3)
DIFF PNL FLD: NEGATIVE — SIGNIFICANT CHANGE UP
EGFR: 112 ML/MIN/1.73M2 — SIGNIFICANT CHANGE UP
EOSINOPHIL # BLD AUTO: 0.14 K/UL — SIGNIFICANT CHANGE UP (ref 0–0.5)
EOSINOPHIL NFR BLD AUTO: 2.9 % — SIGNIFICANT CHANGE UP (ref 0–6)
ETHANOL SERPL-MCNC: <10 MG/DL — SIGNIFICANT CHANGE UP (ref 0–9)
GLUCOSE SERPL-MCNC: 83 MG/DL — SIGNIFICANT CHANGE UP (ref 70–99)
GLUCOSE UR QL: 1000 MG/DL
HCT VFR BLD CALC: 44.7 % — SIGNIFICANT CHANGE UP (ref 39–50)
HGB BLD-MCNC: 15 G/DL — SIGNIFICANT CHANGE UP (ref 13–17)
IMM GRANULOCYTES NFR BLD AUTO: 0 % — SIGNIFICANT CHANGE UP (ref 0–0.9)
KETONES UR-MCNC: NEGATIVE — SIGNIFICANT CHANGE UP
LEUKOCYTE ESTERASE UR-ACNC: NEGATIVE — SIGNIFICANT CHANGE UP
LYMPHOCYTES # BLD AUTO: 1.98 K/UL — SIGNIFICANT CHANGE UP (ref 1–3.3)
LYMPHOCYTES # BLD AUTO: 41.3 % — SIGNIFICANT CHANGE UP (ref 13–44)
MCHC RBC-ENTMCNC: 30 PG — SIGNIFICANT CHANGE UP (ref 27–34)
MCHC RBC-ENTMCNC: 33.6 GM/DL — SIGNIFICANT CHANGE UP (ref 32–36)
MCV RBC AUTO: 89.4 FL — SIGNIFICANT CHANGE UP (ref 80–100)
METHADONE UR-MCNC: NEGATIVE — SIGNIFICANT CHANGE UP
MONOCYTES # BLD AUTO: 0.67 K/UL — SIGNIFICANT CHANGE UP (ref 0–0.9)
MONOCYTES NFR BLD AUTO: 14 % — SIGNIFICANT CHANGE UP (ref 2–14)
NEUTROPHILS # BLD AUTO: 1.99 K/UL — SIGNIFICANT CHANGE UP (ref 1.8–7.4)
NEUTROPHILS NFR BLD AUTO: 41.4 % — LOW (ref 43–77)
NITRITE UR-MCNC: NEGATIVE — SIGNIFICANT CHANGE UP
OPIATES UR-MCNC: NEGATIVE — SIGNIFICANT CHANGE UP
PCP SPEC-MCNC: SIGNIFICANT CHANGE UP
PCP UR-MCNC: NEGATIVE — SIGNIFICANT CHANGE UP
PH UR: 6.5 — SIGNIFICANT CHANGE UP (ref 5–8)
PLATELET # BLD AUTO: 332 K/UL — SIGNIFICANT CHANGE UP (ref 150–400)
POTASSIUM SERPL-MCNC: 4 MMOL/L — SIGNIFICANT CHANGE UP (ref 3.5–5.3)
POTASSIUM SERPL-SCNC: 4 MMOL/L — SIGNIFICANT CHANGE UP (ref 3.5–5.3)
PROT SERPL-MCNC: 6.5 G/DL — LOW (ref 6.6–8.7)
PROT UR-MCNC: NEGATIVE — SIGNIFICANT CHANGE UP
RBC # BLD: 5 M/UL — SIGNIFICANT CHANGE UP (ref 4.2–5.8)
RBC # FLD: 12.3 % — SIGNIFICANT CHANGE UP (ref 10.3–14.5)
SALICYLATES SERPL-MCNC: <0.6 MG/DL — LOW (ref 10–20)
SARS-COV-2 RNA SPEC QL NAA+PROBE: SIGNIFICANT CHANGE UP
SODIUM SERPL-SCNC: 139 MMOL/L — SIGNIFICANT CHANGE UP (ref 135–145)
SP GR SPEC: 1.01 — SIGNIFICANT CHANGE UP (ref 1.01–1.02)
THC UR QL: NEGATIVE — SIGNIFICANT CHANGE UP
UROBILINOGEN FLD QL: NEGATIVE MG/DL — SIGNIFICANT CHANGE UP
WBC # BLD: 4.8 K/UL — SIGNIFICANT CHANGE UP (ref 3.8–10.5)
WBC # FLD AUTO: 4.8 K/UL — SIGNIFICANT CHANGE UP (ref 3.8–10.5)

## 2022-09-19 PROCEDURE — 85025 COMPLETE CBC W/AUTO DIFF WBC: CPT

## 2022-09-19 PROCEDURE — 99218: CPT

## 2022-09-19 PROCEDURE — 90792 PSYCH DIAG EVAL W/MED SRVCS: CPT

## 2022-09-19 PROCEDURE — U0005: CPT

## 2022-09-19 PROCEDURE — 93005 ELECTROCARDIOGRAM TRACING: CPT

## 2022-09-19 PROCEDURE — U0003: CPT

## 2022-09-19 PROCEDURE — G0378: CPT

## 2022-09-19 PROCEDURE — 36415 COLL VENOUS BLD VENIPUNCTURE: CPT

## 2022-09-19 PROCEDURE — 93010 ELECTROCARDIOGRAM REPORT: CPT

## 2022-09-19 PROCEDURE — 99284 EMERGENCY DEPT VISIT MOD MDM: CPT

## 2022-09-19 PROCEDURE — 81003 URINALYSIS AUTO W/O SCOPE: CPT

## 2022-09-19 PROCEDURE — 80053 COMPREHEN METABOLIC PANEL: CPT

## 2022-09-19 PROCEDURE — 80307 DRUG TEST PRSMV CHEM ANLYZR: CPT

## 2022-09-19 NOTE — ED BEHAVIORAL HEALTH ASSESSMENT NOTE - HPI (INCLUDE ILLNESS QUALITY, SEVERITY, DURATION, TIMING, CONTEXT, MODIFYING FACTORS, ASSOCIATED SIGNS AND SYMPTOMS)
Patient is a 52 year old male who is unemployed,  reportedly living with sister, with a past psych history of Schizoaffective disorder, [place] [facility], followed by ACT team also with a history of cocaine use disorder, polysubstance abuse, and multiple ER visits with substance induced psychosis, poor compliance with tx. brought self to ER and disclosed hearing voices and having suicidal thoughts.      Patient is a high utilizer of ER with multiple similar presentations.  He  p/w auditory hallucinations and suicidal ideations. He state he was hearing voices and had thoughts of laying in the tracks.  He stated that was not taking medications and used cocaine.  UDS was positive for cocaine.  Patient was irritable on interview and was limited in interaction. He stated that he did not want to talk and wanted to sleep. He was requesting food and eating earlier.   He did state that he would probably feel better after sleeping. In other occasions he often sleeps, recants symptoms and wants to leave the next day.  Unable to contact ACT team at this time

## 2022-09-19 NOTE — ED BEHAVIORAL HEALTH ASSESSMENT NOTE - OTHER PAST PSYCHIATRIC HISTORY (INCLUDE DETAILS REGARDING ONSET, COURSE OF ILLNESS, INPATIENT/OUTPATIENT TREATMENT)
multiple psych admissions and ER visits for reports of SI last at [facility] 3/8/22  Recent [place] [facility] admission 5/22-8/22;  ACT team Federations   On AOT

## 2022-09-19 NOTE — ED ADULT NURSE NOTE - CHIEF COMPLAINT QUOTE
Ambulatory to ED c/o psychiatric evaluation. Patient states he has "been off my psych meds for a week now". Patient reports auditory hallucinations, however denies command auditory/visual hallucinations. Patient states "I was told if I come back I can go to [place] [facility]. I wanna go to [place] [facility]". Patient well known to ED w/ similar complaints. Patient denies ETOH/illicit drug use this AM. patient changed into yellow gown w/ no belongings at bedside.

## 2022-09-19 NOTE — ED ADULT NURSE NOTE - HPI (INCLUDE ILLNESS QUALITY, SEVERITY, DURATION, TIMING, CONTEXT, MODIFYING FACTORS, ASSOCIATED SIGNS AND SYMPTOMS)
Pt rec'd to  unit wand by security for contraband.  Pt states having command AH telling him to harm self, but not with a plan.  Pt states that this started 1 week ago.  Pt states that he was [facility] 1 week ago.  patient states that he is currently homeless and that he has stopped taking his meds about the same time of discharge. Pt states he has not been complaint with meds and does not attend out patient services. Pt informed of need for a urine sample.  Pt escorted  to room and food and po fluids  given.  Will monitor and chart changes and maintain safe environment

## 2022-09-19 NOTE — ED ADULT NURSE NOTE - OBJECTIVE STATEMENT
patient ambulates to  area in yellow gown and belongings removed.  Pt with poor eye contact and looking down to floor.

## 2022-09-19 NOTE — ED BEHAVIORAL HEALTH ASSESSMENT NOTE - DESCRIPTION
See HPI diabetes Patient was minimally cooperative, irritable and attempting to sleep. He did request dinner and did not appear to be responding to internal stimuli. He did not appear to be in physical distress.     Vital Signs Last 24 Hrs  T(C): 36.7 (19 Sep 2022 23:54), Max: 36.9 (19 Sep 2022 15:20)  T(F): 98 (19 Sep 2022 23:54), Max: 98.5 (19 Sep 2022 15:20)  HR: 75 (19 Sep 2022 23:54) (72 - 92)  BP: 117/79 (19 Sep 2022 23:54) (101/65 - 135/84)  BP(mean): --  RR: 18 (19 Sep 2022 23:54) (16 - 19)  SpO2: 96% (19 Sep 2022 23:54) (96% - 98%)    Parameters below as of 19 Sep 2022 15:20  Patient On (Oxygen Delivery Method): room air

## 2022-09-19 NOTE — ED BEHAVIORAL HEALTH NOTE - BEHAVIORAL HEALTH NOTE
Interviewer attempted to talk with patient, pt was extremely agitated and refused to speak.  Pt was swearing at interviewer and asked them to leave multiple times. Will attempt to reassess when patient is calmer.

## 2022-09-19 NOTE — ED PROVIDER NOTE - PHYSICAL EXAMINATION
Gen: Well appearing in NAD  Head: NC/AT  Neck: trachea midline  Resp:  No distress  Ext: no deformities  Neuro:  A&O appears non focal  Skin:  Warm and dry as visualized  Psych:  Flat affect

## 2022-09-19 NOTE — ED PROVIDER NOTE - OBJECTIVE STATEMENT
52M h/o schizoaffective, DM, HTN, cocaine abuse p/w auditory hallucinations and suicidal ideations. States [place] the train tracks. States he has been off his meds for a week and would like inpatient psychiatric admission. Denies alcohol use, drug use, trauma.

## 2022-09-19 NOTE — ED ADULT TRIAGE NOTE - CHIEF COMPLAINT QUOTE
Ambulatory to ED c/o psychiatric evaluation. Patient states he has "been off my psych meds for a week now". Patient reports auditory hallucinations, however denies command auditory/visual hallucinations. Patient states "I was told if I come back I can go to [facility] [facility]. I wanna go to [place] [facility]". Patient well known to ED w/ similar complaints. Patient denies ETOH/illicit drug use this AM. patient changed into yellow gown w/ no belongings at bedside.

## 2022-09-20 VITALS
OXYGEN SATURATION: 98 % | HEART RATE: 83 BPM | DIASTOLIC BLOOD PRESSURE: 88 MMHG | SYSTOLIC BLOOD PRESSURE: 138 MMHG | RESPIRATION RATE: 18 BRPM | TEMPERATURE: 98 F

## 2022-09-20 PROCEDURE — 99217: CPT

## 2022-09-20 PROCEDURE — 99282 EMERGENCY DEPT VISIT SF MDM: CPT

## 2022-09-20 NOTE — ED ADULT NURSE REASSESSMENT NOTE - NS ED NURSE REASSESS COMMENT FT1
Assumed care of patient.  Pt lying in bed lights off and not responding to loud verbal stimuli.  NAD noted.  Will monitor and chart changes and maintain safe environment
Patient ate 100% of his meals.  Patient reports he feels tired and depressed.  Patient isolative to his bed sleeping intermittently.  Patient not attempting to harm self or others and safety maintained.
Reviewed discharge plans and verbalized understanding of plans and provided a copy of discharge instructions.  Patient agrees to return to local ED or call 911 if symptoms worsen or thoughts to harm self or others develop.
Assumed care of patient at 0720.  Patient resting in bed appears to be sleeping with no distress and regular nonlabored breathing noted.  Safety of patient maintained.
Assumed care of patient at 0730.  Patient resting in bed with no distress.  Patient oriented to staff and educated about plan of care including reassessment.  Patient denies suicidal or homicidal ideations, reports mood is depressed,  No attempts to harm self or others and safety maintained.
Patient ate 100% of his dinner.  Patient reports he does not feel he needs to be psychiatrically hospitalized but would like to rest here for a few days.  Patient educated about pending consult.  No attempts to harm self or others and safety maintained.

## 2022-09-20 NOTE — ED ADULT NURSE REASSESSMENT NOTE - COMFORT CARE
plan of care explained
darkened lights
meal provided/plan of care explained
ambulated to bathroom/meal provided

## 2022-09-20 NOTE — ED ADULT NURSE REASSESSMENT NOTE - STATUS
awaiting consult
awaiting discharge, no change
awaiting transfer, no change
awaiting consult
reassessment
awaiting consult

## 2022-09-20 NOTE — ED BEHAVIORAL HEALTH NOTE - BEHAVIORAL HEALTH NOTE
PROGRESS NOTE: 22 @ 10:08  	  • Reason for Ongoing Consultation: Reassessment of     ID: [id number] Male with HEALTH ISSUES - PROBLEM Dx:           INTERVAL DATA:   • Interval Chief Complaint: "I'm doing good"  • Interval History: Pt reports he is feeling much better. He admits to recent cocaine use before hospital and would like to leave. He states he is no longer experiencing auditory hallucinations.  Patient denies any auditory/visual hallucinations, no delusions were elicited. Denies manic or psychotic sx's and has been in good behavioral control. Pt denies any depressive symptoms, no suicidal ideations, intent, or plan. Pt denies homicidal ideations. Patient agreed when advised to avoid the cocaine and stated "I know".  He feels ready to go and is requesting bus tickets.        REVIEW OF SYSTEMS:   • Constitutional Symptoms	No complaints  • Eyes	No complaints  • Ears / Nose / Throat / Mouth	No complaints  • Cardiovascular	No complaints  • Respiratory	No complaints  • Gastrointestinal	No complaints  • Genitourinary	No complaints  • Musculoskeletal	No complaints  • Skin	No complaints  • Neurological	No complaints  • Psychiatric (see HPI)	See HPI  • Endocrine	No complaints  • Hematologic / Lymphatic	No complaints  • Allergic / Immunologic	No complaints    REVIEW OF VITALS/LABS/IMAGING/INVESTIGATIONS:   • Vital signs reviewed: Yes  • Vital Signs:	    T(C): 36.8 (22 @ 07:41), Max: 36.9 (22 @ 15:20)  HR: 71 (22 @ 07:41) (71 - 81)  BP: 113/71 (22 @ 07:41) (101/65 - 135/84)  RR: 18 (22 @ 23:54) (18 - 19)  SpO2: 96% (22 @ 23:54) (96% - 98%)    • Available labs reviewed: Yes  • Available Lab Results:                           15.0   4.80  )-----------( 332      ( 19 Sep 2022 04:01 )             44.7         139  |  104  |  11.2  ----------------------------<  83  4.0   |  24.0  |  0.68    Ca    8.5      19 Sep 2022 04:01    TPro  6.5<L>  /  Alb  3.7  /  TBili  <0.2<L>  /  DBili  x   /  AST  27  /  ALT  25  /  AlkPhos  49  09-19    LIVER FUNCTIONS - ( 19 Sep 2022 04:01 )  Alb: 3.7 g/dL / Pro: 6.5 g/dL / ALK PHOS: 49 U/L / ALT: 25 U/L / AST: 27 U/L / GGT: x             Urinalysis Basic - ( 19 Sep 2022 10:00 )    Color: Yellow / Appearance: Clear / S.010 / pH: x  Gluc: x / Ketone: Negative  / Bili: Negative / Urobili: Negative mg/dL   Blood: x / Protein: Negative / Nitrite: Negative   Leuk Esterase: Negative / RBC: x / WBC x   Sq Epi: x / Non Sq Epi: x / Bacteria: x          MEDICATIONS:      PRN Medications: none known.   • PRN Medications since last evaluation	  • PRN Details	    Current Medications:   diVALproex  milliGRAM(s) Oral two times a day  traZODone 200 milliGRAM(s) Oral at bedtime PRN     Medication Side Effects:  • Medication Side Effects or Adverse Reactions (new or ongoing)	None known    MENTAL STATUS EXAM:   • Level of Consciousness	Alert  • General Appearance	Well developed  • Body Habitus	Well nourished  • Hygiene	poor  • Grooming	poor  • Behavior	Cooperative  • Eye Contact	Good  • Relatedness	Good  • Impulse Control	Normal  • Muscle Tone / Strength	Normal muscle tone/strength  • Abnormal Movements	No abnormal movements  • Gait / Station	Normal gait / station  • Speech Volume	Normal  • Speech Rate	Normal  • Speech Spontaneity	Normal  • Speech Articulation	Normal  • Mood	Normal  • Affect Quality	Euthymic  • Affect Range	Full  • Affect Congruence	Congruent  • Thought Process	Linear  • Thought Associations	Normal  • Thought Content	Unremarkable  • Perceptions	No abnormalities  • Oriented to Time	Yes  • Oriented to Place	Yes  • Oriented to Situation	Yes  • Oriented to Person	Yes  • Attention / Concentration	Normal  • Estimated Intelligence	Average  • Recent Memory	Normal  • Remote Memory	Normal  • Fund of Knowledge	Normal  • Language	No abnormalities noted  • Judgment (regarding everyday events)	Fair  • Insight (regarding psychiatric illness)	Fair    SUICIDALITY:   • Suicidality (Interval)	none known    HOMICIDALITY/AGGRESSION:   • Homicidality/Aggression	none known    DIAGNOSIS DSM-V:    Psychiatric Diagnosis (Corresponds to DSM-IV Axis I, II):   HEALTH ISSUES - PROBLEM Dx:  Schizoaffective disorder      Medical Diagnosis (Corresponds to DSM-IV Axis III):  • Axis III	  DM    ASSESSMENT OF CURRENT CONDITION:   Summary (include case differential, formulation and patient response to therapy):   Patient is a 52 year old male who is unemployed, living with sister, with a past psych history of Schizoaffective disorder followed by ACT team also with a history of cocaine use disorder, polysubstance abuse, and multiple ER visits with substance induced psychosis. Pt states he is ready to leave and has no acute psychiatric concerns. Pt denies any auditory or visual hallucinations, denies delusions.  Pt agrees he needs to work on limiting cocaine use and states he feels much better and requesting bus tickets to leave.     Risk Assessment (consider static vs modifiable risk factors and protective factors; comment on level of risk for dangerous behavior):   risk factors: ongoing substance use; psychosocial stressors;  single, reports prior suicide attempt, reports non compliant with medication, presented with suicidal ideation that resolved, also initially reported hearing voices that resolved, he presented + cocaine in likely substance induced state, currently appears at baseline  Protective factors: no self-injurious behavior;  no legal issues; seeking help  · Risk Assessment	Low Acute Suicide Risk  · Elevated Chronic Suicide Risk	Yes    PLAN  Pt currently at baseline. Denies suicidal ideations, intent, or plan. Pt requesting to leave, no acute psychiatric concerns.

## 2022-09-20 NOTE — ED CDU PROVIDER DISPOSITION NOTE - PATIENT PORTAL LINK FT
You can access the FollowMyHealth Patient Portal offered by [facility] by registering at the following website: [email or web address]. By joining [employer]’s FollowMyHealth portal, you will also be able to view your health information using other applications (apps) compatible with our system.

## 2022-09-22 ENCOUNTER — EMERGENCY (EMERGENCY)
Facility: HOSPITAL | Age: 52
LOS: 1 days | Discharge: OTHER INSTITUTION | End: 2022-09-22
Attending: STUDENT IN AN ORGANIZED HEALTH CARE EDUCATION/TRAINING PROGRAM
Payer: MEDICAID

## 2022-09-22 VITALS
DIASTOLIC BLOOD PRESSURE: 73 MMHG | HEART RATE: 96 BPM | HEIGHT: 66 IN | OXYGEN SATURATION: 97 % | SYSTOLIC BLOOD PRESSURE: 104 MMHG | RESPIRATION RATE: 16 BRPM | WEIGHT: 149.91 LBS | TEMPERATURE: 98 F

## 2022-09-22 LAB
ALBUMIN SERPL ELPH-MCNC: 3.7 G/DL — SIGNIFICANT CHANGE UP (ref 3.3–5.2)
ALP SERPL-CCNC: 51 U/L — SIGNIFICANT CHANGE UP (ref 40–120)
ALT FLD-CCNC: 27 U/L — SIGNIFICANT CHANGE UP
ANION GAP SERPL CALC-SCNC: 9 MMOL/L — SIGNIFICANT CHANGE UP (ref 5–17)
APAP SERPL-MCNC: <3 UG/ML — LOW (ref 10–26)
AST SERPL-CCNC: 33 U/L — SIGNIFICANT CHANGE UP
BASOPHILS # BLD AUTO: 0.02 K/UL — SIGNIFICANT CHANGE UP (ref 0–0.2)
BASOPHILS NFR BLD AUTO: 0.6 % — SIGNIFICANT CHANGE UP (ref 0–2)
BILIRUB SERPL-MCNC: <0.2 MG/DL — LOW (ref 0.4–2)
BUN SERPL-MCNC: 14.1 MG/DL — SIGNIFICANT CHANGE UP (ref 8–20)
CALCIUM SERPL-MCNC: 8.5 MG/DL — SIGNIFICANT CHANGE UP (ref 8.4–10.5)
CHLORIDE SERPL-SCNC: 105 MMOL/L — SIGNIFICANT CHANGE UP (ref 98–107)
CO2 SERPL-SCNC: 24 MMOL/L — SIGNIFICANT CHANGE UP (ref 22–29)
CREAT SERPL-MCNC: 0.63 MG/DL — SIGNIFICANT CHANGE UP (ref 0.5–1.3)
EGFR: 114 ML/MIN/1.73M2 — SIGNIFICANT CHANGE UP
EOSINOPHIL # BLD AUTO: 0.08 K/UL — SIGNIFICANT CHANGE UP (ref 0–0.5)
EOSINOPHIL NFR BLD AUTO: 2.4 % — SIGNIFICANT CHANGE UP (ref 0–6)
ETHANOL SERPL-MCNC: <10 MG/DL — SIGNIFICANT CHANGE UP (ref 0–9)
FLUAV AG NPH QL: SIGNIFICANT CHANGE UP
FLUBV AG NPH QL: SIGNIFICANT CHANGE UP
GLUCOSE SERPL-MCNC: 249 MG/DL — HIGH (ref 70–99)
HCT VFR BLD CALC: 43.6 % — SIGNIFICANT CHANGE UP (ref 39–50)
HGB BLD-MCNC: 14.7 G/DL — SIGNIFICANT CHANGE UP (ref 13–17)
IMM GRANULOCYTES NFR BLD AUTO: 0.3 % — SIGNIFICANT CHANGE UP (ref 0–0.9)
LYMPHOCYTES # BLD AUTO: 1.33 K/UL — SIGNIFICANT CHANGE UP (ref 1–3.3)
LYMPHOCYTES # BLD AUTO: 39.9 % — SIGNIFICANT CHANGE UP (ref 13–44)
MCHC RBC-ENTMCNC: 30.4 PG — SIGNIFICANT CHANGE UP (ref 27–34)
MCHC RBC-ENTMCNC: 33.7 GM/DL — SIGNIFICANT CHANGE UP (ref 32–36)
MCV RBC AUTO: 90.1 FL — SIGNIFICANT CHANGE UP (ref 80–100)
MONOCYTES # BLD AUTO: 0.49 K/UL — SIGNIFICANT CHANGE UP (ref 0–0.9)
MONOCYTES NFR BLD AUTO: 14.7 % — HIGH (ref 2–14)
NEUTROPHILS # BLD AUTO: 1.4 K/UL — LOW (ref 1.8–7.4)
NEUTROPHILS NFR BLD AUTO: 42.1 % — LOW (ref 43–77)
PLATELET # BLD AUTO: 337 K/UL — SIGNIFICANT CHANGE UP (ref 150–400)
POTASSIUM SERPL-MCNC: 4 MMOL/L — SIGNIFICANT CHANGE UP (ref 3.5–5.3)
POTASSIUM SERPL-SCNC: 4 MMOL/L — SIGNIFICANT CHANGE UP (ref 3.5–5.3)
PROT SERPL-MCNC: 6.1 G/DL — LOW (ref 6.6–8.7)
RBC # BLD: 4.84 M/UL — SIGNIFICANT CHANGE UP (ref 4.2–5.8)
RBC # FLD: 12.3 % — SIGNIFICANT CHANGE UP (ref 10.3–14.5)
RSV RNA NPH QL NAA+NON-PROBE: SIGNIFICANT CHANGE UP
SALICYLATES SERPL-MCNC: <0.6 MG/DL — LOW (ref 10–20)
SARS-COV-2 RNA SPEC QL NAA+PROBE: SIGNIFICANT CHANGE UP
SODIUM SERPL-SCNC: 137 MMOL/L — SIGNIFICANT CHANGE UP (ref 135–145)
WBC # BLD: 3.33 K/UL — LOW (ref 3.8–10.5)
WBC # FLD AUTO: 3.33 K/UL — LOW (ref 3.8–10.5)

## 2022-09-22 PROCEDURE — 90792 PSYCH DIAG EVAL W/MED SRVCS: CPT

## 2022-09-22 PROCEDURE — 99220: CPT

## 2022-09-22 PROCEDURE — 93010 ELECTROCARDIOGRAM REPORT: CPT

## 2022-09-22 RX ORDER — METFORMIN HYDROCHLORIDE 850 MG/1
1000 TABLET ORAL
Refills: 0 | Status: DISCONTINUED | OUTPATIENT
Start: 2022-09-22 | End: 2022-09-26

## 2022-09-22 RX ORDER — DIVALPROEX SODIUM 500 MG/1
500 TABLET, DELAYED RELEASE ORAL
Refills: 0 | Status: DISCONTINUED | OUTPATIENT
Start: 2022-09-22 | End: 2022-09-26

## 2022-09-22 RX ORDER — TRAZODONE HCL 50 MG
150 TABLET ORAL AT BEDTIME
Refills: 0 | Status: DISCONTINUED | OUTPATIENT
Start: 2022-09-22 | End: 2022-09-26

## 2022-09-22 RX ORDER — DIPHENHYDRAMINE HCL 50 MG
50 CAPSULE ORAL EVERY 6 HOURS
Refills: 0 | Status: DISCONTINUED | OUTPATIENT
Start: 2022-09-22 | End: 2022-09-26

## 2022-09-22 RX ADMIN — Medication 150 MILLIGRAM(S): at 22:25

## 2022-09-22 RX ADMIN — DIVALPROEX SODIUM 500 MILLIGRAM(S): 500 TABLET, DELAYED RELEASE ORAL at 12:04

## 2022-09-22 RX ADMIN — METFORMIN HYDROCHLORIDE 1000 MILLIGRAM(S): 850 TABLET ORAL at 12:04

## 2022-09-22 RX ADMIN — DIVALPROEX SODIUM 500 MILLIGRAM(S): 500 TABLET, DELAYED RELEASE ORAL at 18:59

## 2022-09-22 RX ADMIN — METFORMIN HYDROCHLORIDE 1000 MILLIGRAM(S): 850 TABLET ORAL at 18:59

## 2022-09-22 NOTE — ED ADULT NURSE NOTE - NSIMPLEMENTINTERV_GEN_ALL_ED
Implemented All Universal Safety Interventions:  [place] to call system. Call bell, personal items and telephone within reach. Instruct patient to call for assistance. Room bathroom lighting operational. Non-slip footwear when patient is off stretcher. Physically safe environment: no spills, clutter or unnecessary equipment. Stretcher in lowest position, wheels locked, appropriate side rails in place.

## 2022-09-22 NOTE — ED CDU PROVIDER INITIAL DAY NOTE - OBJECTIVE STATEMENT
52yoM; with PMH signif for DM, Schizophrenia; now p/w AH which are telling him to kill himself and to kill others.  denies f/c/s. denies cp/sob/palp. states he has been off his meds because he is homeless. states he was admitted to [facility] 2 weeks ago, but that he was discharged to his sister's house which is being foreclosed and so he was kicked out and unable to obtain his meds.  PMH: DM, Schizophrenia  SOCIAL:  denies smoking / +Ecstasy +Cocaine / social EtOH  MEDS: Depakote, Invega, Trazadone, Metformin, Insulin

## 2022-09-22 NOTE — CHART NOTE - NSCHARTNOTEFT_GEN_A_CORE
SW Note: Notified by  provider that the plan is to transfer pt for inpt psychiatric care. Covid swab and UA pending. SW will explore transfer options when medically cleared.  RN aware

## 2022-09-22 NOTE — ED BEHAVIORAL HEALTH ASSESSMENT NOTE - HPI (INCLUDE ILLNESS QUALITY, SEVERITY, DURATION, TIMING, CONTEXT, MODIFYING FACTORS, ASSOCIATED SIGNS AND SYMPTOMS)
Patient is a 52 year old male who is unemployed, reportedly sometimes living with sister, with a past psych history of Schizoaffective disorder, [facility], followed by ACT team also with a history of cocaine use disorder, polysubstance abuse, and multiple ER visits (4 within past month), with substance induced psychosis, poor compliance with tx. brought self to ER and disclosed hearing command auditory hallucinations and having suicidal thoughts.      Patient is a high utilizer of ER with multiple similar presentations. Pt states he has been depressed. He p/w auditory hallucinations and suicidal ideations. He state he was hearing voices and they have been telling him to hurt himself and others.  He stated that was not taking medications and uses cocaine. Patient was irritable and hostile on interview and was limited in interaction. Patient was not forthcoming with detail on suicidality and homicidality.  He stated that he did not want to talk and wanted to sleep. In other occasions he often sleeps, recants symptoms and wants to leave the next day. Pt denies symptoms of aron and anxiety.

## 2022-09-22 NOTE — ED ADULT TRIAGE NOTE - CHIEF COMPLAINT QUOTE
Pt has not been on his medication for 1 week. Is supposed to be taking Invega, Depakote and Trazadone. Pt reports hearing voices that are telling him to kill himself. Denies any plan. denies any drugs or alcohol today.

## 2022-09-22 NOTE — ED BEHAVIORAL HEALTH ASSESSMENT NOTE - DESCRIPTION
See HPI Patient was minimally cooperative, irritable and attempting to sleep.     Vital Signs Last 24 Hrs  T(C): 36.6 (22 Sep 2022 09:02), Max: 36.8 (22 Sep 2022 07:45)  T(F): 97.9 (22 Sep 2022 09:02), Max: 98.2 (22 Sep 2022 07:45)  HR: 82 (22 Sep 2022 09:02) (82 - 96)  BP: 138/96 (22 Sep 2022 09:02) (104/73 - 138/96)  BP(mean): --  ABP: --  ABP(mean): --  RR: 18 (22 Sep 2022 09:02) (16 - 18)  SpO2: 97% (22 Sep 2022 09:02) (97% - 97%)    O2 Parameters below as of 22 Sep 2022 09:02  Patient On (Oxygen Delivery Method): room air diabetes

## 2022-09-22 NOTE — ED BEHAVIORAL HEALTH ASSESSMENT NOTE - PSYCHIATRIC ISSUES AND PLAN (INCLUDE STANDING AND PRN MEDICATION)
schizoaffective disorder: invega sustenna, trazodone, depakote, schizoaffective disorder: Invega Sustenna, trazodone, Depakote,

## 2022-09-22 NOTE — ED BEHAVIORAL HEALTH ASSESSMENT NOTE - OTHER PAST PSYCHIATRIC HISTORY (INCLUDE DETAILS REGARDING ONSET, COURSE OF ILLNESS, INPATIENT/OUTPATIENT TREATMENT)
multiple psych admissions and ER visits for reports of SI last at [facility] 3/8/22  Recent [facility] admission 5/22-8/22;  ACT team Federations   On AOT

## 2022-09-22 NOTE — ED BEHAVIORAL HEALTH ASSESSMENT NOTE - PAST PSYCHOTROPIC MEDICATION
previously on Risperdal Consta 50  mg. unclear if he is still taking, pt is poor historian   Patient reports he was last prescribed Metformin 1000 mg bid, unclear if pt is compliant.

## 2022-09-22 NOTE — ED ADULT NURSE NOTE - HPI (INCLUDE ILLNESS QUALITY, SEVERITY, DURATION, TIMING, CONTEXT, MODIFYING FACTORS, ASSOCIATED SIGNS AND SYMPTOMS)
Patient comes to  after being medically cleared in main ED. He is well known to  staff due to frequent ED/ visits presenting with similar complaints, most recently about three days ago. Patient with multiple inpatient psychiatric admissions, though has also been discharged after he reported feeling better on previous  assessments. However, patient today endorses need for inpatient psychiatric treatment for the safety of himself/others. States he doesn't want to act on what voices are saying. Patient has history of testing positive for cocaine urine tox screens, has not yet provided urine for drug screen. Patient oriented to unit after security check for contraband. Ambulated with steady gait, requested food and apple juice before placement in room -6. Able to contract for safety while in hospital.

## 2022-09-22 NOTE — ED PROVIDER NOTE - NS ED ROS FT
Constitutional: (-) fever  (-)chills  (-)sweats  Eyes/ENT: (-)   Cardiovascular: (-) chest pain, (-) palpitations (-) edema   Respiratory: (-) cough, (-) shortness of breath   Gastrointestinal: (-)nausea  (-)vomiting, (-) diarrhea  (-) abdominal pain   :  (-)dysuria, (-)frequency, (-)urgency, (-)hematuria  Musculoskeletal: (-) neck pain, (-) back pain, (-) joint pain  Integumentary: (-) rash, (-) edema  Neurological: (-) headache, (-) altered mental status  (-)LOC  Psych:L +AH -VH +SI +HI

## 2022-09-22 NOTE — ED BEHAVIORAL HEALTH ASSESSMENT NOTE - NSBHATTESTCOMMENTATTENDFT_PSY_A_CORE
recurrent presentations now with command hallucinations to harm self and others  SAFE ACT report to be submitted

## 2022-09-22 NOTE — ED BEHAVIORAL HEALTH ASSESSMENT NOTE - RISK ASSESSMENT
Moderate Acute Suicide Risk Chronic risk factors: ongoing substance use; psychosocial stressors;  single, reports prior suicide attempt  Risk factors: reports non compliant with medication, suicidal ideation, CAH  Protective factors: no self-injurious behavior;  no legal issues; seeking help

## 2022-09-22 NOTE — ED BEHAVIORAL HEALTH ASSESSMENT NOTE - SUMMARY
Patient is a 52 year old male who is unemployed, reportedly sometimes living with sister, with a past psych history of Schizoaffective disorder, [facility], followed by ACT team also with a history of cocaine use disorder, polysubstance abuse, and multiple ER visits (4 within past month), with substance induced psychosis, poor compliance with tx. brought self to ER and disclosed hearing command auditory hallucinations and having suicidal thoughts.     Patient is a high utilizer of ER with multiple similar presentations.  He p/w auditory hallucinations and suicidal ideation and homicidal ideation. Pt states he is "hearing voices to hurt himself and others." Pt extremely irritable and hostile, not forthcoming with details. Pt is a poor historian.  Pt reports depressed mood. Pt informed he will most likely be admitted to inpatient psychiatric hospital.

## 2022-09-22 NOTE — ED PROVIDER NOTE - OBJECTIVE STATEMENT
52yoM; with PMH signif for DM, Schizophrenia; now p/w AH which are telling him to kill himself and to kill others.  denies f/c/s. denies cp/sob/palp. states he has been off his meds because he is homeless. states he was admitted to [facility] 2 weeks ago, but that he was discharged to his sister's house which is being foreclosed and so he was kicked out and unable to obtain his meds.  PMH: DM, Schizophrenia  SOCIAL:  denies smoking / +Ecstasy +Cocaine / social EtOH  MEDS: Depakote, Invega, Trazadone, Metformin, Insulin

## 2022-09-23 PROCEDURE — 99226: CPT

## 2022-09-23 PROCEDURE — 99213 OFFICE O/P EST LOW 20 MIN: CPT

## 2022-09-23 RX ADMIN — DIVALPROEX SODIUM 500 MILLIGRAM(S): 500 TABLET, DELAYED RELEASE ORAL at 17:33

## 2022-09-23 RX ADMIN — Medication 150 MILLIGRAM(S): at 21:48

## 2022-09-23 RX ADMIN — DIVALPROEX SODIUM 500 MILLIGRAM(S): 500 TABLET, DELAYED RELEASE ORAL at 07:00

## 2022-09-23 RX ADMIN — METFORMIN HYDROCHLORIDE 1000 MILLIGRAM(S): 850 TABLET ORAL at 07:00

## 2022-09-23 RX ADMIN — METFORMIN HYDROCHLORIDE 1000 MILLIGRAM(S): 850 TABLET ORAL at 17:33

## 2022-09-23 NOTE — CHART NOTE - NSCHARTNOTEFT_GEN_A_CORE
SW Note: Discussed pts care and plan in morning  rounds. Plan is to transfer pt for inpt psychiatric care. Followed up on referral made yesterday to [facility] . Spoke with Sarah and they are not able to accommodate pt at this time stating pt has exhausted tx options at their facility. Exploring options but currently the following report no beds today: [facility] [phone], [facility] [phone] and [facility] [phone].  is closed and  [facility] can not accommodate transfers at this time. Will continue to follow

## 2022-09-23 NOTE — ED BEHAVIORAL HEALTH NOTE - BEHAVIORAL HEALTH NOTE
PROGRESS NOTE: 09-23-22 @ 07:58  	  • Reason for Ongoing Consultation: Command Auditory hallucinations	    ID: [id number] Male with HEALTH ISSUES - PROBLEM Dx:  Cocaine abuse      INTERVAL DATA:   • Interval Chief Complaint: "I'm not feeling good"  • Interval History:   Pt reports he is still having auditory hallucinations to hurt himself and others. Pt did not provide further details on suicidality or homicidality, pt is a poor historian. Pt is hostile and not cooperative upon interview. Pt is very limited historian, replies in one word answers if replying at all to interviewer. Pt denies any depressive or manic symptoms. Pt denies any symptoms of anxiety. No delusions were elicited upon interview. Pt is aware of current plan to head to inpatient psychiatric facility. Pt has not moved from bed much and very irritable.     REVIEW OF SYSTEMS:   • Constitutional Symptoms	No complaints  • Eyes	No complaints  • Ears / Nose / Throat / Mouth	No complaints  • Cardiovascular	No complaints  • Respiratory	No complaints  • Gastrointestinal	No complaints  • Genitourinary	No complaints  • Musculoskeletal	No complaints  • Skin	No complaints  • Neurological	No complaints  • Psychiatric (see HPI)	See HPI  • Endocrine	No complaints  • Hematologic / Lymphatic	No complaints  • Allergic / Immunologic	No complaints    REVIEW OF VITALS/LABS/IMAGING/INVESTIGATIONS:   • Vital signs reviewed: Yes  • Vital Signs:	    T(C): 36.9 (09-23-22 @ 07:27), Max: 36.9 (09-22-22 @ 19:30)  HR: 94 (09-23-22 @ 07:27) (82 - 94)  BP: 124/76 (09-23-22 @ 07:27) (124/76 - 138/96)  RR: 18 (09-23-22 @ 07:27) (18 - 18)  SpO2: 98% (09-23-22 @ 07:27) (97% - 98%)    • Available labs reviewed: Yes  • Available Lab Results:                           14.7   3.33  )-----------( 337      ( 22 Sep 2022 08:40 )             43.6     09-22    137  |  105  |  14.1  ----------------------------<  249<H>  4.0   |  24.0  |  0.63    Ca    8.5      22 Sep 2022 08:40    TPro  6.1<L>  /  Alb  3.7  /  TBili  <0.2<L>  /  DBili  x   /  AST  33  /  ALT  27  /  AlkPhos  51  09-22    LIVER FUNCTIONS - ( 22 Sep 2022 08:40 )  Alb: 3.7 g/dL / Pro: 6.1 g/dL / ALK PHOS: 51 U/L / ALT: 27 U/L / AST: 33 U/L / GGT: x                   MEDICATIONS:      PRN Medications: none given.   • PRN Medications since last evaluation	  • PRN Details	    Current Medications:   diphenhydrAMINE Injectable 50 milliGRAM(s) IntraMuscular every 6 hours PRN  diVALproex  milliGRAM(s) Oral two times a day  LORazepam   Injectable 2 milliGRAM(s) IntraMuscular every 6 hours PRN  metFORMIN 1000 milliGRAM(s) Oral two times a day  traZODone 150 milliGRAM(s) Oral at bedtime     Medication Side Effects:  • Medication Side Effects or Adverse Reactions (new or ongoing)	None known    MENTAL STATUS EXAM:   • Level of Consciousness	lethargic   • General Appearance	Well developed  • Body Habitus	Well nourished  • Hygiene	poor  • Grooming	poor  • Behavior	uncooperative  • Eye Contact	poor  • Relatedness	Good  • Impulse Control	impaired  • Muscle Tone / Strength	Normal muscle tone/strength  • Abnormal Movements	No abnormal movements  • Gait / Station	Normal gait / station  • Speech Volume	Normal  • Speech Rate	Normal  • Speech Spontaneity	Normal  • Speech Articulation	Normal  • Mood	normal  • Affect Quality	depressed  • Affect Range	constricted   • Affect Congruence	Congruent  • Thought Process	Linear  • Thought Associations	Normal  • Thought Content	auditory hallucinations with SI/HI  • Perceptions	AH with SI/HI  • Oriented to Time	Yes  • Oriented to Place	Yes  • Oriented to Situation	Yes  • Oriented to Person	Yes  • Attention / Concentration	Normal  • Estimated Intelligence	Average  • Recent Memory	Normal  • Remote Memory	Normal  • Fund of Knowledge	Normal  • Language	No abnormalities noted  • Judgment (regarding everyday events)	impaired  • Insight (regarding psychiatric illness)	impaired     SUICIDALITY:   • Suicidality (Interval)	AH to hurt himself, pt unable to provide details.     HOMICIDALITY/AGGRESSION:   • Homicidality/Aggression AH to hurt others, pt unable to provide details.     DIAGNOSIS DSM-V:    Psychiatric Diagnosis (Corresponds to DSM-IV Axis I, II):   HEALTH ISSUES - PROBLEM Dx:  Schizoaffective disorder          Medical Diagnosis (Corresponds to DSM-IV Axis III):  • Axis III	  DM    ASSESSMENT OF CURRENT CONDITION:   Summary (include case differential, formulation and patient response to therapy):   Patient is a 52 year old male who is unemployed, living with sister sometimes but mostly homeless, with a past psych history of Schizoaffective disorder followed by ACT team also with a history of cocaine use disorder, polysubstance abuse, and multiple ER visits with substance induced psychosis. Pt is hearing voices to hurt himself and others. Pt very irritable and hostile, remains in bed for majority of visit. Pt to be discharged to inpatient psychiatric facility, awaiting bed. Pt aware of plan.     Risk Assessment (consider static vs modifiable risk factors and protective factors; comment on level of risk for dangerous behavior):   Risk factors: lack of residential stability, lack of employment, substance abuse, schizoaffective disorder, poor insight, lack of compliance with treatment   Protective factors: seeking treatment  high risk dangerous behavior.     PLAN  Pt to continue medications. Pt to be transferred to inpatient psychiatric unit, awaiting bed. Pt aware of plan.

## 2022-09-23 NOTE — CHART NOTE - NSCHARTNOTEFT_GEN_A_CORE
SWNote: pt needs transfer ,no male bed available at [facility] per AD N (Adjen) . SW to follow in the am.

## 2022-09-24 LAB
GLUCOSE BLDC GLUCOMTR-MCNC: 170 MG/DL — HIGH (ref 70–99)
GLUCOSE BLDC GLUCOMTR-MCNC: 219 MG/DL — HIGH (ref 70–99)

## 2022-09-24 PROCEDURE — 99226: CPT

## 2022-09-24 PROCEDURE — 99213 OFFICE O/P EST LOW 20 MIN: CPT

## 2022-09-24 RX ORDER — RISPERIDONE 4 MG/1
2 TABLET ORAL
Refills: 0 | Status: DISCONTINUED | OUTPATIENT
Start: 2022-09-24 | End: 2022-09-26

## 2022-09-24 RX ADMIN — METFORMIN HYDROCHLORIDE 1000 MILLIGRAM(S): 850 TABLET ORAL at 18:49

## 2022-09-24 RX ADMIN — METFORMIN HYDROCHLORIDE 1000 MILLIGRAM(S): 850 TABLET ORAL at 09:05

## 2022-09-24 RX ADMIN — DIVALPROEX SODIUM 500 MILLIGRAM(S): 500 TABLET, DELAYED RELEASE ORAL at 09:05

## 2022-09-24 RX ADMIN — Medication 150 MILLIGRAM(S): at 21:43

## 2022-09-24 RX ADMIN — RISPERIDONE 2 MILLIGRAM(S): 4 TABLET ORAL at 18:50

## 2022-09-24 RX ADMIN — DIVALPROEX SODIUM 500 MILLIGRAM(S): 500 TABLET, DELAYED RELEASE ORAL at 18:49

## 2022-09-24 NOTE — ED CDU PROVIDER SUBSEQUENT DAY NOTE - NS ED ROS FT
Constitutional: (-) fever  (-)chills  (-)sweats  Eyes/ENT: (-)   Cardiovascular: (-) chest pain, (-) palpitations (-) edema   Respiratory: (-) cough, (-) shortness of breath   Gastrointestinal: (-)nausea  (-)vomiting, (-) diarrhea  (-) abdominal pain   :  (-)dysuria, (-)frequency, (-)urgency, (-)hematuria  Musculoskeletal: (-) neck pain, (-) back pain, (-) joint pain  Integumentary: (-) rash, (-) edema  Neurological: (-) headache, (-) altered mental status  (-)LOC  Psych:L +AH -VH +SI +HI
Constitutional: (-) fever  (-)chills  (-)sweats  Eyes/ENT: (-)   Cardiovascular: (-) chest pain, (-) palpitations (-) edema   Respiratory: (-) cough, (-) shortness of breath   Gastrointestinal: (-)nausea  (-)vomiting, (-) diarrhea  (-) abdominal pain   :  (-)dysuria, (-)frequency, (-)urgency, (-)hematuria  Musculoskeletal: (-) neck pain, (-) back pain, (-) joint pain  Integumentary: (-) rash, (-) edema  Neurological: (-) headache, (-) altered mental status  (-)LOC  Psych:L +AH -VH +SI +HI

## 2022-09-24 NOTE — ED CDU PROVIDER SUBSEQUENT DAY NOTE - PHYSICAL EXAMINATION
General:     NAD  Head:     NC/AT, EOMI, oral mucosa moist  Neck:     trachea midline  Lungs:     CTA b/l, no w/r/r  CVS:     S1S2, RRR, no m/g/r  Abd:     +BS, s/nt/nd, no organomegaly  Ext:    2+ radial and pedal pulses, no c/c/e  Neuro: AAOx3, no sensory/motor deficits
General:     NAD  Head:     NC/AT, EOMI, oral mucosa moist  Neck:     trachea midline  Lungs:     CTA b/l, no w/r/r  CVS:     S1S2, RRR, no m/g/r  Abd:     +BS, s/nt/nd, no organomegaly  Ext:    2+ radial and pedal pulses, no c/c/e  Neuro: AAOx3, no sensory/motor deficits

## 2022-09-24 NOTE — CHART NOTE - NSCHARTNOTEFT_GEN_A_CORE
SWNote: pt needs transfer 9.37 , worker called [facility] ,per AD N () no male bed available. SW to follow in the am.

## 2022-09-24 NOTE — ED ADULT NURSE REASSESSMENT NOTE - COMFORT CARE
meal provided/plan of care explained
darkened lights
meal provided/plan of care explained/po fluids offered
meal provided/plan of care explained/po fluids offered
plan of care explained

## 2022-09-24 NOTE — ED ADULT NURSE REASSESSMENT NOTE - DESCRIPTION
received report.  received pt in room resting.  awaiting transfer.  pt a\made aware of need for urine specimen.  requesting and received meal tray snacks and po fluid.
blunted affect and irritable mood.

## 2022-09-24 NOTE — ED CDU PROVIDER SUBSEQUENT DAY NOTE - MEDICAL DECISION MAKING DETAILS
pt pending  admission. no medical issues
pt pending  admission. no medical issues.
18-Apr-2019 16:16

## 2022-09-24 NOTE — ED ADULT NURSE REASSESSMENT NOTE - NS ED NURSE REASSESS COMMENT FT1
6am medication offered to pt pt refusing at this time statin he will take later with breakfast
Assumed care of patient at 0720.  Patient resting in bed appears to be sleeping with no distress noted and regular nonlabored breathing noted.  No attempts to harm self or others and safety maintained.
pt resting in his room in NAD. pt compliant with finger stick bs 170. pt consumed meal 100%. no attempts to harm self or others.
received report.  pt remains self isolating in room.  no c/o awaiting transfer
self isolating to his room. carb consistent meal consumed 100%. pt requesting another meal tray and received it. no attempts to harm self or others.
slept well
self isolating to his room. meal consumed 100%. offers no complaints at this time. no attempts to harm self or others. pt safety maintained.
Patient ate 100% of his meals.  Patient isolative to his room resting in bed.  Patient superficial during interaction but continues to endorse need for hospitalization for depression.  Patient educated about need for urine sample and provided collection cup.  No attempts to harm self or others, and safety maintained.
received report.  received pt in room sleeping, color good resp even unlabored.  no harm to self or others awaiting transfer
Patient reports he will provide urine sample when he has to void.  Patient remains isolative to his bed for most of afternoon.  No attempts to harm self or others.  Safety of patient maintained.  Continue treatment plan.

## 2022-09-24 NOTE — ED ADULT NURSE REASSESSMENT NOTE - NSFALLRSKPASTHIST_ED_ALL_ED
Otc Regimen: Head and shoulders shampoo\\n\\nDHS tar shampoo (wash scalp three times a week)
Action 4: Continue
Initiate Treatment: Clindamycin 1 % topical gel (Apply twice daily to face and back )
no
Continue Regimen: Clindamycin 1 % topical gel (Apply twice daily to face)\\n\\n\\nDesonide cream twice a day to redness on face until
no
Detail Level: Detailed
Discontinue Regimen: Clobetasol scalp solution 0.05% apply to scalp daily
Discontinue Regimen: Minocycline 100 mg take twice daily

## 2022-09-24 NOTE — ED BEHAVIORAL HEALTH NOTE - BEHAVIORAL HEALTH NOTE
PROGRESS NOTE: 22 @ 09:04  	  • Reason for Ongoing Consultation: 	no bed [facility]    ID: [id number] Male with HEALTH ISSUES - PROBLEM Dx:          INTERVAL DATA:   • Interval Chief Complaint: I want to go  • Interval History: Reviewed active AOT order received yesterday  order issued as patient non compliant with court order since release from [facility] in mid September Recurrent ED visits with complaints of auditory hallucinations =  voices which this time commanded him to hurt himself and others Status complicated by active crack - cocaine use  Call to pharmacy however Invega Sustenna not on formulary Order placed for risperidone consistent with court authorized treatment plan    REVIEW OF SYSTEMS:   • Constitutional Symptoms	No complaints  • Eyes	No complaints  • Ears / Nose / Throat / Mouth	No complaints  • Cardiovascular	No complaints  • Respiratory	No complaints  • Gastrointestinal	No complaints  • Genitourinary	No complaints  • Musculoskeletal	No complaints  • Skin	No complaints  • Neurological	No complaints  • Psychiatric (see HPI)	See HPI  • Endocrine	No complaints  • Hematologic / Lymphatic	No complaints  • Allergic / Immunologic	No complaints    REVIEW OF VITALS/LABS/IMAGING/INVESTIGATIONS:   • Vital signs reviewed: Yes  • Vital Signs:	    T(C): 36.8 (22 @ 07:15), Max: 36.9 (22 @ 15:58)  HR: 75 (22 @ 07:15) (72 - 91)  BP: 122/82 (22 @ 07:15) (108/70 - 149/88)  RR: 18 (22 @ 07:15) (18 - 19)  SpO2: 96% (22 @ 07:15) (95% - 98%)    • Available labs reviewed: Yes  • Available Lab Results:   Glucose, Serum: 249 mg/dL (22 @ 08:40)                Urinalysis Basic - ( 23 Sep 2022 17:45 )    Color: Yellow / Appearance: Clear / S.015 / pH: x  Gluc: x / Ketone: Small  / Bili: Negative / Urobili: Negative mg/dL   Blood: x / Protein: Negative / Nitrite: Negative   Leuk Esterase: Negative / RBC: x / WBC x   Sq Epi: x / Non Sq Epi: x / Bacteria: x          MEDICATIONS:      PRN Medications:  • PRN Medications since last evaluation	  • PRN Details	    Current Medications:   diphenhydramine Injectable 50 milliGRAM(s) IntraMuscular every 6 hours PRN  divalproex  milliGRAM(s) Oral two times a day  Lorazepam   Injectable 2 milliGRAM(s) IntraMuscular every 6 hours PRN  metformin 1000 milliGRAM(s) Oral two times a day  risperidone   Tablet 2 milliGRAM(s) Oral two times a day  trazodone 150 milliGRAM(s) Oral at bedtime     Medication Side Effects:  • Medication Side Effects or Adverse Reactions (new or ongoing)	None known    MENTAL STATUS EXAM:   • Level of Consciousness	Alert  • General Appearance	Well developed  • Body Habitus	Well nourished  • Hygiene	poor  • Grooming	Good  • Behavior	guarded  • Eye Contact	poor  • Relatedness	poor  • Impulse Control	Normal  • Muscle Tone / Strength	Normal muscle tone/strength  • Abnormal Movements	No abnormal movements  • Gait / Station	Normal gait / station  • Speech Volume	Normal  • Speech Rate	Normal  • Speech Spontaneity	Normal  • Speech Articulation	Normal  • Mood	angry  • Affect Quality	irritable  • Affect Range	constricted  • Affect Congruence	Congruent  • Thought Process	Linear  • Thought Associations	Normal  • Thought Content	suspicious  • Perceptions	not elicited today  • Oriented to Time	Yes  • Oriented to Place	Yes  • Oriented to Situation	Yes  • Oriented to Person	Yes  • Attention / Concentration	Normal  • Estimated Intelligence	Average  • Recent Memory	Normal  • Remote Memory	Normal  • Fund of Knowledge	Normal  • Language	No abnormalities noted  • Judgment (regarding everyday events)	poor  • Insight (regarding psychiatric illness)	nil    SUICIDALITY:   • Suicidality (Interval)	none known    HOMICIDALITY/AGGRESSION:   • Homicidality/Aggression	none known    DIAGNOSIS DSM-V:    Psychiatric Diagnosis (Corresponds to DSM-IV Axis I, II): schizoaffective disorder   HEALTH ISSUES - PROBLEM Dx:  cocaine use disorder         Medical Diagnosis (Corresponds to DSM-IV Axis III):  • Axis III	  diabetes mellitus type 2    ASSESSMENT OF CURRENT CONDITION:   Summary (include case differential, formulation and patient response to therapy): no interval change    Risk Assessment (consider static vs modifiable risk factors and protective factors; comment on level of risk for dangerous behavior): elevated risk of dangerous behaviors Active AOT non compliant    PLAN  9.37 application for involuntary admission  continue psych meds

## 2022-09-24 NOTE — ED ADULT NURSE REASSESSMENT NOTE - NSIMPLEMENTINTERV_GEN_ALL_ED
Implemented All Universal Safety Interventions:  [place] to call system. Call bell, personal items and telephone within reach. Instruct patient to call for assistance. Room bathroom lighting operational. Non-slip footwear when patient is off stretcher. Physically safe environment: no spills, clutter or unnecessary equipment. Stretcher in lowest position, wheels locked, appropriate side rails in place.
Implemented All Universal Safety Interventions:  [place] to call system. Call bell, personal items and telephone within reach. Instruct patient to call for assistance. Room bathroom lighting operational. Non-slip footwear when patient is off stretcher. Physically safe environment: no spills, clutter or unnecessary equipment. Stretcher in lowest position, wheels locked, appropriate side rails in place.

## 2022-09-25 VITALS
RESPIRATION RATE: 18 BRPM | TEMPERATURE: 98 F | DIASTOLIC BLOOD PRESSURE: 82 MMHG | HEART RATE: 86 BPM | OXYGEN SATURATION: 97 % | SYSTOLIC BLOOD PRESSURE: 128 MMHG

## 2022-09-25 LAB — SARS-COV-2 RNA SPEC QL NAA+PROBE: SIGNIFICANT CHANGE UP

## 2022-09-25 PROCEDURE — 80307 DRUG TEST PRSMV CHEM ANLYZR: CPT

## 2022-09-25 PROCEDURE — 87637 SARSCOV2&INF A&B&RSV AMP PRB: CPT

## 2022-09-25 PROCEDURE — G0378: CPT

## 2022-09-25 PROCEDURE — 85025 COMPLETE CBC W/AUTO DIFF WBC: CPT

## 2022-09-25 PROCEDURE — 36415 COLL VENOUS BLD VENIPUNCTURE: CPT

## 2022-09-25 PROCEDURE — 81003 URINALYSIS AUTO W/O SCOPE: CPT

## 2022-09-25 PROCEDURE — 99217: CPT

## 2022-09-25 PROCEDURE — 82962 GLUCOSE BLOOD TEST: CPT

## 2022-09-25 PROCEDURE — 80053 COMPREHEN METABOLIC PANEL: CPT

## 2022-09-25 PROCEDURE — 99284 EMERGENCY DEPT VISIT MOD MDM: CPT | Mod: 25

## 2022-09-25 PROCEDURE — U0005: CPT

## 2022-09-25 PROCEDURE — U0003: CPT

## 2022-09-25 PROCEDURE — 93005 ELECTROCARDIOGRAM TRACING: CPT

## 2022-09-25 RX ADMIN — DIVALPROEX SODIUM 500 MILLIGRAM(S): 500 TABLET, DELAYED RELEASE ORAL at 08:27

## 2022-09-25 RX ADMIN — METFORMIN HYDROCHLORIDE 1000 MILLIGRAM(S): 850 TABLET ORAL at 08:28

## 2022-09-25 RX ADMIN — RISPERIDONE 2 MILLIGRAM(S): 4 TABLET ORAL at 08:28

## 2022-09-25 NOTE — CHART NOTE - NSCHARTNOTESELECT_GEN_ALL_CORE
Event Note
SW/Event Note
Social Work Note/Event Note
SW/Event Note
Social Work/Event Note

## 2022-09-25 NOTE — CHART NOTE - NSCHARTNOTEFT_GEN_A_CORE
Social Work Note: SW following for inpatient psychiatric admission. Case presented to [facility], spoke with Ashok who reports patient has been accepted by MD Dr. Ramos. Updated COVID result pending.

## 2022-09-25 NOTE — CHART NOTE - NSCHARTNOTEFT_GEN_A_CORE
SWNote: pt's updated covid result obtained (negative) . [facility] called [facility] (Samantha) arranged ETA 60-90 min. Transfer will need simoneSW to follow asap. SWNote: pt's updated covid result obtained (negative) . [facility] called [facility] (Samantha) arranged ETA 60-90 min.  Pt has [facility]  accepting facility to process auth . No other SW concerns reported at this moment.

## 2022-09-25 NOTE — ED CDU PROVIDER DISPOSITION NOTE - CLINICAL COURSE
Pt signed out to me by dr. sher. pt medically cleared. will xfer to [facility]. Pt accepted by dr. hale.

## 2022-09-25 NOTE — ED ADULT NURSE REASSESSMENT NOTE - GENERAL PATIENT STATE
comfortable appearance/resting/sleeping
comfortable appearance/resting/sleeping
resting/sleeping
comfortable appearance/resting/sleeping
comfortable appearance/cooperative
cooperative
comfortable appearance/resting/sleeping

## 2022-09-29 NOTE — ED CDU PROVIDER INITIAL DAY NOTE - SKIN, MLM
9/29/2022       RE: Carole Grover  [address] [place] [id number]     Dear Colleague,    Thank you for referring your patient, Carole Grover, to the [facility] UROLOGY CLINIC [facility]. Please see a copy of my visit note below.    September 29, 2022    Referring Provider: Hayden Patel MD  [address] [place] [address]    Primary Care Provider: Anna Jaramillo    Assessment & Plan     History of sacrocolpopexy  Discussed with patient that we would have to remove some of the mesh with the cervix.  We discussed that if the surgery went really well without any complications that I could plan on re-suspending the vaginal cuff with mesh.     We discussed that I do not plan on removing all of the existing mesh but that if possible would plan on adding a new piece of mesh to the vagina and then attaching this to the mesh that is in situ.  We discussed if there were any complications, specifically injury to bowel or the urinary tract that I would NOT place mesh and instead do a uterosacral suspension with the understanding that if needed we may be able to return for another surgery.      We also discussed that a trachelectomy after sacrocolpopexy can be challenging with increased risks for injury to bowel or urinary tract so I recommend a preoperative bowel prep and that we place temporary ureteral stents at the beginning of the case to assist us in identification of the ureters    Endometrial cancer (H)  She is going to be undergoing trachelctomy and BSO.  Based on the final pathology given the likelihood of needing pelvic radiation in the future is extremely low, will consider revising the sacrocolpopexy at the time of her cancer surgery    We discussed that the risks to the procedure include but not limited to cardiovascular risks of anesthesia, nerve injury, bleeding, infection, injury to adjacent organs including bowel, bladder,  and blood vessels, conversion to open procedure, de abhishek or worsening stress incontinence or urge incontinence, need for post-operative waddell catheter, need for further procedures including for mesh extrusion or erosion.  Patient expressed understanding and agreeable to proceed with CYSTOSCOPY WITH BILATERAL TEMPORARY URETERAL STENTS, ROBOTIC ASSISTED LAPAROSCOPIC SACRCOCOLPOPEXY MESH REVISION VERSUS REMOVAL, POSSIBLE UTEROSACRAL LIGAMENT SUSPENSION, POSSIBLE OPEN    Discussed with patient that this case is tentatively scheduled for 10/28    22 minutes were spent on this day of the encounter in reviewing the EMR including reviewing Dr Syed's notes, Dr Olson's notes, Dr Richards's note, direct patient care including surgical counseling, coordination of care and documentation    Tiffani Hanks MD MPH  (she/her/hers)   of Urology  [facility]      HPI:  Carole Grover is a 65 year old female who presents for evaluation of her pelvic floor symptoms.  She underwent a supracervical hysterectomy and sacrocolpopexy in 9/2/22 at [facility] with pathology returning with grade 1 Endometroid adenocarcinoma.  Operative notes from Dr Syed (OBGYN) and Dr Olson [facility]  She was thus referred to Dr Richards who is planning on definitive surgery for the cancer (trachelectomy and BSO) and has asked patient to see me to discuss the sacrocolpopexy (Dr Richards's note from 9/26/22 reviewed)    Patient has had not issues from the sacrocolpoexy.  Denies gross hematuria, vaginal bleeding, UTIs, urinary incontinence, constipation, or pain.    Past Medical History:   Diagnosis Date     High cholesterol        Past Surgical History:   Procedure Laterality Date     APPENDECTOMY       CATARACT EXTRACTION Bilateral      Social History     Socioeconomic History     Marital status:      Spouse name: Not on file     Number of children: Not on file     Years of education: Not on  file     Highest education level: Not on file   Occupational History     Not on file   Tobacco Use     Smoking status: Never Smoker     Smokeless tobacco: Never Used   Substance and Sexual Activity     Alcohol use: Yes     Drug use: Never     Sexual activity: Not on file   Other Topics Concern     Not on file   Social History Narrative     Not on file     Social Determinants of Health     Financial Resource Strain: Not on file   Food Insecurity: Not on file   Transportation Needs: Not on file   Physical Activity: Not on file   Stress: Not on file   Social Connections: Not on file   Intimate Partner Violence: Not on file   Housing Stability: Not on file       Family History   Problem Relation Age of Onset     Cervical Cancer Sister      Breast Cancer Sister 20     Bladder Cancer Brother        ROS    Allergies   Allergen Reactions     Penicillins Hives     Keflex [Cephalexin] Rash       Current Outpatient Medications   Medication     acetaminophen (TYLENOL) 500 MG tablet     rosuvastatin (CRESTOR) 5 MG tablet     fish oil-omega-3 fatty acids 1000 MG capsule     ibuprofen (ADVIL/MOTRIN) 800 MG tablet     Multiple Vitamin (QUINTABS) TABS     senna-docusate (SENOKOT-S/PERICOLACE) 8.6-50 MG tablet     UNABLE TO FIND     vitamin C (ASCORBIC ACID) 500 MG tablet     vitamin D3 (CHOLECALCIFEROL) 125 MCG (5000 UT) tablet     No current facility-administered medications for this visit.   There were no vitals taken for this visit.  GENERAL: healthy, alert and no distress  EYES: Eyes grossly normal to inspection, conjunctivae and sclerae normal  HENT: normal cephalic/atraumatic.  External ears, nose and mouth without ulcers or lesions.  RESP: no audible wheeze, cough, or visible cyanosis.  No visible retractions or increased work of breathing.  Able to speak fully in complete sentences.  NEURO: Cranial nerves grossly intact, mentation intact and speech normal  PSYCH: mentation appears normal, affect normal/bright, judgement and  insight intact, normal speech and appearance well-groomed    CC  Patient Care Team:  Anna Jaramillo as PCP - General (Family Medicine)  Hayden Patel MD as MD (Gynecologic Oncology)  Fox Zhang, RN as Specialty Care Coordinator (Hematology & Oncology)  HAYDEN PATEL      Elzbieta is a 65 year old who is being evaluated via a billable video visit.      How would you like to obtain your AVS? MyChart  If the video visit is dropped, the invitation should be resent by: Text to cell phone: [phone]  Will anyone else be joining your video visit? No        Video-Visit Details    Video Start Time: 12:53 PM    Type of service:  Video Visit    Video End Time:1:08 PM    Originating Location (pt. Location): Home    Distant Location (provider location):  [facility] UROLOGY CLINIC [place]     Platform used for Video Visit: TOBESOFT     Skin normal color for race, warm, dry and intact. No evidence of rash.

## 2022-10-11 NOTE — ED ADULT TRIAGE NOTE - ESI TRIAGE ACUITY LEVEL, MLM
Recorded Pressure: AoAsc, AoDes, HR=66, Condition=Condition 1 (Ascending Aorta) AoAsc ?/?/?, (Descending Thoracic Aorta) AoDes 70/50/59 2

## 2022-10-18 ENCOUNTER — INPATIENT (INPATIENT)
Facility: HOSPITAL | Age: 52
LOS: 7 days | Discharge: HOME | End: 2022-10-26
Attending: PSYCHIATRY & NEUROLOGY | Admitting: PSYCHIATRY & NEUROLOGY

## 2022-10-18 VITALS — HEIGHT: 67 IN | TEMPERATURE: 98 F | WEIGHT: 169.98 LBS | RESPIRATION RATE: 14 BRPM

## 2022-10-18 DIAGNOSIS — F25.9 SCHIZOAFFECTIVE DISORDER, UNSPECIFIED: ICD-10-CM

## 2022-10-18 LAB — GLUCOSE BLDC GLUCOMTR-MCNC: 411 MG/DL — HIGH (ref 70–99)

## 2022-10-18 PROCEDURE — 99221 1ST HOSP IP/OBS SF/LOW 40: CPT

## 2022-10-18 RX ORDER — INSULIN LISPRO 100/ML
VIAL (ML) SUBCUTANEOUS
Refills: 0 | Status: DISCONTINUED | OUTPATIENT
Start: 2022-10-18 | End: 2022-10-19

## 2022-10-18 RX ORDER — DEXTROSE 50 % IN WATER 50 %
15 SYRINGE (ML) INTRAVENOUS ONCE
Refills: 0 | Status: DISCONTINUED | OUTPATIENT
Start: 2022-10-18 | End: 2022-10-18

## 2022-10-18 RX ORDER — INSULIN LISPRO 100/ML
VIAL (ML) SUBCUTANEOUS AT BEDTIME
Refills: 0 | Status: DISCONTINUED | OUTPATIENT
Start: 2022-10-18 | End: 2022-10-19

## 2022-10-18 RX ORDER — DEXTROSE 50 % IN WATER 50 %
12.5 SYRINGE (ML) INTRAVENOUS ONCE
Refills: 0 | Status: DISCONTINUED | OUTPATIENT
Start: 2022-10-18 | End: 2022-10-18

## 2022-10-18 RX ORDER — GLUCAGON INJECTION, SOLUTION 0.5 MG/.1ML
1 INJECTION, SOLUTION SUBCUTANEOUS ONCE
Refills: 0 | Status: DISCONTINUED | OUTPATIENT
Start: 2022-10-18 | End: 2022-10-19

## 2022-10-18 RX ORDER — DEXTROSE 50 % IN WATER 50 %
25 SYRINGE (ML) INTRAVENOUS ONCE
Refills: 0 | Status: DISCONTINUED | OUTPATIENT
Start: 2022-10-18 | End: 2022-10-18

## 2022-10-18 RX ORDER — TRAZODONE HCL 50 MG
200 TABLET ORAL AT BEDTIME
Refills: 0 | Status: DISCONTINUED | OUTPATIENT
Start: 2022-10-18 | End: 2022-10-26

## 2022-10-18 RX ORDER — SODIUM CHLORIDE 9 MG/ML
1000 INJECTION, SOLUTION INTRAVENOUS
Refills: 0 | Status: DISCONTINUED | OUTPATIENT
Start: 2022-10-18 | End: 2022-10-19

## 2022-10-18 RX ORDER — INSULIN LISPRO 100/ML
VIAL (ML) SUBCUTANEOUS
Refills: 0 | Status: DISCONTINUED | OUTPATIENT
Start: 2022-10-18 | End: 2022-10-18

## 2022-10-18 RX ORDER — SODIUM CHLORIDE 9 MG/ML
1000 INJECTION, SOLUTION INTRAVENOUS
Refills: 0 | Status: DISCONTINUED | OUTPATIENT
Start: 2022-10-18 | End: 2022-10-18

## 2022-10-18 RX ORDER — GLUCAGON INJECTION, SOLUTION 0.5 MG/.1ML
1 INJECTION, SOLUTION SUBCUTANEOUS ONCE
Refills: 0 | Status: DISCONTINUED | OUTPATIENT
Start: 2022-10-18 | End: 2022-10-18

## 2022-10-18 RX ORDER — METFORMIN HYDROCHLORIDE 850 MG/1
1000 TABLET ORAL
Refills: 0 | Status: DISCONTINUED | OUTPATIENT
Start: 2022-10-18 | End: 2022-10-26

## 2022-10-18 RX ORDER — DIVALPROEX SODIUM 500 MG/1
750 TABLET, DELAYED RELEASE ORAL
Refills: 0 | Status: DISCONTINUED | OUTPATIENT
Start: 2022-10-18 | End: 2022-10-26

## 2022-10-18 RX ADMIN — Medication 200 MILLIGRAM(S): at 21:47

## 2022-10-18 RX ADMIN — METFORMIN HYDROCHLORIDE 1000 MILLIGRAM(S): 850 TABLET ORAL at 21:46

## 2022-10-18 RX ADMIN — Medication 4: at 22:50

## 2022-10-18 RX ADMIN — DIVALPROEX SODIUM 750 MILLIGRAM(S): 500 TABLET, DELAYED RELEASE ORAL at 21:46

## 2022-10-18 NOTE — BH INPATIENT PSYCHIATRY ASSESSMENT NOTE - OTHER PAST PSYCHIATRIC HISTORY (INCLUDE DETAILS REGARDING ONSET, COURSE OF ILLNESS, INPATIENT/OUTPATIENT TREATMENT)
multiple psych admissions and ER visits for reports of SI last at [facility] 3/8/22  Recent [place] state admission 5/22-8/22;  ACT team [employer]   On AOT

## 2022-10-18 NOTE — BH INPATIENT PSYCHIATRY ASSESSMENT NOTE - NSBHCHARTREVIEWVS_PSY_A_CORE FT
Vital Signs Last 24 Hrs  T(C): 36.7 (10-18-22 @ 16:51), Max: 36.7 (10-18-22 @ 16:51)  T(F): 98.1 (10-18-22 @ 16:51), Max: 98.1 (10-18-22 @ 16:51)  HR: --  BP: --  BP(mean): --  RR: 14 (10-18-22 @ 16:51) (14 - 14)  SpO2: --    Orthostatic VS  10-18-22 @ 16:51  Lying BP: --/-- HR: --  Sitting BP: 118/83 HR: 92  Standing BP: 116/82 HR: 97  Site: --  Mode: --

## 2022-10-18 NOTE — BH INPATIENT PSYCHIATRY ASSESSMENT NOTE - HPI (INCLUDE ILLNESS QUALITY, SEVERITY, DURATION, TIMING, CONTEXT, MODIFYING FACTORS, ASSOCIATED SIGNS AND SYMPTOMS)
51 y/o unemployed recently homeless 51 y/o male with a history of schizoaffective disorder, cocaine use disorder, and uncontrolled DM recently admitted on 2PC for SI, command type AH. He is on ACT and AOT recently discharged from [facility]. Record shows at least 6 hospitalizations at [facility] in 2022 and at least 9 hospitalizations at [facility] in 2021. On 5/29/22, he reported unwitnessed fall at [facility], sent to ED and eloped. He relapsed immediately on alcohol, cocaine, and ecstasy for 2 days then re-hospitalized in September, has been at [facility] prior to testing + for COVID and being transferred to [facility] (COVID + unit).  Course of hospitalization prior to transfer:    (09/28/22) D/C PO Risperdal. Invega Sustenna 156 mg IM administered    (10/04/22) refused MAT. [facility] transfer hearing held    (10/05/22) refused MAT and spa housing application    (10/07/22) the patient submitted another request for court hearing and rescinded the request at night    (10/08/22) the patient submitted another request for court hearing.    (10/10/22) the patient was irritable and impulsive then stormed out of the room during an evaluation. Depakote increased to 750 mg BID.    (10/11/22) the patient was agitated, belligerent, and refused to discuss the concern of self-harm through neglecting diabetic care or suicidal ideation.    (10/12/22) the patient rescinded the request for court hearing.    (10/17/22) COVID-19 PCR positive. The patient on SRO and wearing mask while waiting for transfer.

## 2022-10-18 NOTE — BH PATIENT PROFILE - FALL HARM RISK - UNIVERSAL INTERVENTIONS
Bed in lowest position, wheels locked, appropriate side rails in place/Call bell, personal items and telephone in reach/Instruct patient to call for assistance before getting out of bed or chair/Non-slip footwear when patient is out of bed/[place] to call system/Physically safe environment - no spills, clutter or unnecessary equipment/Purposeful Proactive Rounding/Room/bathroom lighting operational, light cord in reach

## 2022-10-18 NOTE — BH INPATIENT PSYCHIATRY ASSESSMENT NOTE - MSE UNSTRUCTURED FT
Patient is awake and alert. Good attention. Calm and cooperative. Speech is loud. Affect is slightly intense. "I am going from here to a rehab but I am doing  good." TP is linear. No delusions expressed. No perceptual disturbance. Oriented in all spheres. No suicidal ideations. Limited insight. No tremor or   movement disorder.

## 2022-10-18 NOTE — BH PATIENT PROFILE - HOME MEDICATIONS
traZODone , 250 milligram(s) orally once a day (at bedtime)  metFORMIN 1000 mg oral tablet , 1 tab(s) orally 2 times a day   Depakote 500 mg oral delayed release tablet , 1 tab(s) orally 2 times a day

## 2022-10-18 NOTE — BH INPATIENT PSYCHIATRY ASSESSMENT NOTE - NSBHASSESSSUMMFT_PSY_ALL_CORE
52 year-old undomiciled male with SAD, poor treatment adherence., hx of cocaine and ETOH abuse, DM, htn, presents as transfer from [facility] where he has been treated for psychosis after testing positive for COVID  Invega Sustenna 156 mg IM 9/28/22 Q4 weeks, next dose on 10/26/22  Continue Depakote 750 mg PO BID  Continue trazodone 200 mg PO QHS   Metformin/POCT  Lorazepam prn

## 2022-10-18 NOTE — BH INPATIENT PSYCHIATRY ASSESSMENT NOTE - NSBHMETABOLIC_PSY_ALL_CORE_FT
BMI: BMI (kg/m2): 26.6 (10-18-22 @ 16:51)  HbA1c:   Glucose: POCT Blood Glucose.: 219 mg/dL (09-24-22 @ 16:43)    BP: --  Lipid Panel:

## 2022-10-18 NOTE — BH INPATIENT PSYCHIATRY ASSESSMENT NOTE - RISK ASSESSMENT
Chronic risk factors: ongoing substance use; psychosocial stressors;  single, reports prior suicide attempt  Risk factors: reports non compliant with medication, suicidal ideation, CAH  Protective factors: no self-injurious behavior;  no legal issues; seeking help

## 2022-10-19 DIAGNOSIS — E11.9 TYPE 2 DIABETES MELLITUS WITHOUT COMPLICATIONS: ICD-10-CM

## 2022-10-19 DIAGNOSIS — F19.10 OTHER PSYCHOACTIVE SUBSTANCE ABUSE, UNCOMPLICATED: ICD-10-CM

## 2022-10-19 DIAGNOSIS — F60.89 OTHER SPECIFIC PERSONALITY DISORDERS: ICD-10-CM

## 2022-10-19 LAB
GLUCOSE BLDC GLUCOMTR-MCNC: 230 MG/DL — HIGH (ref 70–99)
GLUCOSE BLDC GLUCOMTR-MCNC: 347 MG/DL — HIGH (ref 70–99)
GLUCOSE BLDC GLUCOMTR-MCNC: 369 MG/DL — HIGH (ref 70–99)
GLUCOSE BLDC GLUCOMTR-MCNC: 387 MG/DL — HIGH (ref 70–99)
GLUCOSE BLDC GLUCOMTR-MCNC: 465 MG/DL — CRITICAL HIGH (ref 70–99)

## 2022-10-19 PROCEDURE — 99231 SBSQ HOSP IP/OBS SF/LOW 25: CPT

## 2022-10-19 RX ORDER — SODIUM CHLORIDE 9 MG/ML
1000 INJECTION, SOLUTION INTRAVENOUS
Refills: 0 | Status: DISCONTINUED | OUTPATIENT
Start: 2022-10-19 | End: 2022-10-26

## 2022-10-19 RX ORDER — INSULIN GLARGINE 100 [IU]/ML
9 INJECTION, SOLUTION SUBCUTANEOUS AT BEDTIME
Refills: 0 | Status: DISCONTINUED | OUTPATIENT
Start: 2022-10-19 | End: 2022-10-20

## 2022-10-19 RX ORDER — DEXTROSE 50 % IN WATER 50 %
25 SYRINGE (ML) INTRAVENOUS ONCE
Refills: 0 | Status: DISCONTINUED | OUTPATIENT
Start: 2022-10-19 | End: 2022-10-26

## 2022-10-19 RX ORDER — INSULIN LISPRO 100/ML
VIAL (ML) SUBCUTANEOUS AT BEDTIME
Refills: 0 | Status: DISCONTINUED | OUTPATIENT
Start: 2022-10-19 | End: 2022-10-25

## 2022-10-19 RX ORDER — DEXTROSE 50 % IN WATER 50 %
12.5 SYRINGE (ML) INTRAVENOUS ONCE
Refills: 0 | Status: DISCONTINUED | OUTPATIENT
Start: 2022-10-19 | End: 2022-10-26

## 2022-10-19 RX ORDER — INSULIN LISPRO 100/ML
3 VIAL (ML) SUBCUTANEOUS
Refills: 0 | Status: DISCONTINUED | OUTPATIENT
Start: 2022-10-19 | End: 2022-10-20

## 2022-10-19 RX ORDER — DEXTROSE 50 % IN WATER 50 %
15 SYRINGE (ML) INTRAVENOUS ONCE
Refills: 0 | Status: DISCONTINUED | OUTPATIENT
Start: 2022-10-19 | End: 2022-10-26

## 2022-10-19 RX ORDER — GLUCAGON INJECTION, SOLUTION 0.5 MG/.1ML
1 INJECTION, SOLUTION SUBCUTANEOUS ONCE
Refills: 0 | Status: DISCONTINUED | OUTPATIENT
Start: 2022-10-19 | End: 2022-10-26

## 2022-10-19 RX ORDER — INSULIN LISPRO 100/ML
VIAL (ML) SUBCUTANEOUS
Refills: 0 | Status: DISCONTINUED | OUTPATIENT
Start: 2022-10-19 | End: 2022-10-25

## 2022-10-19 RX ORDER — INSULIN LISPRO 100/ML
4 VIAL (ML) SUBCUTANEOUS
Refills: 0 | Status: DISCONTINUED | OUTPATIENT
Start: 2022-10-19 | End: 2022-10-19

## 2022-10-19 RX ORDER — INSULIN GLARGINE 100 [IU]/ML
13 INJECTION, SOLUTION SUBCUTANEOUS AT BEDTIME
Refills: 0 | Status: DISCONTINUED | OUTPATIENT
Start: 2022-10-19 | End: 2022-10-19

## 2022-10-19 RX ADMIN — Medication 3: at 20:54

## 2022-10-19 RX ADMIN — Medication 200 MILLIGRAM(S): at 20:54

## 2022-10-19 RX ADMIN — METFORMIN HYDROCHLORIDE 1000 MILLIGRAM(S): 850 TABLET ORAL at 09:24

## 2022-10-19 RX ADMIN — Medication 3 UNIT(S): at 17:06

## 2022-10-19 RX ADMIN — METFORMIN HYDROCHLORIDE 1000 MILLIGRAM(S): 850 TABLET ORAL at 20:53

## 2022-10-19 RX ADMIN — Medication 6: at 12:06

## 2022-10-19 RX ADMIN — Medication 2: at 09:23

## 2022-10-19 RX ADMIN — DIVALPROEX SODIUM 750 MILLIGRAM(S): 500 TABLET, DELAYED RELEASE ORAL at 09:24

## 2022-10-19 RX ADMIN — Medication 4: at 17:06

## 2022-10-19 RX ADMIN — DIVALPROEX SODIUM 750 MILLIGRAM(S): 500 TABLET, DELAYED RELEASE ORAL at 20:53

## 2022-10-19 RX ADMIN — INSULIN GLARGINE 9 UNIT(S): 100 INJECTION, SOLUTION SUBCUTANEOUS at 20:54

## 2022-10-19 NOTE — BH INPATIENT PSYCHIATRY PROGRESS NOTE - NSBHASSESSSUMMFT_PSY_ALL_CORE
52 year-old undomiciled male with SAD, poor treatment adherence., hx of cocaine and ETOH abuse, DM, htn, presents as transfer from [facility] where he has been treated for psychosis after testing positive for COVID    10/19 Update  Irritable on approach  Asking about Rehab and doesn't want to go back to [place]   PLAN  Paliperidone Sustenna 156 mg IM 9/28/22 Q4 weeks, next dose on 10/26/22  Continue Divalproex 750 mg PO BID  Continue trazodone 200 mg PO QHS   Metformin/POCT  Lorazepam prn

## 2022-10-19 NOTE — BH INPATIENT PSYCHIATRY PROGRESS NOTE - MSE UNSTRUCTURED FT
On exam today the patient is irritable and only superficially cooperative.    Speech is clear but minimal.    Thought process: linear and goal directed.    Thought content: with no evidence of false beliefs or obsessions.   Perception: Denies hearing voices or other perceptual disturbances   Mood: Describes as "OK".   Affect: flat.    Patient denies active suicidal ideation, intention and plan.    Patient denies active aggressive/homicidal ideation, intent or plan.   Patient is Alert and oriented .   Fund of knowledge is fair. Memory is intact  Insight and judgment are fair.   Impulse control is intact at this time.

## 2022-10-19 NOTE — PSYCHIATRIC REHAB INITIAL EVALUATION - NSBHALCSUBTREAT_PSY_ALL_CORE
Pt also has hx of state hospitalization at [facility]./Assertive Community Treatment (ACT)/Assisted Outpatient Treatment (AOT)/Outpatient clinic (specify)
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
## 2022-10-19 NOTE — PSYCHIATRIC REHAB INITIAL EVALUATION - NSBHPRRECOMMEND_PSY_ALL_CORE
Writer met with patient to orient to unit and introduce self, psychiatric rehabilitation staff and department functions. Patient was admitted to  after testing positive for COVID-19. Patient was oriented to COVID unit precautions and rules. On interview, patient was minimally engaged, guarded, although polite and cooperative. Patient presented in bed with fair ADL’s and minimal eye contact. Patient reported minimal COVID symptoms. Patient demonstrates poor insight and judgement into his symptoms and treatment at this time. Writer encouraged patient to attend psychiatric rehabilitation groups/individual activities and engage in treatment. Writer and patient were able to establish a collaborative psychiatric rehabilitation goal. Psychiatric Rehabilitation staff will continue to engage patient daily in order to develop therapeutic rapport.

## 2022-10-19 NOTE — BH INPATIENT PSYCHIATRY PROGRESS NOTE - NSBHFUPINTERVALHXFT_PSY_A_CORE
Patient seen for follow of SI and Substance misuse and schizoaffective Disorder  Chart reviewed and case discussed with treatment team  Patient minimally cooperative with exam. Agrees that he has problems with substances but minimizes his past history of nonadherence with treatment  [facility]  Long hx of MULTIPLE admissions for substance induced mood symptoms and withdrawal and was awaiting transfer to [facility] as per plan

## 2022-10-20 LAB
GLUCOSE BLDC GLUCOMTR-MCNC: 215 MG/DL — HIGH (ref 70–99)
GLUCOSE BLDC GLUCOMTR-MCNC: 222 MG/DL — HIGH (ref 70–99)
GLUCOSE BLDC GLUCOMTR-MCNC: 366 MG/DL — HIGH (ref 70–99)
GLUCOSE BLDC GLUCOMTR-MCNC: 420 MG/DL — HIGH (ref 70–99)
GLUCOSE BLDC GLUCOMTR-MCNC: 424 MG/DL — HIGH (ref 70–99)
GLUCOSE BLDC GLUCOMTR-MCNC: 456 MG/DL — CRITICAL HIGH (ref 70–99)

## 2022-10-20 PROCEDURE — 99231 SBSQ HOSP IP/OBS SF/LOW 25: CPT

## 2022-10-20 RX ORDER — INSULIN LISPRO 100/ML
6 VIAL (ML) SUBCUTANEOUS
Refills: 0 | Status: DISCONTINUED | OUTPATIENT
Start: 2022-10-20 | End: 2022-10-21

## 2022-10-20 RX ORDER — INSULIN GLARGINE 100 [IU]/ML
14 INJECTION, SOLUTION SUBCUTANEOUS AT BEDTIME
Refills: 0 | Status: DISCONTINUED | OUTPATIENT
Start: 2022-10-20 | End: 2022-10-21

## 2022-10-20 RX ADMIN — METFORMIN HYDROCHLORIDE 1000 MILLIGRAM(S): 850 TABLET ORAL at 08:18

## 2022-10-20 RX ADMIN — Medication 200 MILLIGRAM(S): at 21:16

## 2022-10-20 RX ADMIN — METFORMIN HYDROCHLORIDE 1000 MILLIGRAM(S): 850 TABLET ORAL at 21:16

## 2022-10-20 RX ADMIN — Medication 4: at 21:17

## 2022-10-20 RX ADMIN — Medication 2: at 16:17

## 2022-10-20 RX ADMIN — DIVALPROEX SODIUM 750 MILLIGRAM(S): 500 TABLET, DELAYED RELEASE ORAL at 08:18

## 2022-10-20 RX ADMIN — DIVALPROEX SODIUM 750 MILLIGRAM(S): 500 TABLET, DELAYED RELEASE ORAL at 21:16

## 2022-10-20 RX ADMIN — Medication 6: at 11:48

## 2022-10-20 RX ADMIN — Medication 2: at 08:19

## 2022-10-20 RX ADMIN — INSULIN GLARGINE 14 UNIT(S): 100 INJECTION, SOLUTION SUBCUTANEOUS at 21:17

## 2022-10-20 RX ADMIN — Medication 1 TABLET(S): at 10:51

## 2022-10-20 RX ADMIN — Medication 3 UNIT(S): at 16:18

## 2022-10-20 RX ADMIN — Medication 3 UNIT(S): at 08:19

## 2022-10-20 RX ADMIN — Medication 3 UNIT(S): at 11:49

## 2022-10-20 NOTE — BH INPATIENT PSYCHIATRY PROGRESS NOTE - NSBHFUPINTERVALHXFT_PSY_A_CORE
Patient seen for follow of SI and Substance misuse and schizoaffective Disorder  Chart reviewed and case discussed with treatment team  Patient irritable about add minimally cooperative with exam. Agrees that he has problems with substances but minimizes his past history of nonadherence with treatment  [facility]  Long hx of MULTIPLE admissions for substance induced mood symptoms and withdrawal and was awaiting transfer to [facility] as per plan Patient seen for follow of SI and Substance misuse and schizoaffective Disorder  Chart reviewed and case discussed with treatment team  Patient irritable about additional blood work  Has been poorly following diabetic diet and BS have been elevated  started by hospitalist on standing insulin regimen   [facility]  Long hx of MULTIPLE admissions for substance induced mood symptoms and withdrawal and was awaiting transfer to [facility] as per plan

## 2022-10-20 NOTE — BH INPATIENT PSYCHIATRY PROGRESS NOTE - MSE UNSTRUCTURED FT
On exam today the patient is irritable and only superficially cooperative.    Speech is clear but minimal.    Thought process: linear and goal directed.    Thought content: with no evidence of false beliefs or obsessions.   Perception: Denies hearing voices or other perceptual disturbances   Mood: Describes as "OK".   Affect: flat.    Patient denies active suicidal ideation, intention and plan.    Patient denies active aggressive/homicidal ideation, intent or plan.   Patient is Alert and oriented .   Fund of knowledge is fair. Memory is intact  Insight and judgment are fair.   Impulse control is intact at this time.     On exam today the patient is only superficially cooperative.    Speech is clear but minimal.    Thought process: linear and goal directed.    Thought content: with no evidence of false beliefs or obsessions.   Perception: Denies hearing voices or other perceptual disturbances   Mood: Describes as "OK".   Affect: flat.    Patient denies active suicidal ideation, intention and plan.    Patient denies active aggressive/homicidal ideation, intent or plan.   Patient is Alert and oriented .   Fund of knowledge is fair. Memory is intact  Insight and judgment are fair.   Impulse control is intact at this time.

## 2022-10-20 NOTE — BH INPATIENT PSYCHIATRY PROGRESS NOTE - NSBHASSESSSUMMFT_PSY_ALL_CORE
52 year-old undomiciled male with SAD, poor treatment adherence., hx of cocaine and ETOH abuse, DM, htn, presents as transfer from [facility] where he has been treated for psychosis after testing positive for COVID    10/19 Update  Irritable on approach  Asking about Rehab and doesn't want to go back to [place]   PLAN  Paliperidone Sustenna 156 mg IM 9/28/22 Q4 weeks, next dose on 10/26/22  Continue Divalproex 750 mg PO BID  Continue trazodone 200 mg PO QHS   Metformin/POCT  Lorazepam prn             52 year-old undomiciled male with SAD, poor treatment adherence., hx of cocaine and ETOH abuse, DM, htn, presents as transfer from [facility] where he has been treated for psychosis after testing positive for COVID    10/20 Update  Irritable on approach  Poorly controlled blood sugars and diet  interested in Rehab and doesn't want to go back to [place]   PLAN  Paliperidone Sustenna 156 mg IM 9/28/22 Q4 weeks, next dose on 10/26/22  Continue Divalproex 750 mg PO BID  Continue trazodone 200 mg PO QHS   Metformin/POCT  Lorazepam prn

## 2022-10-21 LAB
A1C WITH ESTIMATED AVERAGE GLUCOSE RESULT: 9.9 % — HIGH (ref 4–5.6)
CHOLEST SERPL-MCNC: 151 MG/DL — SIGNIFICANT CHANGE UP
ESTIMATED AVERAGE GLUCOSE: 237 — SIGNIFICANT CHANGE UP
GLUCOSE BLDC GLUCOMTR-MCNC: 230 MG/DL — HIGH (ref 70–99)
GLUCOSE BLDC GLUCOMTR-MCNC: 249 MG/DL — HIGH (ref 70–99)
GLUCOSE BLDC GLUCOMTR-MCNC: 294 MG/DL — HIGH (ref 70–99)
GLUCOSE BLDC GLUCOMTR-MCNC: 362 MG/DL — HIGH (ref 70–99)
HDLC SERPL-MCNC: 29 MG/DL — LOW
LIPID PNL WITH DIRECT LDL SERPL: 94 MG/DL — SIGNIFICANT CHANGE UP
NON HDL CHOLESTEROL: 122 MG/DL — SIGNIFICANT CHANGE UP
TRIGL SERPL-MCNC: 138 MG/DL — SIGNIFICANT CHANGE UP

## 2022-10-21 PROCEDURE — 99231 SBSQ HOSP IP/OBS SF/LOW 25: CPT

## 2022-10-21 RX ORDER — INSULIN LISPRO 100/ML
7 VIAL (ML) SUBCUTANEOUS
Refills: 0 | Status: DISCONTINUED | OUTPATIENT
Start: 2022-10-21 | End: 2022-10-23

## 2022-10-21 RX ORDER — INSULIN GLARGINE 100 [IU]/ML
20 INJECTION, SOLUTION SUBCUTANEOUS AT BEDTIME
Refills: 0 | Status: DISCONTINUED | OUTPATIENT
Start: 2022-10-21 | End: 2022-10-23

## 2022-10-21 RX ADMIN — Medication 6 UNIT(S): at 08:01

## 2022-10-21 RX ADMIN — Medication 7 UNIT(S): at 16:27

## 2022-10-21 RX ADMIN — Medication 2: at 16:26

## 2022-10-21 RX ADMIN — Medication 3: at 12:15

## 2022-10-21 RX ADMIN — Medication 6 UNIT(S): at 12:16

## 2022-10-21 RX ADMIN — METFORMIN HYDROCHLORIDE 1000 MILLIGRAM(S): 850 TABLET ORAL at 08:18

## 2022-10-21 RX ADMIN — INSULIN GLARGINE 20 UNIT(S): 100 INJECTION, SOLUTION SUBCUTANEOUS at 21:22

## 2022-10-21 RX ADMIN — METFORMIN HYDROCHLORIDE 1000 MILLIGRAM(S): 850 TABLET ORAL at 21:22

## 2022-10-21 RX ADMIN — Medication 2: at 08:00

## 2022-10-21 RX ADMIN — Medication 1 TABLET(S): at 08:18

## 2022-10-21 RX ADMIN — DIVALPROEX SODIUM 750 MILLIGRAM(S): 500 TABLET, DELAYED RELEASE ORAL at 08:18

## 2022-10-21 RX ADMIN — Medication 200 MILLIGRAM(S): at 21:22

## 2022-10-21 RX ADMIN — Medication 3: at 21:23

## 2022-10-21 RX ADMIN — DIVALPROEX SODIUM 750 MILLIGRAM(S): 500 TABLET, DELAYED RELEASE ORAL at 21:22

## 2022-10-21 NOTE — DIETITIAN INITIAL EVALUATION ADULT - WEIGHT (LBS)
Injection given without difficulties.Bandaid applied. Patient instructed to stay in the clinic for 15 minutes. Patient verbalized understanding and will notify nurse with any complaints.     170

## 2022-10-21 NOTE — DIETITIAN INITIAL EVALUATION ADULT - PERTINENT LABORATORY DATA
CAPILLARY BLOOD GLUCOSE      POCT Blood Glucose.: 230 mg/dL (21 Oct 2022 07:49)  POCT Blood Glucose.: 420 mg/dL (20 Oct 2022 22:02)  POCT Blood Glucose.: 424 mg/dL (20 Oct 2022 21:02)  POCT Blood Glucose.: 215 mg/dL (20 Oct 2022 16:01)  POCT Blood Glucose.: 366 mg/dL (20 Oct 2022 12:37)    POCT Blood Glucose.: 230 mg/dL (10-21-22 @ 07:49)  A1C with Estimated Average Glucose Result: 9.9 % (10-21-22 @ 09:10)

## 2022-10-21 NOTE — DIETITIAN INITIAL EVALUATION ADULT - PERTINENT MEDS FT
MEDICATIONS  (STANDING):  dextrose 5%. 1000 milliLiter(s) (50 mL/Hr) IV Continuous <Continuous>  dextrose 5%. 1000 milliLiter(s) (100 mL/Hr) IV Continuous <Continuous>  dextrose 50% Injectable 25 Gram(s) IV Push once  dextrose 50% Injectable 12.5 Gram(s) IV Push once  dextrose 50% Injectable 25 Gram(s) IV Push once  diVALproex  milliGRAM(s) Oral two times a day  glucagon  Injectable 1 milliGRAM(s) IntraMuscular once  insulin glargine Injectable (LANTUS) 20 Unit(s) SubCutaneous at bedtime  insulin lispro (ADMELOG) corrective regimen sliding scale   SubCutaneous three times a day before meals  insulin lispro (ADMELOG) corrective regimen sliding scale   SubCutaneous at bedtime  insulin lispro Injectable (ADMELOG) 6 Unit(s) SubCutaneous three times a day before meals  metFORMIN 1000 milliGRAM(s) Oral two times a day  multivitamin 1 Tablet(s) Oral daily  traZODone 200 milliGRAM(s) Oral at bedtime    MEDICATIONS  (PRN):  dextrose Oral Gel 15 Gram(s) Oral once PRN Blood Glucose LESS THAN 70 milliGRAM(s)/deciliter  LORazepam   Injectable 2 milliGRAM(s) IntraMuscular once PRN Agitation

## 2022-10-21 NOTE — BH INPATIENT PSYCHIATRY PROGRESS NOTE - NSBHASSESSSUMMFT_PSY_ALL_CORE
52 year-old undomiciled male with SAD, poor treatment adherence., hx of cocaine and ETOH abuse, DM, htn, presents as transfer from [facility] where he has been treated for psychosis after testing positive for COVID    10/21 Update  More cooperative today with exam  Poorly cooperative with diabetic diet   Poorly controlled blood sugars and diet  Asking about returning o [facility]  PLAN  Paliperidone Sustenna 156 mg IM 9/28/22 Q4 weeks, next dose on 10/26/22  Continue Divalproex 750 mg PO BID  Continue trazodone 200 mg PO QHS   Metformin/POCT  Lorazepam prn

## 2022-10-21 NOTE — BH INPATIENT PSYCHIATRY PROGRESS NOTE - MSE UNSTRUCTURED FT
On exam today the patient is superficially cooperative.    Speech is clear but minimal.    Thought process: linear and goal directed.    Thought content: with no evidence of false beliefs or obsessions.   Perception: Denies hearing voices or other perceptual disturbances   Mood: Describes as "better today".   Affect: flat.    Patient denies active suicidal ideation, intention and plan.    Patient denies active aggressive/homicidal ideation, intent or plan.   Patient is Alert and oriented .   Fund of knowledge is fair. Memory is intact  Insight and judgment are fair.   Impulse control is intact at this time.

## 2022-10-21 NOTE — DIETITIAN INITIAL EVALUATION ADULT - OTHER INFO
Patient with hx: schizoaffective disorder, cocaine use disorder, uncontrolled DM transferred from  where he was treated for psychosis after testing positive for COVID.Patient interviewed outside on the patio. Appeared irritable, stating " My feet hurt. My toenails are long. I need them cut". RN made aware. Patient endorses good appetite. Denies any GI distress. Denies any weight changes. Had no specific food preferences. Patient reports he does not follow any special diet for his diabetes. When writer attempted to give diet education, patient stated " I don't care". HgbA1c 9.9 indicative of poor glycemic control. Patient running high blood sugar levels with hospitalist increasing Lantus and Admelog.

## 2022-10-22 DIAGNOSIS — U07.1 COVID-19: ICD-10-CM

## 2022-10-22 LAB
GLUCOSE BLDC GLUCOMTR-MCNC: 193 MG/DL — HIGH (ref 70–99)
GLUCOSE BLDC GLUCOMTR-MCNC: 202 MG/DL — HIGH (ref 70–99)
GLUCOSE BLDC GLUCOMTR-MCNC: 214 MG/DL — HIGH (ref 70–99)
GLUCOSE BLDC GLUCOMTR-MCNC: 365 MG/DL — HIGH (ref 70–99)

## 2022-10-22 PROCEDURE — 99231 SBSQ HOSP IP/OBS SF/LOW 25: CPT

## 2022-10-22 RX ADMIN — Medication 7 UNIT(S): at 11:38

## 2022-10-22 RX ADMIN — METFORMIN HYDROCHLORIDE 1000 MILLIGRAM(S): 850 TABLET ORAL at 08:27

## 2022-10-22 RX ADMIN — INSULIN GLARGINE 20 UNIT(S): 100 INJECTION, SOLUTION SUBCUTANEOUS at 21:05

## 2022-10-22 RX ADMIN — Medication 1: at 16:49

## 2022-10-22 RX ADMIN — Medication 200 MILLIGRAM(S): at 20:41

## 2022-10-22 RX ADMIN — DIVALPROEX SODIUM 750 MILLIGRAM(S): 500 TABLET, DELAYED RELEASE ORAL at 20:41

## 2022-10-22 RX ADMIN — Medication 1 TABLET(S): at 08:27

## 2022-10-22 RX ADMIN — Medication 7 UNIT(S): at 16:49

## 2022-10-22 RX ADMIN — METFORMIN HYDROCHLORIDE 1000 MILLIGRAM(S): 850 TABLET ORAL at 20:41

## 2022-10-22 RX ADMIN — Medication 2: at 08:26

## 2022-10-22 RX ADMIN — Medication 3: at 21:06

## 2022-10-22 RX ADMIN — Medication 7 UNIT(S): at 08:26

## 2022-10-22 RX ADMIN — Medication 2: at 11:37

## 2022-10-22 RX ADMIN — DIVALPROEX SODIUM 750 MILLIGRAM(S): 500 TABLET, DELAYED RELEASE ORAL at 08:27

## 2022-10-22 NOTE — BH INPATIENT PSYCHIATRY PROGRESS NOTE - MSE UNSTRUCTURED FT
On exam today the patient is minimally cooperative with questions.    Speech is clear but minimal.    Thought process: linear and goal directed.    Thought content: with no evidence of false beliefs or obsessions.   Perception: Denies hearing voices or other perceptual disturbances   Mood: Describes as "better today".   Affect: flat.    Patient denies active suicidal ideation, intention and plan.    Patient denies active aggressive/homicidal ideation, intent or plan.   Patient is Alert and oriented .   Fund of knowledge is fair. Memory is intact  Insight and judgment are fair.   Impulse control is intact at this time.

## 2022-10-22 NOTE — BH INPATIENT PSYCHIATRY PROGRESS NOTE - NSBHASSESSSUMMFT_PSY_ALL_CORE
52 year-old undomiciled male with SAD, poor treatment adherence., hx of cocaine and ETOH abuse, DM, htn, presents as transfer from [facility] where he has been treated for psychosis after testing positive for COVID    10/22 Update  Minimal change  Minimally cooperative today with exam  Poorly cooperative with diabetic diet   Poorly controlled blood sugars and diet  Asking about returning o [facility]  PLAN  Paliperidone Sustenna 156 mg IM 9/28/22 Q4 weeks, next dose on 10/26/22  Continue Divalproex 750 mg PO BID  Continue trazodone 200 mg PO QHS   Metformin/POCT  Lorazepam prn

## 2022-10-22 NOTE — BH INPATIENT PSYCHIATRY PROGRESS NOTE - NSBHFUPINTERVALHXFT_PSY_A_CORE
Patient seen for follow of SI and Substance misuse and schizoaffective Disorder and DM  Chart reviewed and case discussed with nursing staff  Patient remains only superficially cooperative  States he has been trying with the diet

## 2022-10-23 LAB
GLUCOSE BLDC GLUCOMTR-MCNC: 228 MG/DL — HIGH (ref 70–99)
GLUCOSE BLDC GLUCOMTR-MCNC: 251 MG/DL — HIGH (ref 70–99)
GLUCOSE BLDC GLUCOMTR-MCNC: 277 MG/DL — HIGH (ref 70–99)
GLUCOSE BLDC GLUCOMTR-MCNC: 308 MG/DL — HIGH (ref 70–99)

## 2022-10-23 PROCEDURE — 99231 SBSQ HOSP IP/OBS SF/LOW 25: CPT

## 2022-10-23 RX ORDER — INSULIN LISPRO 100/ML
9 VIAL (ML) SUBCUTANEOUS
Refills: 0 | Status: DISCONTINUED | OUTPATIENT
Start: 2022-10-23 | End: 2022-10-25

## 2022-10-23 RX ORDER — INSULIN GLARGINE 100 [IU]/ML
23 INJECTION, SOLUTION SUBCUTANEOUS AT BEDTIME
Refills: 0 | Status: DISCONTINUED | OUTPATIENT
Start: 2022-10-23 | End: 2022-10-24

## 2022-10-23 RX ADMIN — Medication 7 UNIT(S): at 12:37

## 2022-10-23 RX ADMIN — INSULIN GLARGINE 23 UNIT(S): 100 INJECTION, SOLUTION SUBCUTANEOUS at 21:33

## 2022-10-23 RX ADMIN — Medication 7 UNIT(S): at 09:35

## 2022-10-23 RX ADMIN — Medication 4: at 12:27

## 2022-10-23 RX ADMIN — Medication 3: at 16:35

## 2022-10-23 RX ADMIN — Medication 200 MILLIGRAM(S): at 20:20

## 2022-10-23 RX ADMIN — METFORMIN HYDROCHLORIDE 1000 MILLIGRAM(S): 850 TABLET ORAL at 20:20

## 2022-10-23 RX ADMIN — Medication 2: at 08:30

## 2022-10-23 RX ADMIN — DIVALPROEX SODIUM 750 MILLIGRAM(S): 500 TABLET, DELAYED RELEASE ORAL at 09:37

## 2022-10-23 RX ADMIN — Medication 9 UNIT(S): at 16:34

## 2022-10-23 RX ADMIN — Medication 1: at 21:32

## 2022-10-23 RX ADMIN — METFORMIN HYDROCHLORIDE 1000 MILLIGRAM(S): 850 TABLET ORAL at 09:37

## 2022-10-23 RX ADMIN — DIVALPROEX SODIUM 750 MILLIGRAM(S): 500 TABLET, DELAYED RELEASE ORAL at 20:21

## 2022-10-23 RX ADMIN — Medication 1 TABLET(S): at 09:42

## 2022-10-23 NOTE — BH INPATIENT PSYCHIATRY PROGRESS NOTE - MSE UNSTRUCTURED FT
On exam today the patient is superficially cooperative with questions.    Speech is clear but minimal.    Thought process: linear and goal directed.    Thought content: poverty of content but with no evidence of false beliefs or obsessions.   Perception: Denies hearing voices or other perceptual disturbances   Mood: Describes as "OK".   Affect: flat.    Patient denies active suicidal ideation, intention and plan.    Patient denies active aggressive/homicidal ideation, intent or plan.   Patient is Alert and oriented .   Fund of knowledge is fair. Memory is intact  Insight and judgment are fair.   Impulse control is intact at this time.

## 2022-10-23 NOTE — BH INPATIENT PSYCHIATRY PROGRESS NOTE - NSBHASSESSSUMMFT_PSY_ALL_CORE
52 year-old undomiciled male with SAD, poor treatment adherence., hx of cocaine and ETOH abuse, DM, htn, presents as transfer from [facility] where he has been treated for psychosis after testing positive for COVID    10/23 Update  Minimal change  Minimally cooperative today with exam  Poorly cooperative with diabetic diet   Poorly controlled blood sugars and diet  Asking about returning o [facility]  PLAN  Paliperidone Sustenna 156 mg IM 9/28/22 Q4 weeks, next dose on 10/26/22  Continue Divalproex 750 mg PO BID  Continue trazodone 200 mg PO QHS   Metformin/POCT  Lorazepam prn

## 2022-10-23 NOTE — BH INPATIENT PSYCHIATRY PROGRESS NOTE - NSBHFUPINTERVALHXFT_PSY_A_CORE
Patient seen for follow of SI and Substance misuse and schizoaffective Disorder and DM  Chart reviewed and case discussed with nursing staff  Patient remains only superficially cooperative but has been taking meds  States he has been trying with the diet but FS remain elevated

## 2022-10-24 LAB
GLUCOSE BLDC GLUCOMTR-MCNC: 216 MG/DL — HIGH (ref 70–99)
GLUCOSE BLDC GLUCOMTR-MCNC: 304 MG/DL — HIGH (ref 70–99)
GLUCOSE BLDC GLUCOMTR-MCNC: 316 MG/DL — HIGH (ref 70–99)
GLUCOSE BLDC GLUCOMTR-MCNC: 346 MG/DL — HIGH (ref 70–99)
SARS-COV-2 RNA SPEC QL NAA+PROBE: SIGNIFICANT CHANGE UP

## 2022-10-24 PROCEDURE — 99231 SBSQ HOSP IP/OBS SF/LOW 25: CPT

## 2022-10-24 RX ORDER — INSULIN GLARGINE 100 [IU]/ML
25 INJECTION, SOLUTION SUBCUTANEOUS AT BEDTIME
Refills: 0 | Status: DISCONTINUED | OUTPATIENT
Start: 2022-10-24 | End: 2022-10-25

## 2022-10-24 RX ADMIN — INSULIN GLARGINE 25 UNIT(S): 100 INJECTION, SOLUTION SUBCUTANEOUS at 21:08

## 2022-10-24 RX ADMIN — Medication 1 TABLET(S): at 08:44

## 2022-10-24 RX ADMIN — Medication 9 UNIT(S): at 16:25

## 2022-10-24 RX ADMIN — Medication 200 MILLIGRAM(S): at 21:06

## 2022-10-24 RX ADMIN — METFORMIN HYDROCHLORIDE 1000 MILLIGRAM(S): 850 TABLET ORAL at 08:44

## 2022-10-24 RX ADMIN — Medication 4: at 16:24

## 2022-10-24 RX ADMIN — Medication 9 UNIT(S): at 08:18

## 2022-10-24 RX ADMIN — Medication 9 UNIT(S): at 11:54

## 2022-10-24 RX ADMIN — Medication 2: at 08:18

## 2022-10-24 RX ADMIN — DIVALPROEX SODIUM 750 MILLIGRAM(S): 500 TABLET, DELAYED RELEASE ORAL at 21:07

## 2022-10-24 RX ADMIN — DIVALPROEX SODIUM 750 MILLIGRAM(S): 500 TABLET, DELAYED RELEASE ORAL at 08:44

## 2022-10-24 RX ADMIN — Medication 4: at 11:54

## 2022-10-24 RX ADMIN — METFORMIN HYDROCHLORIDE 1000 MILLIGRAM(S): 850 TABLET ORAL at 21:07

## 2022-10-24 NOTE — BH INPATIENT PSYCHIATRY PROGRESS NOTE - NSBHASSESSSUMMFT_PSY_ALL_CORE
52 year-old undomiciled male with SAD, poor treatment adherence., hx of cocaine and ETOH abuse, DM, htn, presents as transfer from [facility] where he has been treated for psychosis after testing positive for COVID    10/24 Update  Minimal change  More irritable initially today with exam  Poorly cooperative with diabetic diet   Poorly controlled blood sugars and diet  Asking about discharge then requesting returning to [facility]  PLAN  Paliperidone Sustenna 156 mg IM 9/28/22 Q4 weeks, next dose on 10/26/22  Continue Divalproex 750 mg PO BID  Continue trazodone 200 mg PO QHS   Metformin/POCT  Lorazepam prn

## 2022-10-24 NOTE — ADVANCED PRACTICE NURSE CONSULT - ASSESSMENT
Patient is a 52 year-old undomiciled male with SAD, hx of cocaine and ETOH abuse, DM, htn, presents as transfer from [facility] where he has been treated for psychosis after testing positive for COVID  Patient was seeing in the day room where he stated that he does not care for his diabetes because he wants to die.  Endorsed to primary nurse and Dr. Mendelowitz.

## 2022-10-24 NOTE — BH INPATIENT PSYCHIATRY PROGRESS NOTE - MSE UNSTRUCTURED FT
On exam today the patient is somewhat irritable    Speech is clear but minimal.    Thought process: linear and goal directed.    Thought content: poverty of content but with no evidence of false beliefs or obsessions.   Perception: Denies hearing voices or other perceptual disturbances   Mood: Describes as "the same!".   Affect: flat.    Patient denies active suicidal ideation, intention and plan.    Patient denies active aggressive/homicidal ideation, intent or plan.   Patient is Alert and oriented .   Fund of knowledge is fair. Memory is intact  Insight and judgment are fair.   Impulse control is intact at this time.

## 2022-10-24 NOTE — BH INPATIENT PSYCHIATRY PROGRESS NOTE - NSBHFUPINTERVALHXFT_PSY_A_CORE
Patient seen for follow of SI and Substance misuse and schizoaffective Disorder and DM  Chart reviewed and case discussed with nursing staff  Patient remains only superficially cooperative but has been taking meds  Started interview by demanding discharge and then ended with requesting transfer back to [facility]

## 2022-10-24 NOTE — BH SAFETY PLAN - WARNING SIGN 1
Patient declined to complete safety plan, however verbally participated in completing safety plan Patient declined to complete safety plan, however verbally participated in completing safety plan during group

## 2022-10-25 LAB
GLUCOSE BLDC GLUCOMTR-MCNC: 236 MG/DL — HIGH (ref 70–99)
GLUCOSE BLDC GLUCOMTR-MCNC: 261 MG/DL — HIGH (ref 70–99)
GLUCOSE BLDC GLUCOMTR-MCNC: 338 MG/DL — HIGH (ref 70–99)
GLUCOSE BLDC GLUCOMTR-MCNC: 381 MG/DL — HIGH (ref 70–99)
SARS-COV-2 RNA SPEC QL NAA+PROBE: SIGNIFICANT CHANGE UP

## 2022-10-25 PROCEDURE — 99231 SBSQ HOSP IP/OBS SF/LOW 25: CPT

## 2022-10-25 RX ORDER — PALIPERIDONE 1.5 MG/1
156 TABLET, EXTENDED RELEASE ORAL
Refills: 0 | Status: DISCONTINUED | OUTPATIENT
Start: 2022-10-25 | End: 2022-10-26

## 2022-10-25 RX ORDER — METFORMIN HYDROCHLORIDE 850 MG/1
1 TABLET ORAL
Qty: 0 | Refills: 0 | DISCHARGE
Start: 2022-10-25

## 2022-10-25 RX ORDER — INSULIN LISPRO 100/ML
VIAL (ML) SUBCUTANEOUS AT BEDTIME
Refills: 0 | Status: DISCONTINUED | OUTPATIENT
Start: 2022-10-25 | End: 2022-10-26

## 2022-10-25 RX ORDER — INSULIN GLARGINE 100 [IU]/ML
28 INJECTION, SOLUTION SUBCUTANEOUS
Qty: 0 | Refills: 0 | DISCHARGE
Start: 2022-10-25

## 2022-10-25 RX ORDER — TRAZODONE HCL 50 MG
2 TABLET ORAL
Qty: 0 | Refills: 0 | DISCHARGE
Start: 2022-10-25

## 2022-10-25 RX ORDER — PALIPERIDONE 1.5 MG/1
156 TABLET, EXTENDED RELEASE ORAL
Qty: 0 | Refills: 0 | DISCHARGE
Start: 2022-10-25

## 2022-10-25 RX ORDER — INSULIN GLARGINE 100 [IU]/ML
28 INJECTION, SOLUTION SUBCUTANEOUS AT BEDTIME
Refills: 0 | Status: DISCONTINUED | OUTPATIENT
Start: 2022-10-25 | End: 2022-10-26

## 2022-10-25 RX ORDER — INSULIN LISPRO 100/ML
10 VIAL (ML) SUBCUTANEOUS
Refills: 0 | Status: DISCONTINUED | OUTPATIENT
Start: 2022-10-25 | End: 2022-10-26

## 2022-10-25 RX ORDER — INSULIN LISPRO 100/ML
VIAL (ML) SUBCUTANEOUS
Refills: 0 | Status: DISCONTINUED | OUTPATIENT
Start: 2022-10-25 | End: 2022-10-26

## 2022-10-25 RX ORDER — INSULIN LISPRO 100/ML
9 VIAL (ML) SUBCUTANEOUS
Qty: 0 | Refills: 0 | DISCHARGE
Start: 2022-10-25

## 2022-10-25 RX ORDER — DIVALPROEX SODIUM 500 MG/1
3 TABLET, DELAYED RELEASE ORAL
Qty: 0 | Refills: 0 | DISCHARGE
Start: 2022-10-25

## 2022-10-25 RX ORDER — TRAZODONE HCL 50 MG
250 TABLET ORAL
Qty: 0 | Refills: 0 | DISCHARGE

## 2022-10-25 RX ADMIN — DIVALPROEX SODIUM 750 MILLIGRAM(S): 500 TABLET, DELAYED RELEASE ORAL at 20:45

## 2022-10-25 RX ADMIN — DIVALPROEX SODIUM 750 MILLIGRAM(S): 500 TABLET, DELAYED RELEASE ORAL at 09:06

## 2022-10-25 RX ADMIN — Medication 8: at 11:31

## 2022-10-25 RX ADMIN — Medication 2: at 07:59

## 2022-10-25 RX ADMIN — Medication 6: at 21:02

## 2022-10-25 RX ADMIN — Medication 10 UNIT(S): at 16:15

## 2022-10-25 RX ADMIN — METFORMIN HYDROCHLORIDE 1000 MILLIGRAM(S): 850 TABLET ORAL at 20:45

## 2022-10-25 RX ADMIN — Medication 6: at 16:16

## 2022-10-25 RX ADMIN — Medication 1 TABLET(S): at 09:06

## 2022-10-25 RX ADMIN — Medication 200 MILLIGRAM(S): at 20:46

## 2022-10-25 RX ADMIN — Medication 9 UNIT(S): at 08:00

## 2022-10-25 RX ADMIN — METFORMIN HYDROCHLORIDE 1000 MILLIGRAM(S): 850 TABLET ORAL at 09:06

## 2022-10-25 RX ADMIN — INSULIN GLARGINE 28 UNIT(S): 100 INJECTION, SOLUTION SUBCUTANEOUS at 21:03

## 2022-10-25 RX ADMIN — PALIPERIDONE 156 MILLIGRAM(S): 1.5 TABLET, EXTENDED RELEASE ORAL at 10:03

## 2022-10-25 RX ADMIN — Medication 10 UNIT(S): at 11:30

## 2022-10-25 NOTE — BH INPATIENT PSYCHIATRY DISCHARGE NOTE - NSDCCPCAREPLAN_GEN_ALL_CORE_FT
PRINCIPAL DISCHARGE DIAGNOSIS  Diagnosis: Schizoaffective disorder  Assessment and Plan of Treatment:       SECONDARY DISCHARGE DIAGNOSES  Diagnosis: DM (diabetes mellitus)  Assessment and Plan of Treatment:     Diagnosis: Cluster B personality disorder  Assessment and Plan of Treatment:     Diagnosis: 2019 novel coronavirus disease (COVID-19)  Assessment and Plan of Treatment:     Diagnosis: Polysubstance abuse  Assessment and Plan of Treatment:

## 2022-10-25 NOTE — BH INPATIENT PSYCHIATRY PROGRESS NOTE - NSBHASSESSSUMMFT_PSY_ALL_CORE
52 year-old undomiciled male with SAD, poor treatment adherence., hx of cocaine and ETOH abuse, DM, htn, presents as transfer from [facility] where he has been treated for psychosis after testing positive for COVID    10/25 Update  Minimal change  More cooperative today with exam  Poorly cooperative with diabetic diet   Poorly controlled blood sugars and diet  Asking about discharge then requesting returning to [facility]  Will give sustenna today as likely to return to [facility] tomorrow  PLAN  Paliperidone Sustenna 156 mg IM 9/28/22 Q4 weeks, next dose on 10/26/22  Continue Divalproex 750 mg PO BID  Continue trazodone 200 mg PO QHS   Metformin/POCT  Lorazepam prn

## 2022-10-25 NOTE — BH INPATIENT PSYCHIATRY DISCHARGE NOTE - NSBHMETABOLIC_PSY_ALL_CORE_FT
BMI: BMI (kg/m2): 26.6 (10-18-22 @ 16:51)  HbA1c: A1C with Estimated Average Glucose Result: 9.9 % (10-21-22 @ 09:10)    Glucose: POCT Blood Glucose.: 316 mg/dL (10-24-22 @ 20:08)    BP: --  Lipid Panel: Date/Time: 10-21-22 @ 09:10  Cholesterol, Serum: 151  Direct LDL: --  HDL Cholesterol, Serum: 29  Total Cholesterol/HDL Ration Measurement: --  Triglycerides, Serum: 138

## 2022-10-25 NOTE — BH INPATIENT PSYCHIATRY PROGRESS NOTE - MSE UNSTRUCTURED FT
On exam today the patient is superficially cooperative     Speech is clear but minimal.    Thought process: linear and goal directed.    Thought content: poverty of content but with no evidence of false beliefs or obsessions.   Perception: Denies hearing voices or other perceptual disturbances   Mood: Describes as "OK today!".   Affect: flat.    Patient denies active suicidal ideation, intention and plan.    Patient denies active aggressive/homicidal ideation, intent or plan.   Patient is Alert and oriented .   Fund of knowledge is fair. Memory is intact  Insight and judgment are fair.   Impulse control is intact at this time.
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
## 2022-10-25 NOTE — BH INPATIENT PSYCHIATRY PROGRESS NOTE - NSCGINOTASSEIMPRO_PSY_ALL_CORE
CGI not assessed

## 2022-10-25 NOTE — BH INPATIENT PSYCHIATRY PROGRESS NOTE - NSBHFUPINTERVALHXFT_PSY_A_CORE
Patient seen for follow of SI and Substance misuse and schizoaffective Disorder and DM  Chart reviewed and case discussed with nursing staff  Patient remains only superficially cooperative but has been taking meds  Started interview by discussing his blood sugar and not being concerned  Denies active SI but states he will likely not live long and if he needed an amputation than at that time he would kill himself but has no plan

## 2022-10-25 NOTE — BH INPATIENT PSYCHIATRY DISCHARGE NOTE - HPI (INCLUDE ILLNESS QUALITY, SEVERITY, DURATION, TIMING, CONTEXT, MODIFYING FACTORS, ASSOCIATED SIGNS AND SYMPTOMS)
53 y/o unemployed recently homeless 53 y/o male with a history of schizoaffective disorder, cocaine use disorder, and uncontrolled DM recently admitted on 2PC for SI, command type AH. He is on ACT and AOT recently discharged from [facility]. Record shows at least 6 hospitalizations at [facility] in 2022 and at least 9 hospitalizations at [facility] in 2021. On 5/29/22, he reported unwitnessed fall at [facility], sent to ED and eloped. He relapsed immediately on alcohol, cocaine, and ecstasy for 2 days then re-hospitalized in September, has been at [facility] prior to testing + for COVID and being transferred to [facility] (COVID + unit).  Course of hospitalization prior to transfer:    (09/28/22) D/C PO Risperdal. Invega Sustenna 156 mg IM administered    (10/04/22) refused MAT. [facility] transfer hearing held    (10/05/22) refused MAT and spa housing application    (10/07/22) the patient submitted another request for court hearing and rescinded the request at night    (10/08/22) the patient submitted another request for court hearing.    (10/10/22) the patient was irritable and impulsive then stormed out of the room during an evaluation. Depakote increased to 750 mg BID.    (10/11/22) the patient was agitated, belligerent, and refused to discuss the concern of self-harm through neglecting diabetic care or suicidal ideation.    (10/12/22) the patient rescinded the request for court hearing.    (10/17/22) COVID-19 PCR positive. The patient on SRO and wearing mask while waiting for transfer.

## 2022-10-25 NOTE — BH INPATIENT PSYCHIATRY DISCHARGE NOTE - NSBHANTIPSYCHOTIC_PSY_ALL_CORE
No new care gaps identified.  Powered by [employer]. Reference number: [id number].   4/30/2022 11:58:23 AM CDT   Yes...

## 2022-10-25 NOTE — BH INPATIENT PSYCHIATRY DISCHARGE NOTE - HOSPITAL COURSE
Admitted on [address] from 10/18/22 through 10/26/22    Patient was transferred to [facility] from [facility] when he converted to COVID +  On admission the patient was irritable and demanding  He first demanded his discharge, then demanded a transfer to a substance rehab then agreed to back to [facility] for further treatment  While at [facility] patient was mostly isolative and stayed in his room  He did not interact much with peers  He was guarded about his symptoms but denies AH and paranoia  His mood was labile from depressed to irritable  Despite multiple efforts and discussions by staff and Diabetic Nurse to improve his diet he made little if any effort and stated he didn't care  His HBG A1 C was 9.9 but didn't care when this was explained to him and the potential long term consequences    On discharge exam today the patient is cooperative and makes fair eye contact.   Speech is clear and of normal rate.  Thought process: with no disorder of thought process.   Thought content: with no evidence of delusional beliefs.   Perception: Denies hallucinations.  Mood: Describes as "improved"   Affect: flat.  Patient denies suicidal and aggressive ideation, intent and plan.   AAO X3. Cognitively grossly intact.   Insight and judgment are improved.  Impulse control is intact at this time    Suicide and risk assessment performed prior to discharge.   The patient has a low acute risk and low chronic risk of self-harm and aggression towards others.   Protective factors include denying SI, no SIB, denying HI, good social supports in their family, no substance abuse, no current mood symptoms, no hopelessness, future-oriented in returning to home, no access to firearms.    Risk factors include presenting illness. Immediate risk was minimized by inpatient admission to a safe environment with appropriate supervision and limited access to lethal means.   Future risk was minimized before discharge by treatment of acute episode, maximizing outpatient support, providing relevant patient education, discussing emergency procedures, and ensuring close follow-up.  The patient remains at a low risk of self-harm, and such risk cannot be further ameliorated by continued inpatient treatment and the patient is therefore appropriate for discharge.       There were no behavioral problems on the unit.  Patient did not become agitated and did not require emergent intramuscular medications or seclusion / restraints.  Patient did not self-harm on the unit.        Patient had poorly controlled Blood Sugars which as note he did not care about.  He did not have any new  medical problems during this hospitalization.  There were no medical consultations.    A full discussion of the factors that predict treatment success and relapse was held including safety planning.    A discussion of the risks and benefits of patient’s medication was held before discharge.  This included a discussion of the metabolic risks and risk of EPS and TD     On day of discharge, the patient no longer requires inpatient treatment and care. Patient denies all suicidal and aggressive ideation, intent and plan. Patient denies anxiety symptoms and panic attacks. Patient is not judged to be an acute danger to self or others at this time. Patient will be discharged to return [facility] for further inpatient treatment .   Admitted on 1 [place] from 10/18/22 through 10/26/22    Patient was transferred to [facility] from [facility] when he converted to COVID +  On admission the patient was irritable and demanding  He first demanded his discharge, then demanded a transfer to a substance rehab then agreed to back to [facility] for further treatment  While at [facility] patient was mostly isolative and stayed in his room  He did not interact much with peers or staff  He was guarded about his symptoms but denies AH and paranoia  His mood was labile from depressed to irritable at times  Despite multiple efforts and discussions by staff and Diabetic Nurse to improve his diet he made little if any effort and stated he didn't care  His HBG A1 C was 9.9 but didn't care when this was explained to him and the potential long term consequences  Insulin was adjusted to 9 Units of Admelog Insulin before meals and 23 Units of Lantus Insulin at bedtime in addition to Metformin 1000 mg BID  Divalproex 750 mg BID was continued  Paliperidone Sustenna 156 mg was given on 10/25 with next dose due 11/22/2022    On discharge exam today the patient is cooperative and makes fair eye contact.   Speech is clear and of normal rate.  Thought process: with no disorder of thought process.   Thought content: with no evidence of delusional beliefs.   Perception: Denies hallucinations.  Mood: Describes as "improved"   Affect: flat.  Patient denies suicidal and aggressive ideation, intent and plan.   AAO X3. Cognitively grossly intact.   Insight and judgment are improved.  Impulse control is intact at this time    Suicide and risk assessment performed prior to discharge.   The patient has a low acute risk and low chronic risk of self-harm and aggression towards others.   Protective factors include denying SI, no SIB, denying HI, good social supports in their family, no substance abuse, no current mood symptoms, no hopelessness, future-oriented in returning to home, no access to firearms.    Risk factors include presenting illness. Immediate risk was minimized by inpatient admission to a safe environment with appropriate supervision and limited access to lethal means.   Future risk was minimized before discharge by treatment of acute episode, maximizing outpatient support, providing relevant patient education, discussing emergency procedures, and ensuring close follow-up.  The patient remains at a low risk of self-harm, and such risk cannot be further ameliorated by continued inpatient treatment and the patient is therefore appropriate for discharge.       There were no behavioral problems on the unit.  Patient did not become agitated and did not require emergent intramuscular medications or seclusion / restraints.  Patient did not self-harm on the unit.        Patient had poorly controlled Blood Sugars which as note he did not care about.  He did not have any new  medical problems during this hospitalization.  There were no medical consultations.    A full discussion of the factors that predict treatment success and relapse was held including safety planning.    A discussion of the risks and benefits of patient’s medication was held before discharge.  This included a discussion of the metabolic risks and risk of EPS and TD     On day of discharge, the patient no longer requires inpatient treatment and care. Patient denies all suicidal and aggressive ideation, intent and plan. Patient denies anxiety symptoms and panic attacks. Patient is not judged to be an acute danger to self or others at this time. Patient will be discharged to return [facility] for further inpatient treatment .

## 2022-10-25 NOTE — BH INPATIENT PSYCHIATRY DISCHARGE NOTE - NSDCMRMEDTOKEN_GEN_ALL_CORE_FT
Depakote 500 mg oral delayed release tablet: 1 tab(s) orally 2 times a day  metFORMIN 1000 mg oral tablet: 1 tab(s) orally 2 times a day   traZODone: 250 milligram(s) orally once a day (at bedtime)   divalproex sodium 250 mg oral delayed release tablet: 3 tab(s) orally 2 times a day  insulin glargine 100 units/mL subcutaneous solution: 28 unit(s) subcutaneous once a day (at bedtime)  insulin lispro 100 units/mL injectable solution: 9 unit(s) injectable 3 times a day  before meals  metFORMIN 1000 mg oral tablet: 1 tab(s) orally 2 times a day  Multiple Vitamins oral tablet: 1 tab(s) orally once a day  paliperidone: 156 milligram(s) intramuscular every 28 days  next dose 11/22/22  traZODone 100 mg oral tablet: 2 tab(s) orally once a day (at bedtime)

## 2022-10-25 NOTE — ED BEHAVIORAL HEALTH ASSESSMENT NOTE - THOUGHT ASSOCIATIONS
Radiology Discharge Summary      Admit date: 10/24/2022  9:48 AM  Discharge date: October 25, 2022    Instructions Given to patient: YesVerbal    Diet: Regular    Activity:NO Restrictions    Medications on discharge (List): Refer to Discharge Medication List    Hospital Course: Following informed consent and time out  the patient received moderate sedation Fentanyl 50 mcg and Versed 2 mg IV and Dr. Gramajo and SHANNON Stein monitored the patient for BP, pulse and pulse oximetry from 0954 until 1105.      Pt brought to Ct and following local prep and local anesthesia a bone marrow aspiration and core biopsy was performed with good specimens.     Description of Condition on Discharge: stable    Discharge Disposition: Pt returned to Floor     Discharge Diagnosis: Anemia    Patient to Follow up with Dr. Hummel    Normal

## 2022-10-26 VITALS — TEMPERATURE: 98 F

## 2022-10-26 LAB
GLUCOSE BLDC GLUCOMTR-MCNC: 211 MG/DL — HIGH (ref 70–99)
GLUCOSE BLDC GLUCOMTR-MCNC: 212 MG/DL — HIGH (ref 70–99)

## 2022-10-26 RX ORDER — INSULIN GLARGINE 100 [IU]/ML
34 INJECTION, SOLUTION SUBCUTANEOUS AT BEDTIME
Refills: 0 | Status: DISCONTINUED | OUTPATIENT
Start: 2022-10-26 | End: 2022-10-26

## 2022-10-26 RX ORDER — INSULIN LISPRO 100/ML
14 VIAL (ML) SUBCUTANEOUS
Refills: 0 | Status: DISCONTINUED | OUTPATIENT
Start: 2022-10-26 | End: 2022-10-26

## 2022-10-26 RX ADMIN — METFORMIN HYDROCHLORIDE 1000 MILLIGRAM(S): 850 TABLET ORAL at 08:20

## 2022-10-26 RX ADMIN — Medication 10 UNIT(S): at 08:14

## 2022-10-26 RX ADMIN — Medication 1 TABLET(S): at 08:20

## 2022-10-26 RX ADMIN — Medication 4: at 12:11

## 2022-10-26 RX ADMIN — DIVALPROEX SODIUM 750 MILLIGRAM(S): 500 TABLET, DELAYED RELEASE ORAL at 08:20

## 2022-10-26 RX ADMIN — Medication 14 UNIT(S): at 12:12

## 2022-10-26 RX ADMIN — Medication 4: at 08:15

## 2022-10-26 NOTE — BH INPATIENT PSYCHIATRY PROGRESS NOTE - NSICDXBHSECONDARYDX_PSY_ALL_CORE
Polysubstance abuse   F19.10  DM (diabetes mellitus)   E11.9  Cluster B personality disorder   F60.89  
Polysubstance abuse   F19.10  DM (diabetes mellitus)   E11.9  Cluster B personality disorder   F60.89  2019 novel coronavirus disease (COVID-19)   U07.1  
Polysubstance abuse   F19.10  DM (diabetes mellitus)   E11.9  Cluster B personality disorder   F60.89  
Polysubstance abuse   F19.10  DM (diabetes mellitus)   E11.9  Cluster B personality disorder   F60.89  
Polysubstance abuse   F19.10  DM (diabetes mellitus)   E11.9  Cluster B personality disorder   F60.89  2019 novel coronavirus disease (COVID-19)   U07.1  
Polysubstance abuse   F19.10  DM (diabetes mellitus)   E11.9  Cluster B personality disorder   F60.89  2019 novel coronavirus disease (COVID-19)   U07.1

## 2022-10-26 NOTE — BH INPATIENT PSYCHIATRY PROGRESS NOTE - NSICDXBHPRIMARYDX_PSY_ALL_CORE
Normal mammogram notification via [employer] sent
Schizoaffective disorder   F25.9  

## 2022-10-26 NOTE — BH INPATIENT PSYCHIATRY PROGRESS NOTE - NSBHATTESTBILLINGAW_PSY_A_CORE
24610-Tosbvfgvxn Inpatient care - low complexity - 15 minutes
95716-Atyfvtleox Inpatient care - low complexity - 15 minutes
87085-Pzvbenvgpr Inpatient care - low complexity - 15 minutes
07037-Kqtilydmbg Inpatient care - low complexity - 15 minutes
02766-Iligxksyun Inpatient care - low complexity - 15 minutes
[id number]-Vsfzbzhvxd Inpatient care - low complexity - 15 minutes
Non-billable
40030-Miycxvvqqp Inpatient care - low complexity - 15 minutes
29-Nov-2020 17:10

## 2022-10-26 NOTE — BH INPATIENT PSYCHIATRY PROGRESS NOTE - NSBHFUPINTERVALCCFT_PSY_A_CORE
"Isa send me back to [facility]"
"I am ready to go!"
"I need a podiatrist!!!!!!!"
"I need rehab is what I need!"
"Truth is I don't really care about my blood sugars... I'm not going to live forever!"
"any news DOC?   When am I leaving?"
"I ate better yesterday!!!"
"Why did you order more blood work for me??"

## 2022-10-26 NOTE — BH INPATIENT PSYCHIATRY PROGRESS NOTE - MSE UNSTRUCTURED FT
On exam today the patient is cooperative     Speech is clear but minimal.    Thought process: linear and goal directed.    Thought content: poverty of content but with no evidence of false beliefs or obsessions.   Perception: Denies hearing voices or other perceptual disturbances   Mood: Describes as "good today!".   Affect: flat.    Patient denies active suicidal ideation, intention and plan.    Patient denies active aggressive/homicidal ideation, intent or plan.   Patient is Alert and oriented .   Fund of knowledge is fair. Memory is intact  Insight and judgment are fair.   Impulse control is intact at this time.

## 2022-10-26 NOTE — BH INPATIENT PSYCHIATRY PROGRESS NOTE - NSTXIMPULSGOAL_PSY_ALL_CORE
Will identify 1 behavior to cope with impulsive urges
Will be able to demonstrate the ability to pause before acting out negatively

## 2022-10-26 NOTE — BH DISCHARGE NOTE NURSING/SOCIAL WORK/PSYCH REHAB - PATIENT PORTAL LINK FT
You can access the FollowMyHealth Patient Portal offered by [facility] by registering at the following website: [email or web address]. By joining [email or web address]’s FollowMyHealth portal, you will also be able to view your health information using other applications (apps) compatible with our system.

## 2022-10-26 NOTE — BH INPATIENT PSYCHIATRY PROGRESS NOTE - PRN MEDS
MEDICATIONS  (PRN):  dextrose Oral Gel 15 Gram(s) Oral once PRN Blood Glucose LESS THAN 70 milliGRAM(s)/deciliter  LORazepam   Injectable 2 milliGRAM(s) IntraMuscular once PRN Agitation  

## 2022-10-26 NOTE — BH INPATIENT PSYCHIATRY PROGRESS NOTE - NSBHASSESSSUMMFT_PSY_ALL_CORE
52 year-old undomiciled male with SAD, poor treatment adherence., hx of cocaine and ETOH abuse, DM, htn, presents as transfer from [facility] where he has been treated for psychosis after testing positive for COVID    10/26 Update  stable for transfer back to [facility]  Suicide and risk assessment performed prior to transfer.   The patient has a low acute risk and low chronic risk of self-harm and aggression towards others.   Protective factors include denying SI, no SIB, denying HI, good social supports in their family, no substance abuse, no current mood symptoms, no hopelessness, future-oriented in returning to home, no access to firearms.    Risk factors include presenting illness. Immediate risk was minimized by inpatient admission to a safe environment with appropriate supervision and limited access to lethal means.   Future risk was minimized before discharge by treatment of acute episode, maximizing outpatient support, providing relevant patient education, discussing emergency procedures, and ensuring close follow-up.  The patient remains at a low risk of self-harm, and such risk cannot be further ameliorated by continued inpatient treatment and the patient is therefore appropriate for transfer

## 2022-10-26 NOTE — BH INPATIENT PSYCHIATRY PROGRESS NOTE - NSTXIMPULSINTERMD_PSY_ALL_CORE
meds and therapy

## 2022-10-26 NOTE — BH INPATIENT PSYCHIATRY PROGRESS NOTE - NSTXDEPRESGOAL_PSY_ALL_CORE
Will identify thoughts and self-talk that contribute to depression
Will identify thoughts and self-talk that contribute to depression
Exhibit improvements in self-grooming, hygiene, sleep and appetite
Will identify thoughts and self-talk that contribute to depression

## 2022-10-26 NOTE — BH INPATIENT PSYCHIATRY PROGRESS NOTE - NSBHFUPINTERVALHXFT_PSY_A_CORE
Patient seen for follow of SI and Substance misuse and schizoaffective Disorder and DM and discharge day management   Chart reviewed and case discussed with nursing staff  Patient more cooperative and agrees to transfer back to [facility]  Denies AH and delusions  Denies SI

## 2022-10-26 NOTE — BH INPATIENT PSYCHIATRY PROGRESS NOTE - CURRENT MEDICATION
MEDICATIONS  (STANDING):  dextrose 5%. 1000 milliLiter(s) (50 mL/Hr) IV Continuous <Continuous>  dextrose 5%. 1000 milliLiter(s) (100 mL/Hr) IV Continuous <Continuous>  dextrose 50% Injectable 25 Gram(s) IV Push once  dextrose 50% Injectable 12.5 Gram(s) IV Push once  dextrose 50% Injectable 25 Gram(s) IV Push once  diVALproex  milliGRAM(s) Oral two times a day  glucagon  Injectable 1 milliGRAM(s) IntraMuscular once  insulin glargine Injectable (LANTUS) 9 Unit(s) SubCutaneous at bedtime  insulin lispro (ADMELOG) corrective regimen sliding scale   SubCutaneous three times a day before meals  insulin lispro (ADMELOG) corrective regimen sliding scale   SubCutaneous at bedtime  insulin lispro Injectable (ADMELOG) 3 Unit(s) SubCutaneous three times a day before meals  metFORMIN 1000 milliGRAM(s) Oral two times a day  multivitamin 1 Tablet(s) Oral daily  traZODone 200 milliGRAM(s) Oral at bedtime    MEDICATIONS  (PRN):  dextrose Oral Gel 15 Gram(s) Oral once PRN Blood Glucose LESS THAN 70 milliGRAM(s)/deciliter  LORazepam   Injectable 2 milliGRAM(s) IntraMuscular once PRN Agitation  
MEDICATIONS  (STANDING):  dextrose 5%. 1000 milliLiter(s) (50 mL/Hr) IV Continuous <Continuous>  dextrose 5%. 1000 milliLiter(s) (100 mL/Hr) IV Continuous <Continuous>  dextrose 50% Injectable 25 Gram(s) IV Push once  dextrose 50% Injectable 12.5 Gram(s) IV Push once  dextrose 50% Injectable 25 Gram(s) IV Push once  diVALproex  milliGRAM(s) Oral two times a day  glucagon  Injectable 1 milliGRAM(s) IntraMuscular once  insulin glargine Injectable (LANTUS) 13 Unit(s) SubCutaneous at bedtime  insulin lispro (ADMELOG) corrective regimen sliding scale   SubCutaneous three times a day before meals  insulin lispro (ADMELOG) corrective regimen sliding scale   SubCutaneous at bedtime  insulin lispro Injectable (ADMELOG) 4 Unit(s) SubCutaneous three times a day before meals  metFORMIN 1000 milliGRAM(s) Oral two times a day  traZODone 200 milliGRAM(s) Oral at bedtime    MEDICATIONS  (PRN):  dextrose Oral Gel 15 Gram(s) Oral once PRN Blood Glucose LESS THAN 70 milliGRAM(s)/deciliter  LORazepam   Injectable 2 milliGRAM(s) IntraMuscular once PRN Agitation  
MEDICATIONS  (STANDING):  dextrose 5%. 1000 milliLiter(s) (50 mL/Hr) IV Continuous <Continuous>  dextrose 5%. 1000 milliLiter(s) (100 mL/Hr) IV Continuous <Continuous>  dextrose 50% Injectable 25 Gram(s) IV Push once  dextrose 50% Injectable 12.5 Gram(s) IV Push once  dextrose 50% Injectable 25 Gram(s) IV Push once  diVALproex  milliGRAM(s) Oral two times a day  glucagon  Injectable 1 milliGRAM(s) IntraMuscular once  insulin glargine Injectable (LANTUS) 34 Unit(s) SubCutaneous at bedtime  insulin lispro (ADMELOG) corrective regimen sliding scale   SubCutaneous three times a day before meals  insulin lispro (ADMELOG) corrective regimen sliding scale   SubCutaneous at bedtime  insulin lispro Injectable (ADMELOG) 14 Unit(s) SubCutaneous three times a day before meals  metFORMIN 1000 milliGRAM(s) Oral two times a day  multivitamin 1 Tablet(s) Oral daily  paliperidone Injectable, Long Acting 156 milliGRAM(s) IntraMuscular <User Schedule>  traZODone 200 milliGRAM(s) Oral at bedtime    MEDICATIONS  (PRN):  dextrose Oral Gel 15 Gram(s) Oral once PRN Blood Glucose LESS THAN 70 milliGRAM(s)/deciliter  LORazepam   Injectable 2 milliGRAM(s) IntraMuscular once PRN Agitation  
MEDICATIONS  (STANDING):  dextrose 5%. 1000 milliLiter(s) (50 mL/Hr) IV Continuous <Continuous>  dextrose 5%. 1000 milliLiter(s) (100 mL/Hr) IV Continuous <Continuous>  dextrose 50% Injectable 25 Gram(s) IV Push once  dextrose 50% Injectable 12.5 Gram(s) IV Push once  dextrose 50% Injectable 25 Gram(s) IV Push once  diVALproex  milliGRAM(s) Oral two times a day  glucagon  Injectable 1 milliGRAM(s) IntraMuscular once  insulin glargine Injectable (LANTUS) 25 Unit(s) SubCutaneous at bedtime  insulin lispro (ADMELOG) corrective regimen sliding scale   SubCutaneous three times a day before meals  insulin lispro (ADMELOG) corrective regimen sliding scale   SubCutaneous at bedtime  insulin lispro Injectable (ADMELOG) 9 Unit(s) SubCutaneous three times a day before meals  metFORMIN 1000 milliGRAM(s) Oral two times a day  multivitamin 1 Tablet(s) Oral daily  traZODone 200 milliGRAM(s) Oral at bedtime    MEDICATIONS  (PRN):  dextrose Oral Gel 15 Gram(s) Oral once PRN Blood Glucose LESS THAN 70 milliGRAM(s)/deciliter  LORazepam   Injectable 2 milliGRAM(s) IntraMuscular once PRN Agitation  
MEDICATIONS  (STANDING):  dextrose 5%. 1000 milliLiter(s) (50 mL/Hr) IV Continuous <Continuous>  dextrose 5%. 1000 milliLiter(s) (100 mL/Hr) IV Continuous <Continuous>  dextrose 50% Injectable 25 Gram(s) IV Push once  dextrose 50% Injectable 12.5 Gram(s) IV Push once  dextrose 50% Injectable 25 Gram(s) IV Push once  diVALproex  milliGRAM(s) Oral two times a day  glucagon  Injectable 1 milliGRAM(s) IntraMuscular once  insulin glargine Injectable (LANTUS) 25 Unit(s) SubCutaneous at bedtime  insulin lispro (ADMELOG) corrective regimen sliding scale   SubCutaneous three times a day before meals  insulin lispro (ADMELOG) corrective regimen sliding scale   SubCutaneous at bedtime  insulin lispro Injectable (ADMELOG) 9 Unit(s) SubCutaneous three times a day before meals  metFORMIN 1000 milliGRAM(s) Oral two times a day  multivitamin 1 Tablet(s) Oral daily  traZODone 200 milliGRAM(s) Oral at bedtime    MEDICATIONS  (PRN):  dextrose Oral Gel 15 Gram(s) Oral once PRN Blood Glucose LESS THAN 70 milliGRAM(s)/deciliter  LORazepam   Injectable 2 milliGRAM(s) IntraMuscular once PRN Agitation  
MEDICATIONS  (STANDING):  dextrose 5%. 1000 milliLiter(s) (50 mL/Hr) IV Continuous <Continuous>  dextrose 5%. 1000 milliLiter(s) (100 mL/Hr) IV Continuous <Continuous>  dextrose 50% Injectable 25 Gram(s) IV Push once  dextrose 50% Injectable 12.5 Gram(s) IV Push once  dextrose 50% Injectable 25 Gram(s) IV Push once  diVALproex  milliGRAM(s) Oral two times a day  glucagon  Injectable 1 milliGRAM(s) IntraMuscular once  insulin glargine Injectable (LANTUS) 20 Unit(s) SubCutaneous at bedtime  insulin lispro (ADMELOG) corrective regimen sliding scale   SubCutaneous three times a day before meals  insulin lispro (ADMELOG) corrective regimen sliding scale   SubCutaneous at bedtime  insulin lispro Injectable (ADMELOG) 7 Unit(s) SubCutaneous three times a day before meals  metFORMIN 1000 milliGRAM(s) Oral two times a day  multivitamin 1 Tablet(s) Oral daily  traZODone 200 milliGRAM(s) Oral at bedtime    MEDICATIONS  (PRN):  dextrose Oral Gel 15 Gram(s) Oral once PRN Blood Glucose LESS THAN 70 milliGRAM(s)/deciliter  LORazepam   Injectable 2 milliGRAM(s) IntraMuscular once PRN Agitation  
MEDICATIONS  (STANDING):  dextrose 5%. 1000 milliLiter(s) (100 mL/Hr) IV Continuous <Continuous>  dextrose 5%. 1000 milliLiter(s) (50 mL/Hr) IV Continuous <Continuous>  dextrose 50% Injectable 25 Gram(s) IV Push once  dextrose 50% Injectable 12.5 Gram(s) IV Push once  dextrose 50% Injectable 25 Gram(s) IV Push once  diVALproex  milliGRAM(s) Oral two times a day  glucagon  Injectable 1 milliGRAM(s) IntraMuscular once  insulin glargine Injectable (LANTUS) 23 Unit(s) SubCutaneous at bedtime  insulin lispro (ADMELOG) corrective regimen sliding scale   SubCutaneous three times a day before meals  insulin lispro (ADMELOG) corrective regimen sliding scale   SubCutaneous at bedtime  insulin lispro Injectable (ADMELOG) 9 Unit(s) SubCutaneous three times a day before meals  metFORMIN 1000 milliGRAM(s) Oral two times a day  multivitamin 1 Tablet(s) Oral daily  traZODone 200 milliGRAM(s) Oral at bedtime    MEDICATIONS  (PRN):  dextrose Oral Gel 15 Gram(s) Oral once PRN Blood Glucose LESS THAN 70 milliGRAM(s)/deciliter  LORazepam   Injectable 2 milliGRAM(s) IntraMuscular once PRN Agitation  
MEDICATIONS  (STANDING):  dextrose 5%. 1000 milliLiter(s) (50 mL/Hr) IV Continuous <Continuous>  dextrose 5%. 1000 milliLiter(s) (100 mL/Hr) IV Continuous <Continuous>  dextrose 50% Injectable 25 Gram(s) IV Push once  dextrose 50% Injectable 12.5 Gram(s) IV Push once  dextrose 50% Injectable 25 Gram(s) IV Push once  diVALproex  milliGRAM(s) Oral two times a day  glucagon  Injectable 1 milliGRAM(s) IntraMuscular once  insulin glargine Injectable (LANTUS) 20 Unit(s) SubCutaneous at bedtime  insulin lispro (ADMELOG) corrective regimen sliding scale   SubCutaneous three times a day before meals  insulin lispro (ADMELOG) corrective regimen sliding scale   SubCutaneous at bedtime  insulin lispro Injectable (ADMELOG) 6 Unit(s) SubCutaneous three times a day before meals  metFORMIN 1000 milliGRAM(s) Oral two times a day  multivitamin 1 Tablet(s) Oral daily  traZODone 200 milliGRAM(s) Oral at bedtime    MEDICATIONS  (PRN):  dextrose Oral Gel 15 Gram(s) Oral once PRN Blood Glucose LESS THAN 70 milliGRAM(s)/deciliter  LORazepam   Injectable 2 milliGRAM(s) IntraMuscular once PRN Agitation

## 2022-10-26 NOTE — BH DISCHARGE NOTE NURSING/SOCIAL WORK/PSYCH REHAB - NSCDUDCCRISIS_PSY_A_CORE
.  [facility]  [address] (191) [facility] [phone])/.  [facility]  [phone] [place] [address], [place] [id number] [phone]   Hours:  Monday through Friday from 9 AM to 3 PM/[facility]

## 2022-10-26 NOTE — BH INPATIENT PSYCHIATRY PROGRESS NOTE - NSDCCRITERIA_PSY_ALL_CORE
improvement in symptoms

## 2022-10-26 NOTE — BH INPATIENT PSYCHIATRY PROGRESS NOTE - NSBHMETABOLIC_PSY_ALL_CORE_FT
BMI: BMI (kg/m2): 26.6 (10-18-22 @ 16:51)  HbA1c: A1C with Estimated Average Glucose Result: 9.9 % (10-21-22 @ 09:10)    Glucose: POCT Blood Glucose.: 230 mg/dL (10-21-22 @ 07:49)    BP: 118/84 (10-20-22 @ 22:05) (118/84 - 118/84)  Lipid Panel: Date/Time: 10-21-22 @ 09:10  Cholesterol, Serum: 151  Direct LDL: --  HDL Cholesterol, Serum: 29  Total Cholesterol/HDL Ration Measurement: --  Triglycerides, Serum: 138  
BMI: BMI (kg/m2): 26.6 (10-18-22 @ 16:51)  HbA1c: A1C with Estimated Average Glucose Result: 9.9 % (10-21-22 @ 09:10)    Glucose: POCT Blood Glucose.: 316 mg/dL (10-24-22 @ 20:08)    BP: --  Lipid Panel: Date/Time: 10-21-22 @ 09:10  Cholesterol, Serum: 151  Direct LDL: --  HDL Cholesterol, Serum: 29  Total Cholesterol/HDL Ration Measurement: --  Triglycerides, Serum: 138  
BMI: BMI (kg/m2): 26.6 (10-18-22 @ 16:51)  HbA1c:   Glucose: POCT Blood Glucose.: 465 mg/dL (10-19-22 @ 11:53)    BP: --  Lipid Panel: 
BMI: BMI (kg/m2): 26.6 (10-18-22 @ 16:51)  HbA1c:   Glucose: POCT Blood Glucose.: 366 mg/dL (10-20-22 @ 12:37)    BP: --  Lipid Panel: 
BMI: BMI (kg/m2): 26.6 (10-18-22 @ 16:51)  HbA1c: A1C with Estimated Average Glucose Result: 9.9 % (10-21-22 @ 09:10)    Glucose: POCT Blood Glucose.: 216 mg/dL (10-24-22 @ 07:41)    BP: --  Lipid Panel: Date/Time: 10-21-22 @ 09:10  Cholesterol, Serum: 151  Direct LDL: --  HDL Cholesterol, Serum: 29  Total Cholesterol/HDL Ration Measurement: --  Triglycerides, Serum: 138  
BMI: BMI (kg/m2): 26.6 (10-18-22 @ 16:51)  HbA1c: A1C with Estimated Average Glucose Result: 9.9 % (10-21-22 @ 09:10)    Glucose: POCT Blood Glucose.: 193 mg/dL (10-22-22 @ 16:19)    BP: 118/84 (10-20-22 @ 22:05) (118/84 - 118/84)  Lipid Panel: Date/Time: 10-21-22 @ 09:10  Cholesterol, Serum: 151  Direct LDL: --  HDL Cholesterol, Serum: 29  Total Cholesterol/HDL Ration Measurement: --  Triglycerides, Serum: 138  
BMI: BMI (kg/m2): 26.6 (10-18-22 @ 16:51)  HbA1c: A1C with Estimated Average Glucose Result: 9.9 % (10-21-22 @ 09:10)    Glucose: POCT Blood Glucose.: 251 mg/dL (10-23-22 @ 16:18)    BP: 118/84 (10-20-22 @ 22:05) (118/84 - 118/84)  Lipid Panel: Date/Time: 10-21-22 @ 09:10  Cholesterol, Serum: 151  Direct LDL: --  HDL Cholesterol, Serum: 29  Total Cholesterol/HDL Ration Measurement: --  Triglycerides, Serum: 138  
BMI: BMI (kg/m2): 26.6 (10-18-22 @ 16:51)  HbA1c: A1C with Estimated Average Glucose Result: 9.9 % (10-21-22 @ 09:10)    Glucose: POCT Blood Glucose.: 211 mg/dL (10-26-22 @ 07:49)    BP: --  Lipid Panel: Date/Time: 10-21-22 @ 09:10  Cholesterol, Serum: 151  Direct LDL: --  HDL Cholesterol, Serum: 29  Total Cholesterol/HDL Ration Measurement: --  Triglycerides, Serum: 138

## 2022-10-26 NOTE — BH DISCHARGE NOTE NURSING/SOCIAL WORK/PSYCH REHAB - NSDCPRGOAL_PSY_ALL_CORE
Writer met with patient to discuss his progress throughout his inpatient stay. Patient refused to complete the safety plan due to unspecified reason, however patient participated in safety planning group and engaged verbally. Throughout the session, patient was pleasant and polite. Patient was forthcoming to share his personal hx of misusing drugs. Patient demonstrated motivation to stay away from drugs and strategized to remove himself from those environments. Even though, patient struggled to identify triggers, patient was noted to be reflective and pensive. During his inpatient stay, patient was social with select peers and engaged in groups meaningfully. Patient responded well with encouragement and prompting. Patient identified praying as one of his coping skills and presented with fair insight to his symptoms. Patient was adherent to medication with no reported side effects and receptive to receive CHAIREZ. Patient demonstrated motivation continue treatment and attend NA/AA meetings in the future. Patient denied SI, HI, AH, and VH. Psychiatric rehabilitation team encouraged patient to continue treatment in outpatient for medication regimen and symptom management. Due to Covid-19 pandemic, unit structure and activities were reevaluated on a consistent basis in effort to maintain safety of patients and staff.

## 2022-10-26 NOTE — BH INPATIENT PSYCHIATRY PROGRESS NOTE - NSBHCHARTREVIEWVS_PSY_A_CORE FT
Vital Signs Last 24 Hrs  T(C): 36.7 (10-20-22 @ 08:27), Max: 36.7 (10-19-22 @ 19:46)  T(F): 98 (10-20-22 @ 08:27), Max: 98 (10-19-22 @ 19:46)  HR: --  BP: --  BP(mean): --  RR: --  SpO2: --    Orthostatic VS  10-20-22 @ 08:27  Lying BP: --/-- HR: --  Sitting BP: 107/80 HR: 76  Standing BP: 110/84 HR: 83  Site: --  Mode: --  Orthostatic VS  10-19-22 @ 19:46  Lying BP: --/-- HR: --  Sitting BP: 113/85 HR: 95  Standing BP: 122/85 HR: 92  Site: --  Mode: --  Orthostatic VS  10-19-22 @ 06:44  Lying BP: --/-- HR: --  Sitting BP: 126/83 HR: 75  Standing BP: 120/80 HR: 97  Site: --  Mode: --  Orthostatic VS  10-18-22 @ 23:04  Lying BP: --/-- HR: --  Sitting BP: 124/81 HR: 83  Standing BP: 135/96 HR: 91  Site: --  Mode: --  Orthostatic VS  10-18-22 @ 16:51  Lying BP: --/-- HR: --  Sitting BP: 118/83 HR: 92  Standing BP: 116/82 HR: 97  Site: --  Mode: --  
Vital Signs Last 24 Hrs  T(C): 36.7 (10-26-22 @ 06:43), Max: 36.8 (10-25-22 @ 17:13)  T(F): 98 (10-26-22 @ 06:43), Max: 98.2 (10-25-22 @ 17:13)  HR: --  BP: --  BP(mean): --  RR: --  SpO2: --    Orthostatic VS  10-26-22 @ 06:43  Lying BP: 130/77 HR: 86  Sitting BP: 132/76 HR: 88  Standing BP: --/-- HR: --  Site: --  Mode: --  Orthostatic VS  10-25-22 @ 19:31  Lying BP: --/-- HR: --  Sitting BP: 139/71 HR: 75  Standing BP: 130/80 HR: 88  Site: --  Mode: --  Orthostatic VS  10-25-22 @ 19:27  Lying BP: --/-- HR: --  Sitting BP: 127/84 HR: 92  Standing BP: 125/93 HR: 94  Site: --  Mode: --  Orthostatic VS  10-25-22 @ 07:51  Lying BP: --/-- HR: --  Sitting BP: 116/78 HR: 81  Standing BP: 131/79 HR: 87  Site: --  Mode: --  Orthostatic VS  10-24-22 @ 19:24  Lying BP: --/-- HR: --  Sitting BP: 113/70 HR: 88  Standing BP: 116/77 HR: 90  Site: --  Mode: --  
Vital Signs Last 24 Hrs  T(C): 36.4 (10-19-22 @ 06:44), Max: 36.7 (10-18-22 @ 16:51)  T(F): 97.5 (10-19-22 @ 06:44), Max: 98.1 (10-18-22 @ 16:51)  HR: --  BP: --  BP(mean): --  RR: 14 (10-18-22 @ 16:51) (14 - 14)  SpO2: --    Orthostatic VS  10-19-22 @ 06:44  Lying BP: --/-- HR: --  Sitting BP: 126/83 HR: 75  Standing BP: 120/80 HR: 97  Site: --  Mode: --  Orthostatic VS  10-18-22 @ 23:04  Lying BP: --/-- HR: --  Sitting BP: 124/81 HR: 83  Standing BP: 135/96 HR: 91  Site: --  Mode: --  Orthostatic VS  10-18-22 @ 16:51  Lying BP: --/-- HR: --  Sitting BP: 118/83 HR: 92  Standing BP: 116/82 HR: 97  Site: --  Mode: --  
Vital Signs Last 24 Hrs  T(C): 36.8 (10-24-22 @ 19:24), Max: 36.8 (10-24-22 @ 19:24)  T(F): 98.2 (10-24-22 @ 19:24), Max: 98.2 (10-24-22 @ 19:24)  HR: --  BP: --  BP(mean): --  RR: --  SpO2: --    Orthostatic VS  10-24-22 @ 19:24  Lying BP: --/-- HR: --  Sitting BP: 113/70 HR: 88  Standing BP: 116/77 HR: 90  Site: --  Mode: --  Orthostatic VS  10-23-22 @ 19:11  Lying BP: --/-- HR: --  Sitting BP: 120/77 HR: 99  Standing BP: 118/86 HR: 103  Site: --  Mode: --  
Vital Signs Last 24 Hrs  T(C): 36.6 (10-23-22 @ 19:11), Max: 36.6 (10-23-22 @ 19:11)  T(F): 97.9 (10-23-22 @ 19:11), Max: 97.9 (10-23-22 @ 19:11)  HR: --  BP: --  BP(mean): --  RR: --  SpO2: --    Orthostatic VS  10-23-22 @ 19:11  Lying BP: --/-- HR: --  Sitting BP: 120/77 HR: 99  Standing BP: 118/86 HR: 103  Site: --  Mode: --  Orthostatic VS  10-22-22 @ 19:02  Lying BP: --/-- HR: --  Sitting BP: 136/86 HR: 96  Standing BP: 138/87 HR: 98  Site: --  Mode: --  
Vital Signs Last 24 Hrs  T(C): 36.6 (10-23-22 @ 19:11), Max: 36.6 (10-23-22 @ 19:11)  T(F): 97.9 (10-23-22 @ 19:11), Max: 97.9 (10-23-22 @ 19:11)  HR: --  BP: --  BP(mean): --  RR: --  SpO2: --    Orthostatic VS  10-23-22 @ 19:11  Lying BP: --/-- HR: --  Sitting BP: 120/77 HR: 99  Standing BP: 118/86 HR: 103  Site: --  Mode: --  Orthostatic VS  10-22-22 @ 19:02  Lying BP: --/-- HR: --  Sitting BP: 136/86 HR: 96  Standing BP: 138/87 HR: 98  Site: --  Mode: --  Orthostatic VS  10-22-22 @ 06:54  Lying BP: --/-- HR: --  Sitting BP: 142/88 HR: 77  Standing BP: 121/91 HR: 89  Site: --  Mode: --  
Vital Signs Last 24 Hrs  T(C): 36.3 (10-22-22 @ 06:54), Max: 36.3 (10-22-22 @ 06:54)  T(F): 97.4 (10-22-22 @ 06:54), Max: 97.4 (10-22-22 @ 06:54)  HR: --  BP: --  BP(mean): --  RR: --  SpO2: --    Orthostatic VS  10-22-22 @ 06:54  Lying BP: --/-- HR: --  Sitting BP: 142/88 HR: 77  Standing BP: 121/91 HR: 89  Site: --  Mode: --  Orthostatic VS  10-21-22 @ 19:10  Lying BP: --/-- HR: --  Sitting BP: 131/78 HR: 81  Standing BP: 128/86 HR: 86  Site: --  Mode: --  Orthostatic VS  10-21-22 @ 07:46  Lying BP: --/-- HR: --  Sitting BP: 111/80 HR: 77  Standing BP: 113/82 HR: 81  Site: --  Mode: --  Orthostatic VS  10-20-22 @ 19:40  Lying BP: --/-- HR: --  Sitting BP: 120/87 HR: 91  Standing BP: 119/80 HR: 94  Site: --  Mode: --  
Vital Signs Last 24 Hrs  T(C): 36.4 (10-21-22 @ 07:46), Max: 36.7 (10-20-22 @ 22:05)  T(F): 97.6 (10-21-22 @ 07:46), Max: 98 (10-20-22 @ 22:05)  HR: 84 (10-20-22 @ 22:05) (84 - 84)  BP: 118/84 (10-20-22 @ 22:05) (118/84 - 118/84)  BP(mean): --  RR: --  SpO2: --    Orthostatic VS  10-21-22 @ 07:46  Lying BP: --/-- HR: --  Sitting BP: 111/80 HR: 77  Standing BP: 113/82 HR: 81  Site: --  Mode: --  Orthostatic VS  10-20-22 @ 19:40  Lying BP: --/-- HR: --  Sitting BP: 120/87 HR: 91  Standing BP: 119/80 HR: 94  Site: --  Mode: --  Orthostatic VS  10-20-22 @ 08:27  Lying BP: --/-- HR: --  Sitting BP: 107/80 HR: 76  Standing BP: 110/84 HR: 83  Site: --  Mode: --  Orthostatic VS  10-19-22 @ 19:46  Lying BP: --/-- HR: --  Sitting BP: 113/85 HR: 95  Standing BP: 122/85 HR: 92  Site: --  Mode: --

## 2022-10-26 NOTE — CHART NOTE - NSCHARTNOTEFT_GEN_A_CORE
Called by primary team regarding hyperglycemia and to weigh in on insulin regimen. FS reviewed. Prior A1C in July 2021 was 10.6. Per primary team, patient is on metformin for DM and doesn't take care of his DM. Recommended starting lantus 9 units at bedside. Admelog 3 units with meals. Low correctional insulin scale. Uptitrate insulin as needed.
FS reviewed. Still elevated. Will uptitrate insulin: lantus from 9u to 14u. Admelog from 3u to 6u. Continue low dose ISS. DM educator and nutrition consult.
Up-titrated basal insulin to 34 units at bedtime and 14 units pre-meal due to hyperglycemia.
Blood glucose trends reviewed. Increasing Lantus from 25u-> 28u. Increasing pre-meal from 9-> 10. Increasing to moderate dose sliding scale. Will continue to follow blood glucose trends.

## 2022-10-26 NOTE — BH DISCHARGE NOTE NURSING/SOCIAL WORK/PSYCH REHAB - NSDCPRRECOMMEND_PSY_ALL_CORE
Psychiatric rehabilitation team recommends patient to consistently follow up with outpatient provider for continued treatment, including medication regimen and psychotherapy. Team encourages patient to continue utilizing coping skills that he acquired during the stay.

## 2022-11-02 NOTE — ED BEHAVIORAL HEALTH ASSESSMENT NOTE - NS ED BHA DEMOGRAPHICS MEDICAL RECORD REVIEWED CONSENT OBTAINED YN
Yes
Quality 431: Preventive Care And Screening: Unhealthy Alcohol Use - Screening: Patient screened for unhealthy alcohol use using a single question and scores less than 2 times per year
Quality 110: Preventive Care And Screening: Influenza Immunization: Influenza Immunization previously received during influenza season
Quality 226: Preventive Care And Screening: Tobacco Use: Screening And Cessation Intervention: Patient screened for tobacco use and is an ex/non-smoker
Detail Level: Detailed
Quality 130: Documentation Of Current Medications In The Medical Record: Current Medications Documented

## 2022-11-03 ENCOUNTER — TRANSCRIPTION ENCOUNTER (OUTPATIENT)
Age: 52
End: 2022-11-03

## 2022-12-01 ENCOUNTER — EMERGENCY (EMERGENCY)
Facility: HOSPITAL | Age: 52
LOS: 1 days | Discharge: OTHER INSTITUTION | End: 2022-12-01
Attending: EMERGENCY MEDICINE
Payer: MEDICAID

## 2022-12-01 VITALS
HEIGHT: 67 IN | RESPIRATION RATE: 16 BRPM | OXYGEN SATURATION: 97 % | WEIGHT: 160.06 LBS | HEART RATE: 84 BPM | TEMPERATURE: 97 F | SYSTOLIC BLOOD PRESSURE: 158 MMHG | DIASTOLIC BLOOD PRESSURE: 103 MMHG

## 2022-12-01 DIAGNOSIS — F25.9 SCHIZOAFFECTIVE DISORDER, UNSPECIFIED: ICD-10-CM

## 2022-12-01 DIAGNOSIS — F19.10 OTHER PSYCHOACTIVE SUBSTANCE ABUSE, UNCOMPLICATED: ICD-10-CM

## 2022-12-01 LAB
ALBUMIN SERPL ELPH-MCNC: 3.4 G/DL — SIGNIFICANT CHANGE UP (ref 3.3–5.2)
ALP SERPL-CCNC: 56 U/L — SIGNIFICANT CHANGE UP (ref 40–120)
ALT FLD-CCNC: 25 U/L — SIGNIFICANT CHANGE UP
AMPHET UR-MCNC: NEGATIVE — SIGNIFICANT CHANGE UP
ANION GAP SERPL CALC-SCNC: 13 MMOL/L — SIGNIFICANT CHANGE UP (ref 5–17)
APAP SERPL-MCNC: <3 UG/ML — LOW (ref 10–26)
APPEARANCE UR: CLEAR — SIGNIFICANT CHANGE UP
AST SERPL-CCNC: 34 U/L — SIGNIFICANT CHANGE UP
BARBITURATES UR SCN-MCNC: NEGATIVE — SIGNIFICANT CHANGE UP
BASOPHILS # BLD AUTO: 0.03 K/UL — SIGNIFICANT CHANGE UP (ref 0–0.2)
BASOPHILS NFR BLD AUTO: 0.5 % — SIGNIFICANT CHANGE UP (ref 0–2)
BENZODIAZ UR-MCNC: NEGATIVE — SIGNIFICANT CHANGE UP
BILIRUB SERPL-MCNC: 0.2 MG/DL — LOW (ref 0.4–2)
BILIRUB UR-MCNC: NEGATIVE — SIGNIFICANT CHANGE UP
BUN SERPL-MCNC: 13 MG/DL — SIGNIFICANT CHANGE UP (ref 8–20)
CALCIUM SERPL-MCNC: 8.5 MG/DL — SIGNIFICANT CHANGE UP (ref 8.4–10.5)
CHLORIDE SERPL-SCNC: 102 MMOL/L — SIGNIFICANT CHANGE UP (ref 96–108)
CO2 SERPL-SCNC: 21 MMOL/L — LOW (ref 22–29)
COCAINE METAB.OTHER UR-MCNC: POSITIVE
COLOR SPEC: YELLOW — SIGNIFICANT CHANGE UP
CREAT SERPL-MCNC: 0.59 MG/DL — SIGNIFICANT CHANGE UP (ref 0.5–1.3)
DIFF PNL FLD: NEGATIVE — SIGNIFICANT CHANGE UP
EGFR: 117 ML/MIN/1.73M2 — SIGNIFICANT CHANGE UP
EOSINOPHIL # BLD AUTO: 0.18 K/UL — SIGNIFICANT CHANGE UP (ref 0–0.5)
EOSINOPHIL NFR BLD AUTO: 3.3 % — SIGNIFICANT CHANGE UP (ref 0–6)
ETHANOL SERPL-MCNC: <10 MG/DL — SIGNIFICANT CHANGE UP (ref 0–9)
GLUCOSE BLDC GLUCOMTR-MCNC: 210 MG/DL — HIGH (ref 70–99)
GLUCOSE SERPL-MCNC: 212 MG/DL — HIGH (ref 70–99)
GLUCOSE UR QL: 1000 MG/DL
HCT VFR BLD CALC: 43.8 % — SIGNIFICANT CHANGE UP (ref 39–50)
HGB BLD-MCNC: 14.9 G/DL — SIGNIFICANT CHANGE UP (ref 13–17)
IMM GRANULOCYTES NFR BLD AUTO: 0 % — SIGNIFICANT CHANGE UP (ref 0–0.9)
KETONES UR-MCNC: ABNORMAL
LEUKOCYTE ESTERASE UR-ACNC: NEGATIVE — SIGNIFICANT CHANGE UP
LYMPHOCYTES # BLD AUTO: 2.22 K/UL — SIGNIFICANT CHANGE UP (ref 1–3.3)
LYMPHOCYTES # BLD AUTO: 40.5 % — SIGNIFICANT CHANGE UP (ref 13–44)
MCHC RBC-ENTMCNC: 30.2 PG — SIGNIFICANT CHANGE UP (ref 27–34)
MCHC RBC-ENTMCNC: 34 GM/DL — SIGNIFICANT CHANGE UP (ref 32–36)
MCV RBC AUTO: 88.7 FL — SIGNIFICANT CHANGE UP (ref 80–100)
METHADONE UR-MCNC: NEGATIVE — SIGNIFICANT CHANGE UP
MONOCYTES # BLD AUTO: 0.5 K/UL — SIGNIFICANT CHANGE UP (ref 0–0.9)
MONOCYTES NFR BLD AUTO: 9.1 % — SIGNIFICANT CHANGE UP (ref 2–14)
NEUTROPHILS # BLD AUTO: 2.55 K/UL — SIGNIFICANT CHANGE UP (ref 1.8–7.4)
NEUTROPHILS NFR BLD AUTO: 46.6 % — SIGNIFICANT CHANGE UP (ref 43–77)
NITRITE UR-MCNC: NEGATIVE — SIGNIFICANT CHANGE UP
OPIATES UR-MCNC: NEGATIVE — SIGNIFICANT CHANGE UP
PCP SPEC-MCNC: SIGNIFICANT CHANGE UP
PCP UR-MCNC: NEGATIVE — SIGNIFICANT CHANGE UP
PH UR: 6 — SIGNIFICANT CHANGE UP (ref 5–8)
PLATELET # BLD AUTO: 258 K/UL — SIGNIFICANT CHANGE UP (ref 150–400)
POTASSIUM SERPL-MCNC: 3.2 MMOL/L — LOW (ref 3.5–5.3)
POTASSIUM SERPL-SCNC: 3.2 MMOL/L — LOW (ref 3.5–5.3)
PROT SERPL-MCNC: 6.2 G/DL — LOW (ref 6.6–8.7)
PROT UR-MCNC: NEGATIVE — SIGNIFICANT CHANGE UP
RBC # BLD: 4.94 M/UL — SIGNIFICANT CHANGE UP (ref 4.2–5.8)
RBC # FLD: 12.6 % — SIGNIFICANT CHANGE UP (ref 10.3–14.5)
SALICYLATES SERPL-MCNC: <0.6 MG/DL — LOW (ref 10–20)
SARS-COV-2 RNA SPEC QL NAA+PROBE: SIGNIFICANT CHANGE UP
SODIUM SERPL-SCNC: 136 MMOL/L — SIGNIFICANT CHANGE UP (ref 135–145)
SP GR SPEC: 1.02 — SIGNIFICANT CHANGE UP (ref 1.01–1.02)
THC UR QL: NEGATIVE — SIGNIFICANT CHANGE UP
UROBILINOGEN FLD QL: 1 MG/DL
WBC # BLD: 5.48 K/UL — SIGNIFICANT CHANGE UP (ref 3.8–10.5)
WBC # FLD AUTO: 5.48 K/UL — SIGNIFICANT CHANGE UP (ref 3.8–10.5)

## 2022-12-01 PROCEDURE — 99218: CPT

## 2022-12-01 PROCEDURE — 93010 ELECTROCARDIOGRAM REPORT: CPT

## 2022-12-01 RX ORDER — DIVALPROEX SODIUM 500 MG/1
750 TABLET, DELAYED RELEASE ORAL
Refills: 0 | Status: DISCONTINUED | OUTPATIENT
Start: 2022-12-01 | End: 2022-12-08

## 2022-12-01 RX ORDER — METFORMIN HYDROCHLORIDE 850 MG/1
500 TABLET ORAL ONCE
Refills: 0 | Status: COMPLETED | OUTPATIENT
Start: 2022-12-01 | End: 2022-12-01

## 2022-12-01 RX ORDER — METFORMIN HYDROCHLORIDE 850 MG/1
1000 TABLET ORAL
Refills: 0 | Status: DISCONTINUED | OUTPATIENT
Start: 2022-12-01 | End: 2022-12-08

## 2022-12-01 RX ORDER — TRAZODONE HCL 50 MG
200 TABLET ORAL AT BEDTIME
Refills: 0 | Status: DISCONTINUED | OUTPATIENT
Start: 2022-12-01 | End: 2022-12-08

## 2022-12-01 RX ORDER — POTASSIUM CHLORIDE 20 MEQ
40 PACKET (EA) ORAL ONCE
Refills: 0 | Status: COMPLETED | OUTPATIENT
Start: 2022-12-01 | End: 2022-12-01

## 2022-12-01 RX ADMIN — METFORMIN HYDROCHLORIDE 1000 MILLIGRAM(S): 850 TABLET ORAL at 17:01

## 2022-12-01 RX ADMIN — Medication 200 MILLIGRAM(S): at 21:35

## 2022-12-01 RX ADMIN — Medication 40 MILLIEQUIVALENT(S): at 06:05

## 2022-12-01 RX ADMIN — METFORMIN HYDROCHLORIDE 500 MILLIGRAM(S): 850 TABLET ORAL at 08:17

## 2022-12-01 RX ADMIN — DIVALPROEX SODIUM 750 MILLIGRAM(S): 500 TABLET, DELAYED RELEASE ORAL at 18:08

## 2022-12-01 NOTE — ED ADULT TRIAGE NOTE - BSA (M2)
1.84 device. SBA - with use of device, as needed/appropriate   Balance Sitting: Good-  (at EOB)  Standing: Fair-  (without device)  Fair+ dynamic standing balance during completion of ADLs and other functional tasks. Activity Tolerance Limited  poor tolerance for activity. Patient will demonstrate Good understanding and consistent implementation of energy conservation techniques and work simplification techniques into ADL/IADL routines       Comments:  Pt states she was sitting in the chair for \"hours\" and fatigued. Limited participation in ADL activity despite encouragement to complete as much self care as she could at seated level. Complaint of R hip pain with poor tolerance for anterior trunk flexion for LB ADL skills. Pt returned to the bed at end of the session. Exercise: fair use of UE's during functional activity. Education/treatment:  ADL retraining with facilitation of movement to increase level of participation in self care. Therapeutic activities to address balance, strength, and endurance for increased independence in ADL and transfers. Pt instructed with transfer safety and benefits for increased movement. · Pt has made  progress towards set goals.    · Continue with current plan of care        Treatment Charges: Mins Units    ADL/Home Mgt [id number]  2    Thera Activities [id number] 1    Ther Ex [id number]      Manual Therapy 64817      Neuro Re-ed 34840      Orthotic manage/training  [id number]      Non Billable Time      Total Timed Treatment [facility] JOSE ANTONIO/L 19542

## 2022-12-01 NOTE — ED PROVIDER NOTE - NS ED ROS FT
Constitutional: no fever, no chills  Eyes: no vision changes  ENT: no nasal congestion, no sore throat  CV: no chest pain  Resp: no cough, no shortness of breath  GI: no abdominal pain, no vomiting, no diarrhea  : no dysuria  MSK: no joint pain  Skin: no rash  Neuro: no headache, no focal weakness, no paresthesias  Psych: +SI, +hallucinations

## 2022-12-01 NOTE — ED PROVIDER NOTE - CLINICAL SUMMARY MEDICAL DECISION MAKING FREE TEXT BOX
52y M w/ hx schizophrenia, presenting for SI/hallucinations. Admits to drinking and using cocaine today. 1:1 precautions ordered. Will check EKG, labs, reassess; medically clear for likely psych eval.

## 2022-12-01 NOTE — ED BEHAVIORAL HEALTH ASSESSMENT NOTE - SUMMARY
This is a 53 yo male, homeless, with PMH diabetes, PPH schizoaffective disorder, cocaine/alcohol/ecstasy use disorders, antisocial personality disorder, many psychiatric admissions (at least 6 at [facility] alone in 2022 most recently 9/25/22-11/2/22, 9 at [facility] in 2021), prior admission at [facility] in Aug 2022, and long well-established history of recurrent hospitalizations due primarily to ongoing polysubstance abuse and non-compliance with continued treatment, followed by [facility] team ([phone]) with AOT but not seen for at least 2-3 months, who is BIB EMS requested by self, seeking psychiatric admission and reporting vague SI and HI and auditory hallucinations.    Based on the patient's pattern of presentations, with polysubstance abuse being a consistent central factor and trigger, and his extensive hx of treatment non-compliance, it is unlikely that patient's overall illness course would be helped by further hospitalization. Because he is hostile and uncooperative, it is unclear whether his reported symptoms of SI, aggressive thoughts, and hallucinations are genuine, as there is obvious secondary gain for shelter. At the same time, the patient does not function well at baseline and has low distress tolerance only made worse by his substance use. It could be he is experiencing some symptoms of cocaine withdrawal currently. In his current agitated state, he is at particularly elevated acute on chronic risk of harmful/dangerous behaviors and therefore should not be discharged. We will plan to reassess patient to see if his irritability improves and he can be more cooperative, to see if alliance can be built so that an alternative to hospitalization could possibly be planned, or to see if patient simply retracts his previously made statements and wants to be discharged.    Plan:  - reassess given possible substance withdrawal

## 2022-12-01 NOTE — ED CDU PROVIDER INITIAL DAY NOTE - MEDICAL DECISION MAKING DETAILS
52y M w/ schizophrenia, presenting for SI and hallucinations. Medically cleared, pending psych eval.

## 2022-12-01 NOTE — ED BEHAVIORAL HEALTH NOTE - BEHAVIORAL HEALTH NOTE
==================         PRE-HOSPITAL COURSE         ===================        SOURCE:  Secondhand EMR documentation.         DETAILS:  Patient BIBA; chief complaint of CAH.     ==============     ED COURSE:         ===========         SOURCE:  RN and secondhand EMR documentation.          ARRIVAL:  Patient was uncooperative with hospital protocol; demanding food when arrived to ED, required redirection from staff. Patient gowned, wanded, and placed in private room on 1:1 for consult. Patient presents with good hygiene/grooming.           BELONGINGS:  None notable.         BEHAVIOR: Blood/urine provided for routine labs without noted incident. Patient endorsed CAHKS per RN, per ED provider endorsed SI without specific plan. Patient is AOx3 and makes eye contact; speech of normal volume and rate accompanied by logical thought process. Patient has been in hospital bed while in ED; presently sleeping.     TREATMENT: Patient has not required medication intervention while in ED; remains in behavioral control.     Visitors: Patient presently unaccompanied by social supports while in ED.

## 2022-12-01 NOTE — ED PROVIDER NOTE - OBJECTIVE STATEMENT
52y M w/ hx DM, Schizophrenia, presents for psych eval. Pt states that he is homeless and has been off his medications for the past month. Now complains of suicidal thoughts without plan, as well as auditory hallucinations. Admits to drinking 1 pint of Henessey few hours prior to arrival; also admits to sniffing cocaine.

## 2022-12-01 NOTE — ED ADULT NURSE NOTE - OBJECTIVE STATEMENT
endorses hearing voices, suicidal thoughts without plans, without access to medications for several weeks, patient originally displaying manipulative behavior demanding food and items to participate in his care, after acquiring and providing items, he continued asking for items, was advised that he cannot manipulate his care and bloodwork/ekg/urine were delayed because of his behavior, advised that to see a psychiatrist, would need these items, patient was persuaded to particupate in his care without incident, provided with food, orange juice, crackers after he became cooperaive, hs showed no violent aggresion thus far

## 2022-12-01 NOTE — ED BEHAVIORAL HEALTH ASSESSMENT NOTE - RISK ASSESSMENT
prior admissions, prior self-harm, non-compliance with outpatient treatment, homeless, limited social supports, personality disorder

## 2022-12-01 NOTE — ED ADULT NURSE NOTE - INTERVENTIONS DEFINITIONS
[place] to call system/Physically safe environment: no spills, clutter or unnecessary equipment/Provide visual cue, wrist band, yellow gown, etc./Monitor gait and stability

## 2022-12-01 NOTE — ED ADULT TRIAGE NOTE - CHIEF COMPLAINT QUOTE
Pt A&Ox4 states "I have been living in the woods and off my meds for a couple weeks I am hearing voices." BIBA c/o SI/HI, bizarre behavior. Patient admits using drugs today
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
## 2022-12-02 PROCEDURE — 99226: CPT

## 2022-12-02 PROCEDURE — 99213 OFFICE O/P EST LOW 20 MIN: CPT

## 2022-12-02 RX ORDER — RISPERIDONE 4 MG/1
2 TABLET ORAL
Refills: 0 | Status: DISCONTINUED | OUTPATIENT
Start: 2022-12-02 | End: 2022-12-08

## 2022-12-02 RX ADMIN — METFORMIN HYDROCHLORIDE 1000 MILLIGRAM(S): 850 TABLET ORAL at 19:16

## 2022-12-02 RX ADMIN — DIVALPROEX SODIUM 750 MILLIGRAM(S): 500 TABLET, DELAYED RELEASE ORAL at 19:15

## 2022-12-02 RX ADMIN — Medication 200 MILLIGRAM(S): at 21:46

## 2022-12-02 RX ADMIN — RISPERIDONE 2 MILLIGRAM(S): 4 TABLET ORAL at 19:15

## 2022-12-02 RX ADMIN — METFORMIN HYDROCHLORIDE 1000 MILLIGRAM(S): 850 TABLET ORAL at 06:45

## 2022-12-02 RX ADMIN — DIVALPROEX SODIUM 750 MILLIGRAM(S): 500 TABLET, DELAYED RELEASE ORAL at 06:45
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
## 2022-12-03 PROCEDURE — 99211 OFF/OP EST MAY X REQ PHY/QHP: CPT

## 2022-12-03 PROCEDURE — 99226: CPT

## 2022-12-03 RX ADMIN — RISPERIDONE 2 MILLIGRAM(S): 4 TABLET ORAL at 18:47

## 2022-12-03 RX ADMIN — RISPERIDONE 2 MILLIGRAM(S): 4 TABLET ORAL at 05:55

## 2022-12-03 RX ADMIN — Medication 200 MILLIGRAM(S): at 21:37

## 2022-12-03 RX ADMIN — METFORMIN HYDROCHLORIDE 1000 MILLIGRAM(S): 850 TABLET ORAL at 05:56

## 2022-12-03 RX ADMIN — DIVALPROEX SODIUM 750 MILLIGRAM(S): 500 TABLET, DELAYED RELEASE ORAL at 05:53

## 2022-12-03 RX ADMIN — DIVALPROEX SODIUM 750 MILLIGRAM(S): 500 TABLET, DELAYED RELEASE ORAL at 18:47

## 2022-12-03 RX ADMIN — METFORMIN HYDROCHLORIDE 1000 MILLIGRAM(S): 850 TABLET ORAL at 18:47

## 2022-12-03 NOTE — ED BEHAVIORAL HEALTH NOTE - BEHAVIORAL HEALTH NOTE
PROGRESS NOTE: 12-03-22 @ 15:39  	  • Reason for Ongoing Consultation: 	    ID: 52yyo Male with HEALTH ISSUES - PROBLEM Dx:  Polysubstance abuse    Schizoaffective disorder, unspecified type            INTERVAL DATA:   • Interval Chief Complaint: "I want to go..."  • Interval History:   Pt asking for discharge.  He denies SI/HI.  States AH have resolved.  Claims he ate well and slept well and is "good to go".  Pt advised he was on AOT and involuntary.  Discussed with on call attending and transfer to in pt psych on involuntary states is plan.  No agitation or aggression in BH area.  Mood and affect neutral.   REVIEW OF SYSTEMS:   • Constitutional Symptoms	No complaints  • Eyes	No complaints  • Ears / Nose / Throat / Mouth	No complaints  • Cardiovascular	No complaints  • Respiratory	No complaints  • Gastrointestinal	No complaints  • Genitourinary	No complaints  • Musculoskeletal	No complaints  • Skin	No complaints  • Neurological	No complaints  • Psychiatric (see HPI)	See HPI  • Endocrine	No complaints  • Hematologic / Lymphatic	No complaints  • Allergic / Immunologic	No complaints    REVIEW OF VITALS/LABS/IMAGING/INVESTIGATIONS:   • Vital signs reviewed: Yes  • Vital Signs:	    T(C): 36.7 (12-03-22 @ 07:48), Max: 36.7 (12-02-22 @ 15:53)  HR: 79 (12-03-22 @ 07:48) (79 - 85)  BP: 129/77 (12-03-22 @ 07:48) (100/65 - 129/77)  RR: 18 (12-03-22 @ 07:48) (18 - 18)  SpO2: 96% (12-03-22 @ 07:48) (94% - 99%)    • Available labs reviewed: Yes  • Available Lab Results:                       MEDICATIONS:      PRN Medications:  • PRN Medications since last evaluation	  • PRN Details	    Current Medications:   divalproex  milliGRAM(s) Oral two times a day  metFORMIN 1000 milliGRAM(s) Oral two times a day  risperiDONE   Tablet 2 milliGRAM(s) Oral two times a day  traZODone 200 milliGRAM(s) Oral at bedtime     Medication Side Effects:  • Medication Side Effects or Adverse Reactions (new or ongoing)	None known    MENTAL STATUS EXAM:   • Level of Consciousness	Alert  • General Appearance	Well developed  • Body Habitus	Well nourished  • Hygiene	Good  • Grooming	fair  • Behavior	Cooperative  • Eye Contact	Good  • Relatedness	fair  • Impulse Control	Normal  • Muscle Tone / Strength	Normal muscle tone/strength  • Abnormal Movements	No abnormal movements  • Gait / Station	Normal gait / station  • Speech Volume	Normal  • Speech Rate	Normal  • Speech Spontaneity	Normal  • Speech Articulation	Normal  • Mood	Normal  • Affect Quality	depressed  • Affect Range	constricted  • Affect Congruence	Congruent  • Thought Process	Linear  • Thought Associations	Normal  • Thought Content	 Unremarkable  • Perceptions	No abnormalities  • Oriented to Time	Yes  • Oriented to Place	Yes  • Oriented to Situation	Yes  • Oriented to Person	Yes  • Attention / Concentration	Normal  • Estimated Intelligence	Average  • Recent Memory	Normal  • Remote Memory	Normal  • Fund of Knowledge	Normal  • Language	No abnormalities noted  • Judgment (regarding everyday events)	chronically impaired  • Insight (regarding psychiatric illness)	 limited    SUICIDALITY:   • Suicidality (Interval)	denies    HOMICIDALITY/AGGRESSION:   • Homicidality/Aggression	denies    DIAGNOSIS DSM-V:    Psychiatric Diagnosis (Corresponds to DSM-IV Axis I, II):   HEALTH ISSUES - PROBLEM Dx:  Polysubstance abuse    Schizoaffective disorder, unspecified type             Medical Diagnosis (Corresponds to DSM-IV Axis III):  • Axis III	      ASSESSMENT OF CURRENT CONDITION:   Summary (include case differential, formulation and patient response to therapy):   Denies acute psych s/s, denies AH/VH/SI/HI.  Asking for discharge.  Pt informed he is involuntary and will be transferred to in pt psych when bed available.  Risk Assessment (consider static vs modifiable risk factors and protective factors; comment on level of risk for dangerous behavior):   chronic cocaine abuse, noncompliance, homeless, past suicide attempt  PF On AOT, denies SI currently, improved  PLAN    Transfer once bed avail, DOCS

## 2022-12-04 LAB — GLUCOSE BLDC GLUCOMTR-MCNC: 219 MG/DL — HIGH (ref 70–99)

## 2022-12-04 PROCEDURE — 99226: CPT

## 2022-12-04 RX ADMIN — DIVALPROEX SODIUM 750 MILLIGRAM(S): 500 TABLET, DELAYED RELEASE ORAL at 06:02

## 2022-12-04 RX ADMIN — DIVALPROEX SODIUM 750 MILLIGRAM(S): 500 TABLET, DELAYED RELEASE ORAL at 18:43

## 2022-12-04 RX ADMIN — RISPERIDONE 2 MILLIGRAM(S): 4 TABLET ORAL at 18:43

## 2022-12-04 RX ADMIN — Medication 200 MILLIGRAM(S): at 21:52

## 2022-12-04 RX ADMIN — RISPERIDONE 2 MILLIGRAM(S): 4 TABLET ORAL at 06:03

## 2022-12-04 RX ADMIN — METFORMIN HYDROCHLORIDE 1000 MILLIGRAM(S): 850 TABLET ORAL at 18:43

## 2022-12-04 RX ADMIN — METFORMIN HYDROCHLORIDE 1000 MILLIGRAM(S): 850 TABLET ORAL at 06:03

## 2022-12-04 NOTE — ED BEHAVIORAL HEALTH PROGRESS NOTE - CASE SUMMARY/FORMULATION (CLEARLY DOCUMENT RATIONALE FOR DISPOSITION CHANGE)
Patient is a 51 y/o male, homeless, with PMH of DM, PPH schizo-affective d/o, cocaine/ alcohol/ ecstasy use d/o, self harm behavior, and multiple psychiatric admissions who is having current auditory command hallucinations to kill himself and others, no plan, unclear if there is intent. Extensive history of psychiatric admissions and non-compliance with outpatient treatment, despite AOT court order in Sept. 2022.

## 2022-12-05 VITALS
HEART RATE: 88 BPM | RESPIRATION RATE: 18 BRPM | TEMPERATURE: 98 F | OXYGEN SATURATION: 95 % | DIASTOLIC BLOOD PRESSURE: 76 MMHG | SYSTOLIC BLOOD PRESSURE: 127 MMHG

## 2022-12-05 LAB
ANION GAP SERPL CALC-SCNC: 10 MMOL/L — SIGNIFICANT CHANGE UP (ref 5–17)
BUN SERPL-MCNC: 13.1 MG/DL — SIGNIFICANT CHANGE UP (ref 8–20)
CALCIUM SERPL-MCNC: 9 MG/DL — SIGNIFICANT CHANGE UP (ref 8.4–10.5)
CHLORIDE SERPL-SCNC: 103 MMOL/L — SIGNIFICANT CHANGE UP (ref 96–108)
CO2 SERPL-SCNC: 25 MMOL/L — SIGNIFICANT CHANGE UP (ref 22–29)
CREAT SERPL-MCNC: 0.65 MG/DL — SIGNIFICANT CHANGE UP (ref 0.5–1.3)
EGFR: 113 ML/MIN/1.73M2 — SIGNIFICANT CHANGE UP
GLUCOSE BLDC GLUCOMTR-MCNC: 180 MG/DL — HIGH (ref 70–99)
GLUCOSE SERPL-MCNC: 161 MG/DL — HIGH (ref 70–99)
POTASSIUM SERPL-MCNC: 4.4 MMOL/L — SIGNIFICANT CHANGE UP (ref 3.5–5.3)
POTASSIUM SERPL-SCNC: 4.4 MMOL/L — SIGNIFICANT CHANGE UP (ref 3.5–5.3)
SODIUM SERPL-SCNC: 138 MMOL/L — SIGNIFICANT CHANGE UP (ref 135–145)

## 2022-12-05 PROCEDURE — 93005 ELECTROCARDIOGRAM TRACING: CPT

## 2022-12-05 PROCEDURE — 85025 COMPLETE CBC W/AUTO DIFF WBC: CPT

## 2022-12-05 PROCEDURE — 99285 EMERGENCY DEPT VISIT HI MDM: CPT

## 2022-12-05 PROCEDURE — 99226: CPT

## 2022-12-05 PROCEDURE — U0005: CPT

## 2022-12-05 PROCEDURE — 80048 BASIC METABOLIC PNL TOTAL CA: CPT

## 2022-12-05 PROCEDURE — 82962 GLUCOSE BLOOD TEST: CPT

## 2022-12-05 PROCEDURE — 36415 COLL VENOUS BLD VENIPUNCTURE: CPT

## 2022-12-05 PROCEDURE — G0378: CPT

## 2022-12-05 PROCEDURE — U0003: CPT

## 2022-12-05 PROCEDURE — 93010 ELECTROCARDIOGRAM REPORT: CPT

## 2022-12-05 PROCEDURE — 81003 URINALYSIS AUTO W/O SCOPE: CPT

## 2022-12-05 PROCEDURE — 80053 COMPREHEN METABOLIC PANEL: CPT

## 2022-12-05 PROCEDURE — 80307 DRUG TEST PRSMV CHEM ANLYZR: CPT

## 2022-12-05 RX ADMIN — METFORMIN HYDROCHLORIDE 1000 MILLIGRAM(S): 850 TABLET ORAL at 06:36

## 2022-12-05 RX ADMIN — DIVALPROEX SODIUM 750 MILLIGRAM(S): 500 TABLET, DELAYED RELEASE ORAL at 06:36

## 2022-12-05 RX ADMIN — RISPERIDONE 2 MILLIGRAM(S): 4 TABLET ORAL at 06:35

## 2022-12-05 NOTE — ED ADULT NURSE REASSESSMENT NOTE - COMFORT CARE
ambulated to bathroom/plan of care explained/po fluids offered
meal provided/plan of care explained
meal provided/plan of care explained
plan of care explained
plan of care explained
ambulated to bathroom/meal provided/plan of care explained/po fluids offered
meal provided/plan of care explained/po fluids offered
plan of care explained/po fluids offered
meal provided/plan of care explained

## 2022-12-05 NOTE — CHART NOTE - NSCHARTNOTEFT_GEN_A_CORE
Note: Notified by  provider that the tx recommendation is to transfer pt for inpt psychiatric care. Reviewed chart. Labs completed, covid neg 12/1. Called [facility] -[id number] confirmed pt is still under an active order. Order faxed and charted. Currently there are no beds available for transfer-[facility], [facility] and [facility]. Pt has exhausted tx options at [place].  team aware.  will follow  Msg left for pts ACT team [phone]( Irena)
NOELLE Note: NOELLE made aware pt is in need of inpt psych trx on 9.37 involuntary basis. NOELLE reviewed notes, pt has exhausted treatment options at [place]. NOELLE placed call to [facility], spoke to Tasia, no beds. NOELLE also placed calls to  (Debra), [facility] (Cristel), [facility] (Bhavana), [facility] (Caridad), [facility] (Rachael), [facility] (Jose Alfredo), no open male beds. NOELLE placed call to [facility], per automated message inpt psych unit is temporarily closed. NOELLE continues to search for inpt beds, no open beds at this time
SW Note: Pt will be transferred to [facility] for inpt psychiatric care. Accepting MD Dr. Ann. Legals 9.37. [facility] arranged. [facility] letter provided. Pt aware of the plan. Ins auth will be deferred to accepting facility, kavin listed.
SWNote: pt needs transfer (.37 status. No beds available at [facility] (Juliana) and/or [facility] (BRYNN Ballesteros) . SW to follow in the am.
SWNote: pt needs transfer 9.37 status/ [facility](Mariah) and [facility](BRYNN Mendoza) called, no bed available this evening. SW to follow in the am.
NOELLE Note: NOELLE reviewed pt chart and SW handoff. Pt plan remains for 9.37 inpt psych transfer. NOELLE reached out to [facility] [Chelsey],  [Lexi], and [place] - no available beds. NOELLE left  at [facility]. [facility] has available beds - NOELLE faxed over legals and clinicals for review to [phone]. NOELLE will follow.

## 2022-12-05 NOTE — ED ADULT NURSE REASSESSMENT NOTE - NS ED NURSE REASSESS COMMENT FT1
Assumed care of patient.  Pt alert and cooperative.  Pt states no longer having SI and wants to go home. Explained to patient that he will be re-eval in am.  Will monitor and chart changes and maintain safe environment
Patient educated about ordered EKG.  Patient reports he is refusing.  Patient verbalizing feelings of anger about pending transfer to [facility] reporting "I got jumped there' I'm not going".  Patient not receptive to education or encouragement to comply with orders.  Dr. Mondragon notified of refusal.
Received Trazodone as ordered, tolerates well, no complain voiced, remained in bed resting, no attempt to harm self or other, all needs attend, safety maintained.
Received all AM medication, tolerates well, no complain voiced, no report of visual or auditory hallucinations, no suicidal thoughts, patient in bed sleeping, arousable, no attempt to harm self or other, all needs attend, safety maintained.
pt care assumed at 0730, no apparent distress noted at this time, charting as noted. Pt received A&Ox3 resting in bed comfortably at this time. pt denies SI or auditory hallucinations at this time. pt requesting his metformin, RN called MD CURRY, awaiting orders at this time.
pt refused lunch time finger stick, carb consistent meal provided and consumed. No attempts to harm self or others.
pt requesting to be discharged. pt educated about plan of care to be transferred for inpatient tx involuntarily. pt refused finger stick for dinner and was provided a carb consistent meal. no attempts to harm self or others.
received report. received pt resting quietly no c/o.  no harm to self or others awaiting transfer
sleeping at this time, constant observation remains in place.
sleeping well
Patient in bed resting, no complain voiced, no suicidal thoughts, no visual or auditory hallucination, compliant with nursing care, meals and PO fluids tolerated, no attempt to harm self or other, safety maintained.
Patient ate 100% of his dinner.  Patient not attempting to harm self or others.  Patient resting in bed watching television with no distress.  Safety of patient maintained.
Patient in bed sleeping, safety maintained
Received AM medication, tolerates well, no complain voiced, no hallucination, no suicidal thoughts voiced, no attempt to harm self or other, patient in bed, calm and compliant with nursing care, safety maintained.
received report.  received pt sleeping resp even unlabored.  awaiting transfer
Patient ate 100% of his lunch.  Patient resting in bed with no distress.  No attempts to harm self or others.  Patient endorses mood has been depressed and feels he would benefit from inpatient hospitalization.  Patient denies suicidal or homicidal ideations.  Safety of patient maintained.
Assumed care of patient at 0715.  Patient resting in bed awake.  Patient oriented to staff and educated about plan of care.  Patient verbalized understanding of plan of care.  Patient endorsed feeling better and requesting discharge.  No attempts to harm self or others and safety maintained.
Patient educated about pending transfer to [facility].  Patient reported he will cooperate with transfer.  Denies suicidal or homicidal ideations.  No attempts to harm self or others and safety maintained.
received pt resting/sleeping in NAD at this time. pt compliant with requests for vital signs and am finger stick 219 with no orders for insulin sliding scale coverage required. pt provided breakfast and consumed it 100%. no attempts to harm himself or others.

## 2022-12-05 NOTE — ED ADULT NURSE REASSESSMENT NOTE - STATUS
awaiting transfer, no change
awaiting transfer, no change
awaiting consult
awaiting transfer, no change
awaiting transfer, no change
reassessment
awaiting transfer, no change

## 2022-12-05 NOTE — ED BEHAVIORAL HEALTH NOTE - BEHAVIORAL HEALTH NOTE
PROGRESS NOTE: 12-05-22 @ 08:56  	  • Reason for Ongoing Consultation: 	    ID: 51y/o Male with HEALTH ISSUES - PROBLEM Dx:  Polysubstance abuse  Schizoaffective disorder, unspecified type            INTERVAL DATA:   • Interval Chief Complaint: "I want to go home"  • Interval History:   Patient was lying in bed awake watching TV during interview. Patient denies current S/H I/I/P. Denies VH and AH, said the AH resolved yesterday. Patient has no complaints at this time but says he would like to go home. He is aware that he is involuntary admission. During interview he stood up abruptly and said he needed to use the bathroom and said "don't come back" in an agitated tone.       REVIEW OF SYSTEMS:   • Constitutional Symptoms	No complaints  • Eyes	No complaints  • Ears / Nose / Throat / Mouth	No complaints  • Cardiovascular	No complaints  • Respiratory	No complaints  • Gastrointestinal	No complaints  • Genitourinary	No complaints  • Musculoskeletal	No complaints  • Skin	No complaints  • Neurological	No complaints  • Psychiatric (see HPI)	See HPI  • Endocrine	No complaints  • Hematologic / Lymphatic	No complaints  • Allergic / Immunologic	No complaints    REVIEW OF VITALS/LABS/IMAGING/INVESTIGATIONS:   • Vital signs reviewed: Yes  • Vital Signs:	    T(C): 36.7 (12-05-22 @ 07:30), Max: 36.9 (12-04-22 @ 15:20)  HR: 69 (12-05-22 @ 07:30) (69 - 88)  BP: 113/71 (12-05-22 @ 07:30) (113/71 - 137/76)  RR: 18 (12-05-22 @ 07:30) (18 - 19)  SpO2: 96% (12-05-22 @ 07:30) (96% - 98%)    • Available labs reviewed: Yes  • Available Lab Results:     CAPILLARY BLOOD GLUCOSE  POCT Blood Glucose.: 180 mg/dL (05 Dec 2022 07:43)      MEDICATIONS:      PRN Medications:  • PRN Medications since last evaluation: none  • PRN Details: n/a    Current Medications:   divalproex  milliGRAM(s) Oral two times a day  metFORMIN 1000 milliGRAM(s) Oral two times a day  risperiDONE   Tablet 2 milliGRAM(s) Oral two times a day  traZODone 200 milliGRAM(s) Oral at bedtime     Medication Side Effects:  • Medication Side Effects or Adverse Reactions (new or ongoing)	None known    MENTAL STATUS EXAM:   • Level of Consciousness	Alert  • General Appearance	Well developed  • Body Habitus	Well nourished  • Hygiene	Good  • Grooming	Good  • Behavior	Cooperative majority of time, agitated at times   • Eye Contact	Poor  • Relatedness	Good  • Impulse Control	Normal  • Muscle Tone / Strength	Normal muscle tone/strength  • Abnormal Movements	No abnormal movements  • Gait / Station	Normal gait / station  • Speech Volume	Normal, loud at times  • Speech Rate	Normal  • Speech Spontaneity	Normal  • Speech Articulation	Normal  • Mood	Normal  • Affect Quality	Euthymic  • Affect Range	Constricted  • Affect Congruence	Congruent  • Thought Process	Linear  • Thought Associations	Normal  • Thought Content	Unremarkable  • Perceptions	No abnormalities at this time, previous AH  • Oriented to Time	Yes  • Oriented to Place	Yes  • Oriented to Situation	Yes  • Oriented to Person	Yes  • Attention / Concentration	Normal  • Estimated Intelligence	Average  • Recent Memory	Normal  • Remote Memory	Normal  • Fund of Knowledge	Normal  • Language	No abnormalities noted  • Judgment (regarding everyday events)	Fair  • Insight (regarding psychiatric illness)	Fair    SUICIDALITY:   • Suicidality (Interval)	denies    HOMICIDALITY/ AGGRESSION:   • Homicidality/ Aggression	denies    DIAGNOSIS DSM-V:    Psychiatric Diagnosis (Corresponds to DSM-IV Axis I, II):   HEALTH ISSUES - PROBLEM Dx:  Polysubstance abuse  Schizoaffective disorder, unspecified type             Medical Diagnosis (Corresponds to DSM-IV Axis III): none  • Axis III	      ASSESSMENT OF CURRENT CONDITION:   Summary (include case differential, formulation and patient response to therapy):   Denies acute psych s/s, denies AH/ VH and S/H I/I/P. Asking for discharge.  Pt informed he is involuntary and will be transferred to in pt psych when bed available.    Risk Assessment (consider static vs modifiable risk factors and protective factors; comment on level of risk for dangerous behavior):   moderate suicide risk (chronic cocaine abuse, noncompliance, homeless, past suicide attempt)  moderate homicidal risk (command hallucinations to kill people)     PLAN  Involuntary, transfer to bed when available. PROGRESS NOTE: 12-05-22 @ 08:56  	  • Reason for Ongoing Consultation: 	    ID: 51y/o Male with HEALTH ISSUES - PROBLEM Dx:  Polysubstance abuse  Schizoaffective disorder, unspecified type            INTERVAL DATA:   • Interval Chief Complaint: "I want to go home"  • Interval History:   Patient was lying in bed awake watching TV during interview. Patient denies current S/H I/I/P. Denies VH and AH, said the AH resolved yesterday. Patient has no complaints at this time but says he would like to go home. He is aware that he is involuntary admission. During interview he stood up abruptly and said he needed to use the bathroom and said "don't come back" in an agitated tone.       REVIEW OF SYSTEMS:   • Constitutional Symptoms	No complaints  • Eyes	No complaints  • Ears / Nose / Throat / Mouth	No complaints  • Cardiovascular	No complaints  • Respiratory	No complaints  • Gastrointestinal	No complaints  • Genitourinary	No complaints  • Musculoskeletal	No complaints  • Skin	No complaints  • Neurological	No complaints  • Psychiatric (see HPI)	See HPI  • Endocrine	No complaints  • Hematologic / Lymphatic	No complaints  • Allergic / Immunologic	No complaints    REVIEW OF VITALS/LABS/IMAGING/INVESTIGATIONS:   • Vital signs reviewed: Yes  • Vital Signs:	    T(C): 36.7 (12-05-22 @ 07:30), Max: 36.9 (12-04-22 @ 15:20)  HR: 69 (12-05-22 @ 07:30) (69 - 88)  BP: 113/71 (12-05-22 @ 07:30) (113/71 - 137/76)  RR: 18 (12-05-22 @ 07:30) (18 - 19)  SpO2: 96% (12-05-22 @ 07:30) (96% - 98%)    • Available labs reviewed: Yes  • Available Lab Results:     CAPILLARY BLOOD GLUCOSE  POCT Blood Glucose.: 180 mg/dL (05 Dec 2022 07:43)      MEDICATIONS:      PRN Medications:  • PRN Medications since last evaluation: none  • PRN Details: n/a    Current Medications:   divalproex  milliGRAM(s) Oral two times a day  metFORMIN 1000 milliGRAM(s) Oral two times a day  risperiDONE   Tablet 2 milliGRAM(s) Oral two times a day  traZODone 200 milliGRAM(s) Oral at bedtime     Medication Side Effects:  • Medication Side Effects or Adverse Reactions (new or ongoing)	None known    MENTAL STATUS EXAM:   • Level of Consciousness	Alert  • General Appearance	Well developed  • Body Habitus	Well nourished  • Hygiene	Good  • Grooming	Good  • Behavior	Cooperative majority of time, agitated at times   • Eye Contact	Poor  • Relatedness	Good  • Impulse Control	Normal  • Muscle Tone / Strength	Normal muscle tone/strength  • Abnormal Movements	No abnormal movements  • Gait / Station	Normal gait / station  • Speech Volume	Normal, loud at times  • Speech Rate	Normal  • Speech Spontaneity	Normal  • Speech Articulation	Normal  • Mood	angry  • Affect Quality	angry  • Affect Range	Constricted  • Affect Congruence	Congruent  • Thought Process	Linear  • Thought Associations	Normal  • Thought Content	Unremarkable  • Perceptions	No abnormalities at this time, previous AH  • Oriented to Time	Yes  • Oriented to Place	Yes  • Oriented to Situation	Yes  • Oriented to Person	Yes  • Attention / Concentration	Normal  • Estimated Intelligence	Average  • Recent Memory	Normal  • Remote Memory	Normal  • Fund of Knowledge	Normal  • Language	No abnormalities noted  • Judgment (regarding everyday events)	Fair  • Insight (regarding psychiatric illness)	Fair    SUICIDALITY:   • Suicidality (Interval)	denies    HOMICIDALITY/ AGGRESSION:   • Homicidality/ Aggression	denies    DIAGNOSIS DSM-V:    Psychiatric Diagnosis (Corresponds to DSM-IV Axis I, II):   HEALTH ISSUES - PROBLEM Dx:  Polysubstance abuse  Schizoaffective disorder, unspecified type             Medical Diagnosis (Corresponds to DSM-IV Axis III): none  • Axis III	      ASSESSMENT OF CURRENT CONDITION:   Summary (include case differential, formulation and patient response to therapy):   Denies acute psych s/s, denies AH/ VH and S/H I/I/P. Asking for discharge.  Pt informed he is involuntary and will be transferred to in pt psych when bed available.    Risk Assessment (consider static vs modifiable risk factors and protective factors; comment on level of risk for dangerous behavior):   moderate suicide risk (chronic cocaine abuse, noncompliance, homeless, past suicide attempt)  moderate homicidal risk (command hallucinations to kill people)     PLAN  Involuntary, transfer to [facility].

## 2022-12-05 NOTE — ED CDU PROVIDER SUBSEQUENT DAY NOTE - HISTORY
No events overnight. Awaiting reassessment by psychiatry
No events. Awaiting placement for admission
No events. Awaiting placement for admission
no acute events overnight

## 2022-12-05 NOTE — ED CDU PROVIDER SUBSEQUENT DAY NOTE - MEDICAL DECISION MAKING DETAILS
52y M w/ schizophrenia, presenting for SI and hallucinations. Medically cleared, pending psych eval.
pt seen by , pending inpatient placement
Awaiting bed placement for admission
Awaiting bed placement for admission

## 2022-12-05 NOTE — ED ADULT NURSE REASSESSMENT NOTE - GENERAL PATIENT STATE
comfortable appearance/no change observed
comfortable appearance/resting/sleeping
comfortable appearance/resting/sleeping
comfortable appearance/cooperative
comfortable appearance/resting/sleeping
resting/sleeping
comfortable appearance/cooperative
comfortable appearance/cooperative/resting/sleeping
no change observed
resting/sleeping
comfortable appearance/cooperative
comfortable appearance/cooperative

## 2022-12-06 PROCEDURE — 99217: CPT

## 2022-12-07 NOTE — ED CDU PROVIDER DISPOSITION NOTE - CLINICAL COURSE
Patient was lying in bed awake watching TV during interview. Patient denies current S/H I/I/P. Denies VH and AH, said the AH resolved yesterday. Patient has no complaints at this time but says he would like to go home. He is aware that he is involuntary admission

## 2022-12-16 ENCOUNTER — EMERGENCY (EMERGENCY)
Facility: HOSPITAL | Age: 52
LOS: 1 days | Discharge: HOME | End: 2022-12-16
Attending: STUDENT IN AN ORGANIZED HEALTH CARE EDUCATION/TRAINING PROGRAM
Payer: SELF-PAY

## 2022-12-16 VITALS — WEIGHT: 149.91 LBS | HEIGHT: 67 IN

## 2022-12-16 LAB
ALBUMIN SERPL ELPH-MCNC: 4 G/DL — SIGNIFICANT CHANGE UP (ref 3.3–5.2)
ALP SERPL-CCNC: 59 U/L — SIGNIFICANT CHANGE UP (ref 40–120)
ALT FLD-CCNC: 20 U/L — SIGNIFICANT CHANGE UP
AMPHET UR-MCNC: NEGATIVE — SIGNIFICANT CHANGE UP
ANION GAP SERPL CALC-SCNC: 11 MMOL/L — SIGNIFICANT CHANGE UP (ref 5–17)
APAP SERPL-MCNC: <3 UG/ML — LOW (ref 10–26)
APPEARANCE UR: ABNORMAL
AST SERPL-CCNC: 33 U/L — SIGNIFICANT CHANGE UP
BACTERIA # UR AUTO: ABNORMAL
BARBITURATES UR SCN-MCNC: NEGATIVE — SIGNIFICANT CHANGE UP
BASOPHILS # BLD AUTO: 0.02 K/UL — SIGNIFICANT CHANGE UP (ref 0–0.2)
BASOPHILS NFR BLD AUTO: 0.3 % — SIGNIFICANT CHANGE UP (ref 0–2)
BENZODIAZ UR-MCNC: NEGATIVE — SIGNIFICANT CHANGE UP
BILIRUB SERPL-MCNC: 0.3 MG/DL — LOW (ref 0.4–2)
BILIRUB UR-MCNC: NEGATIVE — SIGNIFICANT CHANGE UP
BUN SERPL-MCNC: 18.1 MG/DL — SIGNIFICANT CHANGE UP (ref 8–20)
CALCIUM SERPL-MCNC: 9.1 MG/DL — SIGNIFICANT CHANGE UP (ref 8.4–10.5)
CHLORIDE SERPL-SCNC: 100 MMOL/L — SIGNIFICANT CHANGE UP (ref 96–108)
CO2 SERPL-SCNC: 25 MMOL/L — SIGNIFICANT CHANGE UP (ref 22–29)
COCAINE METAB.OTHER UR-MCNC: POSITIVE
COLOR SPEC: YELLOW — SIGNIFICANT CHANGE UP
CREAT SERPL-MCNC: 0.67 MG/DL — SIGNIFICANT CHANGE UP (ref 0.5–1.3)
DIFF PNL FLD: NEGATIVE — SIGNIFICANT CHANGE UP
EGFR: 112 ML/MIN/1.73M2 — SIGNIFICANT CHANGE UP
EOSINOPHIL # BLD AUTO: 0.06 K/UL — SIGNIFICANT CHANGE UP (ref 0–0.5)
EOSINOPHIL NFR BLD AUTO: 1 % — SIGNIFICANT CHANGE UP (ref 0–6)
EPI CELLS # UR: SIGNIFICANT CHANGE UP
ETHANOL SERPL-MCNC: <10 MG/DL — SIGNIFICANT CHANGE UP (ref 0–9)
GLUCOSE SERPL-MCNC: 178 MG/DL — HIGH (ref 70–99)
GLUCOSE UR QL: 1000 MG/DL
HCT VFR BLD CALC: 50.8 % — HIGH (ref 39–50)
HGB BLD-MCNC: 16.9 G/DL — SIGNIFICANT CHANGE UP (ref 13–17)
IMM GRANULOCYTES NFR BLD AUTO: 0.3 % — SIGNIFICANT CHANGE UP (ref 0–0.9)
KETONES UR-MCNC: ABNORMAL
LEUKOCYTE ESTERASE UR-ACNC: NEGATIVE — SIGNIFICANT CHANGE UP
LYMPHOCYTES # BLD AUTO: 1.79 K/UL — SIGNIFICANT CHANGE UP (ref 1–3.3)
LYMPHOCYTES # BLD AUTO: 28.9 % — SIGNIFICANT CHANGE UP (ref 13–44)
MCHC RBC-ENTMCNC: 29.6 PG — SIGNIFICANT CHANGE UP (ref 27–34)
MCHC RBC-ENTMCNC: 33.3 GM/DL — SIGNIFICANT CHANGE UP (ref 32–36)
MCV RBC AUTO: 89.1 FL — SIGNIFICANT CHANGE UP (ref 80–100)
METHADONE UR-MCNC: NEGATIVE — SIGNIFICANT CHANGE UP
MONOCYTES # BLD AUTO: 0.94 K/UL — HIGH (ref 0–0.9)
MONOCYTES NFR BLD AUTO: 15.2 % — HIGH (ref 2–14)
NEUTROPHILS # BLD AUTO: 3.37 K/UL — SIGNIFICANT CHANGE UP (ref 1.8–7.4)
NEUTROPHILS NFR BLD AUTO: 54.3 % — SIGNIFICANT CHANGE UP (ref 43–77)
NITRITE UR-MCNC: NEGATIVE — SIGNIFICANT CHANGE UP
OPIATES UR-MCNC: NEGATIVE — SIGNIFICANT CHANGE UP
PCP SPEC-MCNC: SIGNIFICANT CHANGE UP
PCP UR-MCNC: NEGATIVE — SIGNIFICANT CHANGE UP
PH UR: 6 — SIGNIFICANT CHANGE UP (ref 5–8)
PLATELET # BLD AUTO: 266 K/UL — SIGNIFICANT CHANGE UP (ref 150–400)
POTASSIUM SERPL-MCNC: 4.3 MMOL/L — SIGNIFICANT CHANGE UP (ref 3.5–5.3)
POTASSIUM SERPL-SCNC: 4.3 MMOL/L — SIGNIFICANT CHANGE UP (ref 3.5–5.3)
PROT SERPL-MCNC: 7.4 G/DL — SIGNIFICANT CHANGE UP (ref 6.6–8.7)
PROT UR-MCNC: 30 MG/DL
RBC # BLD: 5.7 M/UL — SIGNIFICANT CHANGE UP (ref 4.2–5.8)
RBC # FLD: 12.6 % — SIGNIFICANT CHANGE UP (ref 10.3–14.5)
RBC CASTS # UR COMP ASSIST: SIGNIFICANT CHANGE UP /HPF (ref 0–4)
SALICYLATES SERPL-MCNC: <0.6 MG/DL — LOW (ref 10–20)
SARS-COV-2 RNA SPEC QL NAA+PROBE: SIGNIFICANT CHANGE UP
SODIUM SERPL-SCNC: 136 MMOL/L — SIGNIFICANT CHANGE UP (ref 135–145)
SP GR SPEC: 1.01 — SIGNIFICANT CHANGE UP (ref 1.01–1.02)
THC UR QL: NEGATIVE — SIGNIFICANT CHANGE UP
UROBILINOGEN FLD QL: NEGATIVE MG/DL — SIGNIFICANT CHANGE UP
WBC # BLD: 6.2 K/UL — SIGNIFICANT CHANGE UP (ref 3.8–10.5)
WBC # FLD AUTO: 6.2 K/UL — SIGNIFICANT CHANGE UP (ref 3.8–10.5)
WBC UR QL: SIGNIFICANT CHANGE UP /HPF (ref 0–5)

## 2022-12-16 PROCEDURE — 90792 PSYCH DIAG EVAL W/MED SRVCS: CPT

## 2022-12-16 PROCEDURE — 99218: CPT

## 2022-12-16 RX ORDER — DIVALPROEX SODIUM 500 MG/1
750 TABLET, DELAYED RELEASE ORAL
Refills: 0 | Status: DISCONTINUED | OUTPATIENT
Start: 2022-12-16 | End: 2022-12-23

## 2022-12-16 RX ORDER — TRAZODONE HCL 50 MG
200 TABLET ORAL AT BEDTIME
Refills: 0 | Status: DISCONTINUED | OUTPATIENT
Start: 2022-12-16 | End: 2022-12-23

## 2022-12-16 RX ORDER — RISPERIDONE 4 MG/1
2 TABLET ORAL
Refills: 0 | Status: DISCONTINUED | OUTPATIENT
Start: 2022-12-16 | End: 2022-12-23

## 2022-12-16 RX ADMIN — Medication 200 MILLIGRAM(S): at 21:20

## 2022-12-16 RX ADMIN — DIVALPROEX SODIUM 750 MILLIGRAM(S): 500 TABLET, DELAYED RELEASE ORAL at 18:54

## 2022-12-16 RX ADMIN — RISPERIDONE 2 MILLIGRAM(S): 4 TABLET ORAL at 18:54

## 2022-12-16 NOTE — ED ADULT TRIAGE NOTE - CHIEF COMPLAINT QUOTE
BIBA c/o SI/ HI - off psych meds for two weeks. Attended party yesterday and had cocaine and drunk alcohol. Pt changed in the yellow gown belongings secured. States hearing voices

## 2022-12-16 NOTE — ED ADULT NURSE REASSESSMENT NOTE - NSFALLRSKOUTCOME_ED_ALL_ED
Copied from mom's chart:    Public Health Nurse (PHN) spoke with patient regarding [facility] services and resources. Patient was provided [facility] Public  Health resources handout, home visiting card, follow along card and  car seat installation resources card given and discussed.Patient accepted referral for home visiting services, [place] Warning given, referral completed electronically. PHN also advised on process of getting breast pump through insurance Patient reports no questions or concerns at this time.   Universal Safety Interventions

## 2022-12-16 NOTE — ED ADULT NURSE NOTE - PAIN RATING/NUMBER SCALE (0-10): ACTIVITY
Hpi Title: Evaluation of Skin Lesions How Severe Are Your Spot(S)?: moderate Have Your Spot(S) Been Treated In The Past?: has not been treated Family Member: Grandfather Additional History: Patient would like several moles on her trunk looked at. Her grandfather was recently diagnosed with melanoma and she has not had these moles looked at in several years. 0

## 2022-12-16 NOTE — ED ADULT NURSE NOTE - OBJECTIVE STATEMENT
Pt presents for psych eval off his meds x2 week pt with hx of drug abuse last used last night at party. PT reports he is hearing voices and needs psychiatric help,. PT reports he is cold and hungry and was out in the rain for hours because ems wont come [place]. PT aao x4 resp even unlabored. denies si/hi at this time

## 2022-12-16 NOTE — ED ADULT NURSE REASSESSMENT NOTE - DESCRIPTION
received report. received pt ambulatory in unit. cooperative at this time making needs know. no harm to self or others will monitor

## 2022-12-16 NOTE — ED PROVIDER NOTE - ATTENDING CONTRIBUTION TO CARE
53 yo male with hx htn, dm, schizoaffective disorder with hallucinations  patient admits to drug use yesterday  recent admitted to [facility]  patient states he has no meds/prescriptions    will check labs, psych eval

## 2022-12-16 NOTE — ED PROVIDER NOTE - CLINICAL SUMMARY MEDICAL DECISION MAKING FREE TEXT BOX
51 y/o male w/suicidal ideation, recently discharged from [facility] looking for hospitalization and restarting meds. Unclear if pt is noncompliant with meds and/or malingering. Will get Psych labs and call .

## 2022-12-16 NOTE — ED ADULT NURSE REASSESSMENT NOTE - NS ED NURSE REASSESS COMMENT FT1
Patient comes to  after being medically cleared in main ED. He reports not taking meds for 2 weeks, although documentation indicates patient was discharged from [facility] 2 days ago. Pt has multiple ED visits for similar symptomology. Also admits to drinking ETOH and smoking crack cocaine yesterday at a party. Ate and had irritable edge upon approach. States he is hearing voices telling him to hurt himself. Treatment plan is to hold in ED for reassessment in the AM. Pt able to contract for safety while in the hospital.

## 2022-12-16 NOTE — ED BEHAVIORAL HEALTH ASSESSMENT NOTE - SUMMARY
53 y/o male with PMHx of DM , single, homeless, and past psych hx of Schizoaffective disorder, past psych hospitalization at [facility] 3 weeks ago presents to the ED c/o auditory hallucinations telling him to kill himself. Reports this has been ongoing for the past 2 days. Pt states he wants his medications Depakote, Trazadone and Invega. Reports he was at [facility] 3 wks ago and they did nothing to help him so he left. States he didn't receive any medications there and has nowhere to get them. During interview, patient was uncooperative, agitated and unwilling to provide any additional information regarding his AH or mood. Patient was here for similar complaint on Dec. 2nd in which he endorsed AH. Patient was sent to [facility] for involuntary treatment on Dec.5th. Patient is not compliant with his medications or treatment. 51 y/o male with PMHx of DM , single, homeless, and past psych hx of Schizoaffective disorder, past psych hospitalization at [facility] 3 weeks ago presents to the ED c/o auditory hallucinations telling him to kill himself. Reports this has been ongoing for the past 2 days. Pt states he wants his medications Depakote, Trazadone and Invega. Reports he was at [facility] 3 wks ago and they did nothing to help him so he left. States he didn't receive any medications there and has nowhere to get them. During interview, patient was uncooperative, agitated and unwilling to provide any additional information regarding his AH or mood. Patient was here for similar complaint on Dec. 2nd in which he endorsed AH. Patient was sent to [facility] for involuntary treatment on Dec.5th. Patient is not compliant with his medications or treatment.    Pt is well known to [facility] and is a frequent and high utilizer of ED which appears to be for secondary gains, ie lodging, food and shower.  Pt has become entrenched in the mental health system in part due to reports of remote  suicide attempt and multiple psych admission, treatment with ACT team and AOT..  Pt has repeatedly demonstrated he has no desire to get sober or participate in his sown treatment as he does not follow up and does not want to stop using drugs and alcohol.     Pt is unreliable and not forthcoming as he claims he was off meds x 2 weeks when d/c from [facility] 2 days ago.  Pt will continue to utilize an ED bed and inpt psych bed for personal use rather than for help and do not think Pt will benefit from enabling Pt to continue same behavior.  Will reassess and plan for discharge via [facility] d/c plan.

## 2022-12-16 NOTE — ED ADULT NURSE NOTE - NSIMPLEMENTINTERV_GEN_ALL_ED
Implemented All Fall Risk Interventions:  [place] to call system. Call bell, personal items and telephone within reach. Instruct patient to call for assistance. Room bathroom lighting operational. Non-slip footwear when patient is off stretcher. Physically safe environment: no spills, clutter or unnecessary equipment. Stretcher in lowest position, wheels locked, appropriate side rails in place. Provide visual cue, wrist band, yellow gown, etc. Monitor gait and stability. Monitor for mental status changes and reorient to person, place, and time. Review medications for side effects contributing to fall risk. Reinforce activity limits and safety measures with patient and family.

## 2022-12-16 NOTE — ED ADULT TRIAGE NOTE - NS ED NURSE AMBULANCES
----- Message from Diana Nath sent at 8/27/2019  9:19 AM CDT -----  Contact: Patient  Type: Needs Medical Advice    Who Called:  Patient  Best Call Back Number: [phone]  Additional Information: Patient is calling to get a procedure scheduled for a hernia repair.Please call back and advise.   [facility]

## 2022-12-16 NOTE — ED CDU PROVIDER INITIAL DAY NOTE - OBJECTIVE STATEMENT
51 y/o male w/PMHx of Schizophrenia, Bipolar, Cocaine abuse, and Diabetes presents with auditory hallucinations and suicidal ideation. Says he is not on any medications, previously on Invega and Depakote, with Trazodone for sleep. Had alcohol and snorted cocaine at a party yesterday. Was hearing voices telling him to kill himself. He does not have a specific plan and wants to be hospitalized. On chart review, was previously seen two weeks ago and transferred to [facility]. Pt says they did not help him there or put him on meds, and wanted him to go to a shelter and he says he does not "get along with shelters". Denies homicidal ideation and visual hallucinations. Denies CP, sob, lower extremity numbness.

## 2022-12-16 NOTE — ED BEHAVIORAL HEALTH ASSESSMENT NOTE - DETAILS
n/a pt uncooperative with assessment, does not provide details self [facility] voices telling him to kill himself

## 2022-12-16 NOTE — ED PROVIDER NOTE - OBJECTIVE STATEMENT
53 y/o male w/PMHx of Schizophrenia, Bipolar, Cocaine abuse, and Diabetes presents with auditory hallucinations and suicidal ideation. Says he is not on any medications, previously on Invega and Depakote, with Trazodone for sleep. Had alcohol and snorted cocaine at a party yesterday. Was hearing voices telling him to kill himself. He does not have a specific plan and wants to be hospitalized. On chart review, was previously seen two weeks ago and transferred to [facility]. Pt says they did not help him there or put him on meds, and wanted him to go to a shelter and he says he does not "get along with shelters". Denies homicidal ideation and visual hallucinations. Denies CP, sob, lower extremity numbness.

## 2022-12-16 NOTE — ED BEHAVIORAL HEALTH ASSESSMENT NOTE - DESCRIPTION
diabetes homeless, cocaine use, etoh use awake and alert, agitated and uncooperative. 1:1 at bedside.

## 2022-12-16 NOTE — ED BEHAVIORAL HEALTH ASSESSMENT NOTE - NSCURPASTPSYDX_PSY_ALL_CORE
Psychotic disorder/Alcohol/Substance Use disorders Mood disorder/Psychotic disorder/Alcohol/Substance Use disorders

## 2022-12-16 NOTE — ED PROVIDER NOTE - PHYSICAL EXAMINATION
General: NAD  Head: NC, AT  EENT: EOMI, 3-4mm pupils equal round reactive, no scleral icterus  Cardiac: RRR, no apparent murmurs, no lower extremity edema  Respiratory: CTABL, no respiratory distress   Abdomen: soft, ND, NT, nonperitonitic  MSK/Vascular: full ROM, soft compartments, warm extremities  Neuro: AAOx3, sensation to light touch intact  Psych: makes eye contact, heightened mood, loud/rapid speech, verbalizing suicidal ideation and auditory hallucinations, denying wish to harm others and visual hallucinations

## 2022-12-16 NOTE — ED BEHAVIORAL HEALTH ASSESSMENT NOTE - HPI (INCLUDE ILLNESS QUALITY, SEVERITY, DURATION, TIMING, CONTEXT, MODIFYING FACTORS, ASSOCIATED SIGNS AND SYMPTOMS)
53 y/o male with PMHx of DM, single, homeless, and past psych hx of Schizoaffective disorder, past psych hospitalization at [facility] 3 weeks ago presents to the ED c/o auditory hallucinations telling him to kill himself. Reports this has been ongoing for the past 2 days. Pt states he wants his medications Depakote, Trazadone and Invega. Reports he was at [facility] 3 wks ago and they did nothing to help him so he left. States he didn't receive any medications there and has nowhere to get them. Patient states he was drinking alcohol and snorted cocaine 24 hours ago. During interview, patient became agitated and told interviewer to "stop asking him stupid questions." Patient ended the interview abruptly stating he doesn't want to talk anymore. Pt was uncooperative and unwilling to provide any further information. Patient was here in psych ED on Dec. 2nd for similar presentation. Patient was involuntarily transferred to [facility] on Dec. 5th. As per previous chart patient is followed by [facility] ACT team ([phone]) with AOT. 53 y/o male with PMHx of DM, single, homeless, and past psych hx of Schizoaffective disorder, past psych hospitalization at [facility] "3 weeks ago" per Pt when discharged 2 days ago presents to the ED c/o auditory hallucinations telling him to kill himself. Reports this has been ongoing for the past 2 days. Pt states he wants his medications Depakote, Trazadone and Invega. Reports he was at [facility] 3 wks ago and they did nothing to help him so he left. States he didn't receive any medications there and has nowhere to get them. Patient states he was drinking alcohol and snorted cocaine 24 hours ago. During interview, patient became agitated and told interviewer to "stop asking him stupid questions." Patient ended the interview abruptly stating he doesn't want to talk anymore. Pt was uncooperative and unwilling to provide any further information. Patient was here in psych ED on Dec. 2nd for similar presentation. Patient was involuntarily transferred to [facility] on Dec. 5th. As per previous chart patient is followed by [facility] ACT team ([phone]) with AOT.  Message left with ACT team to return call.

## 2022-12-17 VITALS
TEMPERATURE: 98 F | HEART RATE: 90 BPM | DIASTOLIC BLOOD PRESSURE: 66 MMHG | SYSTOLIC BLOOD PRESSURE: 102 MMHG | OXYGEN SATURATION: 96 % | RESPIRATION RATE: 18 BRPM

## 2022-12-17 PROCEDURE — 80053 COMPREHEN METABOLIC PANEL: CPT

## 2022-12-17 PROCEDURE — 80307 DRUG TEST PRSMV CHEM ANLYZR: CPT

## 2022-12-17 PROCEDURE — 36415 COLL VENOUS BLD VENIPUNCTURE: CPT

## 2022-12-17 PROCEDURE — 99285 EMERGENCY DEPT VISIT HI MDM: CPT

## 2022-12-17 PROCEDURE — 81001 URINALYSIS AUTO W/SCOPE: CPT

## 2022-12-17 PROCEDURE — U0005: CPT

## 2022-12-17 PROCEDURE — 99213 OFFICE O/P EST LOW 20 MIN: CPT

## 2022-12-17 PROCEDURE — G0378: CPT

## 2022-12-17 PROCEDURE — U0003: CPT

## 2022-12-17 PROCEDURE — 99217: CPT

## 2022-12-17 PROCEDURE — 85025 COMPLETE CBC W/AUTO DIFF WBC: CPT

## 2022-12-17 RX ORDER — METFORMIN HYDROCHLORIDE 850 MG/1
1000 TABLET ORAL
Refills: 0 | Status: DISCONTINUED | OUTPATIENT
Start: 2022-12-17 | End: 2022-12-23

## 2022-12-17 RX ADMIN — METFORMIN HYDROCHLORIDE 1000 MILLIGRAM(S): 850 TABLET ORAL at 08:00

## 2022-12-17 RX ADMIN — RISPERIDONE 2 MILLIGRAM(S): 4 TABLET ORAL at 06:43

## 2022-12-17 RX ADMIN — DIVALPROEX SODIUM 750 MILLIGRAM(S): 500 TABLET, DELAYED RELEASE ORAL at 06:44

## 2022-12-17 NOTE — ED CDU PROVIDER DISPOSITION NOTE - PATIENT PORTAL LINK FT
You can access the FollowMyHealth Patient Portal offered by [facility] by registering at the following website: [email or web address]. By joining [employer] [facility]’s FollowMyHealth portal, you will also be able to view your health information using other applications (apps) compatible with our system.

## 2022-12-17 NOTE — ED CDU PROVIDER DISPOSITION NOTE - CLINICAL COURSE
53 y/o male w/PMHx of Schizophrenia, Bipolar, Cocaine abuse, and Diabetes presents with auditory hallucinations and suicidal ideation. Says he is not on any medications, previously on Invega and Depakote, with Trazodone for sleep. Had alcohol and snorted cocaine at a party yesterday. Was hearing voices telling him to kill himself. He does not have a specific plan and wants to be hospitalized. On chart review, was previously seen two weeks ago and transferred to [facility]. Pt says they did not help him there or put him on meds, and wanted him to go to a shelter and he says he does not "get along with shelters". Denies homicidal ideation and visual hallucinations. Denies CP, sob, lower extremity numbness. Pt medically stable and evaluated by psych and observed pt cleared, no SI, instructed to not do illicit drugs and given resources

## 2022-12-17 NOTE — CHART NOTE - NSCHARTNOTEFT_GEN_A_CORE
SW Note: SW reviewed pt chart and SW handoff notes - pt waiting for reassessment this AM. Psych MD later informed SW that pt was cleared to leave. Pt requested bus tickets to return home - SW provided pt bus tickets. SW asked pt if they had any other needs at this time - pt denied.

## 2022-12-17 NOTE — ED CDU PROVIDER DISPOSITION NOTE - NSFOLLOWUPINSTRUCTIONS_ED_ALL_ED_FT
Illegal Drug Use Information, Adult    Illegal drugs are chemicals and substances that are illegal to use, sell, or have (possess). Health care providers and pharmacies do not use or carry these types of drugs to treat medical problems because they can cause serious side effects and can lead to death. Examples of illegal drugs include:    Cocaine or crack.  Meth (methamphetamine) or crystal meth.  "Bath salts" (synthetic cathinones).  Heroin.  LSD, PCP, or acid.  Ecstasy.    What is drug dependence?  Using illegal drugs often leads to dependence or addiction. When you use certain drugs over a long period of time, your brain chemistry changes so that you can no longer function normally without that drug. This is called drug dependence. Drug dependence can cause you to:    Have unpleasant feelings and physical problems when you stop using the drug (withdrawal).  Be unable to perform at work or at home, or do the activities you used to do, without using the drug.    Some drugs make people feel so good that they want to use the drug again and again. This is called drug addiction. People who are addicted spend a lot of time seeking out the drug so that they can get the feeling they want from it. Addiction and dependence can be very hard to overcome.    How can illegal drug use and dependence affect me?  Using an illegal drug only once can have a major impact on your life. It is possible to die from side effects after using a drug just once. If you use an illegal drug repeatedly, you may need to take larger and larger doses of the drug to experience the feelings you want. Drug dependency and addiction may lead to:    Being unable to care for yourself and others.  Withdrawal, if you stop using the drug.  Negative effects on your relationships and work performance. It causes others not to trust you. If you have children, you could lose custody of them.  Behaving in ways that do not match your values.  Lying and crime, such as stealing.  assisted or FDC. This can affect your ability to find a good job or continue your education.  Health problems such as tooth loss, skin problems, heart and lung disease, and stomach problems.  If you are a woman, you may have problems with pregnancy, including:    Losing the pregnancy early (miscarriage), early delivery (premature birth), or delivering a lifeless infant (stillbirth).  Slow or abnormal growth (birth defects) of your unborn child.  Giving birth to a  who is addicted to illegal drugs.  A drug overdose. This is a dangerous situation that requires hospitalization and often leads to death.    What are the benefits of avoiding illegal drug use?  Avoiding illegal drug use can:    Keep your mind and body healthy. This can help improve work performance and participation in activities.  Keep you from developing drug dependency or addiction. This can help you avoid negative side effects such as withdrawal and overdose.  Help you have healthy relationships with your friends and family.  Help you have more stable finances. Instead of using your money for drugs, you can:    Spend it on things you would like to have.  Save it for future use, such as for detention or for big purchases like a car or a house.  Help you avoid a permanent criminal record, FCI time, or FDC time. Having a clean record allows you to have more job and educational opportunities in the future.    What actions can be taken?     To avoid using illegal drugs:    Find healthy ways to cope with stress, such as exercise, meditation, or spending time with family and friends. Talk with your health care provider about how you feel and how to cope with stress.  Spend time with people who do not use illegal drugs, or make new friends who do not use drugs.  Do something else instead of using drugs. You can exercise, take up a hobby, or participate in activities that you can do with others.  Do not be afraid to say no if someone offers you an illegal drug. Speak up about why you do not want to use drugs. You can be a positive role model for others.  Work with a health care provider or counselor to create a program for yourself to help you deal with various aspects of your addiction.    Where to find more information  You can find more information about illegal drug use, dependence, and addiction from:    Your health care provider or mental health counselor.  Narcotics Anonymous: www.na.org  Substance Abuse and Mental Health Services Administration:     Treatment finder: https://www.samhsa.gov/find-help  National helpline: 5-945-758-WJOG (4823)    Contact a health care provider if:  You use illegal drugs.  You have missed family activities or work to use illegal drugs.  You have lied or stolen to get illegal drugs.  You lose interest in things you used to enjoy, like hobbies or family activities.  Your eating or sleeping habits change as a result of drug use.  You use medicine to get the same effects as a drug. This is illegal use and can become addictive as well.  You stopped illegal drug use previously and you start actively using it again (relapse).  You want help to change your addictive behavior.    Get help right away if:  You have thoughts about hurting yourself or others.    If you ever feel like you may hurt yourself or others, or have thoughts about taking your own life, get help right away. You can go to your nearest emergency department or call:    Your local emergency services (911 in the [place]   A suicide crisis helpline, such as the National Suicide Prevention Lifeline at 1-412.219.3908. This is open 24 hours a day.    Summary  Illegal drugs are chemicals and substances that are illegal to use, sell, or possess.  Using illegal drugs often leads to dependence or addiction. This means that you need the drug to feel normal.  If you use illegal drugs, look for resources to help you quit and find healthy ways to cope with stress.    ADDITIONAL NOTES AND INSTRUCTIONS    -Please follow-up with your primary care doctor in the next 2 days.  Please call tomorrow for an appointment.  If you cannot follow-up with your primary care doctor please return to the ED for any urgent issues.  - You were given a copy of the tests performed today.  Please bring the results with you and review them with your primary care doctor.  - If you have any worsening of symptoms or any other concerns please return to the ED immediately.  - Please continue taking your home medications as directed.

## 2022-12-17 NOTE — ED BEHAVIORAL HEALTH NOTE - BEHAVIORAL HEALTH NOTE
PROGRESS NOTE: 22 @ 11:36  	  • Reason for Ongoing Consultation: 	re-evaluation  ID: [id number] Male with HEALTH ISSUES - PROBLEM Dx:  INTERVAL DATA:   • Interval Chief Complaint: "I feel better- I want to go"  • Interval History:   uneventful Night  Patient denies any suicidal or violent urges and requests release  REVIEW OF SYSTEMS:   • Constitutional Symptoms	No complaints  • Eyes	No complaints  • Ears / Nose / Throat / Mouth	No complaints  • Cardiovascular	No complaints  • Respiratory	No complaints  • Gastrointestinal	No complaints  • Genitourinary	No complaints  • Musculoskeletal	No complaints  • Skin	No complaints  • Neurological	No complaints  • Psychiatric (see HPI)	See HPI  • Endocrine	No complaints  • Hematologic / Lymphatic	No complaints  • Allergic / Immunologic	No complaints    REVIEW OF VITALS/LABS/IMAGING/INVESTIGATIONS:   • Vital signs reviewed: Yes  • Vital Signs:	    T(C): 36.7 (22 @ 10:55), Max: 36.9 (22 @ 07:50)  HR: 90 (22 @ 10:55) (85 - 94)  BP: 102/66 (22 @ 10:55) (102/66 - 128/81)  RR: 18 (22 @ 10:55) (18 - 19)  SpO2: 96% (22 @ 10:55) (94% - 96%)    • Available labs reviewed: Yes  • Available Lab Results:                           16.9   6.20  )-----------( 266      ( 16 Dec 2022 08:15 )             50.8     12-    136  |  100  |  18.1  ----------------------------<  178<H>  4.3   |  25.0  |  0.67    Ca    9.1      16 Dec 2022 08:15    TPro  7.4  /  Alb  4.0  /  TBili  0.3<L>  /  DBili  x   /  AST  33  /  ALT  20  /  AlkPhos  59  12-16    LIVER FUNCTIONS - ( 16 Dec 2022 08:15 )  Alb: 4.0 g/dL / Pro: 7.4 g/dL / ALK PHOS: 59 U/L / ALT: 20 U/L / AST: 33 U/L / GGT: x             Urinalysis Basic - ( 16 Dec 2022 12:13 )    Color: Yellow / Appearance: Slightly Turbid / S.015 / pH: x  Gluc: x / Ketone: Small  / Bili: Negative / Urobili: Negative mg/dL   Blood: x / Protein: 30 mg/dL / Nitrite: Negative   Leuk Esterase: Negative / RBC: 0-2 /HPF / WBC 0-2 /HPF   Sq Epi: x / Non Sq Epi: Occasional / Bacteria: Occasional  Cocaine Metabolite, Urine: Positive (22 @ 12:13)          MEDICATIONS:      PRN Medications: NONE  • PRN Medications since last evaluation	  • PRN Details	    Current Medications:   divalproex  milliGRAM(s) Oral two times a day  metformin 1000 milliGRAM(s) Oral two times a day  risperidone   Tablet 2 milliGRAM(s) Oral two times a day  trazodone 200 milliGRAM(s) Oral at bedtime     Medication Side Effects:  • Medication Side Effects or Adverse Reactions (new or ongoing)	None known    MENTAL STATUS EXAM:   • Level of Consciousness	Alert  • General Appearance	Well developed  • Body Habitus	Well nourished  • Hygiene	fair  • Grooming	fair  • Behavior	Cooperative  • Eye Contact	fair  • Relatedness	Good  • Impulse Control	Normal  • Muscle Tone / Strength	Normal muscle tone/strength  • Abnormal Movements	No abnormal movements  • Gait / Station	Normal gait / station  • Speech Volume	Normal  • Speech Rate	Normal  • Speech Spontaneity	Normal  • Speech Articulation	Normal  • Mood	Normal  • Affect Quality	Euthymic  • Affect Range	constricted  • Affect Congruence	Congruent  • Thought Process	Linear  • Thought Associations	Normal  • Thought Content	Unremarkable  • Perceptions	No abnormalities  • Oriented to Time	Yes  • Oriented to Place	Yes  • Oriented to Situation	Yes  • Oriented to Person	Yes  • Attention / Concentration	Normal  • Estimated Intelligence	Average  • Recent Memory	Normal  • Remote Memory	Normal  • Fund of Knowledge	Normal  • Language	No abnormalities noted  • Judgment (regarding everyday events)	poor  • Insight (regarding psychiatric illness)	poor    SUICIDALITY:   • Suicidality (Interval)	none known    HOMICIDALITY/AGGRESSION:   • Homicidality/Aggression	none known    DIAGNOSIS DSM-V:    Psychiatric Diagnosis (Corresponds to DSM-IV Axis I, II): cocaine abuse   HEALTH ISSUES - PROBLEM Dx:           Medical Diagnosis (Corresponds to DSM-IV Axis III):  • Axis III	  DM 2    ASSESSMENT OF CURRENT CONDITION:   Summary (include case differential, formulation and patient response to therapy): recurrent pattern of use of ED after crack cocaine binge Makes suicidal threats and today again recants asking for bus tickets and discharge     Risk Assessment (consider static vs modifiable risk factors and protective factors; comment on level of risk for dangerous behavior): low imminent risk    PLAN  discharge  already has ACT team and is under AOT

## 2022-12-19 ENCOUNTER — EMERGENCY (EMERGENCY)
Facility: HOSPITAL | Age: 52
LOS: 1 days | Discharge: HOME | End: 2022-12-19
Attending: EMERGENCY MEDICINE
Payer: MEDICAID

## 2022-12-19 VITALS
HEART RATE: 104 BPM | SYSTOLIC BLOOD PRESSURE: 127 MMHG | RESPIRATION RATE: 18 BRPM | OXYGEN SATURATION: 97 % | TEMPERATURE: 99 F | DIASTOLIC BLOOD PRESSURE: 79 MMHG | HEIGHT: 67 IN

## 2022-12-19 LAB
HCT VFR BLD CALC: 46.9 % — SIGNIFICANT CHANGE UP (ref 39–50)
HGB BLD-MCNC: 15.3 G/DL — SIGNIFICANT CHANGE UP (ref 13–17)
MCHC RBC-ENTMCNC: 29.3 PG — SIGNIFICANT CHANGE UP (ref 27–34)
MCHC RBC-ENTMCNC: 32.6 GM/DL — SIGNIFICANT CHANGE UP (ref 32–36)
MCV RBC AUTO: 89.8 FL — SIGNIFICANT CHANGE UP (ref 80–100)
PLATELET # BLD AUTO: 247 K/UL — SIGNIFICANT CHANGE UP (ref 150–400)
RBC # BLD: 5.22 M/UL — SIGNIFICANT CHANGE UP (ref 4.2–5.8)
RBC # FLD: 12.6 % — SIGNIFICANT CHANGE UP (ref 10.3–14.5)
WBC # BLD: 6.71 K/UL — SIGNIFICANT CHANGE UP (ref 3.8–10.5)
WBC # FLD AUTO: 6.71 K/UL — SIGNIFICANT CHANGE UP (ref 3.8–10.5)

## 2022-12-19 PROCEDURE — 99284 EMERGENCY DEPT VISIT MOD MDM: CPT

## 2022-12-19 NOTE — ED ADULT TRIAGE NOTE - NS_BH TRG QUESTION1_ED_ALL_ED
Regarding: Needs Rx Authorization   ----- Message from Jalen Henry sent at 9/15/2018  8:17 AM CDT -----  Patient Name: Nawaf Rose  Specialist or PCP:Lul Mckeon MD  Pregnant (If Yes, how long?):no  Symptoms:Needs Rx Authorization   Call Back #:[phone]  Is the patient’s permanent residence located in [place], [place], or a [facility]? Yes [facility] [id number]  Call Center Account #:[id number]       Intoxication

## 2022-12-19 NOTE — ED ADULT NURSE NOTE - HPI (INCLUDE ILLNESS QUALITY, SEVERITY, DURATION, TIMING, CONTEXT, MODIFYING FACTORS, ASSOCIATED SIGNS AND SYMPTOMS)
patient to  unit states that he does not want to talk that all he wants is some place to sleep and something hot to eat.  Pt states that he is giving up and that he is having ah/si/hi.  patient states that he is homeless and that nothing has changes since his last visit 3 days ago,

## 2022-12-19 NOTE — ED ADULT NURSE NOTE - OBJECTIVE STATEMENT
patient to  unit in yellow gown with belongings removed by ER staff.  Pt with poor eye contact and requesting hot meal

## 2022-12-19 NOTE — ED PROVIDER NOTE - OBJECTIVE STATEMENT
53 y/o male w/PMHx of Schizophrenia, Bipolar, Cocaine abuse, and Diabetes presents with auditory hallucinations and suicidal ideation.  states she is on medications, but does not always take them.  denies HI. denies VH. denies drug or alcohol use.  PMH: Schizophrenia, Bipolar, Cocaine abuse, and Diabetes'  SOCIAL: +etoh

## 2022-12-19 NOTE — ED PROVIDER NOTE - PATIENT PORTAL LINK FT
You can access the FollowMyHealth Patient Portal offered by [facility] by registering at the following website: [email or web address]. By joining [facility]’s FollowMyHealth portal, you will also be able to view your health information using other applications (apps) compatible with our system.

## 2022-12-19 NOTE — ED PROVIDER NOTE - NSICDXPASTSURGICALHX_GEN_ALL_CORE_FT
----- Message from Jyoti Grewal MD sent at 12/23/2019 12:49 PM EST -----  Please let him and his mother know that the abdominal ultrasound was entirely normal.  The liver looks normal along with the gallbladder.  Both kidneys look normal.  The spleen also looks normal.        --Called pt and spoke with pt's mother and advised of the above.  She verb understanding and is asking if her son has been confirmed to have gilbert's disease and if so how does he handle this moving forward. She is requesting copy of path to be mailed to them.  Advised will send to home address and will send message to Dr Grewal. She verb understanding.   Whitley Leiva RN   PAST SURGICAL HISTORY:  No significant past surgical history     No significant past surgical history

## 2022-12-20 VITALS
OXYGEN SATURATION: 95 % | SYSTOLIC BLOOD PRESSURE: 129 MMHG | TEMPERATURE: 100 F | RESPIRATION RATE: 18 BRPM | HEART RATE: 114 BPM | DIASTOLIC BLOOD PRESSURE: 83 MMHG

## 2022-12-20 LAB
ACANTHOCYTES BLD QL SMEAR: SLIGHT — SIGNIFICANT CHANGE UP
ALBUMIN SERPL ELPH-MCNC: 3.9 G/DL — SIGNIFICANT CHANGE UP (ref 3.3–5.2)
ALP SERPL-CCNC: 62 U/L — SIGNIFICANT CHANGE UP (ref 40–120)
ALT FLD-CCNC: 23 U/L — SIGNIFICANT CHANGE UP
ANION GAP SERPL CALC-SCNC: 16 MMOL/L — SIGNIFICANT CHANGE UP (ref 5–17)
APAP SERPL-MCNC: <3 UG/ML — LOW (ref 10–26)
AST SERPL-CCNC: 38 U/L — SIGNIFICANT CHANGE UP
BASOPHILS # BLD AUTO: 0 K/UL — SIGNIFICANT CHANGE UP (ref 0–0.2)
BASOPHILS NFR BLD AUTO: 0 % — SIGNIFICANT CHANGE UP (ref 0–2)
BILIRUB SERPL-MCNC: 0.5 MG/DL — SIGNIFICANT CHANGE UP (ref 0.4–2)
BUN SERPL-MCNC: 15.2 MG/DL — SIGNIFICANT CHANGE UP (ref 8–20)
BURR CELLS BLD QL SMEAR: PRESENT — SIGNIFICANT CHANGE UP
CALCIUM SERPL-MCNC: 8.5 MG/DL — SIGNIFICANT CHANGE UP (ref 8.4–10.5)
CHLORIDE SERPL-SCNC: 97 MMOL/L — SIGNIFICANT CHANGE UP (ref 96–108)
CO2 SERPL-SCNC: 22 MMOL/L — SIGNIFICANT CHANGE UP (ref 22–29)
CREAT SERPL-MCNC: 0.81 MG/DL — SIGNIFICANT CHANGE UP (ref 0.5–1.3)
DACRYOCYTES BLD QL SMEAR: SLIGHT — SIGNIFICANT CHANGE UP
EGFR: 106 ML/MIN/1.73M2 — SIGNIFICANT CHANGE UP
EOSINOPHIL # BLD AUTO: 0.12 K/UL — SIGNIFICANT CHANGE UP (ref 0–0.5)
EOSINOPHIL NFR BLD AUTO: 1.8 % — SIGNIFICANT CHANGE UP (ref 0–6)
ETHANOL SERPL-MCNC: <10 MG/DL — SIGNIFICANT CHANGE UP (ref 0–9)
GIANT PLATELETS BLD QL SMEAR: PRESENT — SIGNIFICANT CHANGE UP
GLUCOSE SERPL-MCNC: 90 MG/DL — SIGNIFICANT CHANGE UP (ref 70–99)
LYMPHOCYTES # BLD AUTO: 0.6 K/UL — LOW (ref 1–3.3)
LYMPHOCYTES # BLD AUTO: 8.9 % — LOW (ref 13–44)
LYMPHOCYTES # SPEC AUTO: 2.7 % — HIGH (ref 0–0)
MANUAL SMEAR VERIFICATION: SIGNIFICANT CHANGE UP
MONOCYTES # BLD AUTO: 1.32 K/UL — HIGH (ref 0–0.9)
MONOCYTES NFR BLD AUTO: 19.6 % — HIGH (ref 2–14)
MYELOCYTES NFR BLD: 0.9 % — HIGH (ref 0–0)
NEUTROPHILS # BLD AUTO: 4.25 K/UL — SIGNIFICANT CHANGE UP (ref 1.8–7.4)
NEUTROPHILS NFR BLD AUTO: 60.7 % — SIGNIFICANT CHANGE UP (ref 43–77)
NEUTS BAND # BLD: 2.7 % — SIGNIFICANT CHANGE UP (ref 0–8)
OVALOCYTES BLD QL SMEAR: SLIGHT — SIGNIFICANT CHANGE UP
PLAT MORPH BLD: SIGNIFICANT CHANGE UP
POIKILOCYTOSIS BLD QL AUTO: SIGNIFICANT CHANGE UP
POTASSIUM SERPL-MCNC: 4 MMOL/L — SIGNIFICANT CHANGE UP (ref 3.5–5.3)
POTASSIUM SERPL-SCNC: 4 MMOL/L — SIGNIFICANT CHANGE UP (ref 3.5–5.3)
PROT SERPL-MCNC: 6.9 G/DL — SIGNIFICANT CHANGE UP (ref 6.6–8.7)
RBC BLD AUTO: ABNORMAL
SALICYLATES SERPL-MCNC: <0.6 MG/DL — LOW (ref 10–20)
SARS-COV-2 RNA SPEC QL NAA+PROBE: SIGNIFICANT CHANGE UP
SODIUM SERPL-SCNC: 135 MMOL/L — SIGNIFICANT CHANGE UP (ref 135–145)
VARIANT LYMPHS # BLD: 2.7 % — SIGNIFICANT CHANGE UP (ref 0–6)

## 2022-12-20 PROCEDURE — U0005: CPT

## 2022-12-20 PROCEDURE — 36415 COLL VENOUS BLD VENIPUNCTURE: CPT

## 2022-12-20 PROCEDURE — U0003: CPT

## 2022-12-20 PROCEDURE — 90792 PSYCH DIAG EVAL W/MED SRVCS: CPT | Mod: 95

## 2022-12-20 PROCEDURE — 85025 COMPLETE CBC W/AUTO DIFF WBC: CPT

## 2022-12-20 PROCEDURE — 80307 DRUG TEST PRSMV CHEM ANLYZR: CPT

## 2022-12-20 PROCEDURE — 99284 EMERGENCY DEPT VISIT MOD MDM: CPT

## 2022-12-20 PROCEDURE — 80053 COMPREHEN METABOLIC PANEL: CPT

## 2022-12-20 NOTE — ED BEHAVIORAL HEALTH ASSESSMENT NOTE - HPI (INCLUDE ILLNESS QUALITY, SEVERITY, DURATION, TIMING, CONTEXT, MODIFYING FACTORS, ASSOCIATED SIGNS AND SYMPTOMS)
51 y/o male with PMHx of DM, single, homeless, and past psych hx of Schizoaffective disorder, cocaine use disorder, antisocial personality disorder, alcohol use disorder, cannabis use disorder, substance induced depression, substance induced psychosis, bipolar I who is a very high frequent utilizer of the ED, mostly comes in due to being undomiciled and recent polysubstance abuse, currently utox positive for cocaine, who is often irritable and refuses to participate, but is often discharged when reconstituted and cleared from substances, bring himself in again after presenting 2 days ago for same presentation, reporting CAH and SI.    On evaluation, patient dismissive, does not want to participate. States he is here "for medication", denies any other issues. Denies Si/HI/AVH. Minimally cooperative, does not even look at camera.    Utox + cocaine    history of malingering.

## 2022-12-20 NOTE — ED BEHAVIORAL HEALTH ASSESSMENT NOTE - SAFETY PLAN ADDT'L DETAILS
Safety plan discussed with.../Education provided regarding environmental safety / lethal means restriction/Provision of National Suicide Prevention Lifeline 8-252-469-JDKM (1280) Safety plan discussed with.../Education provided regarding environmental safety / lethal means restriction/Provision of National Suicide Prevention Lifeline 7-345-469-MKON (2923)

## 2022-12-20 NOTE — ED BEHAVIORAL HEALTH ASSESSMENT NOTE - NS ED BHA PLAN TR MEDICATIONS2 FT
Deficient with previous lab work, patient is taking a vitamin D supplement at this time.  Will repeat level with next set of labs and will treat accordingly.  Continue to monitor.   none

## 2022-12-20 NOTE — ED ADULT NURSE REASSESSMENT NOTE - NS ED NURSE REASSESS COMMENT FT1
patient setup for tele psych.  patient uncooperative and would not roll over to speak with Psych MD.  Pt then got up and went into bathroom and woud not given urine sample and states that while going he wasnot going to give sample at this time and would do it later.  Pt then went delbert to bed.

## 2022-12-20 NOTE — ED BEHAVIORAL HEALTH ASSESSMENT NOTE - CURRENT PLAN:
Consent: The rationale for the repair was explained to the patient and consent was obtained. The risks, benefits and alternatives to therapy were discussed in detail. Specifically, the risks of infection, scarring, bleeding, prolonged wound healing, incomplete removal, allergy to anesthesia, nerve injury and recurrence were addressed. Prior to the procedure, the treatment site was clearly identified and confirmed by the patient. All components of Universal Protocol/PAUSE Rule completed. None known

## 2022-12-20 NOTE — ED BEHAVIORAL HEALTH ASSESSMENT NOTE - ADDITIONAL DETAILS ALL
Continue Artificial tears in both eyes as needed. Continue to monitor the blood sugars. Follow up  with your Primary Care Physician for your diabetes. Follow up in 6 months to check for Amiodarone toxicity.
see HPI

## 2022-12-20 NOTE — ED BEHAVIORAL HEALTH ASSESSMENT NOTE - SUMMARY
51 y/o male with PMHx of DM, single, homeless, and past psych hx of Schizoaffective disorder, cocaine use disorder, antisocial personality disorder, alcohol use disorder, cannabis use disorder, substance induced depression, substance induced psychosis, bipolar I who is a very high frequent utilizer of the ED, mostly comes in due to being undomiciled and recent polysubstance abuse, currently utox positive for cocaine, who is often irritable and refuses to participate, but is often discharged when reconstituted and cleared from substances, bring himself in again after presenting 2 days ago for same presentation, reporting CAH and SI.    There is high suspicion that this presentation as with the previous ones are related to cocaine abuse, financial/residential/relationship insecurity, being homeless, and patient is malingering and/or feigning s/sx to obtain admission for shelter, all of which would not be mitigated with inpatient hospitalization.

## 2022-12-20 NOTE — ED BEHAVIORAL HEALTH ASSESSMENT NOTE - RISK ASSESSMENT
R.F. prior admissions, prior self-harm, non-compliance with outpatient treatment, homeless  P.F. none known

## 2022-12-20 NOTE — ED BEHAVIORAL HEALTH NOTE - BEHAVIORAL HEALTH NOTE
===================  PRE-HOSPITAL COURSE  ===================  SOURCE: ED Provider and secondhand ED documentation.   DETAILS: Patient self-activated EMS due to endorsing SI without a plan and experiencing non-command AH. Patient has recent ER visits with similar presentation.    ============  ED COURSE  ============  SOURCE: ED Provider and secondhand ED documentation.   ARRIVAL: Patient arrived via EMS, pt was observed to be calm and cooperative upon arrival.   BELONGINGS:  None  BEHAVIOR: Patient was observed to currently calm and cooperative presenting with linear thought process and thought content WNL, is AAAOx4, currently endorsing SI without a plan and non-command AH, denies current HI/VH, remains in good behavioral control, is not violent or aggressive, does not currently show signs of self-harm.   TREATMENT:  None  VISITORS: None

## 2022-12-21 ENCOUNTER — EMERGENCY (EMERGENCY)
Facility: HOSPITAL | Age: 52
LOS: 1 days | Discharge: HOME | End: 2022-12-21
Attending: EMERGENCY MEDICINE
Payer: MEDICAID

## 2022-12-21 VITALS
DIASTOLIC BLOOD PRESSURE: 93 MMHG | HEART RATE: 101 BPM | RESPIRATION RATE: 18 BRPM | HEIGHT: 67 IN | SYSTOLIC BLOOD PRESSURE: 157 MMHG | OXYGEN SATURATION: 93 % | TEMPERATURE: 100 F

## 2022-12-21 PROCEDURE — 99283 EMERGENCY DEPT VISIT LOW MDM: CPT

## 2022-12-21 NOTE — ED PROVIDER NOTE - PATIENT PORTAL LINK FT
You can access the FollowMyHealth Patient Portal offered by [facility] by registering at the following website: [email or web address]. By joining [facility]’s FollowMyHealth portal, you will also be able to view your health information using other applications (apps) compatible with our system.

## 2022-12-21 NOTE — ED CDU PROVIDER INITIAL DAY NOTE - PRO INTERPRETER NEED 2
Hi Docs,   I was able to see Bob today for a left abdominal/ribcage issue.  Based on CT scan and ultrasound as well as exam today, his left inferior costochondral tissue is more prominent and more inferior in relation to his right.  This seems to be potentially causing some of his symptoms.  He may have a component of dyspnea related to this as well.  I would appreciate review of imaging and recommendations on who might be best to see regarding these issues.  Currently he is going to physical therapy for mobility components which may be all that he needs, but I would love to have some idea of potential next steps if symptoms do not improve with this initial management.   Thanks for the help! Alistair English

## 2022-12-21 NOTE — ED PROVIDER NOTE - OBJECTIVE STATEMENT
53 y/o male with PMHx of schizophrenia presents to the ED requesting psych eval. Pt states he needs to be on meds and need to go hospital. No active SI or HI. Pt was seen and cleared by psych twice in the past 4 days.

## 2022-12-21 NOTE — ED PROVIDER NOTE - CLINICAL SUMMARY MEDICAL DECISION MAKING FREE TEXT BOX
Pt with no new SI or HI, does not have shelter, has been seen and cleared by multiple psychiatric providers over the past week. As pt does not have shelter and temperature is below freezing will have to hold pt in ED overnight.

## 2022-12-21 NOTE — ED PROVIDER NOTE - NSFOLLOWUPINSTRUCTIONS_ED_ALL_ED_FT
Please follow-up with your previously establish psychiatric care providers for continued management.

## 2022-12-21 NOTE — ED ADULT NURSE NOTE - OBJECTIVE STATEMENT
received pt in yellow gown with no belongings at bedside.  pt unable to recall why he came to ED.  refusing to answer any questions.  NAD at this time.

## 2022-12-21 NOTE — ED ADULT TRIAGE NOTE - CHIEF COMPLAINT QUOTE
Pt. BIBA for psych eval. Pt. states hes hearing voices, hasn't had his meds, having suicidal thoughts. Pt. changed into yellow gown and belongings secured.

## 2022-12-22 ENCOUNTER — EMERGENCY (EMERGENCY)
Facility: HOSPITAL | Age: 52
LOS: 1 days | Discharge: OTHER INSTITUTION | End: 2022-12-22
Attending: EMERGENCY MEDICINE
Payer: SELF-PAY

## 2022-12-22 VITALS
TEMPERATURE: 98 F | DIASTOLIC BLOOD PRESSURE: 92 MMHG | HEIGHT: 67 IN | RESPIRATION RATE: 18 BRPM | SYSTOLIC BLOOD PRESSURE: 150 MMHG | HEART RATE: 102 BPM | OXYGEN SATURATION: 100 %

## 2022-12-22 VITALS
SYSTOLIC BLOOD PRESSURE: 126 MMHG | HEART RATE: 97 BPM | TEMPERATURE: 99 F | OXYGEN SATURATION: 95 % | RESPIRATION RATE: 20 BRPM | DIASTOLIC BLOOD PRESSURE: 68 MMHG

## 2022-12-22 PROCEDURE — 93010 ELECTROCARDIOGRAM REPORT: CPT

## 2022-12-22 NOTE — ED ADULT TRIAGE NOTE - CHIEF COMPLAINT QUOTE
Ambulatory reporting auditory hallucinations that are causing him to feel suicidal at times but denies SI presently. No plan, visual hallucinations and denies ETOH or drug abuse today. Patient has been seen here for  the same complaints multiple times over the passed few day and been seen by psych, states that they "do not help him when he comes but he feels as though he needs to come to the hospital. No active plan. Has not taken his Trazadone, Depakote or metformin in "who knows how long" nor has he gotten his Invega shot.

## 2022-12-23 DIAGNOSIS — F19.20 OTHER PSYCHOACTIVE SUBSTANCE DEPENDENCE, UNCOMPLICATED: ICD-10-CM

## 2022-12-23 DIAGNOSIS — F43.29 ADJUSTMENT DISORDER WITH OTHER SYMPTOMS: ICD-10-CM

## 2022-12-23 LAB
ALBUMIN SERPL ELPH-MCNC: 3.5 G/DL — SIGNIFICANT CHANGE UP (ref 3.3–5.2)
ALP SERPL-CCNC: 53 U/L — SIGNIFICANT CHANGE UP (ref 40–120)
ALT FLD-CCNC: 24 U/L — SIGNIFICANT CHANGE UP
ANION GAP SERPL CALC-SCNC: 13 MMOL/L — SIGNIFICANT CHANGE UP (ref 5–17)
APAP SERPL-MCNC: <3 UG/ML — LOW (ref 10–26)
AST SERPL-CCNC: 38 U/L — SIGNIFICANT CHANGE UP
BASOPHILS # BLD AUTO: 0.04 K/UL — SIGNIFICANT CHANGE UP (ref 0–0.2)
BASOPHILS NFR BLD AUTO: 1 % — SIGNIFICANT CHANGE UP (ref 0–2)
BILIRUB SERPL-MCNC: 0.3 MG/DL — LOW (ref 0.4–2)
BUN SERPL-MCNC: 7.4 MG/DL — LOW (ref 8–20)
CALCIUM SERPL-MCNC: 8.6 MG/DL — SIGNIFICANT CHANGE UP (ref 8.4–10.5)
CHLORIDE SERPL-SCNC: 99 MMOL/L — SIGNIFICANT CHANGE UP (ref 96–108)
CO2 SERPL-SCNC: 24 MMOL/L — SIGNIFICANT CHANGE UP (ref 22–29)
CREAT SERPL-MCNC: 0.57 MG/DL — SIGNIFICANT CHANGE UP (ref 0.5–1.3)
EGFR: 118 ML/MIN/1.73M2 — SIGNIFICANT CHANGE UP
EOSINOPHIL # BLD AUTO: 0.03 K/UL — SIGNIFICANT CHANGE UP (ref 0–0.5)
EOSINOPHIL NFR BLD AUTO: 0.8 % — SIGNIFICANT CHANGE UP (ref 0–6)
ETHANOL SERPL-MCNC: <10 MG/DL — SIGNIFICANT CHANGE UP (ref 0–9)
GLUCOSE SERPL-MCNC: 99 MG/DL — SIGNIFICANT CHANGE UP (ref 70–99)
HCT VFR BLD CALC: 42.4 % — SIGNIFICANT CHANGE UP (ref 39–50)
HGB BLD-MCNC: 14.4 G/DL — SIGNIFICANT CHANGE UP (ref 13–17)
IMM GRANULOCYTES NFR BLD AUTO: 0.3 % — SIGNIFICANT CHANGE UP (ref 0–0.9)
LYMPHOCYTES # BLD AUTO: 1.52 K/UL — SIGNIFICANT CHANGE UP (ref 1–3.3)
LYMPHOCYTES # BLD AUTO: 38.3 % — SIGNIFICANT CHANGE UP (ref 13–44)
MCHC RBC-ENTMCNC: 29.6 PG — SIGNIFICANT CHANGE UP (ref 27–34)
MCHC RBC-ENTMCNC: 34 GM/DL — SIGNIFICANT CHANGE UP (ref 32–36)
MCV RBC AUTO: 87.1 FL — SIGNIFICANT CHANGE UP (ref 80–100)
MONOCYTES # BLD AUTO: 0.67 K/UL — SIGNIFICANT CHANGE UP (ref 0–0.9)
MONOCYTES NFR BLD AUTO: 16.9 % — HIGH (ref 2–14)
NEUTROPHILS # BLD AUTO: 1.7 K/UL — LOW (ref 1.8–7.4)
NEUTROPHILS NFR BLD AUTO: 42.7 % — LOW (ref 43–77)
PLATELET # BLD AUTO: 222 K/UL — SIGNIFICANT CHANGE UP (ref 150–400)
POTASSIUM SERPL-MCNC: 3.6 MMOL/L — SIGNIFICANT CHANGE UP (ref 3.5–5.3)
POTASSIUM SERPL-SCNC: 3.6 MMOL/L — SIGNIFICANT CHANGE UP (ref 3.5–5.3)
PROT SERPL-MCNC: 6.5 G/DL — LOW (ref 6.6–8.7)
RBC # BLD: 4.87 M/UL — SIGNIFICANT CHANGE UP (ref 4.2–5.8)
RBC # FLD: 12.3 % — SIGNIFICANT CHANGE UP (ref 10.3–14.5)
SALICYLATES SERPL-MCNC: <0.6 MG/DL — LOW (ref 10–20)
SARS-COV-2 RNA SPEC QL NAA+PROBE: SIGNIFICANT CHANGE UP
SODIUM SERPL-SCNC: 136 MMOL/L — SIGNIFICANT CHANGE UP (ref 135–145)
WBC # BLD: 3.97 K/UL — SIGNIFICANT CHANGE UP (ref 3.8–10.5)
WBC # FLD AUTO: 3.97 K/UL — SIGNIFICANT CHANGE UP (ref 3.8–10.5)

## 2022-12-23 PROCEDURE — 90792 PSYCH DIAG EVAL W/MED SRVCS: CPT | Mod: 95

## 2022-12-23 PROCEDURE — 99211 OFF/OP EST MAY X REQ PHY/QHP: CPT

## 2022-12-23 PROCEDURE — 99218: CPT

## 2022-12-23 NOTE — ED ADULT NURSE NOTE - ACTIVATING EVENTS/STRESSORS
Non-compliant or not receiving treatment/Substance intoxication or withdrawal/Inadequate social supports/Recent inpatient discharge

## 2022-12-23 NOTE — ED PROVIDER NOTE - CARE PLAN
1 Principal Discharge DX:	Adjustment disorder with other symptom  Secondary Diagnosis:	Polysubstance (excluding opioids) dependence  Secondary Diagnosis:	Adjustment disorder with other symptom

## 2022-12-23 NOTE — ED ADULT NURSE NOTE - SUICIDE RISK FACTORS
Alcohol/Substance abuse disorders/Agitation/Severe Anxiety/Panic/Unable to engage in safety planning/Psychotic disorder current/past/Recent onset of current/past psychiatric diagnosis

## 2022-12-23 NOTE — ED CDU PROVIDER INITIAL DAY NOTE - OBJECTIVE STATEMENT
51 y/o male with PMHx of diabetes and HTN presents to ED c/o hearing voices. Pt has been here before for the same reasons. Pt has been off his medication for the past 2 weeks. Pt does report that he has been drinking.

## 2022-12-23 NOTE — ED PROVIDER NOTE - OBJECTIVE STATEMENT
53 y/o male with PMHx of diabetes and HTN presents to ED c/o hearing voices. Pt has been here before for the same reasons. Pt has been off his medication for the past 2 weeks. Pt does report that he has been drinking.

## 2022-12-23 NOTE — ED ADULT NURSE NOTE - NS_BH TRG QUESTION4_ED_ALL_ED
Yes, she needs to be evaluated before medications can be sent in.  She can go to [facility] or see if [facility] has openings today   No

## 2022-12-23 NOTE — ED BEHAVIORAL HEALTH NOTE - BEHAVIORAL HEALTH NOTE
PROGRESS NOTE: 12-23-22 @ 13:27  	  • Reason for Ongoing Consultation: 	    ID: [id number] Male with HEALTH ISSUES - PROBLEM Dx:  Adjustment disorder with other symptom    Polysubstance (excluding opioids) dependence            INTERVAL DATA:   • Interval Chief Complaint: "I need help.  Please, I need my meds.. Why you giving me a hard time?"  • Interval History:   Seen on follow up, in hallway, Refused to speak with telepsych,  now claiming he is suicidal and hearing voices to kill himself, comes to ED 3x week for secondary gains, housing though has reported psych h/o and reported past suicide attempt.  Pt continues to uses cocaine daily and not take meds.  Pt advised he is risking acceptance to psych admission due to noncompliance and refusing drug tx.  Pt accustomed to being sent to  part of ED where he gets private room, TV, food and snacks.  Once there is demanding, rude and sleeps until he decides to leave and retracts SI.  It is not beneficial for Pt to be held in  ED as it encourages and enables same behavior.  Psych will follow in am for dispo unless he retracts SI, then can be discharged.   May give Haldol 5 mg PO/IM, Ativan 2 mg PO/IM, and Benadryl 50 mg PO/IM PRN Q6H for agitation.   REVIEW OF SYSTEMS:   • Constitutional Symptoms	No complaints  • Eyes	No complaints  • Ears / Nose / Throat / Mouth	No complaints  • Cardiovascular	No complaints  • Respiratory	No complaints  • Gastrointestinal	No complaints  • Genitourinary	No complaints  • Musculoskeletal	No complaints  • Skin	No complaints  • Neurological	No complaints  • Psychiatric (see HPI)	See HPI  • Endocrine	No complaints  • Hematologic / Lymphatic	No complaints  • Allergic / Immunologic	No complaints    REVIEW OF VITALS/LABS/IMAGING/INVESTIGATIONS:   • Vital signs reviewed: Yes  • Vital Signs:	    T(C): 36.8 (12-23-22 @ 11:15), Max: 37.4 (12-23-22 @ 07:38)  HR: 88 (12-23-22 @ 11:15) (88 - 102)  BP: 132/83 (12-23-22 @ 11:15) (129/79 - 150/92)  RR: 18 (12-23-22 @ 11:15) (18 - 18)  SpO2: 95% (12-23-22 @ 11:15) (95% - 100%)    • Available labs reviewed: Yes  • Available Lab Results:                           14.4   3.97  )-----------( 222      ( 23 Dec 2022 00:20 )             42.4     12-23    136  |  99  |  7.4<L>  ----------------------------<  99  3.6   |  24.0  |  0.57    Ca    8.6      23 Dec 2022 00:20    TPro  6.5<L>  /  Alb  3.5  /  TBili  0.3<L>  /  DBili  x   /  AST  38  /  ALT  24  /  AlkPhos  53  12-23    LIVER FUNCTIONS - ( 23 Dec 2022 00:20 )  Alb: 3.5 g/dL / Pro: 6.5 g/dL / ALK PHOS: 53 U/L / ALT: 24 U/L / AST: 38 U/L / GGT: x                   MEDICATIONS:      PRN Medications:  • PRN Medications since last evaluation	  • PRN Details	    Current Medications:      Medication Side Effects:  • Medication Side Effects or Adverse Reactions (new or ongoing)	None known    MENTAL STATUS EXAM:   • Level of Consciousness	Alert  • General Appearance	Well developed  • Body Habitus	Well nourished  • Hygiene	fair  • Grooming	fair  • Behavior	min cooperative  • Eye Contact	fair  • Relatedness	fair to poor  • Impulse Control	Normal  • Muscle Tone / Strength	Normal muscle tone/strength  • Abnormal Movements	No abnormal movements  • Gait / Station	Normal gait / station  • Speech Volume	Normal  • Speech Rate	Normal  • Speech Spontaneity	Normal  • Speech Articulation	Normal  • Mood	irritable  • Affect Quality	appropriate  • Affect Range	Full  • Affect Congruence	Congruent  • Thought Process	Linear  • Thought Associations	Normal  • Thought Content	Unremarkable  • Perceptions	claims AH to kill self, though Pt reliability in question  • Oriented to Time	Yes  • Oriented to Place	Yes  • Oriented to Situation	Yes  • Oriented to Person	Yes  • Attention / Concentration	Normal  • Estimated Intelligence	Average  • Recent Memory	Normal  • Remote Memory	Normal  • Fund of Knowledge	Normal  • Language	No abnormalities noted  • Judgment (regarding everyday events)	Fair  • Insight (regarding psychiatric illness)	Fair    SUICIDALITY:   • Suicidality (Interval)	claims SI (secondary gains?)    HOMICIDALITY/AGGRESSION:   • Homicidality/Aggression	none known    DIAGNOSIS DSM-V:    Psychiatric Diagnosis (Corresponds to DSM-IV Axis I, II):   HEALTH ISSUES - PROBLEM Dx:  Adjustment disorder with other symptom    Polysubstance (excluding opioids) dependence             Medical Diagnosis (Corresponds to DSM-IV Axis III):  • Axis III	  NIDDM    ASSESSMENT OF CURRENT CONDITION:   Summary (include case differential, formulation and patient response to therapy):   Refused to speak with telepsych,  now claiming he is suicidal and hearing voices to kill himself, comes to ED 3x week for secondary gains, housing though has reported psych h/o and reported past suicide attempt.  Pt continues to uses cocaine daily and not take meds.  Pt advised he is risking acceptance to psych admission due to noncompliance and refusing drug tx.  Pt accustomed to being sent to  part of ED where he gets private room, TV, food and snacks.  Once there is demanding, rude and sleeps until he decides to leave and retracts SI.  It is not beneficial for Pt to be held in  ED as it encourages and enables same behavior.  Psych will follow in am for dispo unless he retracts SI, then can be discharged.   May give Haldol 5 mg PO/IM, Ativan 2 mg PO/IM, and Benadryl 50 mg PO/IM PRN Q6H for agitation.   Risk Assessment (consider static vs modifiable risk factors and protective factors; comment on level of risk for dangerous behavior):   RF unreliable, cocaine abuse  PF no recent SIB  PLAN    Hold for reassessment.  May be discharged if Pt retracts SI

## 2022-12-23 NOTE — ED BEHAVIORAL HEALTH ASSESSMENT NOTE - SUMMARY
53 y/o male with PMHx of DM, single, homeless, and past psych hx of Schizoaffective disorder, cocaine use disorder, antisocial personality disorder, alcohol use disorder, cannabis use disorder, substance induced depression, substance induced psychosis, bipolar I who is a very high frequent utilizer of the ED, mostly comes in due to being undomiciled and recent polysubstance abuse, currently utox positive for cocaine, who is often irritable and refuses to participate, but is often discharged when reconstituted and cleared from substances, bring himself in again after presenting 2 days ago for same presentation, reporting CAH and SI. Pt likely presenting on this occasion in the similar context as recent visits, however pt refusing to engage.

## 2022-12-23 NOTE — ED ADULT NURSE NOTE - HPI (INCLUDE ILLNESS QUALITY, SEVERITY, DURATION, TIMING, CONTEXT, MODIFYING FACTORS, ASSOCIATED SIGNS AND SYMPTOMS)
Patient arrived to  with complain of Hallucination, and SI. Patient stated; " I want to go to hospital for treatment, I want to have my meds, I want a bed to sleep, I want the remote control and I want a full meals.:" Patient report good sleeping habit, endorse using cocaine, nicotine and weed daily. Patient recent ER visit last week, patient was transferred to  for psych miguel. All patient needs attend, safety maintained.

## 2022-12-23 NOTE — ED ADULT NURSE NOTE - OBJECTIVE STATEMENT
Aox3. Pt presenting to Emergency Department complaining of auditory hallucinations. Pt denies voices telling him to do anything. Denies SI and MI. Denies ETOH or drug use. pt states " I want to speak to someone from behavioral health". Pt placed in yellow gown. Respirations even and unlabored. Skin warm to touch. Patient arrived to  with complain of Hallucination, and SI. Patient stated; " I want to go to hospital for treatment, I want to have my meds, I want a bed to sleep, I want the remote control and I want a full meals.:" Patient report good sleeping habit, endorse using cocaine, nicotine and weed daily. Patient recent ER visit last week, patient was transferred to  for psych miguel. All patient needs attend, safety maintained.

## 2022-12-23 NOTE — ED ADULT NURSE NOTE - HIV OFFER
Previously Negative (within the last year) Rhofade Counseling: Rhofade is a topical medication which can decrease superficial blood flow where applied. Side effects are uncommon and include stinging, redness and allergic reactions.

## 2022-12-23 NOTE — ED BEHAVIORAL HEALTH NOTE - BEHAVIORAL HEALTH NOTE
==================  PRE-HOSPITAL COURSE  ===================  SOURCE:  MD and secondhand ED documentation  DETAILS: 53 y/o male with PMHx of diabetes and HTN presents to ED c/o hearing voices.  =========  ED COURSE  =========  SOURCE:  RN Jeff and secondhand ED documentation.  ARRIVAL:  Per chart and RN, patient arrived via walk in. Per RN, patient was calm upon arrival, and cooperative with triage process.  BELONGINGS:  Per RN, patient arrived with belongings. All belongings were provided to hospital security, and patient currently in a gown with a 1:1 staff member.  BEHAVIOR: RN described patient to be calm and cooperative, presenting with linear thought process, AAOx3, presenting with normal mood and congruent affect, remains in behavioral and impulse control, is not violent/aggressive. RN stated that the patient is endorsing AH, denies SI/HI/VH. RN stated that there are no visible marks, bruises, or lacerations on the body. RN stated that the patient appears to be slightly disheveled, maintains fair hygiene, and reports fair ADLs, ambulates without assistance.   TREATMENT:  Per chart and RN, patient did not receive PRN medications.   VISITORS:  Per RN, no visitors at bedside.        COVID Exposure Screen- collateral (i.e. third-party, chart review, belongings, etc; include EMS and ED staff)   ---------------------------------------------------  1. Has the patient had a COVID-19 test in the last 90 days? Unknown.   2. Has the patient tested positive for COVID-19 antibodies? Unknown.   3. Has the patient received 2 doses of the COVID-19 vaccine? Unknown  4. In the past 10 days, has the patient been around anyone with a positive COVID-19 test?* Unknown.   5. Has the patient been out of [place] within the past 10 days? Unknown

## 2022-12-23 NOTE — ED BEHAVIORAL HEALTH ASSESSMENT NOTE - HPI (INCLUDE ILLNESS QUALITY, SEVERITY, DURATION, TIMING, CONTEXT, MODIFYING FACTORS, ASSOCIATED SIGNS AND SYMPTOMS)
51 y/o male with PMHx of DM, single, homeless, and past psych hx of Schizoaffective disorder, cocaine use disorder, antisocial personality disorder, alcohol use disorder, cannabis use disorder, substance induced depression, substance induced psychosis, bipolar I who is a very high frequent utilizer of the ED, mostly comes in due to being undomiciled and recent polysubstance abuse, currently utox positive for cocaine, who is often irritable and refuses to participate, but is often discharged when reconstituted and cleared from substances, bring himself in again after presenting 2 days ago for same presentation, reporting AH and SI.     On evaluation, patient dismissive, does not want to participate.  Requests admission and did not want to discuss anything futher.

## 2022-12-24 LAB
GLUCOSE BLDC GLUCOMTR-MCNC: 227 MG/DL — HIGH (ref 70–99)
GLUCOSE BLDC GLUCOMTR-MCNC: 279 MG/DL — HIGH (ref 70–99)

## 2022-12-24 PROCEDURE — 99226: CPT

## 2022-12-24 PROCEDURE — 82962 GLUCOSE BLOOD TEST: CPT

## 2022-12-24 PROCEDURE — 99285 EMERGENCY DEPT VISIT HI MDM: CPT

## 2022-12-24 PROCEDURE — 99214 OFFICE O/P EST MOD 30 MIN: CPT

## 2022-12-24 RX ORDER — METFORMIN HYDROCHLORIDE 850 MG/1
1000 TABLET ORAL
Refills: 0 | Status: DISCONTINUED | OUTPATIENT
Start: 2022-12-24 | End: 2022-12-30

## 2022-12-24 RX ORDER — DIVALPROEX SODIUM 500 MG/1
250 TABLET, DELAYED RELEASE ORAL ONCE
Refills: 0 | Status: COMPLETED | OUTPATIENT
Start: 2022-12-24 | End: 2022-12-24

## 2022-12-24 RX ADMIN — METFORMIN HYDROCHLORIDE 1000 MILLIGRAM(S): 850 TABLET ORAL at 21:24

## 2022-12-24 RX ADMIN — DIVALPROEX SODIUM 250 MILLIGRAM(S): 500 TABLET, DELAYED RELEASE ORAL at 21:24

## 2022-12-24 NOTE — ED BEHAVIORAL HEALTH NOTE - BEHAVIORAL HEALTH NOTE
12/24/2022: Patient was seen today PM, chart reviewed and discussed in team. Patient was upbeat, and got up on mentioning his name. " I need meds, I am off meds, and was last hospitalized at [facility] a month ago, I'm hearing voices, and [facility] ". He was positive for Cocaine, smokes cocaine every possible day he could avail. Never f/u anywhere, never took meds, garcia snot seem to be internally pre-occupied or have any Psychiatric issues at this time. When in ED at behavioral health section, he does his own call, and when he feels satisfied he recant his statement, that he is not S/h and ready to rumble again for another episode and come back to ED at  for another turn. This is the usual pattern for years now and this has to stop. He was informed of discharge in AM and  to work on his case tomorrow AM. He denied any S/H/I/P, and when asked what meds he takes he answers never mind.     MENTAL STATUS EXAM:   • Level of Consciousness	Alert  • General Appearance	Well developed  • Body Habitus	Well nourished  • Hygiene	fair  • Grooming	fair  • Behavior	min cooperative  • Eye Contact	fair  • Relatedness	fair to poor  • Impulse Control	Normal  • Muscle Tone / Strength	Normal muscle tone/strength  • Abnormal Movements	No abnormal movements  • Gait / Station	Normal gait / station  • Speech Volume	Normal  • Speech Rate	Normal  • Speech Spontaneity	Normal  • Speech Articulation	Normal  • Mood	irritable  • Affect Quality	appropriate  • Affect Range	Full  • Affect Congruence	Congruent  • Thought Process	Linear  • Thought Associations	Normal  • Thought Content	Unremarkable  • Perceptions	claims AH to kill self, though Pt reliability in question  • Oriented to Time	Yes  • Oriented to Place	Yes  • Oriented to Situation	Yes  • Oriented to Person	Yes  • Attention / Concentration	Normal  • Estimated Intelligence	Average  • Recent Memory	Normal  • Remote Memory	Normal  • Fund of Knowledge	Normal  • Language	No abnormalities noted  • Judgment (regarding everyday events)	Fair    Diagnosis: Cocaine abuse                    Plan: Hold Overnight          No meds          Discharge in AM as per  referral to shelter or elsewhere    Thank you              PROGRESS NOTE: 12-23-22 @ 13:27  	  Polysubstance (excluding opioids) dependence    INTERVAL DATA:   • Interval Chief Complaint: "I need help.  Please, I need my meds.. Why you giving me a hard time?"  • Interval History:   Seen on follow up, in hallway, Refused to speak with telepsych,  now claiming he is suicidal and hearing voices to kill himself, comes to ED 3x week for secondary gains, housing though has reported psych h/o and reported past suicide attempt.  Pt continues to uses cocaine daily and not take meds.  Pt advised he is risking acceptance to psych admission due to noncompliance and refusing drug tx.  Pt accustomed to being sent to  part of ED where he gets private room, TV, food and snacks.  Once there is demanding, rude and sleeps until he decides to leave and retracts SI.  It is not beneficial for Pt to be held in  ED as it encourages and enables same behavior.  Psych will follow in am for dispo unless he retracts SI, then can be discharged.   May give Haldol 5 mg PO/IM, Ativan 2 mg PO/IM, and Benadryl 50 mg PO/IM PRN Q6H for agitation.   REVIEW OF SYSTEMS:   • Constitutional Symptoms	No complaints  • Eyes	No complaints  • Ears / Nose / Throat / Mouth	No complaints  • Cardiovascular	No complaints  • Respiratory	No complaints  • Gastrointestinal	No complaints  • Genitourinary	No complaints  • Musculoskeletal	No complaints  • Skin	No complaints  • Neurological	No complaints  • Psychiatric (see HPI)	See HPI  • Endocrine	No complaints  • Hematologic / Lymphatic	No complaints  • Allergic / Immunologic	No complaints    REVIEW OF VITALS/LABS/IMAGING/INVESTIGATIONS:   • Vital signs reviewed: Yes  • Vital Signs:	    T(C): 36.8 (12-23-22 @ 11:15), Max: 37.4 (12-23-22 @ 07:38)  HR: 88 (12-23-22 @ 11:15) (88 - 102)  BP: 132/83 (12-23-22 @ 11:15) (129/79 - 150/92)  RR: 18 (12-23-22 @ 11:15) (18 - 18)  SpO2: 95% (12-23-22 @ 11:15) (95% - 100%)    • Available labs reviewed: Yes  • Available Lab Results:                           14.4   3.97  )-----------( 222      ( 23 Dec 2022 00:20 )             42.4     12-23    136  |  99  |  7.4<L>  ----------------------------<  99  3.6   |  24.0  |  0.57    Ca    8.6      23 Dec 2022 00:20    TPro  6.5<L>  /  Alb  3.5  /  TBili  0.3<L>  /  DBili  x   /  AST  38  /  ALT  24  /  AlkPhos  53  12-23    LIVER FUNCTIONS - ( 23 Dec 2022 00:20 )  Alb: 3.5 g/dL / Pro: 6.5 g/dL / ALK PHOS: 53 U/L / ALT: 24 U/L / AST: 38 U/L / GGT: x                   MEDICATIONS:      PRN Medications:  • PRN Medications since last evaluation	  • PRN Details	    Current Medications:      Medication Side Effects:  • Medication Side Effects or Adverse Reactions (new or ongoing)	None known    MENTAL STATUS EXAM:   • Level of Consciousness	Alert  • General Appearance	Well developed  • Body Habitus	Well nourished  • Hygiene	fair  • Grooming	fair  • Behavior	min cooperative  • Eye Contact	fair  • Relatedness	fair to poor  • Impulse Control	Normal  • Muscle Tone / Strength	Normal muscle tone/strength  • Abnormal Movements	No abnormal movements  • Gait / Station	Normal gait / station  • Speech Volume	Normal  • Speech Rate	Normal  • Speech Spontaneity	Normal  • Speech Articulation	Normal  • Mood	irritable  • Affect Quality	appropriate  • Affect Range	Full  • Affect Congruence	Congruent  • Thought Process	Linear  • Thought Associations	Normal  • Thought Content	Unremarkable  • Perceptions	claims AH to kill self, though Pt reliability in question  • Oriented to Time	Yes  • Oriented to Place	Yes  • Oriented to Situation	Yes  • Oriented to Person	Yes  • Attention / Concentration	Normal  • Estimated Intelligence	Average  • Recent Memory	Normal  • Remote Memory	Normal  • Fund of Knowledge	Normal  • Language	No abnormalities noted  • Judgment (regarding everyday events)	Fair  • Insight (regarding psychiatric illness)	Fair    SUICIDALITY:   • Suicidality (Interval)	claims SI (secondary gains?)    HOMICIDALITY/AGGRESSION:   • Homicidality/Aggression	none known    DIAGNOSIS DSM-V:    Psychiatric Diagnosis (Corresponds to DSM-IV Axis I, II):   HEALTH ISSUES - PROBLEM Dx:  Adjustment disorder with other symptom    Polysubstance (excluding opioids) dependence             Medical Diagnosis (Corresponds to DSM-IV Axis III):  • Axis III	  NIDDM    ASSESSMENT OF CURRENT CONDITION:   Summary (include case differential, formulation and patient response to therapy):   Refused to speak with telepsych,  now claiming he is suicidal and hearing voices to kill himself, comes to ED 3x week for secondary gains, housing though has reported psych h/o and reported past suicide attempt.  Pt continues to uses cocaine daily and not take meds.  Pt advised he is risking acceptance to psych admission due to noncompliance and refusing drug tx.  Pt accustomed to being sent to  part of ED where he gets private room, TV, food and snacks.  Once there is demanding, rude and sleeps until he decides to leave and retracts SI.  It is not beneficial for Pt to be held in  ED as it encourages and enables same behavior.  Psych will follow in am for dispo unless he retracts SI, then can be discharged.   May give Haldol 5 mg PO/IM, Ativan 2 mg PO/IM, and Benadryl 50 mg PO/IM PRN Q6H for agitation.   Risk Assessment (consider static vs modifiable risk factors and protective factors; comment on level of risk for dangerous behavior):   RF unreliable, cocaine abuse  PF no recent SIB  PLAN    Hold for reassessment.  May be discharged if Pt retracts SI

## 2022-12-25 LAB
AMPHET UR-MCNC: NEGATIVE — SIGNIFICANT CHANGE UP
APPEARANCE UR: CLEAR — SIGNIFICANT CHANGE UP
BARBITURATES UR SCN-MCNC: NEGATIVE — SIGNIFICANT CHANGE UP
BENZODIAZ UR-MCNC: NEGATIVE — SIGNIFICANT CHANGE UP
BILIRUB UR-MCNC: ABNORMAL
COCAINE METAB.OTHER UR-MCNC: POSITIVE
COLOR SPEC: YELLOW — SIGNIFICANT CHANGE UP
DIFF PNL FLD: NEGATIVE — SIGNIFICANT CHANGE UP
GLUCOSE BLDC GLUCOMTR-MCNC: 179 MG/DL — HIGH (ref 70–99)
GLUCOSE UR QL: 1000 MG/DL
KETONES UR-MCNC: ABNORMAL
LEUKOCYTE ESTERASE UR-ACNC: NEGATIVE — SIGNIFICANT CHANGE UP
METHADONE UR-MCNC: NEGATIVE — SIGNIFICANT CHANGE UP
NITRITE UR-MCNC: NEGATIVE — SIGNIFICANT CHANGE UP
OPIATES UR-MCNC: NEGATIVE — SIGNIFICANT CHANGE UP
PCP SPEC-MCNC: SIGNIFICANT CHANGE UP
PCP UR-MCNC: NEGATIVE — SIGNIFICANT CHANGE UP
PH UR: 6 — SIGNIFICANT CHANGE UP (ref 5–8)
PROT UR-MCNC: NEGATIVE — SIGNIFICANT CHANGE UP
SP GR SPEC: 1.02 — SIGNIFICANT CHANGE UP (ref 1.01–1.02)
THC UR QL: NEGATIVE — SIGNIFICANT CHANGE UP
UROBILINOGEN FLD QL: 4 MG/DL

## 2022-12-25 PROCEDURE — 99226: CPT

## 2022-12-25 PROCEDURE — 99211 OFF/OP EST MAY X REQ PHY/QHP: CPT

## 2022-12-25 RX ADMIN — METFORMIN HYDROCHLORIDE 1000 MILLIGRAM(S): 850 TABLET ORAL at 05:39

## 2022-12-25 RX ADMIN — METFORMIN HYDROCHLORIDE 1000 MILLIGRAM(S): 850 TABLET ORAL at 18:56

## 2022-12-25 NOTE — ED BEHAVIORAL HEALTH NOTE - BEHAVIORAL HEALTH NOTE
PROGRESS NOTE: 12-25-22 @ 11:49  	  • Reason for Ongoing Consultation: 	Suicidal ideations    ID: [id number] Male with HEALTH ISSUES - PROBLEM Dx:  Adjustment disorder with other symptom    Polysubstance (excluding opioids) dependence            INTERVAL DATA:   • Interval Chief Complaint: "I'm hearing voices to kill myself"  • Interval History:     Note from 12/23/22:  Seen on follow up, in hallway, Refused to speak with telepsych,  now claiming he is suicidal and hearing voices to kill himself, comes to ED 3x week for secondary gains, housing though has reported psych h/o and reported past suicide attempt.  Pt continues to uses cocaine daily and not take meds.  Pt advised he is risking acceptance to psych admission due to noncompliance and refusing drug tx.  Pt accustomed to being sent to  part of ED where he gets private room, TV, food and snacks.  Once there is demanding, rude and sleeps until he decides to leave and retracts SI.  It is not beneficial for Pt to be held in  ED as it encourages and enables same behavior.  Psych will follow in am for dispo unless he retracts SI, then can be discharged.   May give Haldol 5 mg PO/IM, Ativan 2 mg PO/IM, and Benadryl 50 mg PO/IM PRN Q6H for agitation.     12/24/22:  Pt seen and assessed by this writer reports he is hearing voices commanding him to kill him self and hurt other.  He reports his plan if he leaves here today is to cut or burn himself and  or cut others.  He endorses "please help me ma, its [place] I don't feel safe".  He continues to endorse SI seeking inpatient treatment.  Pt is a frequent ED visitor often makes SI and at times retract statements, at this time he is noncompliant with meds reports was d/c from [facility] two months ago with nothing in place and was d/c to sisters home and now he states she moved and I am homeless.  Reassess in am for dispo if pt continues to have SI with plan possible admissions.   REVIEW OF SYSTEMS:   • Constitutional Symptoms	No complaints  • Eyes	No complaints  • Ears / Nose / Throat / Mouth	No complaints  • Cardiovascular	No complaints  • Respiratory	No complaints  • Gastrointestinal	No complaints  • Genitourinary	No complaints  • Musculoskeletal	No complaints  • Skin	No complaints  • Neurological	No complaints  • Psychiatric (see HPI)	See HPI  • Endocrine	No complaints  • Hematologic / Lymphatic	No complaints  • Allergic / Immunologic	No complaints    REVIEW OF VITALS/LABS/IMAGING/INVESTIGATIONS:   • Vital signs reviewed: Yes  • Vital Signs:	    T(C): 36.8 (12-25-22 @ 05:29), Max: 36.9 (12-24-22 @ 16:13)  HR: 63 (12-25-22 @ 05:29) (63 - 94)  BP: 136/81 (12-25-22 @ 05:29) (116/77 - 136/81)  RR: 16 (12-25-22 @ 05:29) (16 - 18)  SpO2: 98% (12-25-22 @ 05:29) (93% - 98%)    • Available labs reviewed: Yes  • Available Lab Results:     MEDICATIONS:      PRN Medications:  • PRN Medications since last evaluation	  • PRN Details	    Current Medications:   metFORMIN 1000 milliGRAM(s) Oral two times a day     Medication Side Effects:  • Medication Side Effects or Adverse Reactions (new or ongoing)	None known    MENTAL STATUS EXAM:   • Level of Consciousness	Alert  • General Appearance	Well developed  • Body Habitus	Well nourished  • Hygiene	Good  • Grooming	Good  • Behavior	Cooperative  • Eye Contact	Good  • Relatedness	Good  • Impulse Control	Normal  • Muscle Tone / Strength	Normal muscle tone/strength  • Abnormal Movements	No abnormal movements  • Gait / Station	Normal gait / station  • Speech Volume	Normal  • Speech Rate	Normal  • Speech Spontaneity	Normal  • Speech Articulation	Normal  • Mood	Normal  • Affect Quality	depreesed  • Affect Range	Full  • Affect Congruence	Congruent  • Thought Process	Linear  • Thought Associations	Normal  • Thought Content	Auditory hallucinations  • Perceptions	No abnormalities  • Oriented to Time	Yes  • Oriented to Place	Yes  • Oriented to Situation	Yes  • Oriented to Person	Yes  • Attention / Concentration	Normal  • Estimated Intelligence	Average  • Recent Memory	Normal  • Remote Memory	Normal  • Fund of Knowledge	Normal  • Language	No abnormalities noted  • Judgment (regarding everyday events)	Fair  • Insight (regarding psychiatric illness)	Fair    SUICIDALITY:   • Suicidality (Interval)	currentl SI with plan to cut or burn himself and or cut others as well     HOMICIDALITY/AGGRESSION:   • Homicidality/Aggression	Current HI to cut others if he leaves    DIAGNOSIS DSM-V:    Psychiatric Diagnosis (Corresponds to DSM-IV Axis I, II):   HEALTH ISSUES - PROBLEM Dx:  Adjustment disorder with other symptom    Polysubstance (excluding opioids) dependence             Medical Diagnosis (Corresponds to DSM-IV Axis III): diabetes  • Axis III	      ASSESSMENT OF CURRENT CONDITION:   Summary (include case differential, formulation and patient response to therapy):   Pt seen and assessed by this writer reports he is hearing voices commanding him to kill him self and hurt other.  He reports his plan if he leaves here today is to cut or burn himself and  or cut others.  He endorses "please help me ma, its [place] I don't feel safe".  He continues to endorse SI seeking inpatient treatment.  Pt is a frequent ED visitor often makes SI and at times retract statements, at this time he is noncompliant with meds reports was d/c from [facility] two months ago with nothing in place and was d/c to sisters home and now he states she moved and I am homeless.  Reassess in am for dispo if pt continues to have SI with plan possible admissions.     Risk Assessment (consider static vs modifiable risk factors and protective factors; comment on level of risk for dangerous behavior):   RF:  unreliable, non ocmplaint with meds, current SI with plan and current HI with plan    PLAN  Reassess in am for dispo if pt continues to have SI with plan possible admissions.

## 2022-12-26 PROCEDURE — 99226: CPT

## 2022-12-26 RX ORDER — TRAZODONE HCL 50 MG
100 TABLET ORAL AT BEDTIME
Refills: 0 | Status: DISCONTINUED | OUTPATIENT
Start: 2022-12-26 | End: 2022-12-30

## 2022-12-26 RX ORDER — DIVALPROEX SODIUM 500 MG/1
500 TABLET, DELAYED RELEASE ORAL
Refills: 0 | Status: DISCONTINUED | OUTPATIENT
Start: 2022-12-26 | End: 2022-12-30

## 2022-12-26 RX ADMIN — METFORMIN HYDROCHLORIDE 1000 MILLIGRAM(S): 850 TABLET ORAL at 06:01

## 2022-12-26 RX ADMIN — Medication 100 MILLIGRAM(S): at 21:35

## 2022-12-26 RX ADMIN — METFORMIN HYDROCHLORIDE 1000 MILLIGRAM(S): 850 TABLET ORAL at 17:43

## 2022-12-26 RX ADMIN — DIVALPROEX SODIUM 500 MILLIGRAM(S): 500 TABLET, DELAYED RELEASE ORAL at 17:43

## 2022-12-26 NOTE — ED BEHAVIORAL HEALTH NOTE - BEHAVIORAL HEALTH NOTE
PROGRESS NOTE: 12-26-22 @ 10:11  	  • Reason for Ongoing Consultation: 	Evaluation of possible SI/mood sx's     ID: [id number] Male with HEALTH ISSUES - PROBLEM Dx:  Adjustment disorder with other symptom    Polysubstance (excluding opioids) dependence            INTERVAL DATA:   • Interval Chief Complaint:  "I am going through it..."   • Interval History:  Patient has been isolative in his room.   No PRNs given. He has not been aggressive or agitated. He is well known to  staff due to multiple similar presentations. He admits to using cocaine prior to presentation, and report that since using he has been hearing voices telling him to kill himself and kill others. He states that he continues to hear voices today and have suicidal thoughts.  Patient feels that he needs to be in hospital.        REVIEW OF SYSTEMS:   • Constitutional Symptoms	No complaints  • Eyes	No complaints  • Ears / Nose / Throat / Mouth	No complaints  • Cardiovascular	No complaints  • Respiratory	No complaints  • Gastrointestinal	No complaints  • Genitourinary	No complaints  • Musculoskeletal	No complaints  • Skin	No complaints  • Neurological	No complaints  • Psychiatric (see HPI)	See HPI  • Endocrine	No complaints  • Hematologic / Lymphatic	No complaints  • Allergic / Immunologic	No complaints    REVIEW OF VITALS/LABS/IMAGING/INVESTIGATIONS:   • Vital signs reviewed: Yes  • Vital Signs:	    T(C): 36.6 (12-26-22 @ 07:33), Max: 36.6 (12-25-22 @ 16:18)  HR: 80 (12-26-22 @ 07:33) (72 - 80)  BP: 142/99 (12-26-22 @ 07:33) (121/77 - 142/99)  RR: 18 (12-26-22 @ 07:33) (18 - 19)  SpO2: 100% (12-26-22 @ 07:33) (96% - 100%)    • Available labs reviewed: Yes  • Available Lab Results:                       MEDICATIONS:      PRN Medications:  • PRN Medications since last evaluation	  • PRN Details	    Current Medications:   metFORMIN 1000 milliGRAM(s) Oral two times a day     Medication Side Effects:  • Medication Side Effects or Adverse Reactions (new or ongoing)	None known    SUICIDALITY:   • Suicidality (Interval)	reports continued suicidal ideation, with vague plan, unclear intent.     HOMICIDALITY/AGGRESSION:   • Homicidality/Aggression	reports vague homicidal ideation, unclear intent, denies any specific target    MENTAL STATUS EXAM:   • Level of Consciousness	Alert  • General Appearance	Well developed  • Body Habitus	Well nourished  • Hygiene	Good  • Grooming	Good  • Behavior	Cooperative  • Eye Contact	Good  • Relatedness	Good  • Impulse Control	Normal  • Muscle Tone / Strength	Normal muscle tone/strength  • Abnormal Movements	No abnormal movements  • Gait / Station	Normal gait / station  • Speech Volume	Normal  • Speech Rate	Normal  • Speech Spontaneity	Normal  • Speech Articulation	Normal  • Mood	Normal  • Affect Quality	depressed  • Affect Range	Full  • Affect Congruence	Congruent  • Thought Process	Linear  • Thought Associations	Normal  • Thought Content	Auditory hallucinations, suicidal ideation, homicidal ideation   • Perceptions	No abnormalities  • Oriented to Time	Yes  • Oriented to Place	Yes  • Oriented to Situation	Yes  • Oriented to Person	Yes  • Attention / Concentration	Normal  • Estimated Intelligence	Average  • Recent Memory	Normal  • Remote Memory	Normal  • Fund of Knowledge	Normal  • Language	No abnormalities noted  • Judgment (regarding everyday events)	Fair  • Insight (regarding psychiatric illness)	Fair        DIAGNOSIS DSM-V:    Psychiatric Diagnosis (Corresponds to DSM-IV Axis I, II):   HEALTH ISSUES - PROBLEM Dx:  Adjustment disorder with other symptom    Polysubstance (excluding opioids) dependence             Medical Diagnosis (Corresponds to DSM-IV Axis III): diabetes  • Axis III	      ASSESSMENT OF CURRENT CONDITION:   Summary (include case differential, formulation and patient response to therapy):   Pt seen and assessed by this writer reports he is hearing voices commanding him to kill him self and hurt other.  He reports his plan if he leaves here today is to cut or burn himself and  or cut others.  He endorses "please help me ma, its [place] I don't feel safe".  He continues to endorse SI seeking inpatient treatment.  Pt is a frequent ED visitor often makes SI and at times retract statements, at this time he is noncompliant with meds reports was d/c from [facility] two months ago with nothing in place and was d/c to sisters home and now he states she moved and I am homeless.  Reassess in am for dispo if pt continues to have SI with plan possible admissions.     12/26/22 Patient continues to endorse suicidal ideation, homicidal ideation and AH.  h/o multiple similar presentations, in past symptoms have been self limited and likely exacerbated by cocaine use. Admits to using prior to presentation.      Risk Assessment (consider static vs modifiable risk factors and protective factors; comment on level of risk for dangerous behavior):   RF:  unreliable, non noncomplaint with meds, current suicidal ideation , and suicidal ideation, cocaine use   PF:  symptoms often self limited, no known prior suicide attempts, help seeking.     Plan   Hold for re evaluation due to possible self limited substance induced stats vs substance induced state

## 2022-12-27 PROCEDURE — 99226: CPT

## 2022-12-27 RX ORDER — RISPERIDONE 4 MG/1
1 TABLET ORAL AT BEDTIME
Refills: 0 | Status: DISCONTINUED | OUTPATIENT
Start: 2022-12-27 | End: 2022-12-30

## 2022-12-27 RX ADMIN — METFORMIN HYDROCHLORIDE 1000 MILLIGRAM(S): 850 TABLET ORAL at 06:31

## 2022-12-27 RX ADMIN — RISPERIDONE 1 MILLIGRAM(S): 4 TABLET ORAL at 21:28

## 2022-12-27 RX ADMIN — METFORMIN HYDROCHLORIDE 1000 MILLIGRAM(S): 850 TABLET ORAL at 19:06

## 2022-12-27 RX ADMIN — DIVALPROEX SODIUM 500 MILLIGRAM(S): 500 TABLET, DELAYED RELEASE ORAL at 17:20

## 2022-12-27 RX ADMIN — DIVALPROEX SODIUM 500 MILLIGRAM(S): 500 TABLET, DELAYED RELEASE ORAL at 06:32

## 2022-12-27 NOTE — ED ADULT NURSE REASSESSMENT NOTE - NSIMPLEMENTINTERV_GEN_ALL_ED
Implemented All Universal Safety Interventions:  [place] to call system. Call bell, personal items and telephone within reach. Instruct patient to call for assistance. Room bathroom lighting operational. Non-slip footwear when patient is off stretcher. Physically safe environment: no spills, clutter or unnecessary equipment. Stretcher in lowest position, wheels locked, appropriate side rails in place.
Implemented All Universal Safety Interventions:  [place] to call system. Call bell, personal items and telephone within reach. Instruct patient to call for assistance. Room bathroom lighting operational. Non-slip footwear when patient is off stretcher. Physically safe environment: no spills, clutter or unnecessary equipment. Stretcher in lowest position, wheels locked, appropriate side rails in place.

## 2022-12-27 NOTE — ED CDU PROVIDER SUBSEQUENT DAY NOTE - CLINICAL SUMMARY MEDICAL DECISION MAKING FREE TEXT BOX
Patient seen by , hold for reassessment. Medically cleared. Patient seen by , hold for reassessment.

## 2022-12-27 NOTE — ED ADULT NURSE REASSESSMENT NOTE - DESCRIPTION
Pt is calm denies SI/HI denies any audio or visual or tactile disturbances.
blunted affect. c/o auditory hallucinations

## 2022-12-27 NOTE — ED BEHAVIORAL HEALTH NOTE - BEHAVIORAL HEALTH NOTE
Patient seen and reassessed today. He verbalized current auditory hallucinations and current suicidal ideations. He denies current plans to end his life. He endorses a history of haldol and Thorazine allergen and is amenable to taking Risperdal. Benefits and risks reviewed with him. He is requesting for psychiatric admission, noting the voices are making him anxious. He will remain in the ED pending bed assignment.     PLAN:  1. Ativan 2 mg PO X 1 dose for anxiety.  2. Risperdal 1 mg PO HS for psychosis.  3. Will continue Depakote 500mg PO BID for mood.  4. Patient will remain in the ED for reassessment/bed assignment.       Vital Signs Last 24 Hrs  T(C): 36.9 (27 Dec 2022 15:15), Max: 36.9 (26 Dec 2022 19:20)  T(F): 98.4 (27 Dec 2022 15:15), Max: 98.5 (26 Dec 2022 19:20)  HR: 86 (27 Dec 2022 15:15) (77 - 90)  BP: 128/84 (27 Dec 2022 15:15) (114/70 - 148/88)  BP(mean): --  RR: 18 (27 Dec 2022 15:15) (18 - 18)  SpO2: 97% (27 Dec 2022 15:15) (96% - 100%)    Parameters below as of 27 Dec 2022 15:15  Patient On (Oxygen Delivery Method): room air

## 2022-12-27 NOTE — ED ADULT NURSE REASSESSMENT NOTE - GENERAL PATIENT STATE
comfortable appearance/no change observed/resting/sleeping
comfortable appearance/resting/sleeping
comfortable appearance/cooperative
comfortable appearance/cooperative
comfortable appearance/resting/sleeping
comfortable appearance/cooperative
comfortable appearance/no change observed
comfortable appearance
resting/sleeping

## 2022-12-27 NOTE — ED ADULT NURSE REASSESSMENT NOTE - STATUS
awaiting transfer, no change
reassessment
hold for am
hold for am
awaiting consult
awaiting consult
reassessment
reassessment
awaiting consult

## 2022-12-28 VITALS
SYSTOLIC BLOOD PRESSURE: 136 MMHG | HEART RATE: 86 BPM | DIASTOLIC BLOOD PRESSURE: 89 MMHG | RESPIRATION RATE: 18 BRPM | OXYGEN SATURATION: 96 % | TEMPERATURE: 98 F

## 2022-12-28 PROCEDURE — U0005: CPT

## 2022-12-28 PROCEDURE — G0378: CPT

## 2022-12-28 PROCEDURE — 99284 EMERGENCY DEPT VISIT MOD MDM: CPT

## 2022-12-28 PROCEDURE — 81003 URINALYSIS AUTO W/O SCOPE: CPT

## 2022-12-28 PROCEDURE — 99217: CPT

## 2022-12-28 PROCEDURE — 36415 COLL VENOUS BLD VENIPUNCTURE: CPT

## 2022-12-28 PROCEDURE — U0003: CPT

## 2022-12-28 PROCEDURE — 82962 GLUCOSE BLOOD TEST: CPT

## 2022-12-28 PROCEDURE — 80307 DRUG TEST PRSMV CHEM ANLYZR: CPT

## 2022-12-28 PROCEDURE — 93005 ELECTROCARDIOGRAM TRACING: CPT

## 2022-12-28 PROCEDURE — 80053 COMPREHEN METABOLIC PANEL: CPT

## 2022-12-28 PROCEDURE — 85025 COMPLETE CBC W/AUTO DIFF WBC: CPT

## 2022-12-28 RX ADMIN — METFORMIN HYDROCHLORIDE 1000 MILLIGRAM(S): 850 TABLET ORAL at 06:21

## 2022-12-28 RX ADMIN — DIVALPROEX SODIUM 500 MILLIGRAM(S): 500 TABLET, DELAYED RELEASE ORAL at 06:20

## 2022-12-28 NOTE — CHART NOTE - NSCHARTNOTEFT_GEN_A_CORE
SW Note: Pt will be transferred to [facility] for inpt psychiatric care. Accepting MD Dr. Ann. Legals 9.13. Pt aware of the plan. Pts RN Fidel spoke with the pts ACT and notified them. [facility] arranged. [facility] letter provided. Ins auth for admit deferred to accepting facility, Ernesto plan listed.
SWNote: pt needs transfer 9.13 status no beds available at [facility](Juliana) and/or [facility] (Veronica CHAVEZ ) this evening. SW to follow in the am

## 2022-12-28 NOTE — ED CDU PROVIDER SUBSEQUENT DAY NOTE - HISTORY
No acute events since sign out.
no acute events since receiving sign-out
No acute events over night. Pending psych reassessment today.
no acute events since receiving sign-out
no acute events since receiving sign-out

## 2022-12-28 NOTE — ED CDU PROVIDER DISPOSITION NOTE - CLINICAL COURSE
in  for adjustment disorder; medically cleared prior to going to   accepted to inpatient psych unit at [facility]

## 2022-12-28 NOTE — ED CDU PROVIDER SUBSEQUENT DAY NOTE - PHYSICAL EXAMINATION
Gen: Well appearing in NAD  Head: NC/AT  Neck: trachea midline  Resp:  No distress  Ext: no deformities  Neuro:  A&O appears non focal  Skin:  Warm and dry as visualized  Psych: +SI. +HI.
Gen: Well appearing in NAD  Head: NC/AT  Neck: trachea midline  Resp:  No distress  Ext: no deformities  Neuro:  A&O appears non focal  Skin:  Warm and dry as visualized  Psych:  Normal affect and mood
Gen: Well appearing in NAD  Head: NC/AT  Neck: trachea midline  Resp:  No distress  Ext: no deformities  Neuro:  A&O appears non focal  Skin:  Warm and dry as visualized  Psych: +SI. +HI.

## 2022-12-28 NOTE — ED CDU PROVIDER SUBSEQUENT DAY NOTE - NSICDXNOPASTSURGICALHX_GEN_ALL_ED
<-- Click to add NO significant Past Surgical History

## 2022-12-28 NOTE — ED ADULT NURSE REASSESSMENT NOTE - NS ED NURSE REASSESS COMMENT FT1
Assumed care of patient.  Pt alert and cooperative.  V/S taken and chared.  Pt ambulated to bathroom with steady gait. no c/o Pt states continues to hear voices.  Will monitor and chart changes and maintain safe environment.
Fs checked at 2123. . glucometer not transferring to pt chart.
No event to report, patient slept without difficulties, no anxiety noted. Patient refused vital sign x 2 despite encouragement, no suicidal thoughts, report hearing voices, no attempt to harm self or other, all needs attend, safety maintained.
OOB to bathroom with steady gait. lunch tray ordered for pt.
Ot sitting up on stretcher, eating snack and tolerating multiple apple juices. returned to sleep under blanket on stretcher, denies any c/o at this time..
Patient in bed sleeping, no attempt to harm self and others, no suicidal thoughts, no visual or auditory hallucination, safety maintained.
Patient in bed sleeping, no complain voiced, safety maintained.
Patient received Metformin this AM, tolerated well, patient slept during the night without disturbances, patient transferred to  for reassessment this AM, no complain of suicidal thoughts from patient, no visual or auditory hallucination, all needs attend, safety maintained.
Patiuent received PM medication tolerates well, no complain voiced, continue to voiced auditory hallucination, patient is awaiting for transfer, safety maintained.
Pt received from TONA jiang- and assumed care @ 0730hrs.   Patient found in asleep in no apparent distress pt is A&Ox4 denies SI/HI when asked.  Pt reports that he came to ED because he was off his meds pmhx of psych, No SOB, No LOC. PERRLA.  Physical assessment reveals: neg DCAPBTLS no IV is in place .Vital signs within normal limits.  Plan of care explained and reviewed with patient, call bell within reach.
Pt requesting Trazadone to help sleep Pt medicated as ordered.  New ID band needed and Registration called for new band
Report received from off going RN, care of pt assumed at 0730. pt received sleeping on stretcher with blanket over his head. arousable to voice, calm and cooperative, denies SI / HI requesting psych placement and his daily meds. pt updated on plan of care, questions asked and answered. reassurance provided.
assumed care for pt at 1930, charting as noted. pt resting comfortably in bed, denies SI/HI any auditory or tactile hallucinations. respirations even and unlabored. pt shows no s/s of acute distress at this time. safety precautions maintained.
assumed care of pt from RN WT, pt AAOX3, resp. even and unlabored on RA, pt states he is hearing voices and he needs meds and a shower, pt denies SI/HI, pt given blankets and sandwich, pt denies pain at this time, denies chest pain/SOB,
new ID band applied to left wrist.
patient resting in bed attempted to take v/s and patient refused NAD noted will monitor and chart any changes.
pt AAOX3, resp. even and unlabored on RA, pt states he needs meds, also states he has auditory hallucinations/visual hallucinations, denies SI/HI, pt resting comfortably in bed
pt has been calm and cooperative while in my care, denies any complaints, a/ox3, ambulatory, voiding, requesting food, pt given meals tolerating well, awaiting psych consult
pt re-evaluated by dr Branch and endorses command auditory hallucinations to harm himself. pt maintains a good appetite consuming meals 100%. pt has made no attempts to harm himself or others.
pt received resting/sleeping in his room. pt provided lunch and offers no complaints,  awaiting psych re-evaluation. pt is compliant with medications and vital signs and has made no attempts to harm himself or others.
pt resting / sleeping easily aroused to verbal stimuli and compliant with vitals and medication. pt consumed breakfast and performed am adl's with sundries provided. pt has made no attempts to harm himself or others.
pt resting/sleeping in NAD at this time. pt is cooperative and compliant with medications and vital signs. pt with good appetite consuming meals 100%. pt educated on the plan of care to be re-evaluated by psychiatry in the am. pt has ma1de no attempts to harm himself or others.
resting sleeping in nad at this time, appetite is good consuming meals 100% and requesting 2nd trays. pt is compliant with medications and vital signs. no attempts to harm self or others
Patient ate 100% of his meal at breakfast.  Patient ambulated to bathroom with a steady independent gait before returning to bed.  No attempts to harm self or others and safety maintained.
Pt remains asleep, awoke to assess.  Pt denies any pain or discomfort.
Assumed care of pt at 19:15 as stated in report from TONA Carroll. Charting as noted. Patient A&O x4, denies current pain/discomfort, denies CP/SOB. Updated on the plan of care. Call bell within reach, bed locked in lowest position.
Assumed care of patient at 0705.  Patient resting in bed awake.  Patient reports "I don't feel good I'm still hearing voices".  No attempts to harm self or others and safety maintained.
Endorse patient care at 7:00 PM. Patient in the community area walking with a steady gait. Patient voiced complain of anxiety, Ativan ordered for patient by NP, denied suicidal thoughts, denied visual or auditory hallucination. Snack given to patient, tolerated, patient in bed resting, nursing will continue to monitor.
Patient remains resting in bed with no distress.  Safety of patient maintained.
pt resting/sleeping in NAD at this time. No attempts to harm self or others at this time.

## 2022-12-28 NOTE — ED BEHAVIORAL HEALTH NOTE - BEHAVIORAL HEALTH NOTE
PROGRESS NOTE: 12-28-22 @ 10:28  	  • Reason for Ongoing Consultation: YESSYDIVINE	    ID: [id number] Male with HEALTH ISSUES - PROBLEM Dx:  Adjustment disorder with other symptom    Polysubstance (excluding opioids) dependence            INTERVAL DATA:   • Interval Chief Complaint: "hearing voices to hurt himself and others"  • Interval History: Patient is awake and alert in bed stating he continues to hear voices telling him to hurt himself and others. Reports feeling anxious and depressed. States he has thoughts of hurting himself with no plan. Patient reports he couldn't sleep last night and is requesting Trazadone 200mg. Patient is still asking for inpatient treatment. No acute changes overnight.     REVIEW OF SYSTEMS:   • Constitutional Symptoms	No complaints  • Eyes	No complaints  • Ears / Nose / Throat / Mouth	No complaints  • Cardiovascular	No complaints  • Respiratory	No complaints  • Gastrointestinal	No complaints  • Genitourinary	No complaints  • Musculoskeletal	No complaints  • Skin	No complaints  • Neurological	No complaints  • Psychiatric (see HPI)	See HPI  • Endocrine	No complaints  • Hematologic / Lymphatic	No complaints  • Allergic / Immunologic	No complaints    REVIEW OF VITALS/LABS/IMAGING/INVESTIGATIONS:   • Vital signs reviewed: Yes  • Vital Signs:	  ICU Vital Signs Last 24 Hrs  T(C): 36.8 (28 Dec 2022 07:15), Max: 36.9 (27 Dec 2022 15:15)  T(F): 98.3 (28 Dec 2022 07:15), Max: 98.4 (27 Dec 2022 15:15)  HR: 86 (28 Dec 2022 07:15) (83 - 90)  BP: 136/89 (28 Dec 2022 07:15) (122/74 - 136/89)  BP(mean): --  ABP: --  ABP(mean): --  RR: 18 (28 Dec 2022 07:15) (18 - 18)  SpO2: 96% (28 Dec 2022 07:15) (96% - 98%)    O2 Parameters below as of 28 Dec 2022 07:15  Patient On (Oxygen Delivery Method): room air    • Available labs reviewed: Yes  • Available Lab Results:         MEDICATIONS:      PRN Medications: MEDICATIONS  (PRN):  traZODone 100 milliGRAM(s) Oral at bedtime PRN insomnia    • PRN Medications since last evaluation	  • PRN Details	    Current Medications:   divalproex  milliGRAM(s) Oral two times a day  LORazepam     Tablet 2 milliGRAM(s) Oral once  metFORMIN 1000 milliGRAM(s) Oral two times a day  risperiDONE   Tablet 1 milliGRAM(s) Oral at bedtime  traZODone 100 milliGRAM(s) Oral at bedtime PRN     Medication Side Effects:  • Medication Side Effects or Adverse Reactions (new or ongoing)	None known    MENTAL STATUS EXAM:   • Level of Consciousness	Alert  • General Appearance	No deformities present  • Body Habitus	Average build  • Hygiene	Poor  • Grooming	Poor  • Behavior	Cooperative  • Eye Contact	Good  • Relatedness	Fair  • Impulse Control	Normal  • Muscle Tone / Strength	Normal muscle tone/strength  • Abnormal Movements	No abnormal movements  • Gait / Station	Normal gait / station  • Speech Volume	Normal  • Speech Rate	Normal  • Speech Spontaneity	Normal  • Speech Articulation	Normal  • Mood	Depressed  • Affect Quality	Depressed  • Affect Range	Full  • Affect Congruence	Congruent  • Thought Process	Linear  • Thought Associations	Normal  • Thought Content	SI, depressed  • Perceptions	Command AH  • Oriented to Time	Yes  • Oriented to Place	Yes  • Oriented to Situation	Yes  • Oriented to Person	Yes  • Attention / Concentration	Normal  • Estimated Intelligence	Average  • Recent Memory	Normal  • Remote Memory	Normal  • Fund of Knowledge	Normal  • Language	No abnormalities noted  • Judgment (regarding everyday events)	Fair  • Insight (regarding psychiatric illness)	Fair    SUICIDALITY:   • Suicidality (Interval)	(+) SI    HOMICIDALITY/AGGRESSION:   • Homicidality/Aggression	(+) HI    DIAGNOSIS DSM-V:    Psychiatric Diagnosis (Corresponds to DSM-IV Axis I, II):   HEALTH ISSUES - PROBLEM Dx:  Adjustment disorder with other symptom    Polysubstance (excluding opioids) dependence             Medical Diagnosis (Corresponds to DSM-IV Axis III):  • Axis III	      ASSESSMENT OF CURRENT CONDITION:   Summary (include case differential, formulation and patient response to therapy):   Patient continues to endorse command AH to hurt others and self. Patient reports feeling anxious, depressed and having SI. Pt is requesting Trazadone 200mg to help him sleep. There were no acute changes overnight and patient is requesting inpatient psych treatment.     Risk Assessment (consider static vs modifiable risk factors and protective factors; comment on level of risk for dangerous behavior):   R.F. Command AH, (+) SI, (+) HI, history of multiple psych admissions, hx of cocaine abuse  P.F. none known    PLAN  -patient to be transferred to an inpatient psych facility. Patient can receive his Trazadone today. Continue on current psych medications. PROGRESS NOTE: 12-28-22 @ 10:28  	  • Reason for Ongoing Consultation: YESSYDIVINE	    ID: [id number] Male with HEALTH ISSUES - PROBLEM Dx:  Adjustment disorder with other symptom    Polysubstance (excluding opioids) dependence            INTERVAL DATA:   • Interval Chief Complaint: "hearing voices to hurt himself and others"  • Interval History: Patient is awake and alert in bed stating he continues to hear voices telling him to hurt himself and others. Reports feeling anxious and depressed. States he has thoughts of hurting himself with no plan. Patient reports he couldn't sleep last night and is requesting Trazadone 200mg. Patient is still asking for inpatient treatment. No acute changes overnight.     REVIEW OF SYSTEMS:   • Constitutional Symptoms	No complaints  • Eyes	No complaints  • Ears / Nose / Throat / Mouth	No complaints  • Cardiovascular	No complaints  • Respiratory	No complaints  • Gastrointestinal	No complaints  • Genitourinary	No complaints  • Musculoskeletal	No complaints  • Skin	No complaints  • Neurological	No complaints  • Psychiatric (see HPI)	See HPI  • Endocrine	No complaints  • Hematologic / Lymphatic	No complaints  • Allergic / Immunologic	No complaints    REVIEW OF VITALS/LABS/IMAGING/INVESTIGATIONS:   • Vital signs reviewed: Yes  • Vital Signs:	  ICU Vital Signs Last 24 Hrs  T(C): 36.8 (28 Dec 2022 07:15), Max: 36.9 (27 Dec 2022 15:15)  T(F): 98.3 (28 Dec 2022 07:15), Max: 98.4 (27 Dec 2022 15:15)  HR: 86 (28 Dec 2022 07:15) (83 - 90)  BP: 136/89 (28 Dec 2022 07:15) (122/74 - 136/89)  BP(mean): --  ABP: --  ABP(mean): --  RR: 18 (28 Dec 2022 07:15) (18 - 18)  SpO2: 96% (28 Dec 2022 07:15) (96% - 98%)    O2 Parameters below as of 28 Dec 2022 07:15  Patient On (Oxygen Delivery Method): room air    • Available labs reviewed: Yes  • Available Lab Results:         MEDICATIONS:      PRN Medications: MEDICATIONS  (PRN):  traZODone 100 milliGRAM(s) Oral at bedtime PRN insomnia    • PRN Medications since last evaluation	  • PRN Details	    Current Medications:   divalproex  milliGRAM(s) Oral two times a day  LORazepam     Tablet 2 milliGRAM(s) Oral once  metFORMIN 1000 milliGRAM(s) Oral two times a day  risperiDONE   Tablet 1 milliGRAM(s) Oral at bedtime  traZODone 100 milliGRAM(s) Oral at bedtime PRN     Medication Side Effects:  • Medication Side Effects or Adverse Reactions (new or ongoing)	None known    MENTAL STATUS EXAM:   • Level of Consciousness	Alert  • General Appearance	No deformities present  • Body Habitus	Average build  • Hygiene	Poor  • Grooming	Poor  • Behavior	Cooperative  • Eye Contact	Good  • Relatedness	Fair  • Impulse Control	Normal  • Muscle Tone / Strength	Normal muscle tone/strength  • Abnormal Movements	No abnormal movements  • Gait / Station	Normal gait / station  • Speech Volume	Normal  • Speech Rate	Normal  • Speech Spontaneity	Normal  • Speech Articulation	Normal  • Mood	Depressed  • Affect Quality	Depressed  • Affect Range	Full  • Affect Congruence	Congruent  • Thought Process	Linear  • Thought Associations	Normal  • Thought Content	SI, depressed  • Perceptions	Command AH  • Oriented to Time	Yes  • Oriented to Place	Yes  • Oriented to Situation	Yes  • Oriented to Person	Yes  • Attention / Concentration	Normal  • Estimated Intelligence	Average  • Recent Memory	Normal  • Remote Memory	Normal  • Fund of Knowledge	Normal  • Language	No abnormalities noted  • Judgment (regarding everyday events)	Fair  • Insight (regarding psychiatric illness)	Fair    SUICIDALITY:   • Suicidality (Interval)	(+) SI    HOMICIDALITY/AGGRESSION:   • Homicidality/Aggression	(+) HI    DIAGNOSIS DSM-V:    Psychiatric Diagnosis (Corresponds to DSM-IV Axis I, II):   HEALTH ISSUES - PROBLEM Dx:  Adjustment disorder with other symptom    Polysubstance (excluding opioids) dependence             Medical Diagnosis (Corresponds to DSM-IV Axis III):  • Axis III	  see HPI       ASSESSMENT OF CURRENT CONDITION:   Summary (include case differential, formulation and patient response to therapy):   Patient continues to endorse command AH to hurt others and self. Patient reports feeling anxious, depressed and having SI. Pt is requesting Trazadone 200mg to help him sleep. There were no acute changes overnight and patient is requesting inpatient psych treatment.     Risk Assessment (consider static vs modifiable risk factors and protective factors; comment on level of risk for dangerous behavior):   R.F. Command AH, (+) SI, (+) HI, history of multiple psych admissions, hx of cocaine abuse  P.F. none known    PLAN  -Continue Risperdal, Depakote and Trazodone PRN  -patient to be transferred to an inpatient psych facility

## 2022-12-28 NOTE — ED CDU PROVIDER SUBSEQUENT DAY NOTE - CROS ED ROS STATEMENT
all other ROS negative except as per HPI

## 2023-02-03 NOTE — ED ADULT TRIAGE NOTE - PATIENT ON (OXYGEN DELIVERY METHOD)
room air

## 2023-02-13 ENCOUNTER — EMERGENCY (EMERGENCY)
Facility: HOSPITAL | Age: 53
LOS: 1 days | Discharge: HOME | End: 2023-02-13
Attending: EMERGENCY MEDICINE
Payer: MEDICAID

## 2023-02-13 VITALS
HEIGHT: 67 IN | TEMPERATURE: 98 F | SYSTOLIC BLOOD PRESSURE: 140 MMHG | DIASTOLIC BLOOD PRESSURE: 89 MMHG | RESPIRATION RATE: 16 BRPM | WEIGHT: 149.91 LBS | HEART RATE: 109 BPM | OXYGEN SATURATION: 96 %

## 2023-02-13 LAB
ALBUMIN SERPL ELPH-MCNC: 3.9 G/DL — SIGNIFICANT CHANGE UP (ref 3.3–5.2)
ALP SERPL-CCNC: 53 U/L — SIGNIFICANT CHANGE UP (ref 40–120)
ALT FLD-CCNC: 16 U/L — SIGNIFICANT CHANGE UP
AMPHET UR-MCNC: NEGATIVE — SIGNIFICANT CHANGE UP
ANION GAP SERPL CALC-SCNC: 11 MMOL/L — SIGNIFICANT CHANGE UP (ref 5–17)
APAP SERPL-MCNC: <3 UG/ML — LOW (ref 10–26)
APPEARANCE UR: CLEAR — SIGNIFICANT CHANGE UP
AST SERPL-CCNC: 21 U/L — SIGNIFICANT CHANGE UP
BARBITURATES UR SCN-MCNC: NEGATIVE — SIGNIFICANT CHANGE UP
BASOPHILS # BLD AUTO: 0.02 K/UL — SIGNIFICANT CHANGE UP (ref 0–0.2)
BASOPHILS NFR BLD AUTO: 0.3 % — SIGNIFICANT CHANGE UP (ref 0–2)
BENZODIAZ UR-MCNC: NEGATIVE — SIGNIFICANT CHANGE UP
BILIRUB SERPL-MCNC: 0.3 MG/DL — LOW (ref 0.4–2)
BILIRUB UR-MCNC: NEGATIVE — SIGNIFICANT CHANGE UP
BUN SERPL-MCNC: 13.2 MG/DL — SIGNIFICANT CHANGE UP (ref 8–20)
CALCIUM SERPL-MCNC: 8.3 MG/DL — LOW (ref 8.4–10.5)
CHLORIDE SERPL-SCNC: 99 MMOL/L — SIGNIFICANT CHANGE UP (ref 96–108)
CO2 SERPL-SCNC: 24 MMOL/L — SIGNIFICANT CHANGE UP (ref 22–29)
COCAINE METAB.OTHER UR-MCNC: POSITIVE
COLOR SPEC: YELLOW — SIGNIFICANT CHANGE UP
CREAT SERPL-MCNC: 0.72 MG/DL — SIGNIFICANT CHANGE UP (ref 0.5–1.3)
DIFF PNL FLD: NEGATIVE — SIGNIFICANT CHANGE UP
EGFR: 110 ML/MIN/1.73M2 — SIGNIFICANT CHANGE UP
EOSINOPHIL # BLD AUTO: 0.06 K/UL — SIGNIFICANT CHANGE UP (ref 0–0.5)
EOSINOPHIL NFR BLD AUTO: 0.8 % — SIGNIFICANT CHANGE UP (ref 0–6)
ETHANOL SERPL-MCNC: <10 MG/DL — SIGNIFICANT CHANGE UP (ref 0–9)
GLUCOSE BLDC GLUCOMTR-MCNC: 223 MG/DL — HIGH (ref 70–99)
GLUCOSE BLDC GLUCOMTR-MCNC: 292 MG/DL — HIGH (ref 70–99)
GLUCOSE SERPL-MCNC: 446 MG/DL — HIGH (ref 70–99)
GLUCOSE UR QL: 1000 MG/DL
HCT VFR BLD CALC: 42.8 % — SIGNIFICANT CHANGE UP (ref 39–50)
HGB BLD-MCNC: 14.2 G/DL — SIGNIFICANT CHANGE UP (ref 13–17)
HIV 1 & 2 AB SERPL IA.RAPID: SIGNIFICANT CHANGE UP
IMM GRANULOCYTES NFR BLD AUTO: 0.1 % — SIGNIFICANT CHANGE UP (ref 0–0.9)
KETONES UR-MCNC: NEGATIVE — SIGNIFICANT CHANGE UP
LEUKOCYTE ESTERASE UR-ACNC: NEGATIVE — SIGNIFICANT CHANGE UP
LYMPHOCYTES # BLD AUTO: 2.17 K/UL — SIGNIFICANT CHANGE UP (ref 1–3.3)
LYMPHOCYTES # BLD AUTO: 28.2 % — SIGNIFICANT CHANGE UP (ref 13–44)
MCHC RBC-ENTMCNC: 29.6 PG — SIGNIFICANT CHANGE UP (ref 27–34)
MCHC RBC-ENTMCNC: 33.2 GM/DL — SIGNIFICANT CHANGE UP (ref 32–36)
MCV RBC AUTO: 89.4 FL — SIGNIFICANT CHANGE UP (ref 80–100)
METHADONE UR-MCNC: NEGATIVE — SIGNIFICANT CHANGE UP
MONOCYTES # BLD AUTO: 0.77 K/UL — SIGNIFICANT CHANGE UP (ref 0–0.9)
MONOCYTES NFR BLD AUTO: 10 % — SIGNIFICANT CHANGE UP (ref 2–14)
NEUTROPHILS # BLD AUTO: 4.67 K/UL — SIGNIFICANT CHANGE UP (ref 1.8–7.4)
NEUTROPHILS NFR BLD AUTO: 60.6 % — SIGNIFICANT CHANGE UP (ref 43–77)
NITRITE UR-MCNC: NEGATIVE — SIGNIFICANT CHANGE UP
OPIATES UR-MCNC: NEGATIVE — SIGNIFICANT CHANGE UP
PCP SPEC-MCNC: SIGNIFICANT CHANGE UP
PCP UR-MCNC: NEGATIVE — SIGNIFICANT CHANGE UP
PH UR: 5 — SIGNIFICANT CHANGE UP (ref 5–8)
PLATELET # BLD AUTO: 233 K/UL — SIGNIFICANT CHANGE UP (ref 150–400)
POTASSIUM SERPL-MCNC: 4.3 MMOL/L — SIGNIFICANT CHANGE UP (ref 3.5–5.3)
POTASSIUM SERPL-SCNC: 4.3 MMOL/L — SIGNIFICANT CHANGE UP (ref 3.5–5.3)
PROT SERPL-MCNC: 6.3 G/DL — LOW (ref 6.6–8.7)
PROT UR-MCNC: NEGATIVE — SIGNIFICANT CHANGE UP
RBC # BLD: 4.79 M/UL — SIGNIFICANT CHANGE UP (ref 4.2–5.8)
RBC # FLD: 12.5 % — SIGNIFICANT CHANGE UP (ref 10.3–14.5)
SALICYLATES SERPL-MCNC: <0.6 MG/DL — LOW (ref 10–20)
SARS-COV-2 RNA SPEC QL NAA+PROBE: SIGNIFICANT CHANGE UP
SODIUM SERPL-SCNC: 134 MMOL/L — LOW (ref 135–145)
SP GR SPEC: 1.01 — SIGNIFICANT CHANGE UP (ref 1.01–1.02)
THC UR QL: NEGATIVE — SIGNIFICANT CHANGE UP
UROBILINOGEN FLD QL: NEGATIVE MG/DL — SIGNIFICANT CHANGE UP
WBC # BLD: 7.7 K/UL — SIGNIFICANT CHANGE UP (ref 3.8–10.5)
WBC # FLD AUTO: 7.7 K/UL — SIGNIFICANT CHANGE UP (ref 3.8–10.5)

## 2023-02-13 PROCEDURE — 93010 ELECTROCARDIOGRAM REPORT: CPT

## 2023-02-13 PROCEDURE — 90792 PSYCH DIAG EVAL W/MED SRVCS: CPT

## 2023-02-13 PROCEDURE — 99223 1ST HOSP IP/OBS HIGH 75: CPT

## 2023-02-13 RX ORDER — INSULIN GLARGINE 100 [IU]/ML
15 INJECTION, SOLUTION SUBCUTANEOUS AT BEDTIME
Refills: 0 | Status: DISCONTINUED | OUTPATIENT
Start: 2023-02-13 | End: 2023-02-20

## 2023-02-13 RX ORDER — NICOTINE POLACRILEX 2 MG
1 GUM BUCCAL DAILY
Refills: 0 | Status: DISCONTINUED | OUTPATIENT
Start: 2023-02-13 | End: 2023-02-20

## 2023-02-13 RX ORDER — RISPERIDONE 4 MG/1
2 TABLET ORAL
Refills: 0 | Status: DISCONTINUED | OUTPATIENT
Start: 2023-02-13 | End: 2023-02-20

## 2023-02-13 RX ORDER — INSULIN HUMAN 100 [IU]/ML
8 INJECTION, SOLUTION SUBCUTANEOUS ONCE
Refills: 0 | Status: COMPLETED | OUTPATIENT
Start: 2023-02-13 | End: 2023-02-13

## 2023-02-13 RX ORDER — TRAZODONE HCL 50 MG
100 TABLET ORAL AT BEDTIME
Refills: 0 | Status: DISCONTINUED | OUTPATIENT
Start: 2023-02-13 | End: 2023-02-20

## 2023-02-13 RX ORDER — METFORMIN HYDROCHLORIDE 850 MG/1
1000 TABLET ORAL
Refills: 0 | Status: DISCONTINUED | OUTPATIENT
Start: 2023-02-13 | End: 2023-02-13

## 2023-02-13 RX ORDER — TRAZODONE HCL 50 MG
100 TABLET ORAL ONCE
Refills: 0 | Status: COMPLETED | OUTPATIENT
Start: 2023-02-13 | End: 2023-02-13

## 2023-02-13 RX ORDER — INSULIN LISPRO 100/ML
4 VIAL (ML) SUBCUTANEOUS
Refills: 0 | Status: DISCONTINUED | OUTPATIENT
Start: 2023-02-13 | End: 2023-02-20

## 2023-02-13 RX ORDER — INSULIN HUMAN 100 [IU]/ML
2 INJECTION, SOLUTION SUBCUTANEOUS ONCE
Refills: 0 | Status: DISCONTINUED | OUTPATIENT
Start: 2023-02-13 | End: 2023-02-13

## 2023-02-13 RX ORDER — DIVALPROEX SODIUM 500 MG/1
1000 TABLET, DELAYED RELEASE ORAL AT BEDTIME
Refills: 0 | Status: DISCONTINUED | OUTPATIENT
Start: 2023-02-13 | End: 2023-02-20

## 2023-02-13 RX ADMIN — INSULIN HUMAN 8 UNIT(S): 100 INJECTION, SOLUTION SUBCUTANEOUS at 05:06

## 2023-02-13 RX ADMIN — DIVALPROEX SODIUM 1000 MILLIGRAM(S): 500 TABLET, DELAYED RELEASE ORAL at 22:02

## 2023-02-13 RX ADMIN — Medication 100 MILLIGRAM(S): at 22:02

## 2023-02-13 RX ADMIN — RISPERIDONE 2 MILLIGRAM(S): 4 TABLET ORAL at 19:24

## 2023-02-13 RX ADMIN — Medication 100 MILLIGRAM(S): at 05:05

## 2023-02-13 RX ADMIN — INSULIN GLARGINE 15 UNIT(S): 100 INJECTION, SOLUTION SUBCUTANEOUS at 22:00

## 2023-02-13 NOTE — ED BEHAVIORAL HEALTH ASSESSMENT NOTE - NSBHSACOC_PSY_A_CORE FT
GOAL: Pt will perform 4 stairs with or without U HR as needed within 2 weeks./balance training/bed mobility training/gait training/transfer training Pt will negotiate 1 flight of stairs indepenedently in 2 weeks. unclear

## 2023-02-13 NOTE — ED CDU PROVIDER INITIAL DAY NOTE - CLINICAL SUMMARY MEDICAL DECISION MAKING FREE TEXT BOX
53 yo male with hx of bipolar disorder, schizoaffective disorder, htn, and DM presents for evaluation of persistent homicidal and suicidal ideation after not taking his meds for the past week. Patient examination if grossly normal. He is currently medically cleared. Patient now pending psychiatric consultation.    Clinical course in ED: patient seen and evaluated by psych. psych recommends hold for re-evaluation in AM.  will place in obs for re-eval.

## 2023-02-13 NOTE — ED ADULT TRIAGE NOTE - CHIEF COMPLAINT QUOTE
Pt BIBA from [facility] for homicidal and suicidal ideations. Denies having a plan. Pt also having auditory hallucinations and the voices are saying "kill people and kill yourself."  Denies visual hallucinations. Pt states he has been off his medications for 1 week.  Pt reports drinking 2 beers and taking one ecstasy pill prior to coming. Blood sugar 281. Pt calm and cooperative during triage.

## 2023-02-13 NOTE — ED ADULT NURSE NOTE - OBJECTIVE STATEMENT
52 year old male presents to ED by EMS from being found at [facility] for homicidal and suicidal ideations.  Denies having a plan.  Patient also with c/o auditory hallucinations and the voices are saying "kill people and kill yourself."  Denies visual hallucinations.  As per patient , he has been off his medications x 1 week.  Per patient , he drank 2 beers.  Seen and evaluated by provider, orders obtained and noted.  Blood, urine and nasal swab specimens obtained and sent to lab.  1:1 in place as ordered.  No acute distress noted at this time.

## 2023-02-13 NOTE — ED BEHAVIORAL HEALTH ASSESSMENT NOTE - HPI (INCLUDE ILLNESS QUALITY, SEVERITY, DURATION, TIMING, CONTEXT, MODIFYING FACTORS, ASSOCIATED SIGNS AND SYMPTOMS)
51 y/o male with PMHx of DM, single, homeless, and past psych hx of Schizoaffective disorder, cocaine use disorder, antisocial personality disorder, alcohol use disorder, cannabis use disorder, substance induced depression, substance induced psychosis, bipolar I who is a very high frequent utilizer of the ED, mostly comes in due to being undomiciled and recent polysubstance abuse, currently utox positive for cocaine, who is often irritable and refuses to participate, but is often discharged when reconstituted and cleared from substances, bring himself in again after presenting 2 days ago for same presentation, reporting AH and SI.    Patient seen and evaluated, and is dismissive, irritable, and noncooperative. States he hears an unknown man's voice that he describes as "talking normally" constantly telling him to kill himself/others and that he is worthless. He states the voice is currently in his head as he is speaking to me and he "just chooses to ignore it." Pt refuses to elaborate further and just requests admission because he is "off his meds" and did not want to discuss anything further.

## 2023-02-13 NOTE — ED BEHAVIORAL HEALTH ASSESSMENT NOTE - NSBHATTESTTYPEVISIT_PSY_A_CORE
On-site Attending with Resident/Fellow/Student and ROSA ELENA (99XXX codes) On-site Attending supervising ROSA ELENA (99XXX codes)

## 2023-02-13 NOTE — ED PROVIDER NOTE - OBJECTIVE STATEMENT
53 yo male prsents for evaluation of SI and HI for the past week, gradually getting worse. 53 yo male presents for evaluation of SI and HI for the past week, gradually getting worse. Patient denies any acute stressors but does endorse some substacne abuse. He also has not been compliant with his medication for a week or two.

## 2023-02-13 NOTE — ED BEHAVIORAL HEALTH ASSESSMENT NOTE - PRIOR MEDICATION SIDE EFFECTS OR ADVERSE REACTIONS
Returned patients phone call to reschedule her in lab sleep study.    Tiana Dennis      
Unable to assess

## 2023-02-13 NOTE — ED BEHAVIORAL HEALTH ASSESSMENT NOTE - SUMMARY
51 y/o male with PMHx of DM, single, homeless, and past psych hx of Schizoaffective disorder, cocaine use disorder, antisocial personality disorder, alcohol use disorder, cannabis use disorder, substance induced depression, substance induced psychosis, bipolar I who is a very high frequent utilizer of the ED, mostly comes in due to being undomiciled and recent polysubstance abuse, currently utox positive for cocaine, who is often irritable and refuses to participate, but is often discharged when reconstituted and cleared from substances, bring himself in again after presenting for same presentation, reporting AH and SI. Pt likely presenting on this occasion in the similar context as recent visits, however pt refusing to engage.

## 2023-02-13 NOTE — ED BEHAVIORAL HEALTH ASSESSMENT NOTE - DESCRIPTION
[facility]  [id number] [place] [id number] [phone]      July 6, 2017      Naa Paredes  [id number]   [facility] [id number]        Re Naa,  Please accommodate Ms Paredes and allow her to sit at work  Sher Duran            Sincerely,           homeless, cocaine use, etoh use Vital Signs Last 24 Hrs  T(C): 36.7 (13 Feb 2023 11:38), Max: 36.7 (13 Feb 2023 08:15)  T(F): 98.1 (13 Feb 2023 11:38), Max: 98.1 (13 Feb 2023 11:38)  HR: 94 (13 Feb 2023 11:38) (80 - 109)  BP: 127/75 (13 Feb 2023 11:38) (118/73 - 140/89)  BP(mean): --  RR: 18 (13 Feb 2023 11:38) (16 - 18)  SpO2: 95% (13 Feb 2023 11:38) (94% - 96%)    Parameters below as of 13 Feb 2023 11:38  Patient On (Oxygen Delivery Method): room air Diabetes

## 2023-02-13 NOTE — ED PROVIDER NOTE - CLINICAL SUMMARY MEDICAL DECISION MAKING FREE TEXT BOX
53 yo male with hx of bipolar disorder, schizoaffective disorder, htn, and DM presents for evaluation of persistent homicidal and suicidal ideation after not taking his meds for the past week. Patient examination if grossly normal. He is currently medically cleared. Patient now pending psychiatric consultation. 53 yo male with hx of bipolar disorder, schizoaffective disorder, htn, and DM presents for evaluation of persistent homicidal and suicidal ideation after not taking his meds for the past week. Patient examination if grossly normal. He is currently medically cleared. Patient now pending psychiatric consultation.    Clinical course in ED: patient seen and evaluated by psych. psych recommends hold for re-evaluation in AM.  will place in obs for re-eval.

## 2023-02-13 NOTE — ED ADULT TRIAGE NOTE - TEMPERATURE IN CELSIUS (DEGREES C)
36.4 [Follow-Up - Clinic] : a clinic follow-up of [Hyperlipidemia] : hyperlipidemia [Hypertension] : hypertension [Medication Management] : Medication management

## 2023-02-14 LAB
GLUCOSE BLDC GLUCOMTR-MCNC: 150 MG/DL — HIGH (ref 70–99)
GLUCOSE BLDC GLUCOMTR-MCNC: 181 MG/DL — HIGH (ref 70–99)
GLUCOSE BLDC GLUCOMTR-MCNC: 191 MG/DL — HIGH (ref 70–99)
GLUCOSE BLDC GLUCOMTR-MCNC: 252 MG/DL — HIGH (ref 70–99)

## 2023-02-14 PROCEDURE — 99232 SBSQ HOSP IP/OBS MODERATE 35: CPT

## 2023-02-14 RX ADMIN — Medication 4 UNIT(S): at 16:54

## 2023-02-14 RX ADMIN — INSULIN GLARGINE 15 UNIT(S): 100 INJECTION, SOLUTION SUBCUTANEOUS at 21:37

## 2023-02-14 RX ADMIN — RISPERIDONE 2 MILLIGRAM(S): 4 TABLET ORAL at 17:17

## 2023-02-14 RX ADMIN — DIVALPROEX SODIUM 1000 MILLIGRAM(S): 500 TABLET, DELAYED RELEASE ORAL at 21:37

## 2023-02-14 RX ADMIN — RISPERIDONE 2 MILLIGRAM(S): 4 TABLET ORAL at 08:13

## 2023-02-14 RX ADMIN — Medication 4 UNIT(S): at 08:13

## 2023-02-14 RX ADMIN — Medication 4 UNIT(S): at 11:54

## 2023-02-14 RX ADMIN — Medication 100 MILLIGRAM(S): at 21:37

## 2023-02-14 NOTE — ED ADULT NURSE REASSESSMENT NOTE - COMFORT CARE
darkened lights
meal provided/plan of care explained

## 2023-02-14 NOTE — ED ADULT NURSE REASSESSMENT NOTE - STATUS
awaiting transfer, no change
hold for reaccess
reassessment
awaiting transfer, no change
reassessment

## 2023-02-14 NOTE — ED BEHAVIORAL HEALTH NOTE - BEHAVIORAL HEALTH NOTE
PROGRESS NOTE: 23 @ 10:08  	  • Reason for Ongoing Consultation: 	    ID: [id number] Male with HEALTH ISSUES - PROBLEM Dx:          INTERVAL DATA:   • Interval Chief Complaint: "I hear a reno voice telling me to kill people and im off my meds."  • Interval History:   53 y/o male, single, homeless, and past psych hx of Schizoaffective disorder, cocaine use disorder, antisocial personality disorder, alcohol use disorder, cannabis use disorder, substance induced depression, substance induced psychosis, bipolar I, PMHx poorly controlled DM ( on admission, given insulin and metformin), who is a very high frequent utilizer of the ED, mostly comes in due to being undomiciled and recent polysubstance abuse, utox positive for cocaine on admission, who is often irritable and refuses to participate, but is often discharged when reconstituted and cleared from substances, brought himself in again after presenting 3 days ago for same presentation, reporting AH and SI. Repeatedly requesting inpatient management.     Patient seen and evaluated, and is dismissive but cooperative to some questioning. He states he currently hears "the reno voice" who is constantly telling him to kill himself/other but it does tell him "how to kill other people" or give him a plan. He endorses that if he does feel like he is going to start responding to the voices commands, he feels comfortable talking to  staff before any actions of harm to himself/others would be taken. Patient endorses chronic depression with SI and multiple previous SA and states "the only thing that would help my depression at this point is inpatient management." Patient reports seeing [employer] [facility] in [place] regularly "to just talk to people" which the patient reports as helpful, as he is not in contact with his family "due to there not being enough room in the house," denying any contact or past altercations with family. Denies anxious sx, manic sx, VH, or current suicidal/homicidal intent or plan. He refuses to elaborate more at this time.     REVIEW OF SYSTEMS:   • Constitutional Symptoms	No complaints  • Eyes	No complaints  • Ears / Nose / Throat / Mouth	No complaints  • Cardiovascular	No complaints  • Respiratory	No complaints  • Gastrointestinal	No complaints  • Genitourinary	No complaints  • Musculoskeletal	No complaints  • Skin	No complaints  • Neurological	No complaints  • Psychiatric (see HPI)	See HPI  • Endocrine	No complaints  • Hematologic / Lymphatic	No complaints  • Allergic / Immunologic	No complaints    REVIEW OF VITALS/LABS/IMAGING/INVESTIGATIONS:   • Vital signs reviewed: Yes  • Vital Signs:	    T(C): 36.6 (23 @ 07:26), Max: 36.8 (23 @ 23:41)  HR: 78 (23 @ 07:26) (71 - 94)  BP: 112/75 (23 @ 07:26) (96/64 - 147/89)  RR: 18 (23 @ 07:26) (17 - 18)  SpO2: 98% (23 @ 07:26) (95% - 98%)    • Available labs reviewed: Yes  • Available Lab Results:                           14.2   7.70  )-----------( 233      ( 2023 03:50 )             42.8     02-13    134<L>  |  99  |  13.2  ----------------------------<  446<H>  4.3   |  24.0  |  0.72    Ca    8.3<L>      2023 03:50    TPro  6.3<L>  /  Alb  3.9  /  TBili  0.3<L>  /  DBili  x   /  AST  21  /  ALT  16  /  AlkPhos  53  02-13    LIVER FUNCTIONS - ( 2023 03:50 )  Alb: 3.9 g/dL / Pro: 6.3 g/dL / ALK PHOS: 53 U/L / ALT: 16 U/L / AST: 21 U/L / GGT: x             Urinalysis Basic - ( 2023 04:00 )    Color: Yellow / Appearance: Clear / S.015 / pH: x  Gluc: x / Ketone: Negative  / Bili: Negative / Urobili: Negative mg/dL   Blood: x / Protein: Negative / Nitrite: Negative   Leuk Esterase: Negative / RBC: x / WBC x   Sq Epi: x / Non Sq Epi: x / Bacteria: x          MEDICATIONS:      PRN Medications: Trazadone  • PRN Medications since last evaluation	  • PRN Details	    Current Medications:   divalproex ER 1000 milliGRAM(s) Oral at bedtime  insulin glargine Injectable (LANTUS) 15 Unit(s) SubCutaneous at bedtime  insulin lispro Injectable (ADMELOG) 4 Unit(s) SubCutaneous three times a day with meals  nicotine - 21 mG/24Hr(s) Patch 1 Patch Transdermal daily  risperiDONE   Tablet 2 milliGRAM(s) Oral two times a day  traZODone 100 milliGRAM(s) Oral at bedtime     Medication Side Effects:  • Medication Side Effects or Adverse Reactions (new or ongoing)	None known    MENTAL STATUS EXAM:   • Level of Consciousness	Alert  • General Appearance	No deformities present  • Body Habitus	Average build  • Hygiene	Fair  • Grooming	Fair  • Behavior	Cooperative, Dissmisive  • Eye Contact	Fair  • Relatedness	Poor  • Impulse Control	Impaired  • Muscle Tone / Strength	Normal muscle tone/strength  • Abnormal Movements	No abnormal movements  • Gait / Station	Normal gait / station  • Speech Volume	Normal volume  • Speech Rate	Normal  • Speech Spontaneity	Normal  • Speech Articulation	Normal  • Mood	Irritable  • Affect Quality	Irritable  • Affect Range	Contricted  • Affect Congruence	Congruent  • Thought Process	Linear  • Thought Associations	Normal  • Thought Content	Unable to assess  • Perceptions	Auditory Hallucinations  • Oriented to Time	Yes  • Oriented to Place	Yes  • Oriented to Situation	Yes  • Oriented to Person	Yes  • Attention / Concentration	Fair  • Estimated Intelligence	Average  • Recent Memory	Unable to assess  • Remote Memory	Unable to assess  • Fund of Knowledge	Unable to assess  • Language	No abnormalities noted  • Judgment (regarding everyday events)	Poor  • Insight (regarding psychiatric illness)	Fair    SUICIDALITY:   • Suicidality (Interval)	SI without current intention or plan, AH tell him to hurt himself. Moderate Risk    HOMICIDALITY/AGGRESSION:   • Homicidality/Aggression	HI without current intention or plan, AH tell him to hurt other people. Moderate Risk    DIAGNOSIS DSM-V:    Psychiatric Diagnosis (Corresponds to DSM-IV Axis I, II): See above   HEALTH ISSUES - PROBLEM Dx: Diabetes           Medical Diagnosis (Corresponds to DSM-IV Axis III):  • Axis III	Diabetes      ASSESSMENT OF CURRENT CONDITION:   Summary (include case differential, formulation and patient response to therapy):   53 y/o male with PMHx of DM, single, homeless, and past psych hx of Schizoaffective disorder, cocaine use disorder, antisocial personality disorder, alcohol use disorder, cannabis use disorder, substance induced depression, substance induced psychosis, bipolar I who is a very high frequent utilizer of the ED, mostly comes in due to being undomiciled and recent polysubstance abuse, utox positive for cocaine on admission, who is often irritable and refuses to participate, but is often discharged when reconstituted and cleared from substances, bring himself in again after presenting for same presentation, reporting AH and SI. Pt likely presenting on this occasion in the similar context as recent visits, and is requesting inpatient psychiatric intervention at this time. He shows a moderate violence risk with fair insight but poor impulse control at baseline and reported AH. Patient is amenable to plan to be held for reassessment, pending inpatient bed placement.     Risk Assessment (consider static vs modifiable risk factors and protective factors; comment on level of risk for dangerous behavior):   PF: None known  RF: Undomiciled, single, unemployed, poor support, past SA, risk to self/others for harm     PLAN  Safety:   Medications: Trazadone prn, insulin, metformin  Substance: Recommend patient abstain from substance use. Further plan pending admission  Labs/EKG appreciated:   SW will provide with inpatient referral / psychiatric follow up  Hold for reassessment

## 2023-02-15 VITALS
RESPIRATION RATE: 18 BRPM | DIASTOLIC BLOOD PRESSURE: 68 MMHG | SYSTOLIC BLOOD PRESSURE: 109 MMHG | TEMPERATURE: 98 F | OXYGEN SATURATION: 97 % | HEART RATE: 70 BPM

## 2023-02-15 LAB
GLUCOSE BLDC GLUCOMTR-MCNC: 135 MG/DL — HIGH (ref 70–99)
GLUCOSE BLDC GLUCOMTR-MCNC: 86 MG/DL — SIGNIFICANT CHANGE UP (ref 70–99)

## 2023-02-15 PROCEDURE — 99285 EMERGENCY DEPT VISIT HI MDM: CPT | Mod: 25

## 2023-02-15 PROCEDURE — U0005: CPT

## 2023-02-15 PROCEDURE — 82962 GLUCOSE BLOOD TEST: CPT

## 2023-02-15 PROCEDURE — 80053 COMPREHEN METABOLIC PANEL: CPT

## 2023-02-15 PROCEDURE — 93005 ELECTROCARDIOGRAM TRACING: CPT

## 2023-02-15 PROCEDURE — 80307 DRUG TEST PRSMV CHEM ANLYZR: CPT

## 2023-02-15 PROCEDURE — 36415 COLL VENOUS BLD VENIPUNCTURE: CPT

## 2023-02-15 PROCEDURE — 99238 HOSP IP/OBS DSCHRG MGMT 30/<: CPT

## 2023-02-15 PROCEDURE — G0378: CPT

## 2023-02-15 PROCEDURE — 86703 HIV-1/HIV-2 1 RESULT ANTBDY: CPT

## 2023-02-15 PROCEDURE — 85025 COMPLETE CBC W/AUTO DIFF WBC: CPT

## 2023-02-15 PROCEDURE — 81003 URINALYSIS AUTO W/O SCOPE: CPT

## 2023-02-15 PROCEDURE — U0003: CPT

## 2023-02-15 RX ADMIN — Medication 4 UNIT(S): at 09:05

## 2023-02-15 RX ADMIN — RISPERIDONE 2 MILLIGRAM(S): 4 TABLET ORAL at 06:35

## 2023-02-15 NOTE — ED CDU PROVIDER SUBSEQUENT DAY NOTE - ENDOCRINE [-], MLM
no excessive thirst/no excessive urination/no hypoglycemia
no excessive thirst/no excessive urination/no hypoglycemia

## 2023-02-15 NOTE — CHART NOTE - NSCHARTNOTEFT_GEN_A_CORE
SW Note: Notified by  provider that the treatment recommendation is to transfer pt for inpt psychiatric care. labs completed, covid neg. Pt continues to verbalize depressive thoughts and S/I. SWer placed calls to several facilities looking for beds. [facility] have declined referral stating the pt has exhausted tx options at this time. [facility], [facility], [facility] and  have no beds. [facility] and [facility] can not accept at this time, they are still reviewing packets from longer LOS pts. Will continue to explore options.
SW Note: Pt was discharged without SW intervention. According to  staff pt demanded to be discharge and declined all services.

## 2023-02-15 NOTE — ED CDU PROVIDER DISPOSITION NOTE - CLINICAL COURSE
53 y/o male, single, homeless, and past psych hx of Schizoaffective disorder, cocaine use disorder, antisocial personality disorder, alcohol use disorder, cannabis use disorder, substance induced depression, substance induced psychosis, bipolar I, PMHx poorly controlled DM ( on admission, given insulin and metformin), who is a very high frequent utilizer of the ED, mostly comes in due to being undomiciled and recent polysubstance abuse, utox positive for cocaine on admission, who is often irritable and refuses to participate, but is often discharged when reconstituted and cleared from substances, brought himself in again after presenting 4 days ago for same presentation, reporting AH and SI. Repeatedly requesting inpatient management.     Patient seen and evaluated, and is dismissive but cooperative to some questioning.  Five minutes after BH eval, patient was reported to be requesting to nurse "I want to get out of here." After returning to patient, he reports that he is "no longer hearing the voices," is "feeling perfectly fine", "I have all of my medications set up when I leave," and "please get me out of here immediately."  Cleared by Psych and ready for discharge.

## 2023-02-15 NOTE — ED ADULT NURSE REASSESSMENT NOTE - GENERAL PATIENT STATE
comfortable appearance/resting/sleeping
comfortable appearance
comfortable appearance/resting/sleeping
cooperative
comfortable appearance/cooperative
comfortable appearance/resting/sleeping
comfortable appearance/cooperative

## 2023-02-15 NOTE — ED CDU PROVIDER SUBSEQUENT DAY NOTE - PSYCHIATRIC [-], MLM
no depression/no insomnia/not exhibiting opposition
no depression/no insomnia/not exhibiting opposition

## 2023-02-15 NOTE — ED BEHAVIORAL HEALTH NOTE - BEHAVIORAL HEALTH NOTE
PROGRESS NOTE: 02-15-23 @ 11:13  	  • Reason for Ongoing Consultation: 	    ID: [id number] Male with HEALTH ISSUES - PROBLEM Dx:    INTERVAL DATA:   • Interval Chief Complaint: "I hear a reno voice telling me to kill people and im off my meds."  • Interval History:   51 y/o male, single, homeless, and past psych hx of Schizoaffective disorder, cocaine use disorder, antisocial personality disorder, alcohol use disorder, cannabis use disorder, substance induced depression, substance induced psychosis, bipolar I, PMHx poorly controlled DM ( on admission, given insulin and metformin), who is a very high frequent utilizer of the ED, mostly comes in due to being undomiciled and recent polysubstance abuse, utox positive for cocaine on admission, who is often irritable and refuses to participate, but is often discharged when reconstituted and cleared from substances, brought himself in again after presenting 4 days ago for same presentation, reporting AH and SI. Repeatedly requesting inpatient management.     Patient seen and evaluated, and is dismissive but cooperative to some questioning. In our conversation, patient is preoccupied with his medications, asking to be put on Metformin 1000 bid and for an "increase on the meds that stop me from hearing voices." He states that it is only one person talking to him, a man, endorsing SI/HI to the patient. Patient endorses that he is very happy with the [facility] program, called them on his admission, and adamantly refuses to tell why he was in contact with them saying "I told you about all of this yesterday, Im not talking about this again, it hurts too much." When asked where he will go after the hospital, he states he has no contact at all with his family and states he feels safe has "somewhere set up to go."    Five minutes after our conversation, patient was reported to be requesting to nurse "I want to get out of here." After returning to patient, he reports that he is "no longer hearing the voices," is "feeling perfectly fine", "I have all of my medications set up when I leave," and "please get me out of here immediately."     REVIEW OF SYSTEMS:   • Constitutional Symptoms	No complaints  • Eyes	No complaints  • Ears / Nose / Throat / Mouth	No complaints  • Cardiovascular	No complaints  • Respiratory	No complaints  • Gastrointestinal	No complaints  • Genitourinary	No complaints  • Musculoskeletal	No complaints  • Skin	No complaints  • Neurological	No complaints  • Psychiatric (see HPI)	See HPI  • Endocrine	No complaints  • Hematologic / Lymphatic	No complaints  • Allergic / Immunologic	No complaints    REVIEW OF VITALS/LABS/IMAGING/INVESTIGATIONS:   • Vital signs reviewed: Yes  • Vital Signs:	    T(C): 36.8 (02-15-23 @ 11:03), Max: 36.9 (02-14-23 @ 15:42)  HR: 70 (02-15-23 @ 11:03) (61 - 95)  BP: 109/68 (02-15-23 @ 11:03) (102/71 - 111/71)  RR: 18 (02-15-23 @ 11:03) (18 - 20)  SpO2: 97% (02-15-23 @ 11:03) (95% - 99%)    • Available labs reviewed: Yes  • Available Lab Results:     MEDICATIONS:      PRN Medications:  • PRN Medications since last evaluation	  • PRN Details	    Current Medications:   divalproex ER 1000 milliGRAM(s) Oral at bedtime  insulin glargine Injectable (LANTUS) 15 Unit(s) SubCutaneous at bedtime  insulin lispro Injectable (ADMELOG) 4 Unit(s) SubCutaneous three times a day with meals  nicotine - 21 mG/24Hr(s) Patch 1 Patch Transdermal daily  risperiDONE   Tablet 2 milliGRAM(s) Oral two times a day  traZODone 100 milliGRAM(s) Oral at bedtime     Medication Side Effects:  • Medication Side Effects or Adverse Reactions (new or ongoing)	None known    MENTAL STATUS EXAM:   • Level of Consciousness	Alert  • General Appearance	No deformities present  • Body Habitus	Average build  • Hygiene	Fair  • Grooming	Fair  • Behavior	Cooperative, Dissmissive  • Eye Contact	Fair  • Relatedness	Poor  • Impulse Control	Impaired  • Muscle Tone / Strength	Normal muscle tone/strength  • Abnormal Movements	No abnormal movements  • Gait / Station	Normal gait / station  • Speech Volume	Normal  • Speech Rate	Normal  • Speech Spontaneity	Normal  • Speech Articulation	Normal  • Mood	Irritable  • Affect Quality	Irritable  • Affect Range	Constricted  • Affect Congruence	Congruent  • Thought Process	Linear  • Thought Associations	Normal  • Thought Content	Unable to assess  • Perceptions	Auditory Hallucination  • Oriented to Time	Yes  • Oriented to Place	Yes  • Oriented to Situation	Yes  • Oriented to Person	Yes  • Attention / Concentration	Fair  • Estimated Intelligence	Average  • Recent Memory	Unable to assess  • Remote Memory	Unable to assess  • Fund of Knowledge	Unable to assess  • Language	No abnormalities noted  • Judgment (regarding everyday events)	Poor  • Insight (regarding psychiatric illness)	Fair    SUICIDALITY:   • Suicidality (Interval)	SI without current intention or plan, AH tell him to hurt himself. Moderate Risk    HOMICIDALITY/AGGRESSION:   • Homicidality/Aggression	HI without current intention or plan, AH tell him to hurt other people. Moderate Risk    DIAGNOSIS DSM-V:    Psychiatric Diagnosis (Corresponds to DSM-IV Axis I, II): See above   HEALTH ISSUES - PROBLEM Dx: Diabetes     Medical Diagnosis (Corresponds to DSM-IV Axis III):  • Axis III	Diabetes      ASSESSMENT OF CURRENT CONDITION:   Summary (include case differential, formulation and patient response to therapy):   51 y/o male with PMHx of DM, single, homeless, and past psych hx of Schizoaffective disorder, cocaine use disorder, antisocial personality disorder, alcohol use disorder, cannabis use disorder, substance induced depression, substance induced psychosis, bipolar I who is a very high frequent utilizer of the ED, mostly comes in due to being undomiciled and recent polysubstance abuse, utox positive for cocaine on admission, who is often irritable and refuses to participate, but is often discharged when reconstituted and cleared from substances, bring himself in again after presenting for same presentation, reporting AH and SI. Today, patient is requesting an increase on his medications so that he can "stop hearing the voices, they are getting worse" and reports to the nurse five minutes later that he is perfectly fine and would like to go. He states he is not longer hearing the voices, feeling fine, and is amenable to immediate discharge today. He endorses feeling safe to leave with all of his medications "already set up."    Risk Assessment (consider static vs modifiable risk factors and protective factors; comment on level of risk for dangerous behavior):   PF: None known  RF: Undomiciled, single, unemployed, poor support, past SA, risk to self/others for harm     PLAN  Substance: Recommend patient abstain from substance use. Further planning with SW upon discharge  Plan: Discharge PROGRESS NOTE: 02-15-23 @ 11:13  	  • Reason for Ongoing Consultation: 	    ID: [id number] Male with HEALTH ISSUES - PROBLEM Dx:    INTERVAL DATA:   • Interval Chief Complaint:  "I want to leave...I feel better"  • Interval History:   53 y/o male, single, homeless, and past psych hx of Schizoaffective disorder, cocaine use disorder, antisocial personality disorder, alcohol use disorder, cannabis use disorder, substance induced depression, substance induced psychosis, bipolar I, PMHx poorly controlled DM ( on admission, given insulin and metformin), who is a very high frequent utilizer of the ED, mostly comes in due to being undomiciled and recent polysubstance abuse, utox positive for cocaine on admission, who is often irritable and refuses to participate, but is often discharged when reconstituted and cleared from substances, brought himself in again after presenting 4 days ago for same presentation, reporting AH and SI. Repeatedly requesting inpatient management.     Patient seen and evaluated, and is dismissive but cooperative to some questioning. In our conversation, patient is preoccupied with his medications, asking to be put on Metformin 1000 bid and for an "increase on the meds that stop me from hearing voices." He states that it is only one person talking to him, a man, endorsing SI/HI to the patient. Patient endorses that he is very happy with the [facility] program, called them on his admission, and adamantly refuses to tell why he was in contact with them saying "I told you about all of this yesterday, Im not talking about this again, it hurts too much." When asked where he will go after the hospital, he states he has no contact at all with his family and states he feels safe has "somewhere set up to go."    Five minutes after our conversation, patient was reported to be requesting to nurse "I want to get out of here." After returning to patient, he reports that he is "no longer hearing the voices," is "feeling perfectly fine", "I have all of my medications set up when I leave," and "please get me out of here immediately."  He has not been aggressive or agitated in ED.  He has not required any PRNs.  He appears to be at baseline and similar to prior presentation when he is discharged. When questioned directly he denies any S/H I/I/P.      REVIEW OF SYSTEMS:   • Constitutional Symptoms	No complaints  • Eyes	No complaints  • Ears / Nose / Throat / Mouth	No complaints  • Cardiovascular	No complaints  • Respiratory	No complaints  • Gastrointestinal	No complaints  • Genitourinary	No complaints  • Musculoskeletal	No complaints  • Skin	No complaints  • Neurological	No complaints  • Psychiatric (see HPI)	See HPI  • Endocrine	No complaints  • Hematologic / Lymphatic	No complaints  • Allergic / Immunologic	No complaints    REVIEW OF VITALS/LABS/IMAGING/INVESTIGATIONS:   • Vital signs reviewed: Yes  • Vital Signs:	    T(C): 36.8 (02-15-23 @ 11:03), Max: 36.9 (23 @ 15:42)  HR: 70 (02-15-23 @ 11:03) (61 - 95)  BP: 109/68 (02-15-23 @ 11:03) (102/71 - 111/71)  RR: 18 (02-15-23 @ 11:03) (18 - 20)  SpO2: 97% (02-15-23 @ 11:03) (95% - 99%)    • Available labs reviewed: Yes  • Available Lab Results:     MEDICATIONS:      PRN Medications:  • PRN Medications since last evaluation	  • PRN Details	    Current Medications:   divalproex ER 1000 milliGRAM(s) Oral at bedtime  insulin glargine Injectable (LANTUS) 15 Unit(s) SubCutaneous at bedtime  insulin lispro Injectable (ADMELOG) 4 Unit(s) SubCutaneous three times a day with meals  nicotine - 21 mG/24Hr(s) Patch 1 Patch Transdermal daily  risperiDONE   Tablet 2 milliGRAM(s) Oral two times a day  traZODone 100 milliGRAM(s) Oral at bedtime     Medication Side Effects:  • Medication Side Effects or Adverse Reactions (new or ongoing)	None known    MENTAL STATUS EXAM:   • Level of Consciousness	Alert  • General Appearance	No deformities present  • Body Habitus	Average build  • Hygiene	Fair  • Grooming	Fair  • Behavior	Cooperative, Dissmissive  • Eye Contact	Fair  • Relatedness	Poor  • Impulse Control	Impaired  • Muscle Tone / Strength	Normal muscle tone/strength  • Abnormal Movements	No abnormal movements  • Gait / Station	Normal gait / station  • Speech Volume	Normal  • Speech Rate	Normal  • Speech Spontaneity	Normal  • Speech Articulation	Normal  • Mood	Irritable  • Affect Quality	Irritable  • Affect Range	Constricted  • Affect Congruence	Congruent  • Thought Process	Linear  • Thought Associations	Normal  • Thought Content	Unable to assess  • Perceptions	Auditory Hallucination  • Oriented to Time	Yes  • Oriented to Place	Yes  • Oriented to Situation	Yes  • Oriented to Person	Yes  • Attention / Concentration	Fair  • Estimated Intelligence	Average  • Recent Memory	Unable to assess  • Remote Memory	Unable to assess  • Fund of Knowledge	Unable to assess  • Language	No abnormalities noted  • Judgment (regarding everyday events)	Poor  • Insight (regarding psychiatric illness)	Fair    SUICIDALITY:   • Suicidality (Interval)	deines     HOMICIDALITY/AGGRESSION:   • Homicidality/Aggression	denies    DIAGNOSIS DSM-V:    Psychiatric Diagnosis (Corresponds to DSM-IV Axis I, II): See above   HEALTH ISSUES - PROBLEM Dx: Diabetes     Medical Diagnosis (Corresponds to DSM-IV Axis III):  • Axis III	Diabetes      ASSESSMENT OF CURRENT CONDITION:   Summary (include case differential, formulation and patient response to therapy):   53 y/o male with PMHx of DM, single, homeless, and past psych hx of Schizoaffective disorder, cocaine use disorder, antisocial personality disorder, alcohol use disorder, cannabis use disorder, substance induced depression, substance induced psychosis, bipolar I who is a very high frequent utilizer of the ED, mostly comes in due to being undomiciled and recent polysubstance abuse, utox positive for cocaine on admission, who is often irritable and refuses to participate, but is often discharged when reconstituted and cleared from substances, bring himself in again after presenting for same presentation, reporting AH and SI. Today, patient is requesting an increase on his medications so that he can "stop hearing the voices, they are getting worse" and reports to the nurse five minutes later that he is perfectly fine and would like to go. He does not appear psychotic.  He states he is not longer hearing the voices, feeling fine, and is amenable to immediate discharge today. He endorses feeling safe to leave with all of his medications "already set up." He denied any S/H I/I/P and appeared to be at baseline.     Risk Assessment (consider static vs modifiable risk factors and protective factors; comment on level of risk for dangerous behavior):   PF: None known  RF: Undomiciled, single, unemployed, poor support, past SA, risk to self/others for harm     PLAN  Substance: Recommend patient abstain from substance use. Further planning with SW upon discharge  Plan: Discharge  Reports he has medications and will follow up with provider  Advised pt to go to nearest ER or call 911, for any of the followin) Agitation, Aggression or Anxiety,    2) Suicidal or homicidal thoughts 3) Worsening of symptoms or 4) Side effects of medications

## 2023-02-15 NOTE — ED CDU PROVIDER SUBSEQUENT DAY NOTE - PSYCHIATRIC RISK
VERBALIZING SUICIDAL IDEATIONS/VERBALIZING HOMICIDAL IDEATIONS
VERBALIZING SUICIDAL IDEATIONS/VERBALIZING HOMICIDAL IDEATIONS

## 2023-02-15 NOTE — ED CDU PROVIDER DISPOSITION NOTE - PATIENT PORTAL LINK FT
You can access the FollowMyHealth Patient Portal offered by [facility] by registering at the following website: [email or web address]. By joining [employer]’s FollowMyHealth portal, you will also be able to view your health information using other applications (apps) compatible with our system.

## 2023-02-15 NOTE — ED ADULT NURSE REASSESSMENT NOTE - NS ED NURSE REASSESS COMMENT FT1
Assumed care of patient.  Pt alert and cooperative.  Pt lying in darken room and tolerting night meds.  Pt states t hat he had been hearing voices.  Will momitor and chart changes
Pt received to  from Mariah MOTA, pt report was given, pt arrived and assessment performed, pt states he has no auditory hallucinations currently, but they wax and wane, pt state that they tell him to kill himself, they do not give him a definitive plan, but they tell him to harm himself and when he is not listening, they tell him to kill people, pt is calm and cooperative, but did express some agitation during the interview with some of the questions, pt states that he has been off his medication for over a week because he does not have the ability to afford them, pt denies any self injurious harm currently, pt is A&O4, pt was acclimated to the new unit, pt cleared by security, pt ambulating without difficulty, pt educated about expectations in the unit and pt states understanding of education, education deemed successful by teach back demonstration which shows proficiency. Pt escorted to  room 2 from intake without incident.
accucheck done call placed to dr yeboah aware
pt HI, SI, hearing voices. contnue 1:1 pending psych consult
pt expressing that he wants to go home. pt now denies si and hi.
received report.  received pt sleeping resp even unlabored.  no harm to self or others will monitor
received report.  received pt watching tv. pt is pleasant  and cooperative at this time.  no c/o offered will monitor
requesting and receiving sandwich and po fluid
watch tv
Pt resting self isolating to his room in NAD at this time. pt compliant with am fs 135 with admelog 4 units administered per EMAR. pt educated on plan of care to transfer for inpatient tx when a bed is available. no attempts to harm self or others. pt safety maintained.
Patient cooperative with POC testing and medication administration.  Patient ate 100% of his breakfast and returned to bed.  Patient verbally contracts not to harm himself in the hospital.  Patient reports he continues to hear command auditory hallucinations "same" but vague regarding content.  No attempts to harm self or others and safety of patient maintained.
Assumed care of patient at 0720.  Patient resting in bed appears to be sleeping with no distress and regular nonlabored breathing.  Safety of patient maintained.
Patient ate 75% of his lunch.  Patient reports he continues to experience auditory hallucinations of male voice directing him to kill himself.  Verbally contracts not to harm self or others in the hospital.  No attempts to harm self or others and safety maintained.  Patient resting in bed watching television.
Patient continues to report feeling depressed and wanting to be hospitalized for continued auditory hallucinations.  Patient using hospital phone to contact his supports.  Patient ate 75% of his dinner.  Pleasant and calm on interaction.  No attempts to harm self or others and safety maintained.

## 2023-02-15 NOTE — ED CDU PROVIDER DISPOSITION NOTE - NSFOLLOWUPINSTRUCTIONS_ED_ALL_ED_FT
You are advised to please follow up with your primary care doctor within the next 24 hours and return to the Emergency Department for worsening symptoms or any other concerns.  Your doctor may call [phone] to follow up on the specific results of the tests performed today in the emergency department.    [facility] [address]  [phone]    Depression    Depression is a mental illness that usually causes feelings of sadness, hopelessness, or helplessness. Some people with this disorder do not feel particularly sad but lose interest in doing things they used to enjoy. Major depressive disorder also can cause physical symptoms. It can interfere with work, school, relationships, and other normal everyday activities. If you were started on a medication, make sure to take exactly as prescribed and follow up with a psychiatrist.    SEEK IMMEDIATE MEDICAL CARE IF YOU HAVE ANY OF THE FOLLOWING SYMPTOMS: thoughts about hurting or killing yourself, thoughts about hurting or killing somebody else, hallucinations, or worsening depression.

## 2023-02-17 NOTE — ED BEHAVIORAL HEALTH ASSESSMENT NOTE - PATIENT SEEN BY
NP with Telephonic supervision from Attending Psychiatrist Medical Necessity Statement: Based on my medical judgement, Mohs surgery is the most appropriate treatment for this cancer compared to other treatments. Attending Psychiatrist supervising NP/Trainee and meeting pt

## 2023-02-18 ENCOUNTER — EMERGENCY (EMERGENCY)
Facility: HOSPITAL | Age: 53
LOS: 1 days | Discharge: HOME | End: 2023-02-18
Attending: EMERGENCY MEDICINE
Payer: SELF-PAY

## 2023-02-18 VITALS
SYSTOLIC BLOOD PRESSURE: 135 MMHG | HEIGHT: 67 IN | WEIGHT: 149.91 LBS | OXYGEN SATURATION: 95 % | TEMPERATURE: 97 F | RESPIRATION RATE: 18 BRPM | HEART RATE: 85 BPM | DIASTOLIC BLOOD PRESSURE: 88 MMHG

## 2023-02-18 DIAGNOSIS — F25.0 SCHIZOAFFECTIVE DISORDER, BIPOLAR TYPE: ICD-10-CM

## 2023-02-18 LAB
ALBUMIN SERPL ELPH-MCNC: 3.8 G/DL — SIGNIFICANT CHANGE UP (ref 3.3–5.2)
ALP SERPL-CCNC: 55 U/L — SIGNIFICANT CHANGE UP (ref 40–120)
ALT FLD-CCNC: 20 U/L — SIGNIFICANT CHANGE UP
ANION GAP SERPL CALC-SCNC: 10 MMOL/L — SIGNIFICANT CHANGE UP (ref 5–17)
APAP SERPL-MCNC: <3 UG/ML — LOW (ref 10–26)
APPEARANCE UR: CLEAR — SIGNIFICANT CHANGE UP
AST SERPL-CCNC: 29 U/L — SIGNIFICANT CHANGE UP
BACTERIA # UR AUTO: ABNORMAL
BASOPHILS # BLD AUTO: 0.03 K/UL — SIGNIFICANT CHANGE UP (ref 0–0.2)
BASOPHILS NFR BLD AUTO: 0.5 % — SIGNIFICANT CHANGE UP (ref 0–2)
BILIRUB SERPL-MCNC: 0.3 MG/DL — LOW (ref 0.4–2)
BILIRUB UR-MCNC: NEGATIVE — SIGNIFICANT CHANGE UP
BUN SERPL-MCNC: 11.2 MG/DL — SIGNIFICANT CHANGE UP (ref 8–20)
CALCIUM SERPL-MCNC: 8.6 MG/DL — SIGNIFICANT CHANGE UP (ref 8.4–10.5)
CHLORIDE SERPL-SCNC: 99 MMOL/L — SIGNIFICANT CHANGE UP (ref 96–108)
CO2 SERPL-SCNC: 25 MMOL/L — SIGNIFICANT CHANGE UP (ref 22–29)
COLOR SPEC: YELLOW — SIGNIFICANT CHANGE UP
CREAT SERPL-MCNC: 0.62 MG/DL — SIGNIFICANT CHANGE UP (ref 0.5–1.3)
DIFF PNL FLD: NEGATIVE — SIGNIFICANT CHANGE UP
EGFR: 115 ML/MIN/1.73M2 — SIGNIFICANT CHANGE UP
EOSINOPHIL # BLD AUTO: 0.12 K/UL — SIGNIFICANT CHANGE UP (ref 0–0.5)
EOSINOPHIL NFR BLD AUTO: 1.9 % — SIGNIFICANT CHANGE UP (ref 0–6)
EPI CELLS # UR: SIGNIFICANT CHANGE UP
ETHANOL SERPL-MCNC: <10 MG/DL — SIGNIFICANT CHANGE UP (ref 0–9)
FLUAV AG NPH QL: SIGNIFICANT CHANGE UP
FLUBV AG NPH QL: SIGNIFICANT CHANGE UP
GLUCOSE SERPL-MCNC: 217 MG/DL — HIGH (ref 70–99)
GLUCOSE UR QL: 1000 MG/DL
HCT VFR BLD CALC: 43.6 % — SIGNIFICANT CHANGE UP (ref 39–50)
HGB BLD-MCNC: 14.7 G/DL — SIGNIFICANT CHANGE UP (ref 13–17)
IMM GRANULOCYTES NFR BLD AUTO: 0.2 % — SIGNIFICANT CHANGE UP (ref 0–0.9)
KETONES UR-MCNC: ABNORMAL
LEUKOCYTE ESTERASE UR-ACNC: ABNORMAL
LYMPHOCYTES # BLD AUTO: 2.79 K/UL — SIGNIFICANT CHANGE UP (ref 1–3.3)
LYMPHOCYTES # BLD AUTO: 43.3 % — SIGNIFICANT CHANGE UP (ref 13–44)
MCHC RBC-ENTMCNC: 29.8 PG — SIGNIFICANT CHANGE UP (ref 27–34)
MCHC RBC-ENTMCNC: 33.7 GM/DL — SIGNIFICANT CHANGE UP (ref 32–36)
MCV RBC AUTO: 88.3 FL — SIGNIFICANT CHANGE UP (ref 80–100)
MONOCYTES # BLD AUTO: 0.92 K/UL — HIGH (ref 0–0.9)
MONOCYTES NFR BLD AUTO: 14.3 % — HIGH (ref 2–14)
NEUTROPHILS # BLD AUTO: 2.57 K/UL — SIGNIFICANT CHANGE UP (ref 1.8–7.4)
NEUTROPHILS NFR BLD AUTO: 39.8 % — LOW (ref 43–77)
NITRITE UR-MCNC: NEGATIVE — SIGNIFICANT CHANGE UP
PCP SPEC-MCNC: SIGNIFICANT CHANGE UP
PH UR: 6 — SIGNIFICANT CHANGE UP (ref 5–8)
PLATELET # BLD AUTO: 255 K/UL — SIGNIFICANT CHANGE UP (ref 150–400)
POTASSIUM SERPL-MCNC: 4 MMOL/L — SIGNIFICANT CHANGE UP (ref 3.5–5.3)
POTASSIUM SERPL-SCNC: 4 MMOL/L — SIGNIFICANT CHANGE UP (ref 3.5–5.3)
PROT SERPL-MCNC: 6.4 G/DL — LOW (ref 6.6–8.7)
PROT UR-MCNC: NEGATIVE — SIGNIFICANT CHANGE UP
RBC # BLD: 4.94 M/UL — SIGNIFICANT CHANGE UP (ref 4.2–5.8)
RBC # FLD: 12.6 % — SIGNIFICANT CHANGE UP (ref 10.3–14.5)
RBC CASTS # UR COMP ASSIST: SIGNIFICANT CHANGE UP /HPF (ref 0–4)
RSV RNA NPH QL NAA+NON-PROBE: SIGNIFICANT CHANGE UP
SALICYLATES SERPL-MCNC: <0.6 MG/DL — LOW (ref 10–20)
SARS-COV-2 RNA SPEC QL NAA+PROBE: SIGNIFICANT CHANGE UP
SODIUM SERPL-SCNC: 134 MMOL/L — LOW (ref 135–145)
SP GR SPEC: 1.02 — SIGNIFICANT CHANGE UP (ref 1.01–1.02)
UROBILINOGEN FLD QL: NEGATIVE MG/DL — SIGNIFICANT CHANGE UP
WBC # BLD: 6.44 K/UL — SIGNIFICANT CHANGE UP (ref 3.8–10.5)
WBC # FLD AUTO: 6.44 K/UL — SIGNIFICANT CHANGE UP (ref 3.8–10.5)
WBC UR QL: SIGNIFICANT CHANGE UP /HPF (ref 0–5)

## 2023-02-18 PROCEDURE — 90792 PSYCH DIAG EVAL W/MED SRVCS: CPT

## 2023-02-18 PROCEDURE — 99223 1ST HOSP IP/OBS HIGH 75: CPT

## 2023-02-18 PROCEDURE — 93010 ELECTROCARDIOGRAM REPORT: CPT

## 2023-02-18 RX ORDER — DIVALPROEX SODIUM 500 MG/1
1000 TABLET, DELAYED RELEASE ORAL
Qty: 0 | Refills: 0 | DISCHARGE

## 2023-02-18 RX ORDER — TRAZODONE HCL 50 MG
150 TABLET ORAL AT BEDTIME
Refills: 0 | Status: DISCONTINUED | OUTPATIENT
Start: 2023-02-18 | End: 2023-02-25

## 2023-02-18 RX ORDER — METFORMIN HYDROCHLORIDE 850 MG/1
1000 TABLET ORAL DAILY
Refills: 0 | Status: DISCONTINUED | OUTPATIENT
Start: 2023-02-18 | End: 2023-02-18

## 2023-02-18 RX ORDER — METFORMIN HYDROCHLORIDE 850 MG/1
500 TABLET ORAL
Refills: 0 | Status: DISCONTINUED | OUTPATIENT
Start: 2023-02-18 | End: 2023-02-25

## 2023-02-18 RX ORDER — DIVALPROEX SODIUM 500 MG/1
500 TABLET, DELAYED RELEASE ORAL
Refills: 0 | Status: DISCONTINUED | OUTPATIENT
Start: 2023-02-18 | End: 2023-02-25

## 2023-02-18 RX ADMIN — METFORMIN HYDROCHLORIDE 500 MILLIGRAM(S): 850 TABLET ORAL at 18:38

## 2023-02-18 RX ADMIN — Medication 150 MILLIGRAM(S): at 21:25

## 2023-02-18 RX ADMIN — DIVALPROEX SODIUM 500 MILLIGRAM(S): 500 TABLET, DELAYED RELEASE ORAL at 11:43

## 2023-02-18 RX ADMIN — DIVALPROEX SODIUM 500 MILLIGRAM(S): 500 TABLET, DELAYED RELEASE ORAL at 18:42

## 2023-02-18 NOTE — ED PROVIDER NOTE - PHYSICAL EXAMINATION
General: Well appearing male in no acute distress  HEENT: Normocephalic, atraumatic. Moist mucous membranes. Oropharynx clear. No lymphadenopathy.  Eyes: No scleral icterus. EOMI. MAI.  Neck:. Soft and supple. Full ROM without pain. No midline tenderness  Cardiac: Regular rate and regular rhythm. No murmurs, rubs, gallops. Peripheral pulses 2+ and symmetric. No LE edema.  Resp: Lungs CTAB. Speaking in full sentences. No wheezes, rales or rhonchi.  Abd: Soft, non-tender, non-distended. No guarding or rebound. No scars, masses, or lesions.  Back: Spine midline and non-tender. No CVA tenderness.    Skin: No rashes, abrasions, or lacerations.  Neuro: AO x 3. Moves all extremities symmetrically. Motor strength and sensation grossly intact.

## 2023-02-18 NOTE — ED ADULT NURSE REASSESSMENT NOTE - NS ED NURSE REASSESS COMMENT FT1
Assumed care of patient.  Pt alert and cooperative. Resp even and unlabored.  Pt lying in darken room. patient requesting po fluids and provided.  V/s taken and charted. Assumed care of patient.  Pt alert and cooperative. Resp even and unlabored.  Pt lying in darken room. patient requesting po fluids and provided.  V/s taken and charted. Will monitor and chart changes and maintain a safe environment

## 2023-02-18 NOTE — BEHAVIORAL HEALTH ASSESSMENT NOTE - ACTIVATING EVENTS/STRESSORS
Triggering events leading to humiliation, shame, and/or despair (e.g. Loss of relationship, financial or health status) (real or anticipated)/Non-compliant or not receiving treatment/Perceived burden on family or others/Substance intoxication or withdrawal

## 2023-02-18 NOTE — ED PROVIDER NOTE - ATTENDING CONTRIBUTION TO CARE
52yoM; with PMH signif for schizoaffective disorder, polysubstance abuse, bipolar disease, DM; now p/w SI/HI/AH/VH.  denies plan.  denies drug or alcohol use tonight.  denies f/c/s. denies n/v. denies abd pain.  General:     NAD  Head:     NC/AT, EOMI, oral mucosa moist  Neck:     trachea midline  Lungs:     CTA b/l, no w/r/r  CVS:     S1S2, RRR, no m/g/r  Abd:     +BS, s/nt/nd, no organomegaly  Ext:    2+ radial and pedal pulses, no c/c/e  Neuro: AAOx3, no sensory/motor deficits  A/P:  52yoM p/w si/hi/ah/vh.   -labs, ekg, psych eval.

## 2023-02-18 NOTE — ED PROVIDER NOTE - CLINICAL SUMMARY MEDICAL DECISION MAKING FREE TEXT BOX
53 y/o M hx of schioaffective disorder, polysubstance abuse, bipolar DM presents for auditory hallucinations.  +SI/+HI. will get labs, drug screen, ekg. BH eval given SI/HI w/ significant psych hx. constant observation.

## 2023-02-18 NOTE — ED ADULT NURSE REASSESSMENT NOTE - NS ED NURSE REASSESS COMMENT FT1
Patient has been resting in bed most of day, Ate 100% of meals, good PO fluid intake. Continues to endorse AH command in nature to harm self/others. Requested medication for AH and diabetes. Nurse practitioner assessed pt and determined pt should be held in ED for reassessment after he admitted to using Ecstacy to determine if pt's symptoms are more related to substance abuse. No distress, and accepted meds as ordered by NP. Cimetidine Counseling:  I discussed with the patient the risks of Cimetidine including but not limited to gynecomastia, headache, diarrhea, nausea, drowsiness, arrhythmias, pancreatitis, skin rashes, psychosis, bone marrow suppression and kidney toxicity.

## 2023-02-18 NOTE — ED ADULT TRIAGE NOTE - CHIEF COMPLAINT QUOTE
pt BIBEMS due to auditory hallucinations. hx of schizophrenia, has not taken meds as he is homeless and unable to get them. pt changed to yellow gown, calm and cooperative in triage.

## 2023-02-18 NOTE — BEHAVIORAL HEALTH ASSESSMENT NOTE - SUMMARY
53 y/o male with PMHx of DM, single, homeless, and past psych hx of Schizoaffective disorder, cocaine use disorder, antisocial personality disorder, alcohol use disorder, cannabis use disorder, substance induced depression, substance induced psychosis, bipolar I who presents in the context of being undomiciled and recent polysubstance abuse; currently reports taking ecstasy 2 days ago which is when the AH began.  Pt is minimally cooperative this morning, guarded, irritable demanding his medications and admission due to his current CAH telling him to kill himself and others. Pt will be held for observation as pt is known to present with CAH in the context of recent substance use for secondary gain. Historically pt is typically held, observed and monitored and discharged once reconstituted.

## 2023-02-18 NOTE — ED PROVIDER NOTE - OBJECTIVE STATEMENT
53 y/o M hx of schioaffective disorder, polysubstance abuse, bipolar DM presents for auditory hallucinations. endorses hearing voices and endorsees suicidal ideation. denies suicidal plan. endorses homicidal ideation but denies plan. states he used ecstasy 1 day ago but denies any drug use since. endorses taking trazodone but not sure what his other medications are. denies any trauma/falls. denies chest pain/sob. denies abdominal pain. denies nausea/vomiting.

## 2023-02-18 NOTE — CHART NOTE - NSCHARTNOTEFT_GEN_A_CORE
SOCIAL WORK NOTE:  NOELLE REVIEWED CHART. SEEN BY  FOR EVAL. PATIENT BEING HELD ON OBSERVATION FOR RE-EVAL IN MORNING AS PATIENT IS KNOWN TO HAVE SUBSTANCE INDUCED COMMAND AUDITORY HALLUCINATIONS. SW WILL FOLLOW.

## 2023-02-18 NOTE — BEHAVIORAL HEALTH ASSESSMENT NOTE - NSBHCHARTREVIEWVS_PSY_A_CORE FT
ICU Vital Signs Last 24 Hrs  T(C): 36.6 (18 Feb 2023 11:14), Max: 36.7 (18 Feb 2023 03:53)  T(F): 97.8 (18 Feb 2023 11:14), Max: 98.1 (18 Feb 2023 03:53)  HR: 78 (18 Feb 2023 11:14) (78 - 85)  BP: 125/78 (18 Feb 2023 11:14) (99/63 - 135/88)  BP(mean): 75 (18 Feb 2023 03:53) (75 - 75)  ABP: --  ABP(mean): --  RR: 19 (18 Feb 2023 11:14) (18 - 19)  SpO2: 95% (18 Feb 2023 11:14) (95% - 96%)    O2 Parameters below as of 18 Feb 2023 11:14  Patient On (Oxygen Delivery Method): room air

## 2023-02-18 NOTE — BEHAVIORAL HEALTH ASSESSMENT NOTE - HPI (INCLUDE ILLNESS QUALITY, SEVERITY, DURATION, TIMING, CONTEXT, MODIFYING FACTORS, ASSOCIATED SIGNS AND SYMPTOMS)
53 y/o male with PMHx of DM, single, homeless, and past psych hx of Schizoaffective disorder, cocaine use disorder, antisocial personality disorder, alcohol use disorder, cannabis use disorder, substance induced depression, substance induced psychosis, bipolar I who is a very high utilizer of the ED. Pt presents in the context of being undomiciled and recent polysubstance abuse; currently reports taking ecstasy 2 days ago which is when the AH began. Pt typically presents with irritable mood and refuses to participate in interview, but is often discharged when reconstituted and cleared from substances. Pt brought himself in again after being discharged 3 days ago (Feb 15, 2023) with the same presentation, reporting AH and SI in the context of recent substance use.    Patient seen and evaluated this morning, and remains superficially cooperative, guarded, dismissive and irritable. Pt states that he began hearing the voices 2 days ago around the same time he used ecstasy. Pt states the voices tell him to "kill himself and other people" but refuses to elaborate further as to the nature of the voices; ie male vs female, multiple vs single, etc. Pt is requesting his medication because he states he has been off them since leaving the hospital however pt does endorse receiving his monthly Invega injection last month. Pt endorses being followed by the [facility] team. Pt requests to stay and would like to be admitted; reports last admission being last month at [facility] x 1 week.

## 2023-02-18 NOTE — ED ADULT NURSE REASSESSMENT NOTE - NS ED NURSE REASSESS COMMENT FT1
Patient received PM medication tolerated well, no complain voiced, patient in bed sleeping, safety maintained.

## 2023-02-18 NOTE — ED ADULT NURSE NOTE - HPI (INCLUDE ILLNESS QUALITY, SEVERITY, DURATION, TIMING, CONTEXT, MODIFYING FACTORS, ASSOCIATED SIGNS AND SYMPTOMS)
Pt rec'd to  unit walked passed metal detector without alarming. Pt states that he is depressed and having CAH to kill self and other and that he is depressed.  Pt states that this started just after leaving hospital two days ago.  Pt with PMH of DM and PPH of SAD.  Pt states has had multiple in-pt admission but does not remember when last. and states that he was at [facility] 2 months ago.  Pt states that he is presently homeless and has not eaten in 2 days . patient states that he does smoke cigarettes no ETOH and uses cocaine with last use this morning.   Pt states that he has had covid vaccines but does not remember when or which one.  Pt states has no access to FA.  Pt deneis any VH.  Pt orientated to unit and made aware of need for urine.

## 2023-02-18 NOTE — ED CDU PROVIDER INITIAL DAY NOTE - OBJECTIVE STATEMENT
51 y/o M hx of schioaffective disorder, polysubstance abuse, bipolar DM presents for auditory hallucinations. endorses hearing voices and endorsees suicidal ideation. denies suicidal plan. endorses homicidal ideation but denies plan. states he used ecstasy 1 day ago but denies any drug use since. endorses taking trazodone but not sure what his other medications are. denies any trauma/falls. denies chest pain/sob. denies abdominal pain. denies nausea/vomiting.

## 2023-02-19 VITALS
RESPIRATION RATE: 18 BRPM | TEMPERATURE: 98 F | OXYGEN SATURATION: 98 % | HEART RATE: 84 BPM | SYSTOLIC BLOOD PRESSURE: 110 MMHG | DIASTOLIC BLOOD PRESSURE: 72 MMHG

## 2023-02-19 PROCEDURE — 99285 EMERGENCY DEPT VISIT HI MDM: CPT

## 2023-02-19 PROCEDURE — 85025 COMPLETE CBC W/AUTO DIFF WBC: CPT

## 2023-02-19 PROCEDURE — 80307 DRUG TEST PRSMV CHEM ANLYZR: CPT

## 2023-02-19 PROCEDURE — 87637 SARSCOV2&INF A&B&RSV AMP PRB: CPT

## 2023-02-19 PROCEDURE — 99238 HOSP IP/OBS DSCHRG MGMT 30/<: CPT

## 2023-02-19 PROCEDURE — G0378: CPT

## 2023-02-19 PROCEDURE — 81001 URINALYSIS AUTO W/SCOPE: CPT

## 2023-02-19 PROCEDURE — 80053 COMPREHEN METABOLIC PANEL: CPT

## 2023-02-19 PROCEDURE — 93005 ELECTROCARDIOGRAM TRACING: CPT

## 2023-02-19 PROCEDURE — 83735 ASSAY OF MAGNESIUM: CPT

## 2023-02-19 PROCEDURE — 36415 COLL VENOUS BLD VENIPUNCTURE: CPT

## 2023-02-19 RX ADMIN — DIVALPROEX SODIUM 500 MILLIGRAM(S): 500 TABLET, DELAYED RELEASE ORAL at 05:31

## 2023-02-19 RX ADMIN — METFORMIN HYDROCHLORIDE 500 MILLIGRAM(S): 850 TABLET ORAL at 05:31

## 2023-02-19 NOTE — ED CDU PROVIDER DISPOSITION NOTE - PATIENT PORTAL LINK FT
You can access the FollowMyHealth Patient Portal offered by [facility] by registering at the following website: [email or web address]. By joining [employer]’s FollowMyHealth portal, you will also be able to view your health information using other applications (apps) compatible with our system.

## 2023-02-19 NOTE — ED ADULT NURSE REASSESSMENT NOTE - COMFORT CARE
ambulated to bathroom/po fluids offered
ambulated to bathroom/meal provided/plan of care explained/po fluids offered
meal provided/plan of care explained
plan of care explained

## 2023-02-19 NOTE — ED BEHAVIORAL HEALTH PROGRESS NOTE - NSICDXBHPRIMARYDX_PSY_ALL_CORE
Onset of other substance-induced psychotic disorder during intoxication   F19.585  
Onset of other substance-induced psychotic disorder during intoxication   F19.216

## 2023-02-19 NOTE — ED CDU PROVIDER DISPOSITION NOTE - NSFOLLOWUPINSTRUCTIONS_ED_ALL_ED_FT
follow up with outpatient providers as instructed  abstain from any substance use  return to ED if any new concerns.

## 2023-02-19 NOTE — ED BEHAVIORAL HEALTH PROGRESS NOTE - NSBHMSEPERCEPT_PSY_A_CORE
That's not expected side effect, so would have her check with Dr Mao to discuss bladder issues    An alternative is bisoprolol 5 mg daily  
Reports hearing voices, but does not appear to be responding to internal stimuli/Auditory hallucinations
Reports hearing voices, but does not appear to be responding to internal stimuli/Auditory hallucinations

## 2023-02-19 NOTE — CHART NOTE - NSCHARTNOTEFT_GEN_A_CORE
SW NOTE: Plan will be for re-eval in AM for d/c planning. SW to continue following for d/c planning.

## 2023-02-19 NOTE — ED ADULT NURSE REASSESSMENT NOTE - NS ED NURSE REASSESS COMMENT FT1
Assumed care of patient at 0720.  Patient resting in bed with no distress.  Patient pleasant and calm on interaction.  Patient oriented to staff and educated about plan of care.  No attempts to harm self or others and safety of patient maintained.

## 2023-02-19 NOTE — ED BEHAVIORAL HEALTH PROGRESS NOTE - DETAILS:
Patient reported that he is "not feeling good". He stated that when he left to ED he did not make it to DSS.  He used ecstasy laced with cocaine and started hearint voices again. He stated "I am hearing voices and having thoughts of hurting myself and need to be admitted".  He did not see any connection between cocaine use and his symptoms.  He denied any specific suicidal plan or intent.  He denied any CAH.  He has been observed eating and sleeping and similar to prior presentations. 
Patient seen this morning by attending physician via telehealth:   Patient reported that he is "not feeling good". He stated that when he left to ED he did not make it to DSS.  He used ecstasy laced with cocaine and started hearing voices again. He stated "I am hearing voices and having thoughts of hurting myself and need to be admitted".  He did not see any connection between cocaine use and his symptoms.  He denied any specific suicidal plan or intent.  He denied any CAH.  He has been observed eating and sleeping and similar to prior presentations.

## 2023-02-19 NOTE — ED BEHAVIORAL HEALTH PROGRESS NOTE - CASE SUMMARY/FORMULATION (CLEARLY DOCUMENT RATIONALE FOR DISPOSITION CHANGE)
Patient is denying any psychotic symptoms at this time, patient does not appear internally preoccupied he has been resting in bed comfortably and eating causing no disturbance on the unit. Patient is eager to go home reports he is going to his mothers house and denies SI/HI/AVH. Case discussed with Dr. Branch and Dr. Branch in agreement to discharged patient with no presentation of psychotic symptoms.   
as above

## 2023-02-19 NOTE — ED BEHAVIORAL HEALTH PROGRESS NOTE - RISK ASSESSMENT
acute low, H/o chronic non compliance with medications, multiple ED visits, substance use, reporting vague suicidal ideation and hearing voices, poor support, consistent with multiple prior presentations. 
acute low, H/o chronic non compliance with medicaitons/treatment, multiple ED visits, substance use, reporting vague suicidal ideation and hearing voices, poor support, consistent with multiple prior presentations.

## 2023-02-19 NOTE — ED ADULT NURSE REASSESSMENT NOTE - GENERAL PATIENT STATE
comfortable appearance/cooperative
comfortable appearance/resting/sleeping
comfortable appearance/cooperative
comfortable appearance/no change observed/resting/sleeping

## 2023-02-19 NOTE — ED BEHAVIORAL HEALTH PROGRESS NOTE - STANDING MEDS RECEIVED IN ED DETAILS FREE TEXT
Depakote 500 mg twice daily, Metformin 500 mg twice daily, Trazodone 150 mg at night. 
Depakote 500 mg twice daily, Metformin 500 mg twice daily, Trazodone 150 mg at night.

## 2023-02-19 NOTE — ED ADULT NURSE REASSESSMENT NOTE - NS ED NURSE REASSESS COMMENT FT1
Patient ate 100% of his breakfast.  Patient reports he feels he would benefit from inpatient hospitalization due to his depression and suicidal ideations.  Ate 100% of his breakfast.  No attempts to harm self or others and safety maintained.

## 2023-02-19 NOTE — ED ADULT NURSE REASSESSMENT NOTE - NS ED NURSE REASSESS COMMENT FT1
Received AM medication tolerates, patient observed sleeping all night with no disturbances. PO fluids tolerated, ambulate to the bathroom as needed, no attempt to harm self or others, all needs attend, safety maintained

## 2023-02-19 NOTE — ED BEHAVIORAL HEALTH PROGRESS NOTE - SUMMARY
53 y/o male with PMHx of DM, single, homeless, and past psych hx of Schizoaffective disorder, cocaine use disorder, antisocial personality disorder, alcohol use disorder, cannabis use disorder, substance induced depression, substance induced psychosis, bipolar I who presents in the context of being undomiciled and recent polysubstance abuse; currently reports taking ecstasy 2 days ago which is when the AH began.  Pt is superficially cooperative reporting hearing voices with vague suicidal ideation. Pt will be held for observation as pt is known to present with CAH in the context of recent substance use for secondary gain/substance induced symptoms . Historically pt is typically held, observed and monitored and discharged once reconstituted.  
51 y/o male with PMHx of DM, single, homeless, and past psych hx of Schizoaffective disorder, cocaine use disorder, antisocial personality disorder, alcohol use disorder, cannabis use disorder, substance induced depression, substance induced psychosis, bipolar I who presents in the context of being undomiciled and recent polysubstance abuse; currently reports taking ecstasy 2 days ago which is when the AH began.  Pt is superficially cooperative reporting hearing voices with vague suicidal ideation. Pt will be held for observation as pt is known to present with CAH in the context of recent substance use for secondary gain/substance induced symptoms . Historically pt is typically held, observed and monitored and discharged once reconstituted.

## 2023-02-19 NOTE — ED BEHAVIORAL HEALTH PROGRESS NOTE - BILLING CODES
82016-Aocfgpipqo hospital care - low complexity 20-29 minutes
48055-Svbiwctjbo hospital care - low complexity 20-29 minutes

## 2023-02-19 NOTE — ED ADULT NURSE REASSESSMENT NOTE - NS ED NURSE REASSESS COMMENT FT1
Patient denies suicidal or homicidal ideations.  Patient reports he feels better and requested discharge.  Patient reviewed discharge instructions and provided copy of plans.  Patient agrees to return to local ED or call 911 if symptoms worsen or thoughts to harm self or others develop.

## 2023-02-22 ENCOUNTER — EMERGENCY (EMERGENCY)
Facility: HOSPITAL | Age: 53
LOS: 1 days | Discharge: HOME | End: 2023-02-22
Attending: EMERGENCY MEDICINE
Payer: SELF-PAY

## 2023-02-22 VITALS
OXYGEN SATURATION: 98 % | SYSTOLIC BLOOD PRESSURE: 151 MMHG | TEMPERATURE: 98 F | RESPIRATION RATE: 20 BRPM | DIASTOLIC BLOOD PRESSURE: 96 MMHG | HEIGHT: 67 IN | HEART RATE: 81 BPM

## 2023-02-22 PROCEDURE — 99282 EMERGENCY DEPT VISIT SF MDM: CPT

## 2023-02-22 PROCEDURE — 99284 EMERGENCY DEPT VISIT MOD MDM: CPT

## 2023-02-22 NOTE — ED PROVIDER NOTE - CLINICAL SUMMARY MEDICAL DECISION MAKING FREE TEXT BOX
Patient with auditory hallucinations, currently not a risk to self or others.  is requesting a [employer] [facility] home.  Ready for discharge.  Advised patient to stop using drugs as it aggravates his underlying bipolar.  Patient endorses understanding.

## 2023-02-22 NOTE — ED ADULT TRIAGE NOTE - CHIEF COMPLAINT QUOTE
Ambulatory BIBEMS after calling it for himself for hearing more voices than usual after taking Ecstasy. Denies SI/HI, alcohol use today.

## 2023-02-22 NOTE — ED PROVIDER NOTE - PATIENT PORTAL LINK FT
You can access the FollowMyHealth Patient Portal offered by [facility] by registering at the following website: [email or web address]. By joining [employer]’s FollowMyHealth portal, you will also be able to view your health information using other applications (apps) compatible with our system.

## 2023-02-22 NOTE — ED PROVIDER NOTE - OBJECTIVE STATEMENT
52-year-old male history of bipolar and drug abuse presenting with auditory hallucinations.  Patient well-known to the department, often comes in with hallucinations after using drugs.  Patient reports that he used ecstasy yesterday.   Denies SI, HI, alcohol use, trauma.

## 2023-02-22 NOTE — ED PROVIDER NOTE - NSFOLLOWUPINSTRUCTIONS_ED_ALL_ED_FT
- Follow up with your doctor within 2-3 days.   - Stop using drugs.   - Return to the ED for any new or worsening symptoms.

## 2023-02-22 NOTE — ED ADULT TRIAGE NOTE - NS ED TRIAGE AVPU SCALE
Addended by: VERENICE ORBERTS on: 11/19/2018 11:29 AM     Modules accepted: Mitra    
Addended by: VERENICE ROBERTS on: 11/16/2018 03:42 PM     Modules accepted: Orders    
Alert-The patient is alert, awake and responds to voice. The patient is oriented to time, place, and person. The triage nurse is able to obtain subjective information.

## 2023-03-01 NOTE — ED PROVIDER NOTE - DISPOSITION TYPE
INTERVENTIONAL RADIOLOGY BRIEF-OPERATIVE NOTE    Procedure:  Chest-port placement    Pre-Op Diagnosis:  MDS    Post-Op Diagnosis:  Same    Attending:  Padmini (IR); Deana (Anaesthesia)  Resident:  None    Antibiotic Prophylaxis:  ancef, 2g, ivpb    Anesthesia (type):  [ ] General Anesthesia  [X] Sedation  [ ] Spinal Anesthesia  [X] Local/Regional    Total Face-to-Face Sedation Time:  49 minutes    Contrast:  None    Blood Loss:  5 cc    Other:  Heparin, 1500 units to port    Condition:   [ ] Critical  [ ] Serious  [ ] Fair   [X] Good    Findings/Follow up Plan of Care:  8-Yakut single lumen Bard PowerPort chest-port system placed via right internal jugular vein approach with catheter tip in the superior vena cava at the caval-atrial junction.  The system works well and is ready to use.    Specimens Removed:  None    Implants:  As above    Complications:  None immediate    Disposition:  Discharge home;  F/U Dr. Wade;  F/U Dr. Washington in IR clinic after all the Steri-Strips have fallen off.      Please call Interventional Radiology [phone] with any questions, concerns, or issues.        
PRE-OPERATIVE DAY OF PROCEDURE EVALUATION:     I have personally seen and examined this patient.  I agree with the history and physical which I have reviewed and noted any changes below:     Plan is for chest-port placement with intravenous conscious sedation.    Procedure/ risks/ benefits/ goals/ alternatives were explained.  All questions answered.  Informed content obtained from patient.  Consent placed in chart. 
OBSERVATION

## 2023-03-02 NOTE — ED ADULT NURSE NOTE - HISTORY OF COVID-19 VACCINATION
[FreeTextEntry8] : 57 y/o male here for acute visit\par \par \par Last seen here ~2 years ago Feb 2021 for a CPE\par Hx of HTN, GERD, HLD and parox afib in 2016 (found during orhto procedure, converted spontaneously, treated with BB for some time, no AC)\par \par WOrk stressors have been very high\par Had a proposed change in shift/new job two weeks ago, and since then\par Feels nervous all of the time, spilling over in to homelife\par feels heart racing, difficulty relaxing.  no chest pain.  gets red in the face at times but this is not abnormal for him.  \par Also working two other jobs.\par Adamantly Denies SI, HI \par Denies hx of similar nervousness in the past\par Today met with his managers at work and was told to go home as he was not well.  \par \par feels he needs time away from work\par 
Vaccine status unknown

## 2023-03-16 NOTE — ED ADULT NURSE NOTE - CAS DISCH ACCOMP BY
Routine Visit Note: LPN    Skill/education provided: Educate on FSBG monitoring. Education on skin integrity. Educate on medication management.    Patient/caregiver verbalised understanding of instructions.    Plan for next visit: Plans for dicharge.    Other pertinent info: n/a
Self

## 2023-03-17 NOTE — ED BEHAVIORAL HEALTH ASSESSMENT NOTE - NS ED BHA PLAN ADMIT TO PSYCHIATRY BH CONTACTED FT
pt underwent prostatectomy in December, had lymph nodes removed and now has accumulation of fluid in peritoneal cavity requiring cat scan imaging and possible "removal of fluid" as per wife. pt has been febrile x4 days, tmax 101. pt c/o RLQ abdominal pain as well as generalized weakness/dizziness and lightheadedness. attempted, no answer

## 2023-03-21 NOTE — ED ADULT NURSE NOTE - NSHOSCREENINGQ4_ED_ALL_ED
Post-Care Instructions: I reviewed with the patient in detail post-care instructions. Patient is to wear sunprotection, and avoid picking at any of the treated lesions. Pt may apply Vaseline to crusted or scabbing areas.
Render Note In Bullet Format When Appropriate: No
Duration Of Freeze Thaw-Cycle (Seconds): 2
Detail Level: Detailed
Number Of Freeze-Thaw Cycles: 1 freeze-thaw cycle
Consent: The patient's consent was obtained including but not limited to risks of crusting, scabbing, blistering, scarring, darker or lighter pigmentary change, recurrence, incomplete removal and infection.
Show Applicator Variable?: Yes
Medical Necessity Information: It is in your best interest to select a reason for this procedure from the list below. All of these items fulfill various CMS LCD requirements except the new and changing color options.
Spray Paint Text: The liquid nitrogen was applied to the skin utilizing a spray paint frosting technique.
Medical Necessity Clause: This procedure was medically necessary because the lesions that were treated were:
No
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
## 2023-04-18 NOTE — ED BEHAVIORAL HEALTH ASSESSMENT NOTE - AFFECT RANGE
Render In Strict Bullet Format?: No Continue Regimen: Ketoconazole cream Detail Level: Zone Constricted

## 2023-04-21 NOTE — ED ADULT NURSE NOTE - NS PRO PASSIVE SMOKE EXP
Remote Patient Monitoring Note  Date/Time:  4/21/2023 3:35 PM  Patient Current Location: Home: [address] [place] [address] Road [place] [id number]  LPN attempted to contact patient by telephone regarding red alert received for blood pressure reading (174/92) and blood pressure heart rate (121). Background: HTN, Diabetes  Clinical Interventions: Reviewed and followed up on alerts and treatments-LPN left HIPAA compliant message requesting return call. Plan/Follow Up: Will continue to review, monitor and address alerts with follow up based on severity of symptoms and risk factors. Unknown

## 2023-04-27 NOTE — ED CDU PROVIDER SUBSEQUENT DAY NOTE - NS ED MD CDU HISTORY DATE TIME DT
History     Chief Complaint   Patient presents with     Wheezing     HPI    History obtained from mother.    Sharon is a(n) 18 month old female, pmh/o frequent ear infections s/p PE tubes placement on Friday and h/o reactive airway disease who presents at 11:57 AM with her mother for increased work of breathing. She was well on Tuesday when she had her well child check.  She started coughing and wheezing yesterday after  and today got worse.  She has not seemed to be responding much to albuterol.  He has had RSV twice in the past and has been using albuterol.  Has never been hospitalized for wheezing or asthma before.  She does have 2 older siblings with history of asthma.  Mom thinks she needs a controller medication since she has been coughing most of the days even when not having symptoms of a viral URI.  Sometimes she coughs with exertion as well.    PMHx:  Past Medical History:   Diagnosis Date     Otitis media      Reactive airway disease in pediatric patient      Past Surgical History:   Procedure Laterality Date     MYRINGOTOMY, INSERT TUBE BILATERAL, COMBINED Bilateral 4/21/2023    Procedure: MYRINGOTOMY, BILATERAL, WITH VENTILATION TUBE INSERTION;  Surgeon: Briana Valdivia MD;  Location: [facility] OR     These were reviewed with the patient/family.    MEDICATIONS were reviewed and are as follows:   Current Facility-Administered Medications   Medication     dexamethasone (DECADRON) injectable solution used ORALLY 6 mg     Current Outpatient Medications   Medication     albuterol (ACCUNEB) 1.25 MG/3ML neb solution     albuterol (PROAIR HFA/PROVENTIL HFA/VENTOLIN HFA) 108 (90 Base) MCG/ACT inhaler     ibuprofen (ADVIL/MOTRIN) 100 MG/5ML suspension       ALLERGIES:  Patient has no known allergies.  FAMILY HISTORY: astham      Physical Exam   Pulse: 158  Temp: 98  F (36.7  C)  Resp: 40  Weight: 10.6 kg (23 lb 5.9 oz)  SpO2: 97 %       Physical Exam  Appearance: Alert and appropriate, well developed, nontoxic,  with moist mucous membranes.  HEENT: Head: Normocephalic and atraumatic. Eyes: PERRL, EOM grossly intact, conjunctivae and sclerae clear. Ears: Tympanic membranes clear bilaterally, without inflammation or effusion. Nose: Nares clear with no active discharge.  Mouth/Throat: No oral lesions, pharynx clear with no erythema or exudate.  Neck: Supple, no masses, no meningismus. No significant cervical lymphadenopathy.  Pulmonary: Diffuse wheezing, prolonged expiration, poor air movement. Intercostal retractions, belly breathing, grunting.   Cardiovascular: Regular rate and rhythm, normal S1 and S2, with no murmurs.  Normal symmetric peripheral pulses and brisk cap refill.  Abdominal: Normal bowel sounds, soft, nontender, nondistended, with no masses and no hepatosplenomegaly.  Neurologic: Alert and oriented, cranial nerves II-XII grossly intact, moving all extremities equally with grossly normal coordination and normal gait.  Extremities/Back: No deformity, no CVA tenderness.  Skin: No significant rashes, ecchymoses, or lacerations.  Genitourinary:  Deferred   Rectal:  Deferred        ED Course              ED Course as of 04/27/23 1545   Thu Apr 27, 2023   1302 Patient much improved after 3 Duonebs but still tachypneic, wheezing and with increased work of breathing. Will start on HFNC and consider   IV Mg/Bolus   1425 Sharon started on HFNC at 6 l 21% and eventually increased to 7 l. She continued to improve with her WOB and skin coloring. On reassessment she is breathing much more comfortably and eating a cookie. Will give another Duoneb as she continues to wheeze with prolonged expiration. Will admit to gen peds.    1534 1525: significant redness to face, neck, chest with sparing of folds after 4th Duoneb.      Procedures    No results found for any visits on 04/27/23.    Medications   dexamethasone (DECADRON) injectable solution used ORALLY 6 mg (has no administration in time range)   ipratropium - albuterol 0.5  mg/2.5 mg/3 mL (DUONEB) neb solution 3 mL (3 mLs Nebulization $Given 4/27/23 1212)   ipratropium - albuterol 0.5 mg/2.5 mg/3 mL (DUONEB) neb solution 3 mL (3 mLs Nebulization $Given 4/27/23 1210)   ipratropium - albuterol 0.5 mg/2.5 mg/3 mL (DUONEB) neb solution 3 mL (3 mLs Nebulization $Given 4/27/23 1209)       Critical care time:  none        Medical Decision Making  The patient's presentation was of moderate complexity (an acute illness with systemic symptoms).    The patient's evaluation involved:  an assessment requiring an independent historian (see separate area of note for details)  ordering and/or review of 1 test(s) in this encounter (see separate area of note for details)  discussion of management or test interpretation with another health professional (see separate area of note for details)    The patient's management necessitated moderate risk (prescription drug management including medications given in the ED) and high risk (a decision regarding hospitalization).        Assessment & Plan   Sharon is a(n) 18 month old female, previous medical history of reactive airway disease, who presents to the emergency department in status asthmaticus requiring high flow nasal cannula and 3 DuoNeb's along with steroids.  She is currently spaced to every 2 albuterol and on 7 L 21%.  She is well-appearing and breathing comfortably at this point and can be admitted to the general pediatric inpatient service for further management and observation.  The patient was signed out to the inpatient team and will be admitted after a viral swab is obtained.      New Prescriptions    No medications on file       Final diagnoses:   Mild intermittent asthma with status asthmaticus         Portions of this note may have been created using voice recognition software. Please excuse transcription errors.     4/27/2023   [facility] EMERGENCY DEPARTMENT        Joann Redding MD  Pediatric Emergency Medicine Attending  Physician       Joann Redding MD  04/27/23 [id number]     24-Dec-2021 05:11

## 2023-05-04 NOTE — ED ADULT NURSE NOTE - PRO INTERPRETER NEED 2
Medicare Preventive Visit Patient Instructions  Thank you for completing your Welcome to Medicare Visit or Medicare Annual Wellness Visit today  Your next wellness visit will be due in one year (5/4/2024)  The screening/preventive services that you may require over the next 5-10 years are detailed below  Some tests may not apply to you based off risk factors and/or age  Screening tests ordered at today's visit but not completed yet may show as past due  Also, please note that scanned in results may not display below  Preventive Screenings:  Service Recommendations Previous Testing/Comments   Colorectal Cancer Screening  · Colonoscopy    · Fecal Occult Blood Test (FOBT)/Fecal Immunochemical Test (FIT)  · Fecal DNA/Cologuard Test  · Flexible Sigmoidoscopy Age: 39-70 years old   Colonoscopy: every 10 years (May be performed more frequently if at higher risk)  OR  FOBT/FIT: every 1 year  OR  Cologuard: every 3 years  OR  Sigmoidoscopy: every 5 years  Screening may be recommended earlier than age 39 if at higher risk for colorectal cancer  Also, an individualized decision between you and your healthcare provider will decide whether screening between the ages of 74-80 would be appropriate   Colonoscopy: 11/11/2021  FOBT/FIT: Not on file  Cologuard: Not on file  Sigmoidoscopy: Not on file    Screening Current     Prostate Cancer Screening Individualized decision between patient and health care provider in men between ages of 53-78   Medicare will cover every 12 months beginning on the day after your 50th birthday PSA: 2 2 ng/mL     Screening Not Indicated     Hepatitis C Screening Once for adults born between 1945 and 1965  More frequently in patients at high risk for Hepatitis C Hep C Antibody: Not on file        Diabetes Screening 1-2 times per year if you're at risk for diabetes or have pre-diabetes Fasting glucose: 95 mg/dL (5/9/2022)  A1C: No results in last 5 years (No results in last 5 years)  Screening Current Cholesterol Screening Once every 5 years if you don't have a lipid disorder  May order more often based on risk factors  Lipid panel: 05/09/2022  Screening Not Indicated  History Lipid Disorder      Other Preventive Screenings Covered by Medicare:  1  Abdominal Aortic Aneurysm (AAA) Screening: covered once if your at risk  You're considered to be at risk if you have a family history of AAA or a male between the age of 73-68 who smoking at least 100 cigarettes in your lifetime  2  Lung Cancer Screening: covers low dose CT scan once per year if you meet all of the following conditions: (1) Age 50-69; (2) No signs or symptoms of lung cancer; (3) Current smoker or have quit smoking within the last 15 years; (4) You have a tobacco smoking history of at least 20 pack years (packs per day x number of years you smoked); (5) You get a written order from a healthcare provider  3  Glaucoma Screening: covered annually if you're considered high risk: (1) You have diabetes OR (2) Family history of glaucoma OR (3)  aged 48 and older OR (3)  American aged 72 and older  3  Osteoporosis Screening: covered every 2 years if you meet one of the following conditions: (1) Have a vertebral abnormality; (2) On glucocorticoid therapy for more than 3 months; (3) Have primary hyperparathyroidism; (4) On osteoporosis medications and need to assess response to drug therapy  5  HIV Screening: covered annually if you're between the age of 12-76  Also covered annually if you are younger than 13 and older than 72 with risk factors for HIV infection  For pregnant patients, it is covered up to 3 times per pregnancy      Immunizations:  Immunization Recommendations   Influenza Vaccine Annual influenza vaccination during flu season is recommended for all persons aged >= 6 months who do not have contraindications   Pneumococcal Vaccine   * Pneumococcal conjugate vaccine = PCV13 (Prevnar 13), PCV15 (Vaxneuvance), PCV20 (Prevnar 20)  * Pneumococcal polysaccharide vaccine = PPSV23 (Pneumovax) Adults 2364 years old: 1-3 doses may be recommended based on certain risk factors  Adults 72 years old: 1-2 doses may be recommended based off what pneumonia vaccine you previously received   Hepatitis B Vaccine 3 dose series if at intermediate or high risk (ex: diabetes, end stage renal disease, liver disease)   Tetanus (Td) Vaccine - COST NOT COVERED BY MEDICARE PART B Following completion of primary series, a booster dose should be given every 10 years to maintain immunity against tetanus  Td may also be given as tetanus wound prophylaxis  Tdap Vaccine - COST NOT COVERED BY MEDICARE PART B Recommended at least once for all adults  For pregnant patients, recommended with each pregnancy  Shingles Vaccine (Shingrix) - COST NOT COVERED BY MEDICARE PART B  2 shot series recommended in those aged 48 and above     Health Maintenance Due:      Topic Date Due    Hepatitis C Screening  Never done    Colorectal Cancer Screening  11/10/2026     Immunizations Due:  There are no preventive care reminders to display for this patient  Advance Directives   What are advance directives? Advance directives are legal documents that state your wishes and plans for medical care  These plans are made ahead of time in case you lose your ability to make decisions for yourself  Advance directives can apply to any medical decision, such as the treatments you want, and if you want to donate organs  What are the types of advance directives? There are many types of advance directives, and each state has rules about how to use them  You may choose a combination of any of the following:  · Living will: This is a written record of the treatment you want  You can also choose which treatments you do not want, which to limit, and which to stop at a certain time  This includes surgery, medicine, IV fluid, and tube feedings     · Durable power of  for healthcare [facility]): This is a written record that states who you want to make healthcare choices for you when you are unable to make them for yourself  This person, called a proxy, is usually a family member or a friend  You may choose more than 1 proxy  · Do not resuscitate (DNR) order:  A DNR order is used in case your heart stops beating or you stop breathing  It is a request not to have certain forms of treatment, such as CPR  A DNR order may be included in other types of advance directives  · Medical directive: This covers the care that you want if you are in a coma, near death, or unable to make decisions for yourself  You can list the treatments you want for each condition  Treatment may include pain medicine, surgery, blood transfusions, dialysis, IV or tube feedings, and a ventilator (breathing machine)  · Values history: This document has questions about your views, beliefs, and how you feel and think about life  This information can help others choose the care that you would choose  Why are advance directives important? An advance directive helps you control your care  Although spoken wishes may be used, it is better to have your wishes written down  Spoken wishes can be misunderstood, or not followed  Treatments may be given even if you do not want them  An advance directive may make it easier for your family to make difficult choices about your care  Weight Management   Why it is important to manage your weight:  Being overweight increases your risk of health conditions such as heart disease, high blood pressure, type 2 diabetes, and certain types of cancer  It can also increase your risk for osteoarthritis, sleep apnea, and other respiratory problems  Aim for a slow, steady weight loss  Even a small amount of weight loss can lower your risk of health problems  How to lose weight safely:  A safe and healthy way to lose weight is to eat fewer calories and get regular exercise   You can lose up about 1 pound a week by decreasing the number of calories you eat by 500 calories each day  Healthy meal plan for weight management:  A healthy meal plan includes a variety of foods, contains fewer calories, and helps you stay healthy  A healthy meal plan includes the following:  · Eat whole-grain foods more often  A healthy meal plan should contain fiber  Fiber is the part of grains, fruits, and vegetables that is not broken down by your body  Whole-grain foods are healthy and provide extra fiber in your diet  Some examples of whole-grain foods are whole-wheat breads and pastas, oatmeal, brown rice, and bulgur  · Eat a variety of vegetables every day  Include dark, leafy greens such as spinach, kale, tabatha greens, and mustard greens  Eat yellow and orange vegetables such as carrots, sweet potatoes, and winter squash  · Eat a variety of fruits every day  Choose fresh or canned fruit (canned in its own juice or light syrup) instead of juice  Fruit juice has very little or no fiber  · Eat low-fat dairy foods  Drink fat-free (skim) milk or 1% milk  Eat fat-free yogurt and low-fat cottage cheese  Try low-fat cheeses such as mozzarella and other reduced-fat cheeses  · Choose meat and other protein foods that are low in fat  Choose beans or other legumes such as split peas or lentils  Choose fish, skinless poultry (chicken or turkey), or lean cuts of red meat (beef or pork)  Before you cook meat or poultry, cut off any visible fat  · Use less fat and oil  Try baking foods instead of frying them  Add less fat, such as margarine, sour cream, regular salad dressing and mayonnaise to foods  Eat fewer high-fat foods  Some examples of high-fat foods include french fries, doughnuts, ice cream, and cakes  · Eat fewer sweets  Limit foods and drinks that are high in sugar  This includes candy, cookies, regular soda, and sweetened drinks  Exercise:  Exercise at least 30 minutes per day on most days of the week   Some examples of exercise include walking, biking, dancing, and swimming  You can also fit in more physical activity by taking the stairs instead of the elevator or parking farther away from stores  Ask your healthcare provider about the best exercise plan for you  © Copyright [employer] 2018 Information is for End User's use only and may not be sold, redistributed or otherwise used for commercial purposes   All illustrations and images included in CareNotes® are the copyrighted property of A D A M , Inc  or [address] [place] [address] English

## 2023-05-16 NOTE — ED BEHAVIORAL HEALTH ASSESSMENT NOTE - NS ED BHA AXIS I PRIMARY CODE FT
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
F25.0

## 2023-06-12 NOTE — ED PROVIDER NOTE - PRINCIPAL DIAGNOSIS
Auditory hallucinations Sarecycline Counseling: Patient advised regarding possible photosensitivity and discoloration of the teeth, skin, lips, tongue and gums.  Patient instructed to avoid sunlight, if possible.  When exposed to sunlight, patients should wear protective clothing, sunglasses, and sunscreen.  The patient was instructed to call the office immediately if the following severe adverse effects occur:  hearing changes, easy bruising/bleeding, severe headache, or vision changes.  The patient verbalized understanding of the proper use and possible adverse effects of sarecycline.  All of the patient's questions and concerns were addressed.

## 2023-06-15 NOTE — ED CDU PROVIDER INITIAL DAY NOTE - CROS ED ENMT ALL NEG
----- Message from JOSEPH Pate sent at 6/15/2023 12:39 PM EDT -----  Please notify of negative result, recommend repeat screening in 3 years    negative...

## 2023-06-15 NOTE — ED ADULT TRIAGE NOTE - HEIGHT IN FEET
5 Enbrel Counseling:  I discussed with the patient the risks of etanercept including but not limited to myelosuppression, immunosuppression, autoimmune hepatitis, demyelinating diseases, lymphoma, and infections.  The patient understands that monitoring is required including a PPD at baseline and must alert us or the primary physician if symptoms of infection or other concerning signs are noted.

## 2023-07-03 NOTE — ED ADULT NURSE NOTE - NS ED BHA MSE SPEECH RATE
Normal Protopic Pregnancy And Lactation Text: This medication is Pregnancy Category C. It is unknown if this medication is excreted in breast milk when applied topically.

## 2023-07-09 NOTE — ED ADULT NURSE NOTE - NS ED NURSE LEVEL OF CONSCIOUSNESS ORIENTATION
Oriented - self; Oriented - place; Oriented - time [FreeTextEntry1] : \par \par #HM\par -A1C, CBC, CMP, Lipid panel, TSH w. rFT4, vitamin B12\par \par #On stimulant medication\par -EKG done today in office- sinus rhythm, horizontal axis \par \par #Urinary frequency\par -UA + microscopy \par \par Labs drawn in office

## 2023-09-19 NOTE — BH INPATIENT PSYCHIATRY ASSESSMENT NOTE - NSBHCONSULTIPREASON_PSY_A_CORE
Detail Level: Zone
Detail Level: Generalized
Detail Level: Detailed
danger to self; mental illness expected to respond to inpatient care

## 2023-10-10 NOTE — ED PROVIDER NOTE - NEUROLOGICAL, MLM
Medication: oxybutinin  Last office visit date: 08/23/2023  Next appointment scheduled?: Yes   Number of refills given: 0  
Alert and oriented, no focal deficits, no motor or sensory deficits.

## 2023-10-10 NOTE — ED BEHAVIORAL HEALTH ASSESSMENT NOTE - REASON
[Dear  ___] : Dear  [unfilled], [Courtesy Letter:] : I had the pleasure of seeing your patient, [unfilled], in my office today. [Please see my note below.] : Please see my note below. [Sincerely,] : Sincerely, [FreeTextEntry2] : Giuseppe Brooks pt not cooperative with assessment 2 seconds or less

## 2023-10-29 ENCOUNTER — EMERGENCY (EMERGENCY)
Facility: HOSPITAL | Age: 53
LOS: 1 days | Discharge: HOME | End: 2023-10-29
Attending: EMERGENCY MEDICINE | Admitting: EMERGENCY MEDICINE
Payer: MEDICAID

## 2023-10-29 VITALS
HEART RATE: 94 BPM | DIASTOLIC BLOOD PRESSURE: 88 MMHG | SYSTOLIC BLOOD PRESSURE: 139 MMHG | OXYGEN SATURATION: 95 % | RESPIRATION RATE: 20 BRPM | TEMPERATURE: 98 F | HEIGHT: 67 IN | WEIGHT: 160.06 LBS

## 2023-10-29 DIAGNOSIS — F14.10 COCAINE ABUSE, UNCOMPLICATED: ICD-10-CM

## 2023-10-29 DIAGNOSIS — F29 UNSPECIFIED PSYCHOSIS NOT DUE TO A SUBSTANCE OR KNOWN PHYSIOLOGICAL CONDITION: ICD-10-CM

## 2023-10-29 LAB
ALBUMIN SERPL ELPH-MCNC: 4.2 G/DL — SIGNIFICANT CHANGE UP (ref 3.3–5.2)
ALBUMIN SERPL ELPH-MCNC: 4.2 G/DL — SIGNIFICANT CHANGE UP (ref 3.3–5.2)
ALP SERPL-CCNC: 56 U/L — SIGNIFICANT CHANGE UP (ref 40–120)
ALP SERPL-CCNC: 56 U/L — SIGNIFICANT CHANGE UP (ref 40–120)
ALT FLD-CCNC: 46 U/L — HIGH
ALT FLD-CCNC: 46 U/L — HIGH
AMPHET UR-MCNC: NEGATIVE — SIGNIFICANT CHANGE UP
AMPHET UR-MCNC: NEGATIVE — SIGNIFICANT CHANGE UP
ANION GAP SERPL CALC-SCNC: 14 MMOL/L — SIGNIFICANT CHANGE UP (ref 5–17)
ANION GAP SERPL CALC-SCNC: 14 MMOL/L — SIGNIFICANT CHANGE UP (ref 5–17)
APAP SERPL-MCNC: <3 UG/ML — LOW (ref 10–26)
APAP SERPL-MCNC: <3 UG/ML — LOW (ref 10–26)
APPEARANCE UR: CLEAR — SIGNIFICANT CHANGE UP
APPEARANCE UR: CLEAR — SIGNIFICANT CHANGE UP
AST SERPL-CCNC: 74 U/L — HIGH
AST SERPL-CCNC: 74 U/L — HIGH
BARBITURATES UR SCN-MCNC: NEGATIVE — SIGNIFICANT CHANGE UP
BARBITURATES UR SCN-MCNC: NEGATIVE — SIGNIFICANT CHANGE UP
BASOPHILS # BLD AUTO: 0.02 K/UL — SIGNIFICANT CHANGE UP (ref 0–0.2)
BASOPHILS # BLD AUTO: 0.02 K/UL — SIGNIFICANT CHANGE UP (ref 0–0.2)
BASOPHILS NFR BLD AUTO: 0.3 % — SIGNIFICANT CHANGE UP (ref 0–2)
BASOPHILS NFR BLD AUTO: 0.3 % — SIGNIFICANT CHANGE UP (ref 0–2)
BENZODIAZ UR-MCNC: NEGATIVE — SIGNIFICANT CHANGE UP
BENZODIAZ UR-MCNC: NEGATIVE — SIGNIFICANT CHANGE UP
BILIRUB SERPL-MCNC: 0.3 MG/DL — LOW (ref 0.4–2)
BILIRUB SERPL-MCNC: 0.3 MG/DL — LOW (ref 0.4–2)
BILIRUB UR-MCNC: NEGATIVE — SIGNIFICANT CHANGE UP
BILIRUB UR-MCNC: NEGATIVE — SIGNIFICANT CHANGE UP
BUN SERPL-MCNC: 16.8 MG/DL — SIGNIFICANT CHANGE UP (ref 8–20)
BUN SERPL-MCNC: 16.8 MG/DL — SIGNIFICANT CHANGE UP (ref 8–20)
CALCIUM SERPL-MCNC: 8.8 MG/DL — SIGNIFICANT CHANGE UP (ref 8.4–10.5)
CALCIUM SERPL-MCNC: 8.8 MG/DL — SIGNIFICANT CHANGE UP (ref 8.4–10.5)
CHLORIDE SERPL-SCNC: 97 MMOL/L — SIGNIFICANT CHANGE UP (ref 96–108)
CHLORIDE SERPL-SCNC: 97 MMOL/L — SIGNIFICANT CHANGE UP (ref 96–108)
CO2 SERPL-SCNC: 24 MMOL/L — SIGNIFICANT CHANGE UP (ref 22–29)
CO2 SERPL-SCNC: 24 MMOL/L — SIGNIFICANT CHANGE UP (ref 22–29)
COCAINE METAB.OTHER UR-MCNC: POSITIVE
COCAINE METAB.OTHER UR-MCNC: POSITIVE
COLOR SPEC: YELLOW — SIGNIFICANT CHANGE UP
COLOR SPEC: YELLOW — SIGNIFICANT CHANGE UP
CREAT SERPL-MCNC: 0.85 MG/DL — SIGNIFICANT CHANGE UP (ref 0.5–1.3)
CREAT SERPL-MCNC: 0.85 MG/DL — SIGNIFICANT CHANGE UP (ref 0.5–1.3)
DIFF PNL FLD: NEGATIVE — SIGNIFICANT CHANGE UP
DIFF PNL FLD: NEGATIVE — SIGNIFICANT CHANGE UP
EGFR: 104 ML/MIN/1.73M2 — SIGNIFICANT CHANGE UP
EGFR: 104 ML/MIN/1.73M2 — SIGNIFICANT CHANGE UP
EOSINOPHIL # BLD AUTO: 0.15 K/UL — SIGNIFICANT CHANGE UP (ref 0–0.5)
EOSINOPHIL # BLD AUTO: 0.15 K/UL — SIGNIFICANT CHANGE UP (ref 0–0.5)
EOSINOPHIL NFR BLD AUTO: 2 % — SIGNIFICANT CHANGE UP (ref 0–6)
EOSINOPHIL NFR BLD AUTO: 2 % — SIGNIFICANT CHANGE UP (ref 0–6)
ETHANOL SERPL-MCNC: <10 MG/DL — SIGNIFICANT CHANGE UP (ref 0–9)
ETHANOL SERPL-MCNC: <10 MG/DL — SIGNIFICANT CHANGE UP (ref 0–9)
FLUAV AG NPH QL: SIGNIFICANT CHANGE UP
FLUAV AG NPH QL: SIGNIFICANT CHANGE UP
FLUBV AG NPH QL: SIGNIFICANT CHANGE UP
FLUBV AG NPH QL: SIGNIFICANT CHANGE UP
GLUCOSE SERPL-MCNC: 209 MG/DL — HIGH (ref 70–99)
GLUCOSE SERPL-MCNC: 209 MG/DL — HIGH (ref 70–99)
GLUCOSE UR QL: 1000 MG/DL
GLUCOSE UR QL: 1000 MG/DL
HCT VFR BLD CALC: 43.7 % — SIGNIFICANT CHANGE UP (ref 39–50)
HCT VFR BLD CALC: 43.7 % — SIGNIFICANT CHANGE UP (ref 39–50)
HGB BLD-MCNC: 14.7 G/DL — SIGNIFICANT CHANGE UP (ref 13–17)
HGB BLD-MCNC: 14.7 G/DL — SIGNIFICANT CHANGE UP (ref 13–17)
IMM GRANULOCYTES NFR BLD AUTO: 0.1 % — SIGNIFICANT CHANGE UP (ref 0–0.9)
IMM GRANULOCYTES NFR BLD AUTO: 0.1 % — SIGNIFICANT CHANGE UP (ref 0–0.9)
KETONES UR-MCNC: ABNORMAL
KETONES UR-MCNC: ABNORMAL
LEUKOCYTE ESTERASE UR-ACNC: NEGATIVE — SIGNIFICANT CHANGE UP
LEUKOCYTE ESTERASE UR-ACNC: NEGATIVE — SIGNIFICANT CHANGE UP
LYMPHOCYTES # BLD AUTO: 1.71 K/UL — SIGNIFICANT CHANGE UP (ref 1–3.3)
LYMPHOCYTES # BLD AUTO: 1.71 K/UL — SIGNIFICANT CHANGE UP (ref 1–3.3)
LYMPHOCYTES # BLD AUTO: 23.4 % — SIGNIFICANT CHANGE UP (ref 13–44)
LYMPHOCYTES # BLD AUTO: 23.4 % — SIGNIFICANT CHANGE UP (ref 13–44)
MCHC RBC-ENTMCNC: 29.9 PG — SIGNIFICANT CHANGE UP (ref 27–34)
MCHC RBC-ENTMCNC: 29.9 PG — SIGNIFICANT CHANGE UP (ref 27–34)
MCHC RBC-ENTMCNC: 33.6 GM/DL — SIGNIFICANT CHANGE UP (ref 32–36)
MCHC RBC-ENTMCNC: 33.6 GM/DL — SIGNIFICANT CHANGE UP (ref 32–36)
MCV RBC AUTO: 89 FL — SIGNIFICANT CHANGE UP (ref 80–100)
MCV RBC AUTO: 89 FL — SIGNIFICANT CHANGE UP (ref 80–100)
METHADONE UR-MCNC: NEGATIVE — SIGNIFICANT CHANGE UP
METHADONE UR-MCNC: NEGATIVE — SIGNIFICANT CHANGE UP
MONOCYTES # BLD AUTO: 0.97 K/UL — HIGH (ref 0–0.9)
MONOCYTES # BLD AUTO: 0.97 K/UL — HIGH (ref 0–0.9)
MONOCYTES NFR BLD AUTO: 13.3 % — SIGNIFICANT CHANGE UP (ref 2–14)
MONOCYTES NFR BLD AUTO: 13.3 % — SIGNIFICANT CHANGE UP (ref 2–14)
NEUTROPHILS # BLD AUTO: 4.46 K/UL — SIGNIFICANT CHANGE UP (ref 1.8–7.4)
NEUTROPHILS # BLD AUTO: 4.46 K/UL — SIGNIFICANT CHANGE UP (ref 1.8–7.4)
NEUTROPHILS NFR BLD AUTO: 60.9 % — SIGNIFICANT CHANGE UP (ref 43–77)
NEUTROPHILS NFR BLD AUTO: 60.9 % — SIGNIFICANT CHANGE UP (ref 43–77)
NITRITE UR-MCNC: NEGATIVE — SIGNIFICANT CHANGE UP
NITRITE UR-MCNC: NEGATIVE — SIGNIFICANT CHANGE UP
OPIATES UR-MCNC: NEGATIVE — SIGNIFICANT CHANGE UP
OPIATES UR-MCNC: NEGATIVE — SIGNIFICANT CHANGE UP
PCP SPEC-MCNC: SIGNIFICANT CHANGE UP
PCP SPEC-MCNC: SIGNIFICANT CHANGE UP
PCP UR-MCNC: NEGATIVE — SIGNIFICANT CHANGE UP
PCP UR-MCNC: NEGATIVE — SIGNIFICANT CHANGE UP
PH UR: 5 — SIGNIFICANT CHANGE UP (ref 5–8)
PH UR: 5 — SIGNIFICANT CHANGE UP (ref 5–8)
PLATELET # BLD AUTO: 255 K/UL — SIGNIFICANT CHANGE UP (ref 150–400)
PLATELET # BLD AUTO: 255 K/UL — SIGNIFICANT CHANGE UP (ref 150–400)
POTASSIUM SERPL-MCNC: 4 MMOL/L — SIGNIFICANT CHANGE UP (ref 3.5–5.3)
POTASSIUM SERPL-MCNC: 4 MMOL/L — SIGNIFICANT CHANGE UP (ref 3.5–5.3)
POTASSIUM SERPL-SCNC: 4 MMOL/L — SIGNIFICANT CHANGE UP (ref 3.5–5.3)
POTASSIUM SERPL-SCNC: 4 MMOL/L — SIGNIFICANT CHANGE UP (ref 3.5–5.3)
PROT SERPL-MCNC: 7.3 G/DL — SIGNIFICANT CHANGE UP (ref 6.6–8.7)
PROT SERPL-MCNC: 7.3 G/DL — SIGNIFICANT CHANGE UP (ref 6.6–8.7)
PROT UR-MCNC: NEGATIVE — SIGNIFICANT CHANGE UP
PROT UR-MCNC: NEGATIVE — SIGNIFICANT CHANGE UP
RBC # BLD: 4.91 M/UL — SIGNIFICANT CHANGE UP (ref 4.2–5.8)
RBC # BLD: 4.91 M/UL — SIGNIFICANT CHANGE UP (ref 4.2–5.8)
RBC # FLD: 12.3 % — SIGNIFICANT CHANGE UP (ref 10.3–14.5)
RBC # FLD: 12.3 % — SIGNIFICANT CHANGE UP (ref 10.3–14.5)
RSV RNA NPH QL NAA+NON-PROBE: SIGNIFICANT CHANGE UP
RSV RNA NPH QL NAA+NON-PROBE: SIGNIFICANT CHANGE UP
SALICYLATES SERPL-MCNC: <0.6 MG/DL — LOW (ref 10–20)
SALICYLATES SERPL-MCNC: <0.6 MG/DL — LOW (ref 10–20)
SARS-COV-2 RNA SPEC QL NAA+PROBE: SIGNIFICANT CHANGE UP
SARS-COV-2 RNA SPEC QL NAA+PROBE: SIGNIFICANT CHANGE UP
SODIUM SERPL-SCNC: 135 MMOL/L — SIGNIFICANT CHANGE UP (ref 135–145)
SODIUM SERPL-SCNC: 135 MMOL/L — SIGNIFICANT CHANGE UP (ref 135–145)
SP GR SPEC: 1.02 — SIGNIFICANT CHANGE UP (ref 1.01–1.02)
SP GR SPEC: 1.02 — SIGNIFICANT CHANGE UP (ref 1.01–1.02)
THC UR QL: NEGATIVE — SIGNIFICANT CHANGE UP
THC UR QL: NEGATIVE — SIGNIFICANT CHANGE UP
UROBILINOGEN FLD QL: 1 MG/DL
UROBILINOGEN FLD QL: 1 MG/DL
WBC # BLD: 7.32 K/UL — SIGNIFICANT CHANGE UP (ref 3.8–10.5)
WBC # BLD: 7.32 K/UL — SIGNIFICANT CHANGE UP (ref 3.8–10.5)
WBC # FLD AUTO: 7.32 K/UL — SIGNIFICANT CHANGE UP (ref 3.8–10.5)
WBC # FLD AUTO: 7.32 K/UL — SIGNIFICANT CHANGE UP (ref 3.8–10.5)

## 2023-10-29 PROCEDURE — 99223 1ST HOSP IP/OBS HIGH 75: CPT

## 2023-10-29 PROCEDURE — 93010 ELECTROCARDIOGRAM REPORT: CPT

## 2023-10-29 RX ORDER — DIVALPROEX SODIUM 500 MG/1
500 TABLET, DELAYED RELEASE ORAL
Refills: 0 | Status: DISCONTINUED | OUTPATIENT
Start: 2023-10-29 | End: 2023-11-05

## 2023-10-29 RX ORDER — TRAZODONE HCL 50 MG
100 TABLET ORAL AT BEDTIME
Refills: 0 | Status: DISCONTINUED | OUTPATIENT
Start: 2023-10-29 | End: 2023-11-05

## 2023-10-29 RX ORDER — METFORMIN HYDROCHLORIDE 850 MG/1
500 TABLET ORAL
Refills: 0 | Status: DISCONTINUED | OUTPATIENT
Start: 2023-10-29 | End: 2023-11-05

## 2023-10-29 RX ADMIN — METFORMIN HYDROCHLORIDE 500 MILLIGRAM(S): 850 TABLET ORAL at 17:54

## 2023-10-29 RX ADMIN — METFORMIN HYDROCHLORIDE 500 MILLIGRAM(S): 850 TABLET ORAL at 10:23

## 2023-10-29 RX ADMIN — DIVALPROEX SODIUM 500 MILLIGRAM(S): 500 TABLET, DELAYED RELEASE ORAL at 17:54

## 2023-10-29 RX ADMIN — DIVALPROEX SODIUM 500 MILLIGRAM(S): 500 TABLET, DELAYED RELEASE ORAL at 10:23

## 2023-10-29 NOTE — ED BEHAVIORAL HEALTH ASSESSMENT NOTE - DESCRIPTION
Patient was calm, and did not appear to be in distress. He was laying in bed eating food. He reported hearing voices telling him to kill himself.   He was not aggressive or agitated. Utox is pending.     ICU Vital Signs Last 24 Hrs  T(C): 36.6 (29 Oct 2023 08:13), Max: 36.6 (29 Oct 2023 08:13)  T(F): 97.8 (29 Oct 2023 08:13), Max: 97.8 (29 Oct 2023 08:13)  HR: 91 (29 Oct 2023 08:13) (91 - 94)  BP: 135/91 (29 Oct 2023 08:13) (135/91 - 139/88)  BP(mean): --  ABP: --  ABP(mean): --  RR: 18 (29 Oct 2023 08:13) (18 - 20)  SpO2: 95% (29 Oct 2023 08:13) (95% - 95%)    O2 Parameters below as of 29 Oct 2023 08:13  Patient On (Oxygen Delivery Method): room air Diabetes homeless, cocaine use, etoh use

## 2023-10-29 NOTE — ED BEHAVIORAL HEALTH ASSESSMENT NOTE - REASON
reports CAH in context of substance use and med non compliance. Holding for collateral/observation and to restart oral medications.  Patient often reconstitutes after observation and there is an element of secondary gain

## 2023-10-29 NOTE — ED ADULT TRIAGE NOTE - CHIEF COMPLAINT QUOTE
pt c/o hearing voices, SI/HI but denies plans at this time. pt denies alcohol or drug use today. pt states he stopped taking his medication.

## 2023-10-29 NOTE — ED BEHAVIORAL HEALTH ASSESSMENT NOTE - PAST PSYCHOTROPIC MEDICATION
multiple, reports he was last taking Depakote 500 mg twice daily and Trazodone 100 mg at night two months ago.

## 2023-10-29 NOTE — ED PROVIDER NOTE - CLINICAL SUMMARY MEDICAL DECISION MAKING FREE TEXT BOX
53-year-old male; with PMH significant for HTN, HLD, DM, schizophrenia; now presenting with psychiatric decompensation status post using ecstasy.  seen by psych and will place on hold for re-eval.

## 2023-10-29 NOTE — ED ADULT NURSE NOTE - OBJECTIVE STATEMENT
Presented in  area dressed in yellow gowns.  Patient is awake, alert and oriented times four.  Patient reports he has been feeling depressed and suicidal.  Patient also endorses hearing voices but saying "kill myself".  Patient affect is incongruent with mood as it frequently brightens and patient laughs during assessment.  Patient is familiar with staff from prior  visits.  Patient reports he was visiting his daughter in [place] for months and was not taking any medications.  Patient reports he returned to [place] to restart treatment.  Patient has since relapsed on cocaine and ecstasy several days ago.  Patient had a fight with his sister about his drug use and is now homeless.  Patient reports he has been staying in the woods.  Patient is requesting to be psychiatrically hospitalized at this time.

## 2023-10-29 NOTE — ED BEHAVIORAL HEALTH ASSESSMENT NOTE - SUMMARY
Patient's temp max was 100.5 AX at beginning of night. (temp was actually decreasing from previous PM shift).  Tylenol given twice overnight; Including once as a pre-med for blood product in the morning. Patient continues to have intermittent dry,non-productive cough, especially after drinking fluids and when talking. Appears slightly short of breath after coughing spells. Sats 93% on room air. He denies pain/chills/nausea.  Antibiotic coverage with Cefepime IV. Urine initially more concentrated and odorous. Improving during the night. Patient prefers to have peripheral IV saline locked between meds. Up to bathroom independently. Hgb decreased to 7.6 today; PRBC's transfused in the morning. Patient said he does not want Benadryl pre-med before blood products as it makes him too drowsy and he hasn't had a reaction to blood products. His potassium level is 3.0 this morning; he will take replacement by oral route with food (breakfast).    Patient is a 54 y/o male with PMHx of DM, single, homeless, and past psych hx of Schizoaffective disorder, cocaine use disorder, antisocial personality disorder, alcohol use disorder, cannabis use disorder, substance induced depression, substance induced psychosis, bipolar I who is a very high frequent utilizer of the ED, mostly comes in due to being undomiciled and recent polysubstance abuse, currently utox positive for cocaine, who is often irritable and refuses to participate, but is often discharged when reconstituted and cleared from substances, bring himself in again after presenting for same presentation, reporting AH and SI. Pt likely presenting on this occasion in the similar context as recent visits. Will restart Depakote 500 mg twice daily, Trazodone 100 mg at night PRN insomnia and Metormin 500 mg twice daily.  Will hold for observation and reach out to ACT team for collateral. Patient is a 54 y/o male with PMHx of DM, single, homeless, and past psych hx of Schizoaffective disorder, cocaine use disorder, antisocial personality disorder, alcohol use disorder, cannabis use disorder, substance induced depression, substance induced psychosis, bipolar I who is a very high frequent utilizer of the ED, mostly comes in due to being undomiciled and recent polysubstance abuse, currently utox pemodmg , who is often irritable and refuses to participate, but is often discharged when reconstituted and cleared from substances, bring himself in again after presenting for same presentation, reporting AH and SI. Pt likely presenting on this occasion in the similar context as recent visits. Will restart Depakote 500 mg twice daily, Trazodone 100 mg at night PRN insomnia and Metormin 500 mg twice daily.  Will hold for observation and reach out to ACT team for collateral.

## 2023-10-29 NOTE — ED BEHAVIORAL HEALTH ASSESSMENT NOTE - VIOLENCE RISK FACTORS:
6:14 AM :Pt care assumed from Dr. Kristian Ojeda , ED provider. Pt complaint(s), current treatment plan, progression and available diagnostic results have been discussed thoroughly. Rounding occurred: yes Intended Disposition: ADMIT Pending diagnostic reports and/or labs (please list): Waiting for [facility] bed. 
 
1:22 PM Consult:  Discussed care with Tele psych. Standard discussion; including history of patients chief complaint, available diagnostic results, and treatment course. Called for advice. Pt has not been able to find bed at [facility] due to positive flu test.  
 
 
.5:36 PM : Pt care transferred to Dr. Kristian Ojeda  ,ED provider. History of patient complaint(s), available diagnostic reports and current treatment plan has been discussed thoroughly. Bedside rounding on patient occured : yes . Intended disposition of patient : ADMIT Pending diagnostics reports and/or labs (please list): Waiting for [facility] bed. Hasn't gotten one yet due to positive flu test. 
 
 
 
 
___________________________________________________________________ Scribe Attestation Lidia Dee acting as a scribe for and in the presence of Maria C Fulton MD     
February 13, 2019 at [facility] 
    
Provider Attestation:     
I personally performed the services described in the documentation, reviewed the documentation, as recorded by the scribe in my presence, and it accurately and completely records my words and actions.  February 13, 2019 at 6:15 AM - Maria C Fulton MD   
 
 
 Substance abuse/Command hallucinations for violent behavior/Irritability

## 2023-10-29 NOTE — ED ADULT NURSE NOTE - NS_BH TRG Q4B_ED_A_ED
Debridement Text: The wound edges were debrided prior to proceeding with the closure to facilitate wound healing. Past 24 hrs

## 2023-10-29 NOTE — ED ADULT NURSE REASSESSMENT NOTE - NSFALLUNIVINTERV_ED_ALL_ED
Monitor gait and stability/Monitor for mental status changes and reorient to person, place, and time, as needed/Physically safe environment - no spills, clutter or unnecessary equipment/Purposeful proactive rounding
Monitor gait and stability/Monitor for mental status changes and reorient to person, place, and time, as needed/Physically safe environment - no spills, clutter or unnecessary equipment/Purposeful proactive rounding

## 2023-10-29 NOTE — ED PROVIDER NOTE - OBJECTIVE STATEMENT
53-year-old male; with PMH significant for HTN, HLD, DM, schizophrenia; now presenting with psychiatric decompensation status post using ecstasy.  Patient reports having auditory and visual hallucinations as well as suicidal ideation after using ecstasy 2 days ago.  Denies any alcohol use.  Denies headache.  Denies nausea or vomiting.  Denies chest pain, shortness of breath, palpitations.  Denies abdominal pain.

## 2023-10-29 NOTE — ED ADULT NURSE NOTE - MOOD
Patient is calling and he is stating that his Mri was done today and, would like a call when the results are in at [phone]. Thank you.   Depressed

## 2023-10-29 NOTE — ED BEHAVIORAL HEALTH ASSESSMENT NOTE - HPI (INCLUDE ILLNESS QUALITY, SEVERITY, DURATION, TIMING, CONTEXT, MODIFYING FACTORS, ASSOCIATED SIGNS AND SYMPTOMS)
54 y/o male with PMHx of DM, single, homeless, and past psych hx of Schizoaffective disorder, cocaine use disorder, antisocial personality disorder, alcohol use disorder, cannabis use disorder, substance induced depression, substance induced psychosis, bipolar I who is a very high frequent utilizer of the ED, mostly comes in due to being undomiciled and recent polysubstance abuse, currently utox positive for cocaine, who is often irritable and refuses to participate, but is often discharged when reconstituted and cleared from substances, patient came to ED via EMS activated by patient with reports of hearing voices and having suicidal ideation.        Patient seen and evaluated.  He was laying in bed eating and was superficially cooperative. He answered questions but often did not elaborate. He reported that he has been in [place] for 4 months visiting daughter and returned to [place] recently. He states "I don't remember" when asked when.  He reported that he had been staying with sister until he kicked her out three days ago. Patient reported that he has been following up with ACT team and last got his injection two days ago. He states he has not taken his oral medications in the [facility]. He reports that for the last week he has been hearing voices telling him to kill himself.  He reported that he feels worse since he talk cocaine and ecstasy two days ago.  Patient reported feeling depressed with poor sleep. He stated he does not feel safe at this time. He denies any aggressive thoughts towards others and is presenting in similar way to prior presentations.

## 2023-10-30 VITALS
SYSTOLIC BLOOD PRESSURE: 124 MMHG | RESPIRATION RATE: 18 BRPM | HEART RATE: 75 BPM | OXYGEN SATURATION: 98 % | DIASTOLIC BLOOD PRESSURE: 83 MMHG | TEMPERATURE: 98 F

## 2023-10-30 LAB
GLUCOSE BLDC GLUCOMTR-MCNC: 204 MG/DL — HIGH (ref 70–99)
GLUCOSE BLDC GLUCOMTR-MCNC: 204 MG/DL — HIGH (ref 70–99)

## 2023-10-30 PROCEDURE — 99239 HOSP IP/OBS DSCHRG MGMT >30: CPT

## 2023-10-30 PROCEDURE — 87637 SARSCOV2&INF A&B&RSV AMP PRB: CPT

## 2023-10-30 PROCEDURE — 80307 DRUG TEST PRSMV CHEM ANLYZR: CPT

## 2023-10-30 PROCEDURE — 99213 OFFICE O/P EST LOW 20 MIN: CPT

## 2023-10-30 PROCEDURE — G0378: CPT

## 2023-10-30 PROCEDURE — 81003 URINALYSIS AUTO W/O SCOPE: CPT

## 2023-10-30 PROCEDURE — 80053 COMPREHEN METABOLIC PANEL: CPT

## 2023-10-30 PROCEDURE — 99284 EMERGENCY DEPT VISIT MOD MDM: CPT | Mod: 25

## 2023-10-30 PROCEDURE — T1013: CPT

## 2023-10-30 PROCEDURE — 82962 GLUCOSE BLOOD TEST: CPT

## 2023-10-30 PROCEDURE — 36415 COLL VENOUS BLD VENIPUNCTURE: CPT

## 2023-10-30 PROCEDURE — 93005 ELECTROCARDIOGRAM TRACING: CPT

## 2023-10-30 PROCEDURE — 85025 COMPLETE CBC W/AUTO DIFF WBC: CPT

## 2023-10-30 RX ADMIN — METFORMIN HYDROCHLORIDE 500 MILLIGRAM(S): 850 TABLET ORAL at 05:05

## 2023-10-30 RX ADMIN — DIVALPROEX SODIUM 500 MILLIGRAM(S): 500 TABLET, DELAYED RELEASE ORAL at 05:04

## 2023-10-30 NOTE — ED ADULT NURSE REASSESSMENT NOTE - COMFORT CARE
meal provided/plan of care explained
plan of care explained
meal provided/plan of care explained
plan of care explained
meal provided/plan of care explained
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
## 2023-10-30 NOTE — ED ADULT NURSE REASSESSMENT NOTE - GENERAL PATIENT STATE
comfortable appearance/cooperative
cooperative
comfortable appearance/cooperative
comfortable appearance/cooperative/no change observed
comfortable appearance/cooperative/resting/sleeping
comfortable appearance/cooperative

## 2023-10-30 NOTE — ED BEHAVIORAL HEALTH PROGRESS NOTE - COLLATERAL INFORMATION (NAME, PHONE, RELATIONSHIP):
Irena from ACT team; [phone]; last saw pt Thursday 10/26/23; pt to be seen by NP Hallie Coronel tomorrow. No safety concerns with regard to pt being cleared and discharged.     Sister, Audra, [phone] denied any safety concerns with pt being discharged and confirmed pt will be returning to her home located at:  [address] [place] [address]

## 2023-10-30 NOTE — ED CDU PROVIDER DISPOSITION NOTE - PATIENT PORTAL LINK FT
You can access the FollowMyHealth Patient Portal offered by [facility] by registering at the following website: [email or web address]. By joining [facility]’s FollowMyHealth portal, you will also be able to view your health information using other applications (apps) compatible with our system.

## 2023-10-30 NOTE — ED ADULT NURSE REASSESSMENT NOTE - NS ED NURSE REASSESS COMMENT FT1
Assumed patient care at 7:00 PM. Patient in bed sleeping. Patient reported suicidal thoughts due to cocaine and ectasy use, patient was evaluated by NP, no definite plan, patient is on hold for reassessment in AM. No attempt made to harm self and others, safety maintained.,
Patient ate 100% of his lunch.  Patient denies suicidal or homicidal ideations.  Patient reports he is not experiencing any auditory hallucinations.  Patient agrees with plan for discharge today.
Patient was noted sleeping on round, patient received AM medication, tolerated well, , Metformin given, patient voiced no complain, patient is compliant with care, safety maintained.
Patient denies suicidal or homicidal ideations.  Patient reviewed discharge plans and provided a copy of instructions.  Patient agrees to return to local ED if symptoms worsen or thoughts to harm self or others develop.  Safety of patient maintained.
resting in no acute distress self isolating to room 4. pt offers no complaints and has made no attempts to harm himself or others. pt's appetite is good consuming meals 100%. pt safety is being maintained.
Assumed care of patient at 07:15.  Patient resting in bed awake.  Patient oriented to staff and educated about plan of care.  Patient reports he does not feel good emotionally.  Patient endorses he is depressed and continues to want to be hospitalized in a psychiatric hospital.  No attempts to harm self or others.
Patient ate 100% of his breakfast.  Patient participated in tele psych interview and verbalized understanding of pending reassessment.  Patient complaint with medications as prescribed.  PAtient resting in bed with no distress and safety of patient maintained.
Patient has been resting in bed with no distress, sleeping intermittently.  No attempts to harm self or others and safety maintained.
resting/sleeping in no acute distress and self isolating to his room. pt's appetite is good consuming all meals and po fluids. no attempts to harm self or others. pt safety is being maintained.
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
## 2023-10-30 NOTE — ED ADULT NURSE REASSESSMENT NOTE - STATUS
awaiting consult
reassessment
awaiting discharge, no change
awaiting transfer, no change
awaiting transfer, no change
reassessment
reassessment

## 2023-10-30 NOTE — ED BEHAVIORAL HEALTH PROGRESS NOTE - SUMMARY
RF: Substance abuse, reports of CAH to harm himself  PF: Help seeking, support from sister, pt cooperative

## 2023-10-30 NOTE — CHART NOTE - NSCHARTNOTEFT_GEN_A_CORE
SW Note: Notified by Psych ALIA Dozier that she met with pt and plans to clear pt for D/C. She left [facility] for his ACT team to discuss. Met with pt to review his d/c plans. Pt answered questions but did not want to engage in d/c planning process. Declined sbirt screening Reports he can stay with his sister but stated she didn't have a phone for SW to confirm. When pt came in he stated he was homeless and that his sister kicked him out of her home. Bus tickets will be provided.

## 2023-10-30 NOTE — ED BEHAVIORAL HEALTH PROGRESS NOTE - IS SUICIDALITY/HOMICIDALITY RISK SCREENING/ASSESSMENT INCOMPLETE OR NEED TO BE REDONE?
Patient has UTI,please treat with Bactrim DS BID x 3 days or Macrobid BID x 5 days,if there are no contraindications     No (assessment complete)

## 2023-10-30 NOTE — ED BEHAVIORAL HEALTH PROGRESS NOTE - NSBHATTESTAPPBILLTIME_PSY_A_CORE
I attest my time as ROSA ELENA is greater than 50% of the total combined time spent on qualifying patient care activities. I have reviewed and verified the documentation.

## 2023-11-06 NOTE — ED ADULT NURSE NOTE - CAS DISCH BELONGINGS RETURNED
Yes
What Type Of Note Output Would You Prefer (Optional)?: Standard Output
How Severe Is Your Acne?: moderate
Is This A New Presentation, Or A Follow-Up?: Acne

## 2023-11-17 PROBLEM — Z00.00 ENCOUNTER FOR PREVENTIVE HEALTH EXAMINATION: Status: ACTIVE | Noted: 2023-11-17

## 2023-11-20 ENCOUNTER — APPOINTMENT (OUTPATIENT)
Dept: FAMILY MEDICINE | Facility: CLINIC | Age: 53
End: 2023-11-20

## 2023-11-27 NOTE — ED PROVIDER NOTE - EYES, MLM
Refer to the Assessment tab to view/cancel completed assessment.
Clear bilaterally, pupils equal, round and reactive to light.

## 2023-12-15 NOTE — ED BEHAVIORAL HEALTH PROGRESS NOTE - LACKING INFO FOR PSYCH DISPO
Received signed and completed form from Physician's Office.    Complete form was emailed to pt at [email or web address]   
N/A
Substance metabolism...

## 2023-12-18 NOTE — ED PROVIDER NOTE - IV ALTEPLASE EXCL ABS HIDDEN
Patient has an asap referral for:   Pilonidal cyst    Referred by: TANI Isaac OB on 12/18/23  Please advise how soon patient should be seen and by who   show

## 2024-01-30 NOTE — ED ADULT NURSE NOTE - NS_SISCREENINGSR_GEN_ALL_ED
Chief Complaint   Patient presents with    urinary frequency    Blood in Urine    abnormal ct scan       History of Present Illness   Martha Davey is a 63 y.o. female presents for acute needs. Patient notes increased urinary frequency and urgency. Last night this progressed to bright red blood in the urine.   Pateint noted to have bladder thickening on ct abd/ pelvis recently. She has had numerous UTI in last one year. At least once a month.       The following portions of the patient's history were reviewed and updated as appropriate: allergies, current medications, past family history, past medical history, past social history, past surgical history and problem list.  Current Outpatient Medications on File Prior to Visit   Medication Sig Dispense Refill    ALPRAZolam (Xanax) 0.25 MG tablet Take 1 tablet by mouth 2 (Two) Times a Day As Needed for Anxiety. 60 tablet 0    azelastine (ASTELIN) 0.1 % nasal spray 2 sprays into the nostril(s) as directed by provider 2 (Two) Times a Day. Use in each nostril as directed 30 mL 12    calcium carbonate (OS-CARY) 600 MG tablet Take 1 tablet by mouth 2 (Two) Times a Day With Meals.      capecitabine (XELODA) 500 MG chemo tablet Take 2 tablets (1000mg) by mouth every AM and3 tablets (1500mg) by mouth every PM for 7 days, then take 7 days off, then repeat cycle. Use as directed by physician. Take doses approximately 12 hours apart at the end of a meal. 70 tablet 5    cholecalciferol (VITAMIN D3) 1000 units tablet Take 1 tablet by mouth Daily.      CRANBERRY PO Take  by mouth.      Cyanocobalamin ER 1000 MCG tablet controlled-release Take 1 tablet by mouth Daily.      Diclofenac Sodium (VOLTAREN) 1 % gel gel Place 4 g on the skin as directed by provider.      DULoxetine (Cymbalta) 60 MG capsule Take 1 capsule by mouth Daily. 90 capsule 3    Eliquis 5 MG tablet tablet TAKE 1 TABLET BY MOUTH  EVERY 12 HOURS 180 tablet 3    FLONASE ALLERGY RELIEF 50 MCG/ACT nasal spray 2 sprays  into the nostril(s) as directed by provider Daily.  11    gabapentin (NEURONTIN) 300 MG capsule TAKE 1 CAPSULE BY MOUTH 3 TIMES  DAILY 270 capsule 3    Hylan (SYNVISC) 16 MG/2ML solution prefilled syringe injection Inject 2 mL into the appropriate joint as directed by provider As Needed.      Lialda 1.2 g EC tablet TAKE 1 TABLET BY MOUTH TWICE  DAILY 180 tablet 3    losartan (Cozaar) 25 MG tablet Take 1 tablet by mouth Daily. 90 tablet 3    metaxalone (Skelaxin) 800 MG tablet Take 1 tablet by mouth 3 (Three) Times a Day As Needed for Muscle Spasms. 30 tablet 0    methenamine (HIPREX) 1 g tablet Take 1 tablet by mouth Daily.      modafinil (PROVIGIL) 200 MG tablet Take 1 tablet by mouth Daily. 30 tablet 2    Multiple Vitamins-Minerals (Multivitamin Gummies Womens) chewable tablet Chew 1 tablet Daily.      mupirocin (BACTROBAN) 2 % ointment Apply 1 Application topically to the appropriate area as directed As Needed.      nitrofurantoin, macrocrystal-monohydrate, (Macrobid) 100 MG capsule Take 1 capsule by mouth 2 (Two) Times a Day. 14 capsule 0    ondansetron ODT (ZOFRAN-ODT) 8 MG disintegrating tablet Place 1 tablet on the tongue Every 8 (Eight) Hours As Needed for Nausea or Vomiting. 30 tablet 1    oxyCODONE-acetaminophen (PERCOCET) 5-325 MG per tablet Take 1 tablet by mouth Every 4 (Four) Hours As Needed for Moderate Pain. 60 tablet 0    pantoprazole (PROTONIX) 40 MG EC tablet TAKE 1 TABLET BY MOUTH  DAILY 90 tablet 3    Probiotic Product (PROBIOTIC-10 PO) Take  by mouth.      rosuvastatin (CRESTOR) 5 MG tablet Take 1 tablet by mouth Daily. 90 tablet 3    traMADol (ULTRAM) 50 MG tablet Take 1 tablet by mouth Every 8 (Eight) Hours As Needed for Moderate Pain. 90 tablet 0    triamcinolone (KENALOG) 0.1 % cream Apply  topically to the appropriate area as directed 2 (Two) Times a Day. 15 g 0    urea (CARMOL) 10 % cream Apply  topically to the appropriate area as directed 3 (Three) Times a Day. 453 g 1    Vibegron  (Gemtesa) 75 MG tablet Take 1 tablet by mouth Daily. 30 tablet 5    White Petrolatum-Mineral Oil (Wh Petrol-Mineral Oil-Lanolin) 0.1-0.1 % ointment Apply  to eye(s) as directed by provider.      phenazopyridine (PYRIDIUM) 200 MG tablet Take 1 tablet by mouth 3 (Three) Times a Day As Needed for Bladder Spasms. (Patient not taking: Reported on 1/30/2024) 6 tablet 0    saccharomyces boulardii (Florastor) 250 MG capsule Take 1 capsule by mouth 2 (Two) Times a Day. (Patient not taking: Reported on 1/30/2024) 20 capsule 0    [DISCONTINUED] cephalexin (Keflex) 500 MG capsule Take 1 capsule by mouth 2 (Two) Times a Day. (Patient not taking: Reported on 1/30/2024) 14 capsule 0     No current facility-administered medications on file prior to visit.     Review of Systems   Constitutional: Negative.    HENT: Negative.     Eyes: Negative.    Respiratory: Negative.     Cardiovascular: Negative.    Gastrointestinal: Negative.    Endocrine: Negative.    Genitourinary:  Positive for dysuria, frequency and hematuria.   Musculoskeletal: Negative.    Skin: Negative.    Allergic/Immunologic: Negative.    Neurological: Negative.    Hematological: Negative.    Psychiatric/Behavioral: Negative.         Objective   Physical Exam  Vitals and nursing note reviewed.   Constitutional:       Appearance: Normal appearance. She is well-developed.   HENT:      Head: Normocephalic and atraumatic.      Right Ear: Tympanic membrane and external ear normal.      Left Ear: Tympanic membrane and external ear normal.      Nose: Nose normal.      Mouth/Throat:      Mouth: Mucous membranes are moist.   Eyes:      Extraocular Movements: Extraocular movements intact.      Pupils: Pupils are equal, round, and reactive to light.   Cardiovascular:      Rate and Rhythm: Normal rate and regular rhythm.      Pulses: Normal pulses.      Heart sounds: Normal heart sounds.   Pulmonary:      Effort: Pulmonary effort is normal. No respiratory distress.      Breath  "sounds: Normal breath sounds.   Abdominal:      General: Abdomen is flat.      Palpations: Abdomen is soft.   Musculoskeletal:         General: Normal range of motion.      Cervical back: Normal range of motion and neck supple.   Skin:     General: Skin is warm and dry.   Neurological:      Mental Status: She is alert and oriented to person, place, and time.   Psychiatric:         Mood and Affect: Mood normal.         Behavior: Behavior normal.         Thought Content: Thought content normal.         Judgment: Judgment normal.          /68   Pulse 100   Ht 154.9 cm (61\")   Wt 83 kg (183 lb)   LMP  (LMP Unknown)   SpO2 96%   BMI 34.58 kg/m²     Assessment & Plan   Diagnoses and all orders for this visit:    Urine frequency  -     POC Urinalysis Dipstick, Automated  -     Urine Culture - Urine, Urine, Clean Catch    Acute cystitis with hematuria    Gross hematuria  -     Ambulatory Referral to Urology    Abnormal CT scan  -     Ambulatory Referral to Urology    Recurrent UTI  -     Ambulatory Referral to Urology    Other orders  -     cephalexin (Keflex) 500 MG capsule; Take 1 capsule by mouth 2 (Two) Times a Day.  -     phenazopyridine (Pyridium) 200 MG tablet; Take 1 tablet by mouth 3 (Three) Times a Day As Needed for Bladder Spasms.      Patient w/ acute hematuria. Appears to have UTI on urine dip. She does have notable bladder wall thickening.she will be treated w/ keflex and pyridium. Will send for culture. Will repeat in 2 weeks. Consider macrobid daily for prophylaxis. She has a history of breast cancer. Given this w/ abonromal thickingi on ct will refer to urology for furhter evaluation.            " Positive
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
## 2024-02-05 NOTE — ED BEHAVIORAL HEALTH ASSESSMENT NOTE - AXIS III
increased thoracic kyphosis/ergonomics and body mechanics/gait, locomotion, and balance/gross motor/joint integrity and mobility/muscle strength/poor safety awareness diabetes

## 2024-02-05 NOTE — BH PATIENT PROFILE - NSSBIRTINJURYRES_GEN_A_CORE
10:54 AM     Magnesium:    Lab Results   Component Value Date/Time    MG 2.80 04/10/2023 05:31 AM     Phosphorus:    Lab Results   Component Value Date/Time    PHOS 3.3 02/04/2023 04:46 AM     Uric Acid:    Lab Results   Component Value Date/Time    LABURIC 4.6 10/04/2011 09:26 AM     Last 3 Troponin:    Lab Results   Component Value Date/Time    TROPONINI 0.02 04/07/2023 09:13 AM    TROPONINI 0.02 02/23/2023 09:40 AM    TROPONINI <0.01 01/26/2023 09:28 AM     U/A:    Lab Results   Component Value Date/Time    NITRITE Trace 01/15/2021 02:53 PM    COLORU Yellow 02/05/2024 11:56 AM    PROTEINU Negative 02/05/2024 11:56 AM    PHUR 5.0 02/05/2024 11:56 AM    LABCAST 1-3 Hyaline 07/18/2019 03:50 PM    WBCUA 10-20 02/05/2024 11:56 AM    RBCUA 0 04/07/2023 09:13 AM    YEAST 0 08/11/2020 03:47 PM    BACTERIA 1+ 02/05/2024 11:56 AM    CLARITYU Clear 02/05/2024 11:56 AM    SPECGRAV 1.015 02/05/2024 11:56 AM    LEUKOCYTESUR MODERATE 02/05/2024 11:56 AM    UROBILINOGEN 0.2 02/05/2024 11:56 AM    BILIRUBINUR Negative 02/05/2024 11:56 AM    BILIRUBINUR Negative 01/15/2021 02:53 PM    BLOODU Negative 02/05/2024 11:56 AM    GLUCOSEU Negative 02/05/2024 11:56 AM           IMPRESSION/RECOMMENDATIONS:      Acute kidney injury  Unsure of the exact etiology  She has been on diuretics at home  Will check fractional excretion of sodium  Renal ultrasound to rule out obstruction  Hold furosemide, Jardiance  Avoid nephrotoxins  Monitor renal function  Chronic kidney disease stage IIIa  Baseline creatinine 1-1.2 with previous CAROLANN's  Atrial fibrillation  Currently in sinus rhythm  Diastolic CHF  Currently euvolemic  COPD exacerbation: Treatment per primary team  Hypertension  Continue medication and monitor      Thank you for allowing me to participate in the care of this patient.  I will continue to follow along.  Please call with questions.    Braden Mera MD, MD  2/5/2024  [facility]  
No

## 2024-02-13 NOTE — ED ADULT TRIAGE NOTE - NS_BH TRG QUESTION7_ED_ALL_ED
Depression (without Suicidality or Psychosis)/Bhavna (includes Bipolar Disorder)/Anxiety (includes Panic, OCD)
MOLECULAR PCR

## 2024-03-07 NOTE — ED PROVIDER NOTE - NSTIMEPROVIDERCAREINITIATE_GEN_ER
Patient is a 89y Female whom presented to the hospital with mak     PAST MEDICAL & SURGICAL HISTORY:  Myasthenia gravis      Dementia      Myasthenia gravis      No significant past surgical history      No significant past surgical history          MEDICATIONS  (STANDING):  acyclovir IVPB 550 milliGRAM(s) IV Intermittent every 12 hours  chlorhexidine 2% Cloths 1 Application(s) Topical daily  donepezil 10 milliGRAM(s) Oral at bedtime  pantoprazole  Injectable 40 milliGRAM(s) IV Push two times a day  sodium chloride 0.9%. 1000 milliLiter(s) (75 mL/Hr) IV Continuous <Continuous>      Allergies    sulfamethoxazole (Vomiting; Nausea)    Intolerances        SOCIAL HISTORY:  Denies ETOh,Smoking,     FAMILY HISTORY:  No pertinent family history in first degree relatives    No pertinent family history in first degree relatives        REVIEW OF SYSTEMS:    unable to obtained a good review system                                                                                                             10.6   7.90  )-----------( 227      ( 06 Mar 2024 07:14 )             30.3       CBC Full  -  ( 06 Mar 2024 07:14 )  WBC Count : 7.90 K/uL  RBC Count : 3.22 M/uL  Hemoglobin : 10.6 g/dL  Hematocrit : 30.3 %  Platelet Count - Automated : 227 K/uL  Mean Cell Volume : 94.1 fl  Mean Cell Hemoglobin : 32.9 pg  Mean Cell Hemoglobin Concentration : 35.0 gm/dL  Auto Neutrophil # : x  Auto Lymphocyte # : x  Auto Monocyte # : x  Auto Eosinophil # : x  Auto Basophil # : x  Auto Neutrophil % : x  Auto Lymphocyte % : x  Auto Monocyte % : x  Auto Eosinophil % : x  Auto Basophil % : x      03-07    147<H>  |  104  |  15  ----------------------------<  127<H>  2.6<LL>   |  31  |  1.08    Ca    7.9<L>      07 Mar 2024 07:09    TPro  5.9<L>  /  Alb  2.9<L>  /  TBili  1.3<H>  /  DBili  x   /  AST  20  /  ALT  16  /  AlkPhos  132<H>  03-07      CAPILLARY BLOOD GLUCOSE      POCT Blood Glucose.: 115 mg/dL (07 Mar 2024 17:56)  POCT Blood Glucose.: 131 mg/dL (07 Mar 2024 12:11)  POCT Blood Glucose.: 125 mg/dL (07 Mar 2024 05:56)  POCT Blood Glucose.: 152 mg/dL (06 Mar 2024 23:54)      Vital Signs Last 24 Hrs  T(C): 36.8 (07 Mar 2024 16:03), Max: 37.2 (07 Mar 2024 09:44)  T(F): 98.3 (07 Mar 2024 16:03), Max: 98.9 (07 Mar 2024 09:44)  HR: 106 (07 Mar 2024 16:03) (75 - 106)  BP: 92/64 (07 Mar 2024 16:03) (92/64 - 148/84)  BP(mean): --  RR: 18 (07 Mar 2024 16:03) (18 - 18)  SpO2: 93% (07 Mar 2024 16:03) (93% - 100%)    Parameters below as of 07 Mar 2024 11:19  Patient On (Oxygen Delivery Method): room air        Urinalysis Basic - ( 07 Mar 2024 07:09 )    Color: x / Appearance: x / SG: x / pH: x  Gluc: 127 mg/dL / Ketone: x  / Bili: x / Urobili: x   Blood: x / Protein: x / Nitrite: x   Leuk Esterase: x / RBC: x / WBC x   Sq Epi: x / Non Sq Epi: x / Bacteria: x                    PHYSICAL EXAM:    Constitutional: NAD  HEENT: conjunctive   clear   Neck:  No JVD  Respiratory: CTAB  Cardiovascular: S1 and S2  Gastrointestinal: BS+, soft, NT/ND  Extremities: No peripheral edema                       18-Aug-2021 04:04

## 2024-04-02 NOTE — ED PROVIDER NOTE - CHIEF COMPLAINT
Congestive heart failure due to coronary artery disease (CAD) and fluid overload and a fib.  Heart failure is improving. NYHA Class III.    Heart failure will be reassessed .Mildly improved from admit. Diastolic.   The patient is a 52y Male complaining of medical evaluation.

## 2024-04-03 NOTE — DISCHARGE NOTE BEHAVIORAL HEALTH - NSBHDCHOUSINGFT_PSY_A_CORE
Juana Lynn is a 89 year old female presenting today for Follow up  Denies known Latex allergy or symptoms of Latex sensitivity.  Medications verified, no changes.  Pharmacy verified  Social History     Tobacco Use   Smoking Status Never   Smokeless Tobacco Never     Health Maintenance Due   Topic Date Due    Medicare Advantage- Medicare Wellness Visit  01/01/2024       Patient is due for topics as listed above but is not proceeding with MWV (Medicare Wellness Visit) at this time. Will discuss with MD.      Unaddressed Risk Adjusted HCC Categories and Diagnoses  HCC 48 - Coagulation Defects and Other Specified Hematological Disorders  - Unaddressed Dx:Senile Purpura (Cmd)               [address] [place] [id number]

## 2024-04-22 NOTE — ED ADULT TRIAGE NOTE - BP NONINVASIVE DIASTOLIC (MM HG)
Patient is in the prone position.   The body was positioned using the following devices: safety strap.  The head was positioned using the following devices: regular pillow.  The left arm was positioned using the following devices: arm board and gel arm pads.  The right arm was positioned using the following devices: arm board and gel arm pads.  The patient was positioned by Ana Horvath RN 75

## 2024-05-23 NOTE — ED CDU PROVIDER INITIAL DAY NOTE - CPE EDP CARDIAC NORM
normal...
I, Alma Peñaloza, personally saw the patient with the PA, and completed the key components of the history and physical exam. I then discussed the management plan with the PA.

## 2024-06-07 NOTE — ED ADULT NURSE NOTE - JUDGMENT (REGARDING EVERYDAY EVENTS)
Factors/Diagnosis that could create risk for breastfeeding:   Short Second Stage of labor     Glucose:  No       Intervention during consultation:     Interventions Performed:   Assisted with breastfeeding , Hand expression, Lanolin provided and instructed on use, Hydrogel pads, Education , and Skin to skin     Latch & Positioning: Reviewed tips on how to position infant to breast using cross cradle hold. MOB previously using football hold, but feels infant is latching shallow and she doesn't feel comfortable holding infant. LC reviewed hands on, provided demonstration, and pictures prior to bringing infant over to breast. LC then brought infant back over to mob and positioned infant to left breast with hands on help and mob was shown how to latch infant and achieve deep latch. After a few attempts, and coaching from LC, rose was achieved with srs observed and as noted at left breast x15 minutes and continues after consult. MOB reports latch feels much deeper, and she likes cross cradle hold.     Manual Expression:  Expresses easily    Bedside Breast Pump:   N/A    Breast Shield Size:   N/A    Amount of milk expressed:   Drops    Pump Arrangements:  Owns breast pump    Pump Distributed: No     Education:  Latch & positioning , Feeding frequency & feeding cues , Exclusive breastfeeding , Breastfeeding Booklet reviewed , Expected intake and output , Feeding and diaper log , Skin to skin , and Hand expression           Action/Plan:  Breastfeed on cue and at least 8 times/24 hours, unlimited timing. May not nurse this often in the first 24 hours. Wake baby and offer breastfeeding if it has been 3 hours since the beginning of last feeding. Place infant skin to skin if infant will not breastfeed at 3 hours.   Hand express prior to latch to brooke nipple and entice infant to the breast.   It is important to use gentle stimulation during feeding to promote active eating. Offer both breasts at every feeding. Burp infant in  Poor

## 2024-07-02 NOTE — ED BEHAVIORAL HEALTH ASSESSMENT NOTE - NS ED BHA AXIS I SECONDARY1 CODE FT
Patient has had anorexia with 6 to 7 pound weight loss.  Workup to rule out malignancy is in progress  -Protonix 40 mg nightly  -Nutrition consult  -Encourage supplements       F19.10

## 2024-07-25 NOTE — ED ADULT NURSE NOTE - NSICDXPASTSURGICALHX_GEN_ALL_CORE_FT
PAST SURGICAL HISTORY:  No significant past surgical history     No significant past surgical history     
fainted

## 2024-07-26 NOTE — BEHAVIORAL HEALTH ASSESSMENT NOTE - DESCRIPTION
[de-identified] : Impression: Cervical strain, muscle spasm of the trapezius and right shoulder pain.  Plan: Patient was advised for physical therapy, anti-inflammatories and muscle relaxant. Patient has tried Mobic in the past at this time we will switch her to diclofenac 75 mg. Patient has done well with tizanidine, and your prescription will be sent to the pharmacy. JUNO was offered a trigger point injection to right muscle spasm, she agreed, after verbal consent, in a sterile technique using alcohol and ethyl chloride 1 cc of 1% lidocaine was infiltrated over her right muscle spasm, she tolerated well, band aid was placed over the puncture site. A prescription for physical therapy was given. Patient was advised to be mindful of her physical activities such as computer position and the use of her phone as well as evaluate her pillow that this may be rations for her cervical strain.  Follow-up: As needed   Diabetes

## 2024-07-26 NOTE — ED CDU PROVIDER SUBSEQUENT DAY NOTE - CARDIOVASCULAR [-], MLM
Benzoyl Peroxide Pregnancy And Lactation Text: This medication is Pregnancy Category C. It is unknown if benzoyl peroxide is excreted in breast milk. Azithromycin Pregnancy And Lactation Text: This medication is considered safe during pregnancy and is also secreted in breast milk. Doxycycline Counseling:  Patient counseled regarding possible photosensitivity and increased risk for sunburn.  Patient instructed to avoid sunlight, if possible.  When exposed to sunlight, patients should wear protective clothing, sunglasses, and sunscreen.  The patient was instructed to call the office immediately if the following severe adverse effects occur:  hearing changes, easy bruising/bleeding, severe headache, or vision changes.  The patient verbalized understanding of the proper use and possible adverse effects of doxycycline.  All of the patient's questions and concerns were addressed. High Dose Vitamin A Pregnancy And Lactation Text: High dose vitamin A therapy is contraindicated during pregnancy and breast feeding. Tetracycline Counseling: Patient counseled regarding possible photosensitivity and increased risk for sunburn.  Patient instructed to avoid sunlight, if possible.  When exposed to sunlight, patients should wear protective clothing, sunglasses, and sunscreen.  The patient was instructed to call the office immediately if the following severe adverse effects occur:  hearing changes, easy bruising/bleeding, severe headache, or vision changes.  The patient verbalized understanding of the proper use and possible adverse effects of tetracycline.  All of the patient's questions and concerns were addressed. Patient understands to avoid pregnancy while on therapy due to potential birth defects. Detail Level: Zone Topical Clindamycin Counseling: Patient counseled that this medication may cause skin irritation or allergic reactions.  In the event of skin irritation, the patient was advised to reduce the amount of the drug applied or use it less frequently.   The patient verbalized understanding of the proper use and possible adverse effects of clindamycin.  All of the patient's questions and concerns were addressed. Azelaic Acid Pregnancy And Lactation Text: This medication is considered safe during pregnancy and breast feeding. Cleanser Recommendations: LaRoche Posay Effaclar Medicated Gel Cleanser\\nCeraVe SA \\nCeraVe Acne Control Cleanser Dapsone Counseling: I discussed with the patient the risks of dapsone including but not limited to hemolytic anemia, agranulocytosis, rashes, methemoglobinemia, kidney failure, peripheral neuropathy, headaches, GI upset, and liver toxicity.  Patients who start dapsone require monitoring including baseline LFTs and weekly CBCs for the first month, then every month thereafter.  The patient verbalized understanding of the proper use and possible adverse effects of dapsone.  All of the patient's questions and concerns were addressed. Isotretinoin Pregnancy And Lactation Text: This medication is Pregnancy Category X and is considered extremely dangerous during pregnancy. It is unknown if it is excreted in breast milk. Spironolactone Counseling: Patient advised regarding risks of diarrhea, abdominal pain, hyperkalemia, birth defects (for female patients), liver toxicity and renal toxicity. The patient may need blood work to monitor liver and kidney function and potassium levels while on therapy. The patient verbalized understanding of the proper use and possible adverse effects of spironolactone.  All of the patient's questions and concerns were addressed. Use Enhanced Medication Counseling?: No Birth Control Pills Counseling: Birth Control Pill Counseling: I discussed with the patient the potential side effects of OCPs including but not limited to increased risk of stroke, heart attack, thrombophlebitis, deep venous thrombosis, hepatic adenomas, breast changes, GI upset, headaches, and depression.  The patient verbalized understanding of the proper use and possible adverse effects of OCPs. All of the patient's questions and concerns were addressed. Erythromycin Pregnancy And Lactation Text: This medication is Pregnancy Category B and is considered safe during pregnancy. It is also excreted in breast milk. Sarecycline Counseling: Patient advised regarding possible photosensitivity and discoloration of the teeth, skin, lips, tongue and gums.  Patient instructed to avoid sunlight, if possible.  When exposed to sunlight, patients should wear protective clothing, sunglasses, and sunscreen.  The patient was instructed to call the office immediately if the following severe adverse effects occur:  hearing changes, easy bruising/bleeding, severe headache, or vision changes.  The patient verbalized understanding of the proper use and possible adverse effects of sarecycline.  All of the patient's questions and concerns were addressed. Aklief Pregnancy And Lactation Text: It is unknown if this medication is safe to use during pregnancy.  It is unknown if this medication is excreted in breast milk.  Breastfeeding women should use the topical cream on the smallest area of the skin for the shortest time needed while breastfeeding.  Do not apply to nipple and areola. Moisturizer Recommendations: CeraVe, Cetaphil, LaRoche Posay Lipkar Balm Winlevi Pregnancy And Lactation Text: This medication is considered safe during pregnancy and breastfeeding. Tazorac Counseling:  Patient advised that medication is irritating and drying.  Patient may need to apply sparingly and wash off after an hour before eventually leaving it on overnight.  The patient verbalized understanding of the proper use and possible adverse effects of tazorac.  All of the patient's questions and concerns were addressed. Topical Retinoid counseling:  Patient advised to apply a pea-sized amount only at bedtime and wait 30 minutes after washing their face before applying.  If too drying, patient may add a non-comedogenic moisturizer. The patient verbalized understanding of the proper use and possible adverse effects of retinoids.  All of the patient's questions and concerns were addressed. no chest pain Topical Sulfur Applications Pregnancy And Lactation Text: This medication is Pregnancy Category C and has an unknown safety profile during pregnancy. It is unknown if this topical medication is excreted in breast milk. Doxycycline Pregnancy And Lactation Text: This medication is Pregnancy Category D and not consider safe during pregnancy. It is also excreted in breast milk but is considered safe for shorter treatment courses. Minocycline Counseling: Patient advised regarding possible photosensitivity and discoloration of the teeth, skin, lips, tongue and gums.  Patient instructed to avoid sunlight, if possible.  When exposed to sunlight, patients should wear protective clothing, sunglasses, and sunscreen.  The patient was instructed to call the office immediately if the following severe adverse effects occur:  hearing changes, easy bruising/bleeding, severe headache, or vision changes.  The patient verbalized understanding of the proper use and possible adverse effects of minocycline.  All of the patient's questions and concerns were addressed. Tetracycline Pregnancy And Lactation Text: This medication is Pregnancy Category D and not consider safe during pregnancy. It is also excreted in breast milk. Spironolactone Pregnancy And Lactation Text: This medication can cause feminization of the male fetus and should be avoided during pregnancy. The active metabolite is also found in breast milk. Dapsone Pregnancy And Lactation Text: This medication is Pregnancy Category C and is not considered safe during pregnancy or breast feeding. High Dose Vitamin A Counseling: Side effects reviewed, pt to contact office should one occur. Azithromycin Counseling:  I discussed with the patient the risks of azithromycin including but not limited to GI upset, allergic reaction, drug rash, diarrhea, and yeast infections. Bactrim Counseling:  I discussed with the patient the risks of sulfa antibiotics including but not limited to GI upset, allergic reaction, drug rash, diarrhea, dizziness, photosensitivity, and yeast infections.  Rarely, more serious reactions can occur including but not limited to aplastic anemia, agranulocytosis, methemoglobinemia, blood dyscrasias, liver or kidney failure, lung infiltrates or desquamative/blistering drug rashes. Benzoyl Peroxide Counseling: Patient counseled that medicine may cause skin irritation and bleach clothing.  In the event of skin irritation, the patient was advised to reduce the amount of the drug applied or use it less frequently.   The patient verbalized understanding of the proper use and possible adverse effects of benzoyl peroxide.  All of the patient's questions and concerns were addressed. Topical Clindamycin Pregnancy And Lactation Text: This medication is Pregnancy Category B and is considered safe during pregnancy. It is unknown if it is excreted in breast milk. Sunscreen Recommendations: Tinted Mineral sunscreens (Zinc oxide, titanium dioxide) - ISDIN, Elta MD, LaRoche Posay, [place] Azelaic Acid Counseling: Patient counseled that medicine may cause skin irritation and to avoid applying near the eyes.  In the event of skin irritation, the patient was advised to reduce the amount of the drug applied or use it less frequently.   The patient verbalized understanding of the proper use and possible adverse effects of azelaic acid.  All of the patient's questions and concerns were addressed. Tazorac Pregnancy And Lactation Text: This medication is not safe during pregnancy. It is unknown if this medication is excreted in breast milk. Birth Control Pills Pregnancy And Lactation Text: This medication should be avoided if pregnant and for the first 30 days post-partum. Isotretinoin Counseling: Patient should get monthly blood tests, not donate blood, not drive at night if vision affected, not share medication, and not undergo elective surgery for 6 months after tx completed. Side effects reviewed, pt to contact office should one occur. Winlevi Counseling:  I discussed with the patient the risks of topical clascoterone including but not limited to erythema, scaling, itching, and stinging. Patient voiced their understanding. Aklief counseling:  Patient advised to apply a pea-sized amount only at bedtime and wait 30 minutes after washing their face before applying.  If too drying, patient may add a non-comedogenic moisturizer.  The most commonly reported side effects including irritation, redness, scaling, dryness, stinging, burning, itching, and increased risk of sunburn.  The patient verbalized understanding of the proper use and possible adverse effects of retinoids.  All of the patient's questions and concerns were addressed. Topical Retinoid Pregnancy And Lactation Text: This medication is Pregnancy Category C. It is unknown if this medication is excreted in breast milk. Bactrim Pregnancy And Lactation Text: This medication is Pregnancy Category D and is known to cause fetal risk.  It is also excreted in breast milk. Erythromycin Counseling:  I discussed with the patient the risks of erythromycin including but not limited to GI upset, allergic reaction, drug rash, diarrhea, increase in liver enzymes, and yeast infections. Topical Sulfur Applications Counseling: Topical Sulfur Counseling: Patient counseled that this medication may cause skin irritation or allergic reactions.  In the event of skin irritation, the patient was advised to reduce the amount of the drug applied or use it less frequently.   The patient verbalized understanding of the proper use and possible adverse effects of topical sulfur application.  All of the patient's questions and concerns were addressed.

## 2024-08-19 NOTE — BH INPATIENT PSYCHIATRY PROGRESS NOTE - NSTXIMPULSDATEEST_PSY_ALL_CORE
decreased stu/decreased step length/decreased stride length/decreased weight-shifting ability 26-Oct-2022

## 2024-08-23 NOTE — ED PROVIDER NOTE - IV ALTEPLASE EXCL REL HIDDEN
ERP at bedside.   
Pt ambulated to RD 1. Pt changed to gown, hooked to monitor with call light in reach.  
Reviewed discharge instructions, pt verbalized understanding of follow up with PCP. Pt encouraged to return to the ED for unilateral weakness, CP, SOB, changes in vision, or any other new/concerning symptoms.  
show

## 2024-09-24 NOTE — ED ADULT TRIAGE NOTE - NS_BH TRG QUESTION8_ED_ALL_ED
Depression (without Suicidality or Psychosis)/Bhavna (includes Bipolar Disorder)/Anxiety (includes Panic, OCD)
What Type Of Note Output Would You Prefer (Optional)?: Standard Output
How Severe Is Your Skin Lesion?: mild
Is This A New Presentation, Or A Follow-Up?: Skin Lesions

## 2024-10-09 NOTE — BH SAFETY PLAN - WARNING SIGN 5
Anxiety.

## 2024-10-12 NOTE — ED ADULT NURSE NOTE - NS PRO AD NO ADVANCE DIRECTIVE
[facility]  Inpatient Service     Progress Note  10/12/2024    2:44 PM    Name:   Yari Stokes  MRN:     [id number]     Acct:      [id number]   Room:   [address] [place] [address] Day:  2  Admit Date:  10/10/2024  6:05 PM    PCP:   Tay Angel DO  Code Status:  Full Code    Subjective:     Overnight events: no acute events    Pt was examined at bedside.  Patient says that she is doing well in terms of her breathing, is back to her baseline 3 L oxygen.  However she says that she feels \"scared.\"  Talk to her about pneumonia being an additional disease burden, but that she is recovering in the right place, and has a good prognosis.  Let her know that we would switch her from albuterol to Xopenex because it could also be contributing to her anxiety.    Medications:     Allergies:    Allergies   Allergen Reactions    Diphenhydramine-Phenylephrine Rash    Sudafed [Pseudoephedrine]     Wellbutrin [Bupropion]        Current Meds:   Scheduled Meds:    sodium chloride flush  5-40 mL IntraVENous 2 times per day    azithromycin  500 mg Oral Q24H    cefepime  2,000 mg IntraVENous Q12H    atorvastatin  40 mg Oral Daily    metoprolol succinate  25 mg Oral Daily    torsemide  20 mg Oral Daily    venlafaxine  150 mg Oral Daily with breakfast    warfarin placeholder: dosing by pharmacy   Other RX Placeholder     Continuous Infusions:    sodium chloride       PRN Meds: morphine, HYDROcodone-acetaminophen **OR** HYDROcodone 5 mg - acetaminophen, levalbuterol, calcium carbonate, sodium chloride flush, sodium chloride, ondansetron **OR** ondansetron, polyethylene glycol, acetaminophen **OR** acetaminophen, benzonatate    ROS:   ROS is negative except for points noted above.    Data:     Vitals:  /77   Pulse (!) 120   Temp 98.4 °F (36.9 °C) (Oral)   Resp 23   Ht 1.651 m (5' 5\")   Wt 96 kg (211 lb 10.3 oz)   LMP  (LMP Unknown)   SpO2 100%   BMI 35.22 kg/m²   Temp  Problems             Last Modified POA    * (Principal) Pneumonia of right lower lobe due to infectious organism 10/12/2024 Yes    Acute cystitis without hematuria 10/11/2024 Yes    Chronic anticoagulation 10/11/2024 Yes    Stage 3b chronic kidney disease (CKD) (HCC) 10/11/2024 Yes    Chronic obstructive pulmonary disease (HCC) 10/11/2024 Yes    Moderate protein-calorie malnutrition (HCC) 10/11/2024 Yes    Chronic heart failure with preserved ejection fraction (HCC) 10/11/2024 Yes    Hyponatremia 10/11/2024 Yes    Chronic respiratory failure with hypoxia and hypercapnia 10/12/2024 Yes    Anemia, macrocytic 10/11/2024 Yes    Ambulatory dysfunction 10/11/2024 Yes    History of pulmonary embolism 10/11/2024 Yes    Exposure to second hand smoke 10/11/2024 Yes       Plan:     Acute on chronic respiratory failure with hypoxia and hypercarbia, improving  Pneumonia, lobar  COPD  Mycoplasma antibodies pending  Blood culture no growth 1 day  Legionella and covid negative  Pulmonology following  Oxygen as needed with goal saturation of 88% or more  Stop albuterol, start Xopenex  continue cefepime day 2/7 days  Continue Zithromax day 2/3  Add flutter valve and incentive spirometry  PT/OT  Wean O2 as tolerated.   Home O2 eval before discharge  Follow-up outpatient pulmonary function testing  Outpatient sleep study, pulmonary has already ordered      Acute cystitis without hematuria  Continue cefepime as above    Hyponatremia  Na 133--> 132  Patient is currently doing well, will continue to track  Daily labs    Chronic HFpEF  History of pulmonary embolism  Chronic anticoagulation  Anemia  INR decreased supratherapeutic decreased from 9 to 7.7  Will monitor for bleeding and add vitamin K if INR > 10 or if there are signs of bleeding  Pharmacy to dose warfarin, currently held  INR goal between 2 and 3  Anemia: Hemoglobin dropped from 11-9.3 will continue to monitor at this time.  However there is no signs of acute bleeding at this  No

## 2024-11-07 ENCOUNTER — EMERGENCY (EMERGENCY)
Facility: HOSPITAL | Age: 54
LOS: 1 days | Discharge: OTHER INSTITUTION | End: 2024-11-07
Attending: STUDENT IN AN ORGANIZED HEALTH CARE EDUCATION/TRAINING PROGRAM
Payer: MEDICAID

## 2024-11-07 VITALS
DIASTOLIC BLOOD PRESSURE: 83 MMHG | HEART RATE: 92 BPM | OXYGEN SATURATION: 97 % | RESPIRATION RATE: 18 BRPM | TEMPERATURE: 98 F | SYSTOLIC BLOOD PRESSURE: 120 MMHG

## 2024-11-07 DIAGNOSIS — F25.9 SCHIZOAFFECTIVE DISORDER, UNSPECIFIED: ICD-10-CM

## 2024-11-07 DIAGNOSIS — F14.10 COCAINE ABUSE, UNCOMPLICATED: ICD-10-CM

## 2024-11-07 LAB
ALBUMIN SERPL ELPH-MCNC: 3.6 G/DL — SIGNIFICANT CHANGE UP (ref 3.3–5.2)
ALP SERPL-CCNC: 55 U/L — SIGNIFICANT CHANGE UP (ref 40–120)
ALT FLD-CCNC: 65 U/L — HIGH
ANION GAP SERPL CALC-SCNC: 10 MMOL/L — SIGNIFICANT CHANGE UP (ref 5–17)
APAP SERPL-MCNC: <3 UG/ML — LOW (ref 10–26)
AST SERPL-CCNC: 55 U/L — HIGH
BASOPHILS # BLD AUTO: 0.03 K/UL — SIGNIFICANT CHANGE UP (ref 0–0.2)
BASOPHILS NFR BLD AUTO: 0.7 % — SIGNIFICANT CHANGE UP (ref 0–2)
BILIRUB SERPL-MCNC: <0.2 MG/DL — LOW (ref 0.4–2)
BUN SERPL-MCNC: 17.5 MG/DL — SIGNIFICANT CHANGE UP (ref 8–20)
CALCIUM SERPL-MCNC: 8.6 MG/DL — SIGNIFICANT CHANGE UP (ref 8.4–10.5)
CHLORIDE SERPL-SCNC: 98 MMOL/L — SIGNIFICANT CHANGE UP (ref 96–108)
CO2 SERPL-SCNC: 25 MMOL/L — SIGNIFICANT CHANGE UP (ref 22–29)
CREAT SERPL-MCNC: 0.69 MG/DL — SIGNIFICANT CHANGE UP (ref 0.5–1.3)
EGFR: 110 ML/MIN/1.73M2 — SIGNIFICANT CHANGE UP
EOSINOPHIL # BLD AUTO: 0.13 K/UL — SIGNIFICANT CHANGE UP (ref 0–0.5)
EOSINOPHIL NFR BLD AUTO: 2.9 % — SIGNIFICANT CHANGE UP (ref 0–6)
ETHANOL SERPL-MCNC: <10 MG/DL — SIGNIFICANT CHANGE UP (ref 0–9)
FLUAV AG NPH QL: SIGNIFICANT CHANGE UP
FLUBV AG NPH QL: SIGNIFICANT CHANGE UP
GLUCOSE SERPL-MCNC: 315 MG/DL — HIGH (ref 70–99)
HCT VFR BLD CALC: 44.9 % — SIGNIFICANT CHANGE UP (ref 39–50)
HGB BLD-MCNC: 14.6 G/DL — SIGNIFICANT CHANGE UP (ref 13–17)
IMM GRANULOCYTES NFR BLD AUTO: 0.5 % — SIGNIFICANT CHANGE UP (ref 0–0.9)
LYMPHOCYTES # BLD AUTO: 1.64 K/UL — SIGNIFICANT CHANGE UP (ref 1–3.3)
LYMPHOCYTES # BLD AUTO: 37 % — SIGNIFICANT CHANGE UP (ref 13–44)
MCHC RBC-ENTMCNC: 29.1 PG — SIGNIFICANT CHANGE UP (ref 27–34)
MCHC RBC-ENTMCNC: 32.5 G/DL — SIGNIFICANT CHANGE UP (ref 32–36)
MCV RBC AUTO: 89.6 FL — SIGNIFICANT CHANGE UP (ref 80–100)
MONOCYTES # BLD AUTO: 0.64 K/UL — SIGNIFICANT CHANGE UP (ref 0–0.9)
MONOCYTES NFR BLD AUTO: 14.4 % — HIGH (ref 2–14)
NEUTROPHILS # BLD AUTO: 1.97 K/UL — SIGNIFICANT CHANGE UP (ref 1.8–7.4)
NEUTROPHILS NFR BLD AUTO: 44.5 % — SIGNIFICANT CHANGE UP (ref 43–77)
PLATELET # BLD AUTO: 334 K/UL — SIGNIFICANT CHANGE UP (ref 150–400)
POTASSIUM SERPL-MCNC: 4.3 MMOL/L — SIGNIFICANT CHANGE UP (ref 3.5–5.3)
POTASSIUM SERPL-SCNC: 4.3 MMOL/L — SIGNIFICANT CHANGE UP (ref 3.5–5.3)
PROT SERPL-MCNC: 6.3 G/DL — LOW (ref 6.6–8.7)
RBC # BLD: 5.01 M/UL — SIGNIFICANT CHANGE UP (ref 4.2–5.8)
RBC # FLD: 12.6 % — SIGNIFICANT CHANGE UP (ref 10.3–14.5)
RSV RNA NPH QL NAA+NON-PROBE: SIGNIFICANT CHANGE UP
SALICYLATES SERPL-MCNC: <0.6 MG/DL — LOW (ref 10–20)
SARS-COV-2 RNA SPEC QL NAA+PROBE: SIGNIFICANT CHANGE UP
SODIUM SERPL-SCNC: 133 MMOL/L — LOW (ref 135–145)
WBC # BLD: 4.43 K/UL — SIGNIFICANT CHANGE UP (ref 3.8–10.5)
WBC # FLD AUTO: 4.43 K/UL — SIGNIFICANT CHANGE UP (ref 3.8–10.5)

## 2024-11-07 PROCEDURE — 93010 ELECTROCARDIOGRAM REPORT: CPT

## 2024-11-07 PROCEDURE — 90792 PSYCH DIAG EVAL W/MED SRVCS: CPT | Mod: 95

## 2024-11-07 PROCEDURE — 99223 1ST HOSP IP/OBS HIGH 75: CPT

## 2024-11-07 RX ORDER — DIVALPROEX SODIUM 250 MG/1
500 TABLET, FILM COATED, EXTENDED RELEASE ORAL ONCE
Refills: 0 | Status: COMPLETED | OUTPATIENT
Start: 2024-11-07 | End: 2024-11-07

## 2024-11-07 RX ORDER — DIVALPROEX SODIUM 250 MG/1
500 TABLET, FILM COATED, EXTENDED RELEASE ORAL
Refills: 0 | Status: ACTIVE | OUTPATIENT
Start: 2024-11-07 | End: 2025-10-06

## 2024-11-07 RX ORDER — OLANZAPINE 20 MG/1
5 TABLET ORAL EVERY 6 HOURS
Refills: 0 | Status: ACTIVE | OUTPATIENT
Start: 2024-11-07 | End: 2025-10-06

## 2024-11-07 RX ORDER — INSULIN LISPRO 100/ML
VIAL (ML) SUBCUTANEOUS
Refills: 0 | Status: ACTIVE | OUTPATIENT
Start: 2024-11-07 | End: 2025-10-06

## 2024-11-07 RX ORDER — INSULIN GLARGINE,HUM.REC.ANLOG 100/ML
18 VIAL (ML) SUBCUTANEOUS AT BEDTIME
Refills: 0 | Status: ACTIVE | OUTPATIENT
Start: 2024-11-07 | End: 2025-10-06

## 2024-11-07 RX ORDER — METFORMIN HYDROCHLORIDE 500 MG/1
1000 TABLET, EXTENDED RELEASE ORAL
Refills: 0 | Status: ACTIVE | OUTPATIENT
Start: 2024-11-07 | End: 2025-10-06

## 2024-11-07 RX ORDER — GLUCAGON INJECTION, SOLUTION 1 MG/.2ML
1 INJECTION, SOLUTION SUBCUTANEOUS ONCE
Refills: 0 | Status: ACTIVE | OUTPATIENT
Start: 2024-11-07 | End: 2025-10-06

## 2024-11-07 RX ORDER — LORAZEPAM 2 MG
2 TABLET ORAL EVERY 6 HOURS
Refills: 0 | Status: DISCONTINUED | OUTPATIENT
Start: 2024-11-07 | End: 2024-11-07

## 2024-11-07 RX ADMIN — Medication 18 UNIT(S): at 21:17

## 2024-11-07 RX ADMIN — Medication 5: at 12:18

## 2024-11-07 RX ADMIN — METFORMIN HYDROCHLORIDE 1000 MILLIGRAM(S): 500 TABLET, EXTENDED RELEASE ORAL at 18:25

## 2024-11-07 RX ADMIN — Medication 3: at 18:26

## 2024-11-07 RX ADMIN — METFORMIN HYDROCHLORIDE 1000 MILLIGRAM(S): 500 TABLET, EXTENDED RELEASE ORAL at 09:16

## 2024-11-07 RX ADMIN — DIVALPROEX SODIUM 500 MILLIGRAM(S): 250 TABLET, FILM COATED, EXTENDED RELEASE ORAL at 12:10

## 2024-11-07 RX ADMIN — DIVALPROEX SODIUM 500 MILLIGRAM(S): 250 TABLET, FILM COATED, EXTENDED RELEASE ORAL at 21:17

## 2024-11-07 NOTE — ED CDU PROVIDER INITIAL DAY NOTE - OBJECTIVE STATEMENT
Pt is a 55 yo M here for SI/HI.  Pt states that he has had thoughts of SI/hi.  no active plan.  Pt denies drug use. Pt reports med noncompliance x 1 month.

## 2024-11-07 NOTE — ED ADULT TRIAGE NOTE - NS ED TRIAGE AVPU SCALE
Detail Level: Simple
Quality 226: Preventive Care And Screening: Tobacco Use: Screening And Cessation Intervention: Patient screened for tobacco use and is an ex/non-smoker
Quality 47: Advance Care Plan: Advance Care Planning discussed and documented; advance care plan or surrogate decision maker documented in the medical record.
Alert-The patient is alert, awake and responds to voice. The patient is oriented to time, place, and person. The triage nurse is able to obtain subjective information.
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
## 2024-11-07 NOTE — ED PROVIDER NOTE - OBJECTIVE STATEMENT
Pt is a 53 yo M here for SI/HI.  Pt states that he has had thoughts of SI/hi.  no active plan.  Pt denies drug use. Pt reports med noncompliance x 1 month.

## 2024-11-07 NOTE — ED BEHAVIORAL HEALTH ASSESSMENT NOTE - RISK ASSESSMENT
R.F. prior admissions, prior self-harm, non-compliance with outpatient treatment, homeless  P.F. connected to care with ACT Team, calm, cooperative

## 2024-11-07 NOTE — ED BEHAVIORAL HEALTH ASSESSMENT NOTE - REASON
reports SI/HI in context of substance use and med non compliance. Holding for collateral/observation and to restart oral medications.  Patient often reconstitutes after observation per chart review

## 2024-11-07 NOTE — ED BEHAVIORAL HEALTH ASSESSMENT NOTE - CURRENT MEDICATION
reports that he has been off all medications for the past month; previously was taking Depakote 500 mg BID, trazodone 100 mg qHS, metformin 1000 mg BID, Invega Sustenna CHAIREZ - Has ACT Team

## 2024-11-07 NOTE — ED BEHAVIORAL HEALTH ASSESSMENT NOTE - PSYCHIATRIC ISSUES AND PLAN (INCLUDE STANDING AND PRN MEDICATION)
as per above; restart  mg PO BID; if agitated, may offer Zyprexa 5 mg q6 PRN and ativan 2 mg q6 prn anxiety

## 2024-11-07 NOTE — ED BEHAVIORAL HEALTH ASSESSMENT NOTE - SUMMARY
Ariel is a 54 year-old M with PMHx of DM, single, homeless, and past psych hx of Schizoaffective disorder, cocaine use disorder, antisocial personality disorder, alcohol use disorder, cannabis use disorder, substance induced depression, substance induced psychosis, bipolar I,  very high frequent utilizer of the ED, mostly comes in due to being undomiciled and recent polysubstance abuse, presenting to the ED BIB self with SI and HI in the context of recent cocaine and EtOH use. Will hold for observation and to give more time for substances to metabolize and will reassess.

## 2024-11-07 NOTE — ED BEHAVIORAL HEALTH ASSESSMENT NOTE - DESCRIPTION
DM Patient arrived in ED and has been calm and cooperative in no acute distress. Psychiatry consulted. as per HPI

## 2024-11-08 ENCOUNTER — INPATIENT (INPATIENT)
Facility: HOSPITAL | Age: 54
LOS: 5 days | Discharge: LEFT BEFORE DISCHARGE | End: 2024-11-14
Attending: PSYCHIATRY & NEUROLOGY | Admitting: PSYCHIATRY & NEUROLOGY
Payer: MEDICAID

## 2024-11-08 VITALS
SYSTOLIC BLOOD PRESSURE: 124 MMHG | RESPIRATION RATE: 18 BRPM | TEMPERATURE: 98 F | OXYGEN SATURATION: 98 % | HEART RATE: 87 BPM | DIASTOLIC BLOOD PRESSURE: 76 MMHG

## 2024-11-08 VITALS — OXYGEN SATURATION: 98 % | RESPIRATION RATE: 18 BRPM | HEIGHT: 67 IN | WEIGHT: 167.99 LBS | TEMPERATURE: 98 F

## 2024-11-08 DIAGNOSIS — F20.9 SCHIZOPHRENIA, UNSPECIFIED: ICD-10-CM

## 2024-11-08 LAB
APPEARANCE UR: CLEAR — SIGNIFICANT CHANGE UP
BILIRUB UR-MCNC: NEGATIVE — SIGNIFICANT CHANGE UP
COLOR SPEC: YELLOW — SIGNIFICANT CHANGE UP
DIFF PNL FLD: NEGATIVE — SIGNIFICANT CHANGE UP
GLUCOSE UR QL: >=1000 MG/DL
KETONES UR-MCNC: 15 MG/DL
LEUKOCYTE ESTERASE UR-ACNC: NEGATIVE — SIGNIFICANT CHANGE UP
NITRITE UR-MCNC: NEGATIVE — SIGNIFICANT CHANGE UP
PCP SPEC-MCNC: SIGNIFICANT CHANGE UP
PH UR: 6 — SIGNIFICANT CHANGE UP (ref 5–8)
PROT UR-MCNC: NEGATIVE MG/DL — SIGNIFICANT CHANGE UP
SP GR SPEC: >1.03 — HIGH (ref 1–1.03)
UROBILINOGEN FLD QL: 1 MG/DL — SIGNIFICANT CHANGE UP (ref 0.2–1)

## 2024-11-08 PROCEDURE — G0378: CPT

## 2024-11-08 PROCEDURE — 93005 ELECTROCARDIOGRAM TRACING: CPT

## 2024-11-08 PROCEDURE — 81003 URINALYSIS AUTO W/O SCOPE: CPT

## 2024-11-08 PROCEDURE — 85025 COMPLETE CBC W/AUTO DIFF WBC: CPT

## 2024-11-08 PROCEDURE — 82962 GLUCOSE BLOOD TEST: CPT

## 2024-11-08 PROCEDURE — 80307 DRUG TEST PRSMV CHEM ANLYZR: CPT

## 2024-11-08 PROCEDURE — 99214 OFFICE O/P EST MOD 30 MIN: CPT

## 2024-11-08 PROCEDURE — 99238 HOSP IP/OBS DSCHRG MGMT 30/<: CPT

## 2024-11-08 PROCEDURE — 87637 SARSCOV2&INF A&B&RSV AMP PRB: CPT

## 2024-11-08 PROCEDURE — 80053 COMPREHEN METABOLIC PANEL: CPT

## 2024-11-08 PROCEDURE — 36415 COLL VENOUS BLD VENIPUNCTURE: CPT

## 2024-11-08 PROCEDURE — 99285 EMERGENCY DEPT VISIT HI MDM: CPT | Mod: 25

## 2024-11-08 RX ORDER — GLUCAGON INJECTION, SOLUTION 1 MG/.2ML
1 INJECTION, SOLUTION SUBCUTANEOUS ONCE
Refills: 0 | Status: DISCONTINUED | OUTPATIENT
Start: 2024-11-08 | End: 2024-11-14

## 2024-11-08 RX ORDER — METFORMIN HYDROCHLORIDE 500 MG/1
1000 TABLET, EXTENDED RELEASE ORAL
Refills: 0 | Status: DISCONTINUED | OUTPATIENT
Start: 2024-11-08 | End: 2024-11-14

## 2024-11-08 RX ORDER — DIVALPROEX SODIUM 250 MG/1
500 TABLET, FILM COATED, EXTENDED RELEASE ORAL
Refills: 0 | Status: DISCONTINUED | OUTPATIENT
Start: 2024-11-08 | End: 2024-11-14

## 2024-11-08 RX ORDER — INSULIN LISPRO 100/ML
VIAL (ML) SUBCUTANEOUS
Refills: 0 | Status: DISCONTINUED | OUTPATIENT
Start: 2024-11-08 | End: 2024-11-14

## 2024-11-08 RX ORDER — INSULIN GLARGINE,HUM.REC.ANLOG 100/ML
18 VIAL (ML) SUBCUTANEOUS AT BEDTIME
Refills: 0 | Status: DISCONTINUED | OUTPATIENT
Start: 2024-11-08 | End: 2024-11-11

## 2024-11-08 RX ORDER — INSULIN LISPRO 100/ML
VIAL (ML) SUBCUTANEOUS AT BEDTIME
Refills: 0 | Status: DISCONTINUED | OUTPATIENT
Start: 2024-11-08 | End: 2024-11-14

## 2024-11-08 RX ADMIN — DIVALPROEX SODIUM 500 MILLIGRAM(S): 250 TABLET, FILM COATED, EXTENDED RELEASE ORAL at 09:36

## 2024-11-08 RX ADMIN — Medication 4: at 16:40

## 2024-11-08 RX ADMIN — DIVALPROEX SODIUM 500 MILLIGRAM(S): 250 TABLET, FILM COATED, EXTENDED RELEASE ORAL at 18:15

## 2024-11-08 RX ADMIN — METFORMIN HYDROCHLORIDE 1000 MILLIGRAM(S): 500 TABLET, EXTENDED RELEASE ORAL at 18:14

## 2024-11-08 RX ADMIN — Medication 1: at 13:04

## 2024-11-08 RX ADMIN — METFORMIN HYDROCHLORIDE 1000 MILLIGRAM(S): 500 TABLET, EXTENDED RELEASE ORAL at 09:36

## 2024-11-08 NOTE — BH PATIENT PROFILE - HOME MEDICATIONS
RisperDAL 2 mg oral tablet , 1 tab(s) orally 2 times a day  paliperidone 234 mg/1.5 mL intramuscular suspension, extended release , 234 milligram(s) intramuscularly every 4 weeks  divalproex sodium 500 mg oral delayed release tablet , 1 tab(s) orally 2 times a day  metFORMIN 1000 mg oral tablet , 1 tab(s) orally 2 times a day

## 2024-11-08 NOTE — ED BEHAVIORAL HEALTH NOTE - BEHAVIORAL HEALTH NOTE
SWNote: per Telepsych , pt accepted at [facility] [address] . Nurse (Fidel) will call for report asap . Worker will arrange hamida upon RN2RN completion.

## 2024-11-08 NOTE — ED ADULT NURSE REASSESSMENT NOTE - NS ED NURSE REASSESS COMMENT FT1
Left in stable condition escorted by 2 attendant from [facility]. Alert and oriented, all belongings returned. Patient went to [facility] for in patient treatment. Transfer was voluntary.

## 2024-11-08 NOTE — ED BEHAVIORAL HEALTH PROGRESS NOTE - DETAILS
reports voices telling him to kill himself but denies intent of acting on the voices tba patient is referent
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
## 2024-11-08 NOTE — ED BEHAVIORAL HEALTH PROGRESS NOTE - SUBSTANCE ISSUES AND PLAN (INCLUDE STANDING AND PRN MEDICATION)
severe polysubstance abuse (crack cocaine - severe, cannabis - severe) - consider off label treatment of stimulant use disorder with topamax, wellbutrin and/or prescribed stimulants, consider inpatient vs outpatient rehab on discharge

## 2024-11-08 NOTE — ED BEHAVIORAL HEALTH NOTE - BEHAVIORAL HEALTH NOTE
Patient has been rejected from [facility], he may elect for inpatient rehab if bed search continues to prove unfruitful.

## 2024-11-08 NOTE — ED ADULT NURSE REASSESSMENT NOTE - GENERAL PATIENT STATE
Called pt's caregiver and spoke with Hilaria. Informed to get outstanding labwork. She states they will get done. If not will call back and reschedule. Pt had a change in medication and the facility is wanting that to work better.   
comfortable appearance/resting/sleeping
Ear Wedge Repair Text: A wedge excision was completed by carrying down an excision through the full thickness of the ear and cartilage with an inward facing Burow's triangle. The wound was then closed in a layered fashion.

## 2024-11-08 NOTE — ED BEHAVIORAL HEALTH PROGRESS NOTE - SUMMARY
Ariel is a 54 year-old M with PMHx of DM, single, homeless, and past psych hx of Schizoaffective disorder, cocaine use disorder, antisocial personality disorder, alcohol use disorder, cannabis use disorder, substance induced depression, substance induced psychosis, bipolar I,  very high frequent utilizer of the ED, mostly comes in due to being undomiciled and recent polysubstance abuse, presenting to the ED BIB self with SI and HI in the context of recent cocaine and EtOH use. Will hold for observation and to give more time for substances to metabolize and will reassess. Ariel is a 54 year-old M with PMHx of DM, single, homeless, and past psych hx of Schizoaffective disorder, cocaine use disorder, antisocial personality disorder, alcohol use disorder, cannabis use disorder, substance induced depression, substance induced psychosis, bipolar I,  very high frequent utilizer of the ED, currently on AOT mostly comes in due to being undomiciled and recent polysubstance abuse, presenting to the ED BIB self with SI and HI in the context of recent cocaine and EtOH use. Will hold for observation and to give more time for substances to metabolize and will reassess.

## 2024-11-08 NOTE — BH PATIENT PROFILE - NSDASASENSITIVE_PSY_ALL_CORE
"Mom calling states child is scratching \"down there\" a lot and the area is red and irritated looking.  Has child in a sitz bath now.  Wondering what she can apply?    Ok to apply lotrimin or generic clotrimazole qid (4x a day) or hydrocortisone cream.  Can mix these together equal parts of both and apply 4x/day.  Continue sitz bath and ok to add baking soda and soak for 5-10 minutes.  Make sure after giving bath to rinse child well with plain water to get all the soap residue off body.  If no improvement in 3 days or getting worse should be seen in the clinic.    Mom states understanding.    Keya HERNÁNDEZ RN  EP Triage    " No

## 2024-11-08 NOTE — ED BEHAVIORAL HEALTH PROGRESS NOTE - PSYCHIATRIC ISSUES AND PLAN (INCLUDE STANDING AND PRN MEDICATION)
hx of schizoaffective disorder - continue Invega sustenna 234 mg IM qmonthly (next due on 11/12), Depakote 500 mg bid

## 2024-11-08 NOTE — ED ADULT NURSE REASSESSMENT NOTE - NS ED NURSE REASSESS COMMENT FT1
Assumed patient care at 7:00 PM. Patient alert and awake in room. Patient is voluntary admitted, report already given by AM shift to [facility]. Patient is awaiting for transfer. Safety maintained.

## 2024-11-08 NOTE — BH PATIENT PROFILE - NSPROHMDIABETMGMTSTRAT_GEN_A_NUR
39 weeks gestation of pregnancy     Doing well, no complaints  Good fetal movement  Denies regular contractions, VB, LOF.   VE per request  Is curb walking  IOL 11/11 at 5AM if no baby by then  32lb 13.6oz TWG  FKC's and labor precautions     
medication therapy

## 2024-11-08 NOTE — ED CDU PROVIDER SUBSEQUENT DAY NOTE - NS ED ROS FT
No fever/chills   No photophobia/eye pain/changes in visio,   No ear pain/sore throat/dysphagia   No chest pain/palpitations  No SOB/cough/wheeze/stridor   No abdominal pain, No N/V/D  No dysuria/frequency/discharge   No neck/back pain,   No rash  No changes in neurological status/function. Ambulatory

## 2024-11-08 NOTE — ED CDU PROVIDER DISPOSITION NOTE - CLINICAL COURSE
Patient received at [facility] medically cleared and pending psych placement voluntary for SI, HI. Pt vitals stable, well appearing, accepted at [facility].

## 2024-11-08 NOTE — ED BEHAVIORAL HEALTH PROGRESS NOTE - DETAILS:
No acute events overnight, no PRNs required. I met with the patient at the bedside this morning. He complains of persistent auditory hallucinations telling him to kill himself but denies any intent of acting on these phenomena. He states that he is in need of assistance with his mental health given that he just got out of long term and was not receiving any formal mental health treatment while he was incarcerated, requests that he be started back on his medications and that he be helped with securing housing as he no longer wants to live with his sister. He states that the medications he was on in the past were helpful to him. He denies HI, VH currently. Reports good sleep and appetite, remains in appropriate behavioral control. He states that he would like to go to rehab to help him address his substance use issues but feels that he needs to address his mental health before doing so.

## 2024-11-08 NOTE — ED BEHAVIORAL HEALTH PROGRESS NOTE - RISK ASSESSMENT
Upon today's assessment, patient continues to complain of CAH telling him to kill himself and admits to depression and hopelessness associated with feeling overwhelmed by his stressors (recently released from snf, inability to address his substance use issues, has not followed up with outpatient mental health, lack of housing to name a few of his concerns). Though chart indicates the patient has a history of high ED utilization, it appears that he has not sought out treatment in at least a year (likely due to his incarceration  Upon today's assessment, patient continues to complain of CAH telling him to kill himself and admits to depression and hopelessness associated with feeling overwhelmed by his stressors (recently released from senior care, inability to address his substance use issues, has not followed up with outpatient mental health, lack of housing to name a few of his concerns).    I obtained collateral from patient's ACT  Jomar [phone]. He states that while the patient was in penitentiary, he was receiving treatment (Invega Sustenna 234 mg qmonthly last received on 10/15 and due next on 11/12, continued on Depakote 500 mg bid and also started on Risperdal 2 mg bid on 10/31/24 for unclear reasons). He states that the patient was supposed to go to rehab in [facility] following his release from penitentiary but he appeared to have sabotaged the interview as he hung up [facility] through the interview.    As stated in the initial assessment, patient's presentation is largely attributable to his polysubstance abuse as well as housing difficulties but it is clear on examination that the patient seems to be suffering from depression as well and incapability to address his polysubstance abuse is only further exacerbating his issues. He expresses interest in going to rehab following inpatient mental health treatment given his inability to keep himself safe as he recognizes the problems that his substance use has caused him. We will continue with the Invega Sustenna as well as the Depakote; will discuss starting the patient on an antidepressant to help with depression that he cites plays a role in why he turns to crack cocaine but will discontinue the Risperdal to avoid redundancy in antipsychotic medication.

## 2024-11-09 DIAGNOSIS — E11.9 TYPE 2 DIABETES MELLITUS WITHOUT COMPLICATIONS: ICD-10-CM

## 2024-11-09 DIAGNOSIS — F19.20 OTHER PSYCHOACTIVE SUBSTANCE DEPENDENCE, UNCOMPLICATED: ICD-10-CM

## 2024-11-09 LAB
A1C WITH ESTIMATED AVERAGE GLUCOSE RESULT: 10.6 % — HIGH (ref 4–5.6)
ESTIMATED AVERAGE GLUCOSE: 258 — SIGNIFICANT CHANGE UP
GLUCOSE BLDC GLUCOMTR-MCNC: 194 MG/DL — HIGH (ref 70–99)
GLUCOSE BLDC GLUCOMTR-MCNC: 229 MG/DL — HIGH (ref 70–99)
GLUCOSE BLDC GLUCOMTR-MCNC: 335 MG/DL — HIGH (ref 70–99)
GLUCOSE BLDC GLUCOMTR-MCNC: 512 MG/DL — CRITICAL HIGH (ref 70–99)

## 2024-11-09 PROCEDURE — 99222 1ST HOSP IP/OBS MODERATE 55: CPT

## 2024-11-09 RX ORDER — OLANZAPINE 20 MG/1
5 TABLET ORAL EVERY 6 HOURS
Refills: 0 | Status: DISCONTINUED | OUTPATIENT
Start: 2024-11-09 | End: 2024-11-14

## 2024-11-09 RX ORDER — LORAZEPAM 2 MG
2 TABLET ORAL EVERY 6 HOURS
Refills: 0 | Status: DISCONTINUED | OUTPATIENT
Start: 2024-11-09 | End: 2024-11-14

## 2024-11-09 RX ORDER — LORAZEPAM 2 MG
2 TABLET ORAL ONCE
Refills: 0 | Status: DISCONTINUED | OUTPATIENT
Start: 2024-11-09 | End: 2024-11-09

## 2024-11-09 RX ORDER — OLANZAPINE 20 MG/1
5 TABLET ORAL ONCE
Refills: 0 | Status: DISCONTINUED | OUTPATIENT
Start: 2024-11-09 | End: 2024-11-14

## 2024-11-09 RX ORDER — MELATONIN 5 MG
5 TABLET ORAL ONCE
Refills: 0 | Status: DISCONTINUED | OUTPATIENT
Start: 2024-11-09 | End: 2024-11-14

## 2024-11-09 RX ORDER — TRAZODONE HYDROCHLORIDE 100 MG/1
50 TABLET ORAL AT BEDTIME
Refills: 0 | Status: DISCONTINUED | OUTPATIENT
Start: 2024-11-09 | End: 2024-11-14

## 2024-11-09 RX ORDER — NICOTINE POLACRILEX 4 MG/1
2 GUM, CHEWING ORAL
Refills: 0 | Status: DISCONTINUED | OUTPATIENT
Start: 2024-11-09 | End: 2024-11-14

## 2024-11-09 RX ORDER — LORAZEPAM 2 MG
2 TABLET ORAL ONCE
Refills: 0 | Status: DISCONTINUED | OUTPATIENT
Start: 2024-11-09 | End: 2024-11-14

## 2024-11-09 RX ADMIN — Medication 4: at 12:31

## 2024-11-09 RX ADMIN — Medication 8: at 20:48

## 2024-11-09 RX ADMIN — METFORMIN HYDROCHLORIDE 1000 MILLIGRAM(S): 500 TABLET, EXTENDED RELEASE ORAL at 20:27

## 2024-11-09 RX ADMIN — Medication 2: at 08:28

## 2024-11-09 RX ADMIN — METFORMIN HYDROCHLORIDE 1000 MILLIGRAM(S): 500 TABLET, EXTENDED RELEASE ORAL at 08:29

## 2024-11-09 RX ADMIN — DIVALPROEX SODIUM 500 MILLIGRAM(S): 250 TABLET, FILM COATED, EXTENDED RELEASE ORAL at 08:29

## 2024-11-09 RX ADMIN — Medication 18 UNIT(S): at 20:48

## 2024-11-09 RX ADMIN — TRAZODONE HYDROCHLORIDE 50 MILLIGRAM(S): 100 TABLET ORAL at 20:27

## 2024-11-09 RX ADMIN — DIVALPROEX SODIUM 500 MILLIGRAM(S): 250 TABLET, FILM COATED, EXTENDED RELEASE ORAL at 20:27

## 2024-11-09 RX ADMIN — Medication 8: at 16:38

## 2024-11-09 NOTE — BH INPATIENT PSYCHIATRY ASSESSMENT NOTE - NSBHASSESSSUMMFT_PSY_ALL_CORE
54M with PMHx of DM, single, homeless, and PPH of Schizoaffective disorder, cocaine/alcohol/cannabis use disorder, antisocial personality disorder, who is a very frequent utilizer of the ED, mostly comes in due to being undomiciled and recent polysubstance abuse, now presenting to the [facility] ED BIB self with SI and HI in the context of recent cocaine and EtOH use. Patient reports recently being released from alf and relapsing on cocaine and EtOH. On transfer to [facility], denies SI/HI and denies AH/VH. Pt has ACT Team and was previously taking Depakote 500 mg BID, metformin 500 mg BID, risperdal 2mg BID and Invega Sustenna. In [facility], psych CL team held risperdal d/t unclear indication and to avoid antipsychotic redundancy. Primary to consider antidepressant to further address depressive sx.     Plan:  1. Legals: admit to [facility] on 9.13  2. Safety: routine observation, denies SI/HI/I/P on the unit.   3. Psychiatry: (defer med optimization to primary team)  -Resumed home meds:  	Invega Sustenna (next dose 11/12/24)  	Depakote 500mg po bid  --consider Naltrexone  -Start trazodone 50mg qhs PRN for sleep  -PRNs for agitation:   	Zyprexa 5mg / Ativan 2mg PO q6h for agitation 2/2 schizoaffective disorder  	Zyprexa 5mg *OR* Ativan 2mg IM x1 for severe agitation 2/2 schizoaffective disorder  	[do not give IM Zyprexa within 1h of IM benzos]  4. Group, milieu, individual therapy as appropriate.  5. Medical:  #DM2  - c/w metformin 1000mg bid  - c/w lantus 18mg qhs (per [facility] orders)  - ISS, premeal and qhs POCT gluc monitoring  6. Dispo: pending clinical improvement.  Patient continues to require inpatient hospitalization for stabilization and safety.

## 2024-11-09 NOTE — PSYCHIATRIC REHAB INITIAL EVALUATION - NSBHPRRECOMMEND_PSY_ALL_CORE
Writer met with patient in order to introduce patient to Psychiatric Rehabilitation Staff, department functions, orient patient to unit [facility] programming and to establish a Psychiatric Rehabilitation goal. Pt was admitted to 6 in the context of worsening Schizoaffective disorder and Cocaine use disorder. On the unit patient is visible and has maintained behavioral control. Patient reported he wants help to get into a rehab and to identify his triggers. Patient was able to identify an appropriate Psychiatric Rehabilitation goal, located in their  plan of care. Psychiatric Rehabilitation Staff will continue to engage patient daily in order to develop rapport, provide support and to assist patient in demonstrating progress towards Psychiatric Rehabilitation.

## 2024-11-09 NOTE — PSYCHIATRIC REHAB INITIAL EVALUATION - ABILITY TO HEAR (WITH HEARING AID OR HEARING APPLIANCE IF NORMALLY USED):
Adequate: hears normal conversation without difficulty
Lab values do not require emergent intervention. CT scan demonstrates no acute pathology. pain improved, advise fu with pmd.
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
## 2024-11-09 NOTE — BH INPATIENT PSYCHIATRY ASSESSMENT NOTE - RISK ASSESSMENT
Modifiable risk factors include: recent SI/I/P, helplessness, active mood episode, active substance use, acute psychosocial stressors, poor reactivity to stressors, difficulty expressing emotions, low frustration tolerance, experiencing homelessness, social isolation, noncompliant with treatment/not receiving treatment, recent incarceration, unable to care for self secondary to psychiatric illness.      Static risk factors for suicide include: h/o prior psychiatric admissions, diagnosis of psychosis and mood d/o, prior suicide attempt, h/o legal problems, h/o polysubstance use, h/o nonadherence to treatment.     Protective factors include: denies current SI/I/P, identifies reasons for living, future oriented, no active psychosis, help-seeking behaviors, adequate outpatient follow up (ACT team).    Immediate risk is minimized by inpatient admission to safe environment with appropriate supervision and limited access to lethal means. Future risk to be minimized by treatment of acute psychiatric symptoms, maximizing outpatient supports, providing patient education, discussing emergency procedures, and ensuring close follow-up.

## 2024-11-09 NOTE — BH INPATIENT PSYCHIATRY ASSESSMENT NOTE - NSACTIVEVENT_PSY_ALL_CORE
Yes Substance intoxication or withdrawal/Pending incarceration or homelessness/Inadequate social supports

## 2024-11-09 NOTE — BH INPATIENT PSYCHIATRY ASSESSMENT NOTE - NSBHCHARTREVIEWVS_PSY_A_CORE FT
Vital Signs Last 24 Hrs  T(C): 36.4 (11-09-24 @ 08:11), Max: 36.9 (11-08-24 @ 15:15)  T(F): 97.5 (11-09-24 @ 08:11), Max: 98.4 (11-08-24 @ 15:15)  HR: 87 (11-08-24 @ 15:15) (87 - 87)  BP: 124/76 (11-08-24 @ 15:15) (124/76 - 124/76)  BP(mean): --  RR: 18 (11-08-24 @ 22:01) (18 - 18)  SpO2: 98% (11-08-24 @ 22:01) (98% - 98%)    Orthostatic VS  11-09-24 @ 08:11  Lying BP: --/-- HR: --  Sitting BP: 121/69 HR: 90  Standing BP: 119/72 HR: 98  Site: --  Mode: --  Orthostatic VS  11-08-24 @ 22:01  Lying BP: 127/76 HR: 84  Sitting BP: 109/88 HR: 92  Standing BP: 127/77 HR: 98  Site: --  Mode: --

## 2024-11-09 NOTE — BH INPATIENT PSYCHIATRY ASSESSMENT NOTE - NSBHATTESTTYPEVISIT_PSY_A_CORE
Please call patient, blood pressure too high at pharmacy, rec have her come back for another blood pressure check or do on her own, can do at fire stations or at pharmacy.  However, if not under 140/90 needs office visit for possible meds    Milton Lyle M.D.     On-site Attending supervising ROSA ELENA (99XXX codes)

## 2024-11-09 NOTE — CHART NOTE - NSCHARTNOTEFT_GEN_A_CORE
Screening Medical Evaluation    Patient Admitted from: [facility] ED    [facility] admitting diagnosis: Schizophrenia      PAST MEDICAL & SURGICAL HISTORY:  DM (diabetes mellitus)  Bipolar disorder  HTN (hypertension)  Schizoaffective disorder, unspecified type  No significant past surgical history    ALLERGIES:  Haldol (Unknown)  Thorazine (Angioedema)  Haldol (Other)    SOCIAL HISTORY:  Patient w/ extensive hx of polysubstance abuse including cocaine and alcohol, recent relapse as per patient.     FAMILY HISTORY:  No known pertinent family history in 1st degree relatives      MEDICATIONS  (STANDING):  dextrose 5%. 1000 milliLiter(s) (50 mL/Hr) IV Continuous <Continuous>  dextrose 5%. 1000 milliLiter(s) (100 mL/Hr) IV Continuous <Continuous>  dextrose 50% Injectable 25 Gram(s) IV Push once  dextrose 50% Injectable 25 Gram(s) IV Push once  dextrose 50% Injectable 12.5 Gram(s) IV Push once  divalproex  milliGRAM(s) Oral two times a day  glucagon  Injectable 1 milliGRAM(s) IntraMuscular once  insulin glargine Injectable (LANTUS) 18 Unit(s) SubCutaneous at bedtime  insulin lispro (ADMELOG) corrective regimen sliding scale   SubCutaneous three times a day before meals  insulin lispro (ADMELOG) corrective regimen sliding scale   SubCutaneous at bedtime  melatonin. 5 milliGRAM(s) Oral once  metFORMIN 1000 milliGRAM(s) Oral two times a day    MEDICATIONS  (PRN):  dextrose Oral Gel 15 Gram(s) Oral once PRN Blood Glucose LESS THAN 70 milliGRAM(s)/deciliter  LORazepam     Tablet 2 milliGRAM(s) Oral every 6 hours PRN agitation 2/2 schizoaffective  LORazepam   Injectable 2 milliGRAM(s) IntraMuscular once PRN severe agitation 2/2 schizoaffective  nicotine  Polacrilex Gum 2 milliGRAM(s) Oral every 3 hours PRN Smoking Cessation  OLANZapine 5 milliGRAM(s) Oral every 6 hours PRN agitation 2/2 schizoaffective  OLANZapine Injectable 5 milliGRAM(s) IntraMuscular once PRN severe agitation 2/2 schizoaffective  traZODone 50 milliGRAM(s) Oral at bedtime PRN insomnia      Vital Signs Last 24 Hrs  T(F): 97.5 (09 Nov 2024 08:11)  HR: 90 (09 Nov 2024 08:11)  BP: 121/69 (09 Nov 2024 08:11)  RR: --  SpO2: --      CAPILLARY BLOOD GLUCOSE  POCT Blood Glucose.: 512 mg/dL (09 Nov 2024 20:17)  POCT Blood Glucose.: 335 mg/dL (09 Nov 2024 16:29)  POCT Blood Glucose.: 229 mg/dL (09 Nov 2024 12:16)  POCT Blood Glucose.: 194 mg/dL (09 Nov 2024 08:05)      PHYSICAL EXAM:  GENERAL: NAD  HEAD: Atraumatic, Normocephalic  EYES: EOMI, PERRLA, conjunctiva and sclera clear  NECK: Supple, No JVD  CHEST/LUNG: Clear to auscultation bilaterally; No wheeze  HEART: Regular rate and rhythm; No murmurs, rubs, or gallops      Assessment and Plan:  54M admitted to [facility] for Schizophrenia w/ PMHx of uncontrolled T2DM, HTN (not on meds anymore) seen at bedside for medical screening evaluation. Patient has no acute medical complaints at this time. Patient denies fever, chills, headache, dizziness, lightheadedness, N/V, SOB, cough, congestion, chest pain, abdominal pain, dysuria, hematuria, diarrhea, constipation. Staff reported that patient's bedtime  and uncontrolled in general, increasing throughout the day - patient seen eating cake and juice on the unit despite education regarding diet. Patient received ISS coverage and lantus. Physical exam limited but unremarkable, VSS. Labs unremarkable, LFTs mildly elevated. EKG NSR, Qtc 474.    1.) Schizophrenia: Refer to primary team documentation for recs  2.) T2DM: Uncontrolled. Continue metformin 1000mg BID, lantus 18U QHS, ISS coverage before meals and at bedtime. Monitor FS. Endo consult

## 2024-11-09 NOTE — BH INPATIENT PSYCHIATRY ASSESSMENT NOTE - ATTENDING COMMENTS
Patient seen by me individually for initial inpatient interview.  I was physically present during the service to the patient and personally examined the patient and I was directly involved in the management plan and recommendations of the care provided to the patient. I have reviewed the admission record and reviewed the patient’s physical examination, review of systems and admission labs. I have discussed the case with the NP, and I have reviewed the NP’s admission assessment. I agree with the above admission progress notes’ contents and the treatment plan and recommendations described above as per the NP’s note, and I have made changes as indicated.    54M with PMHx of DM, single, homeless, and PPH of Schizoaffective disorder, cocaine/alcohol/cannabis use disorder, antisocial personality disorder, who is a very frequent utilizer of the ED, mostly comes in due to being undomiciled and recent polysubstance abuse, now presenting to the [facility] ED BIB self with SI and HI in the context of recent cocaine and EtOH use. Patient reports recently being released from shelter and relapsing on cocaine and EtOH. On transfer to [facility], denies SI/HI and denies AH/VH. Pt has ACT Team and was previously taking Depakote 500 mg BID, metformin 500 mg BID, risperdal 2mg BID and Invega Sustenna. In [facility], psych CL team held risperdal d/t unclear indication and to avoid antipsychotic redundancy. Primary to consider antidepressant to further address depressive sx.     Plan:  1. Legals: admit to [facility] on 9.13  2. Safety: routine observation, denies SI/HI/I/P on the unit.   3. Psychiatry: (defer med optimization to primary team)  -Resumed home meds:  	Invega Sustenna (next dose 11/12/24)  	Depakote 500mg po bid

## 2024-11-09 NOTE — BH INPATIENT PSYCHIATRY ASSESSMENT NOTE - CURRENT MEDICATION
MEDICATIONS  (STANDING):  dextrose 5%. 1000 milliLiter(s) (50 mL/Hr) IV Continuous <Continuous>  dextrose 5%. 1000 milliLiter(s) (100 mL/Hr) IV Continuous <Continuous>  dextrose 50% Injectable 25 Gram(s) IV Push once  dextrose 50% Injectable 25 Gram(s) IV Push once  dextrose 50% Injectable 12.5 Gram(s) IV Push once  divalproex  milliGRAM(s) Oral two times a day  glucagon  Injectable 1 milliGRAM(s) IntraMuscular once  insulin glargine Injectable (LANTUS) 18 Unit(s) SubCutaneous at bedtime  insulin lispro (ADMELOG) corrective regimen sliding scale   SubCutaneous three times a day before meals  insulin lispro (ADMELOG) corrective regimen sliding scale   SubCutaneous at bedtime  metFORMIN 1000 milliGRAM(s) Oral two times a day    MEDICATIONS  (PRN):  dextrose Oral Gel 15 Gram(s) Oral once PRN Blood Glucose LESS THAN 70 milliGRAM(s)/deciliter  LORazepam     Tablet 2 milliGRAM(s) Oral every 6 hours PRN agitation 2/2 schizoaffective  LORazepam   Injectable 2 milliGRAM(s) IntraMuscular once PRN severe agitation 2/2 schizoaffective  OLANZapine 5 milliGRAM(s) Oral every 6 hours PRN agitation 2/2 schizoaffective  OLANZapine Injectable 5 milliGRAM(s) IntraMuscular once PRN severe agitation 2/2 schizoaffective  traZODone 50 milliGRAM(s) Oral at bedtime PRN insomnia   MEDICATIONS  (STANDING):  dextrose 5%. 1000 milliLiter(s) (50 mL/Hr) IV Continuous <Continuous>  dextrose 5%. 1000 milliLiter(s) (100 mL/Hr) IV Continuous <Continuous>  dextrose 50% Injectable 25 Gram(s) IV Push once  dextrose 50% Injectable 12.5 Gram(s) IV Push once  dextrose 50% Injectable 25 Gram(s) IV Push once  divalproex  milliGRAM(s) Oral two times a day  glucagon  Injectable 1 milliGRAM(s) IntraMuscular once  insulin glargine Injectable (LANTUS) 18 Unit(s) SubCutaneous at bedtime  insulin lispro (ADMELOG) corrective regimen sliding scale   SubCutaneous three times a day before meals  insulin lispro (ADMELOG) corrective regimen sliding scale   SubCutaneous at bedtime  metFORMIN 1000 milliGRAM(s) Oral two times a day    MEDICATIONS  (PRN):  dextrose Oral Gel 15 Gram(s) Oral once PRN Blood Glucose LESS THAN 70 milliGRAM(s)/deciliter  LORazepam     Tablet 2 milliGRAM(s) Oral every 6 hours PRN agitation 2/2 schizoaffective  LORazepam   Injectable 2 milliGRAM(s) IntraMuscular once PRN severe agitation 2/2 schizoaffective  nicotine  Polacrilex Gum 2 milliGRAM(s) Oral every 3 hours PRN Smoking Cessation  OLANZapine 5 milliGRAM(s) Oral every 6 hours PRN agitation 2/2 schizoaffective  OLANZapine Injectable 5 milliGRAM(s) IntraMuscular once PRN severe agitation 2/2 schizoaffective  traZODone 50 milliGRAM(s) Oral at bedtime PRN insomnia

## 2024-11-10 LAB
GLUCOSE BLDC GLUCOMTR-MCNC: 219 MG/DL — HIGH (ref 70–99)
GLUCOSE BLDC GLUCOMTR-MCNC: 276 MG/DL — HIGH (ref 70–99)
GLUCOSE BLDC GLUCOMTR-MCNC: 277 MG/DL — HIGH (ref 70–99)
GLUCOSE BLDC GLUCOMTR-MCNC: 342 MG/DL — HIGH (ref 70–99)

## 2024-11-10 PROCEDURE — 99232 SBSQ HOSP IP/OBS MODERATE 35: CPT

## 2024-11-10 RX ADMIN — Medication 4: at 08:10

## 2024-11-10 RX ADMIN — METFORMIN HYDROCHLORIDE 1000 MILLIGRAM(S): 500 TABLET, EXTENDED RELEASE ORAL at 09:00

## 2024-11-10 RX ADMIN — Medication 8: at 17:06

## 2024-11-10 RX ADMIN — DIVALPROEX SODIUM 500 MILLIGRAM(S): 250 TABLET, FILM COATED, EXTENDED RELEASE ORAL at 09:00

## 2024-11-10 RX ADMIN — Medication 18 UNIT(S): at 20:14

## 2024-11-10 RX ADMIN — DIVALPROEX SODIUM 500 MILLIGRAM(S): 250 TABLET, FILM COATED, EXTENDED RELEASE ORAL at 20:12

## 2024-11-10 RX ADMIN — Medication 6: at 12:43

## 2024-11-10 RX ADMIN — Medication 2: at 20:13

## 2024-11-10 RX ADMIN — METFORMIN HYDROCHLORIDE 1000 MILLIGRAM(S): 500 TABLET, EXTENDED RELEASE ORAL at 20:12

## 2024-11-10 RX ADMIN — TRAZODONE HYDROCHLORIDE 50 MILLIGRAM(S): 100 TABLET ORAL at 20:13

## 2024-11-11 LAB
GLUCOSE BLDC GLUCOMTR-MCNC: 217 MG/DL — HIGH (ref 70–99)
GLUCOSE BLDC GLUCOMTR-MCNC: 237 MG/DL — HIGH (ref 70–99)
GLUCOSE BLDC GLUCOMTR-MCNC: 339 MG/DL — HIGH (ref 70–99)
GLUCOSE BLDC GLUCOMTR-MCNC: 484 MG/DL — CRITICAL HIGH (ref 70–99)

## 2024-11-11 PROCEDURE — 99232 SBSQ HOSP IP/OBS MODERATE 35: CPT

## 2024-11-11 RX ORDER — PALIPERIDONE 6 MG/1
234 TABLET, EXTENDED RELEASE ORAL ONCE
Refills: 0 | Status: COMPLETED | OUTPATIENT
Start: 2024-11-12 | End: 2024-11-12

## 2024-11-11 RX ORDER — INSULIN GLARGINE,HUM.REC.ANLOG 100/ML
20 VIAL (ML) SUBCUTANEOUS AT BEDTIME
Refills: 0 | Status: DISCONTINUED | OUTPATIENT
Start: 2024-11-11 | End: 2024-11-14

## 2024-11-11 RX ORDER — INSULIN LISPRO 100/ML
4 VIAL (ML) SUBCUTANEOUS
Refills: 0 | Status: DISCONTINUED | OUTPATIENT
Start: 2024-11-11 | End: 2024-11-14

## 2024-11-11 RX ORDER — B-COMPLEX WITH VITAMIN C
1 VIAL (ML) INJECTION DAILY
Refills: 0 | Status: DISCONTINUED | OUTPATIENT
Start: 2024-11-11 | End: 2024-11-14

## 2024-11-11 RX ADMIN — TRAZODONE HYDROCHLORIDE 50 MILLIGRAM(S): 100 TABLET ORAL at 20:24

## 2024-11-11 RX ADMIN — Medication 8: at 20:30

## 2024-11-11 RX ADMIN — Medication 4: at 08:31

## 2024-11-11 RX ADMIN — METFORMIN HYDROCHLORIDE 1000 MILLIGRAM(S): 500 TABLET, EXTENDED RELEASE ORAL at 08:32

## 2024-11-11 RX ADMIN — Medication 4 UNIT(S): at 16:38

## 2024-11-11 RX ADMIN — DIVALPROEX SODIUM 500 MILLIGRAM(S): 250 TABLET, FILM COATED, EXTENDED RELEASE ORAL at 08:32

## 2024-11-11 RX ADMIN — DIVALPROEX SODIUM 500 MILLIGRAM(S): 250 TABLET, FILM COATED, EXTENDED RELEASE ORAL at 20:23

## 2024-11-11 RX ADMIN — Medication 4: at 12:30

## 2024-11-11 RX ADMIN — Medication 20 UNIT(S): at 20:28

## 2024-11-11 RX ADMIN — METFORMIN HYDROCHLORIDE 1000 MILLIGRAM(S): 500 TABLET, EXTENDED RELEASE ORAL at 20:23

## 2024-11-11 RX ADMIN — Medication 8: at 16:38
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
## 2024-11-11 NOTE — BH TREATMENT PLAN - NSTXCOPEINTERPR_PSY_ALL_CORE
Psychiatric Rehabilitaiton staff met with patient to help them establish a goal. Patients goal is to identify and utilize 2 coping skills to meet their needs.

## 2024-11-11 NOTE — BH TREATMENT PLAN - NSTXDCOPNOINTERSW_PSY_ALL_CORE
SW will encourage pt to comply with medication post d/c to increase likelihood of psychiatric stability once back in the community.

## 2024-11-11 NOTE — BH TREATMENT PLAN - NSTXPLANTHERAPYSESSIONSFT_PSY_ALL_CORE
11-09-24  Type of therapy: Medication management, Psychoeducation, Relapse prevention  --  --  --  --  --

## 2024-11-12 LAB
GLUCOSE BLDC GLUCOMTR-MCNC: 223 MG/DL — HIGH (ref 70–99)
GLUCOSE BLDC GLUCOMTR-MCNC: 244 MG/DL — HIGH (ref 70–99)
GLUCOSE BLDC GLUCOMTR-MCNC: 324 MG/DL — HIGH (ref 70–99)
GLUCOSE BLDC GLUCOMTR-MCNC: 332 MG/DL — HIGH (ref 70–99)

## 2024-11-12 PROCEDURE — 99238 HOSP IP/OBS DSCHRG MGMT 30/<: CPT

## 2024-11-12 PROCEDURE — 99232 SBSQ HOSP IP/OBS MODERATE 35: CPT

## 2024-11-12 RX ORDER — B-COMPLEX WITH VITAMIN C
1 VIAL (ML) INJECTION
Qty: 0 | Refills: 0 | DISCHARGE
Start: 2024-11-12

## 2024-11-12 RX ORDER — DIVALPROEX SODIUM 250 MG/1
1 TABLET, FILM COATED, EXTENDED RELEASE ORAL
Refills: 0 | DISCHARGE

## 2024-11-12 RX ORDER — DIVALPROEX SODIUM 250 MG/1
1 TABLET, FILM COATED, EXTENDED RELEASE ORAL
Qty: 0 | Refills: 0 | DISCHARGE
Start: 2024-11-12

## 2024-11-12 RX ORDER — PALIPERIDONE 6 MG/1
234 TABLET, EXTENDED RELEASE ORAL
Qty: 0 | Refills: 0 | DISCHARGE

## 2024-11-12 RX ORDER — METFORMIN HYDROCHLORIDE 500 MG/1
1 TABLET, EXTENDED RELEASE ORAL
Qty: 0 | Refills: 0 | DISCHARGE
Start: 2024-11-12

## 2024-11-12 RX ADMIN — Medication 4 UNIT(S): at 13:04

## 2024-11-12 RX ADMIN — Medication 4 UNIT(S): at 16:55

## 2024-11-12 RX ADMIN — Medication 4: at 13:03

## 2024-11-12 RX ADMIN — Medication 1 TABLET(S): at 08:34

## 2024-11-12 RX ADMIN — Medication 4: at 08:53

## 2024-11-12 RX ADMIN — METFORMIN HYDROCHLORIDE 1000 MILLIGRAM(S): 500 TABLET, EXTENDED RELEASE ORAL at 08:34

## 2024-11-12 RX ADMIN — Medication 8: at 16:54

## 2024-11-12 RX ADMIN — Medication 4 UNIT(S): at 08:53

## 2024-11-12 RX ADMIN — Medication 4: at 20:58

## 2024-11-12 RX ADMIN — DIVALPROEX SODIUM 500 MILLIGRAM(S): 250 TABLET, FILM COATED, EXTENDED RELEASE ORAL at 08:35

## 2024-11-12 RX ADMIN — TRAZODONE HYDROCHLORIDE 50 MILLIGRAM(S): 100 TABLET ORAL at 20:10

## 2024-11-12 RX ADMIN — METFORMIN HYDROCHLORIDE 1000 MILLIGRAM(S): 500 TABLET, EXTENDED RELEASE ORAL at 20:10

## 2024-11-12 RX ADMIN — DIVALPROEX SODIUM 500 MILLIGRAM(S): 250 TABLET, FILM COATED, EXTENDED RELEASE ORAL at 20:10

## 2024-11-12 RX ADMIN — Medication 20 UNIT(S): at 20:59

## 2024-11-12 RX ADMIN — PALIPERIDONE 234 MILLIGRAM(S): 6 TABLET, EXTENDED RELEASE ORAL at 08:52

## 2024-11-12 NOTE — BH INPATIENT PSYCHIATRY DISCHARGE NOTE - NSBHDCMEDICALFT_PSY_A_CORE
Pt was followed by hospitalist and diabetic RN regarding his DM who gave DM discharge regimen recommendations

## 2024-11-12 NOTE — BH INPATIENT PSYCHIATRY DISCHARGE NOTE - NSBHASSESSSUMMFT_PSY_ALL_CORE
54M with PMHx of DM, single, homeless, and PPH of Schizoaffective disorder, cocaine/alcohol/cannabis use disorder, antisocial personality disorder, who is a very frequent utilizer of the ED, mostly comes in due to being undomiciled and recent polysubstance abuse, now presenting to the [facility] ED BIB self with SI and HI in the context of recent cocaine and EtOH use. Patient reports recently being released from retirement and relapsing on cocaine and EtOH. On transfer to [facility], denies SI/HI and denies AH/VH. Pt has ACT Team and was previously taking Depakote 500 mg BID, metformin 500 mg BID, risperdal 2mg BID and Invega Sustenna. In [facility], psych CL team held risperdal d/t unclear indication and to avoid antipsychotic redundancy.      Plan:  -- Psychiatry:  	Invega Sustenna 234mg IM every 4 weeks (given on 11/12/24) and due on 12/10/24  	Depakote 500mg po bid    -- Dispo: pt put in 72 hour letter on 11/12 and was discharged on this letter today with ACT team follow-up
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
## 2024-11-12 NOTE — BH INPATIENT PSYCHIATRY DISCHARGE NOTE - NSDCMRMEDTOKEN_GEN_ALL_CORE_FT
divalproex sodium 500 mg oral delayed release tablet: 1 tab(s) orally 2 times a day  metFORMIN 1000 mg oral tablet: 1 tab(s) orally 2 times a day  paliperidone 234 mg/1.5 mL intramuscular suspension, extended release: 234 milligram(s) intramuscularly every 4 weeks  RisperDAL 2 mg oral tablet: 1 tab(s) orally 2 times a day   divalproex sodium 500 mg oral delayed release tablet: 1 tab(s) orally 2 times a day  divalproex sodium 500 mg oral delayed release tablet: 1 tab(s) orally 2 times a day  Insulin Pen Needles, 4mm: 1 application subcutaneously 4 times a day. ** Use with insulin pen **  Lantus Solostar Pen 100 units/mL subcutaneous solution: 20 unit(s) subcutaneous once a day (at bedtime)  metFORMIN 1000 mg oral tablet: 1 tab(s) orally 2 times a day  Multiple Vitamins oral tablet: 1 tab(s) orally once a day  paliperidone 234 mg/1.5 mL intramuscular suspension, extended release: 234 milligram(s) intramuscularly every 4 weeks --given on 11/12 and due on 12/10/24

## 2024-11-12 NOTE — BH INPATIENT PSYCHIATRY DISCHARGE NOTE - HOSPITAL COURSE
On the unit, patient presented as depressed and irritable at times.  He denied SI/HI/I/P, reported he had those sx prior to admission.  Pt reported daily ETOH and using cocaine most days.  Pt was interested in inpatient rehab.  Pt was restarted on Depakote 500mg PO BID and given his monthly Invega Sustenna 234mg IM that was due on 11/12/24.  Pt reported after speaking to his ACT team and sister who said he could stay with him, he no longer wanted inpatient rehab and wanted to go home to stay with his sister and engage with his ACT team.  Pt put in a 72 hour letter requesting discharge on 11/12/24 and was discharged on this letter as he no longer presented as a danger to himself or others.  Pt's sister and ACT team were contacted prior to discharge and they were in agreement with discharge plan. On the unit, patient presented as depressed and irritable at times.  He denied SI/HI/I/P, reported he had those sx prior to admission.  Pt reported daily ETOH and using cocaine most days.  Pt was interested in inpatient rehab.  Pt was restarted on Depakote 500mg PO BID and given his monthly Invega Sustenna 234mg IM that was due on 11/12/24.  Pt reported after speaking to his ACT team and sister who said he could stay with him, he no longer wanted inpatient rehab and wanted to go home to stay with his sister and engage with his ACT team.  Pt put in a 72 hour letter requesting discharge on 11/12/24 and was discharged on this letter as he no longer presented as a danger to himself or others.  Pt's sister and ACT team were contacted prior to discharge and they were in agreement with discharge plan.    Although patient was admitted due to risk factors for violence/suicide including mood sx and SI and HI, the patient’s risk for suicide/violence was mitigated during his hospitalization and his protective factors outweigh his risk factors at this time.  Pt is currently at low acute risk for suicide/violence.  Patient’s protective factors include:  no current SI/HI/I/P, willingness to engage in treatment, on CHAIREZ, ACT team in the community, family support, no hx of trauma, no hx of aggression, and no current acute depressive, psychotic or manic sx.  Although the patient is at chronic risk for violence/suicide given his hx of psychiatric illness, hx of psychiatric hospitalizations, legal hx, hx of suicide attempts, chronic medical issues, male gender and hx of substance abuse, this risk cannot be ameliorated by continued inpatient treatment.  The patient no longer met the criteria for psychiatric hospitalization at discharge.    On the unit, patient presented as depressed and irritable at times.  He denied SI/HI/I/P, reported he had those sx prior to admission.  Pt reported daily ETOH and using cocaine most days.  Pt was interested in inpatient rehab.  Pt was restarted on Depakote 500mg PO BID and given his monthly Invega Sustenna 234mg IM that was due on 11/12/24.  Pt reported after speaking to his ACT team and sister who said he could stay with him, he no longer wanted inpatient rehab and wanted to go home to stay with his sister and engage with his ACT team.  Pt put in a 72 hour letter requesting discharge on 11/12/24 and was discharged on this letter as he no longer presented as a danger to himself or others.  Pt's sister and ACT team were contacted prior to discharge and they were in agreement with discharge plan.    Although patient was admitted due to risk factors for violence/suicide including mood sx and SI and HI, the patient’s risk for suicide/violence was mitigated during his hospitalization and his protective factors outweigh his risk factors at this time.  Pt is currently at low acute risk for suicide/violence.  Patient’s protective factors include:  no current SI/HI/I/P, willingness to engage in treatment, on CHAIREZ, motivation to abstain from substances, ACT team in the community, family support, no hx of trauma, no hx of aggression, and no current acute depressive, psychotic or manic sx.  Although the patient is at chronic risk for violence/suicide given his hx of psychiatric illness, hx of psychiatric hospitalizations, legal hx, hx of suicide attempts, chronic medical issues, male gender and hx of substance abuse, this risk cannot be ameliorated by continued inpatient treatment.  The patient no longer met the criteria for psychiatric hospitalization at discharge.

## 2024-11-12 NOTE — ED ADULT TRIAGE NOTE - CHIEF COMPLAINT QUOTE
[employer] BIBEMS from street reporting auditory hallucinations and suicidal ideations. PMH of Bipolar schizophrenia, depression, and DM unable to take any of his medications related to no insurance and homelessness. Patient calm and cooperative at present, denies homicidal ideations, ETOH/drugs, visual hallucinations. Belongings secured.

## 2024-11-12 NOTE — BH DISCHARGE NOTE NURSING/SOCIAL WORK/PSYCH REHAB - DISCHARGE INSTRUCTIONS AFTERCARE APPOINTMENTS
In order to check the location, date, or time of your aftercare appointment, please refer to your Discharge Instructions Document given to you upon leaving the hospital.  If you have lost the instructions please call [phone]

## 2024-11-12 NOTE — BH INPATIENT PSYCHIATRY DISCHARGE NOTE - SUBSTANCE USE
Yes
Enoxaparin/Lovenox increases your risk for bleeding. Notify your doctor if you experience any of the following side effects: unusual bleeding or bruising, coughing up or vomiting blood, red or black stool, blood in your urine, itching or hives, chest tightness, chest pain, shortness of breath, trouble breathing, swelling in your face or hands, swelling in your mouth or throat, fever, large flat blue or purplish patches on the skin, numbness or weakness in your arm or leg on one side, pain in your lower leg, sudden or severe headache with vision, speech or walking problems. When Enoxaparin/Lovenox is taken with other medicines, they can affect how it works. Taking other medications such as aspirin, blood thinners, and nonsteroidal anti-inflammatories increases your risk of bleeding. It is very important to tell your health care provider about all of the other medicines, including over-the-counter medications, herbs, and vitamins you are taking. DO NOT start, stop, or change the dosage of any medicine, including over-the-counter medicines, vitamins, and herbal products without your doctor’s approval. Any products containing aspirin or are nonsteroidal anti-inflammatories lessen the blood’s ability to form clots and adds to the effect of Enoxaparin/Lovenox. Never take aspirin or medicines that contain aspirin without speaking to your doctor.

## 2024-11-12 NOTE — BH INPATIENT PSYCHIATRY DISCHARGE NOTE - NSDCCPCAREPLAN_GEN_ALL_CORE_FT
PRINCIPAL DISCHARGE DIAGNOSIS  Diagnosis: Schizoaffective disorder  Assessment and Plan of Treatment:       SECONDARY DISCHARGE DIAGNOSES  Diagnosis: Cocaine use disorder  Assessment and Plan of Treatment:

## 2024-11-12 NOTE — BH INPATIENT PSYCHIATRY DISCHARGE NOTE - ATTENDING DISCHARGE PHYSICAL EXAMINATION:
The patient has continued to show improvement in symptoms.  He states his depression is much improved, and he denies any significant anxiety.  He denies any SI, and his behavior has been well controlled.  He denies any psychotic symptoms.  The patient has been sleeping well.  The patient has been fully engaged in treatment on the unit, is compliant with medications, and is looking forward to continuing care as an outpatient with the ACT team.  He understands the need to remain abstinent from substances.  The patient has support from his family, who are also protective factors for him.  He was able to safety plan appropriately.  The patient’s risk cannot be further decreased with continued inpatient hospitalization.  He is no longer an acute danger to himself or others, and is stable for discharge home.  The patient was offered a Narcan kit prior to discharge.

## 2024-11-12 NOTE — BH INPATIENT PSYCHIATRY DISCHARGE NOTE - NSBHMSEBODY_PSY_A_CORE
Patient/Family Goals    • Patient/Family Long Term Goal  LONG TERM GOAL THAT PATIENT WILL HAVE CLEAR LUNGS, ADEQUATE BREATHING, AND SHORTNESS OF BREATH WILL BE RESOLVED.  Progressing    • Patient/Family Short Term Goal Progressing    SHORT TERM GOAL THAT PA Average build
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
## 2024-11-12 NOTE — BH DISCHARGE NOTE NURSING/SOCIAL WORK/PSYCH REHAB - PATIENT PORTAL LINK FT
You can access the FollowMyHealth Patient Portal offered by [facility] by registering at the following website: [email or web address]. By joining [employer]’s FollowMyHealth portal, you will also be able to view your health information using other applications (apps) compatible with our system.

## 2024-11-12 NOTE — BH INPATIENT PSYCHIATRY DISCHARGE NOTE - NSBHFUPINTERVALHXFT_PSY_A_CORE
Pt compliant with medication and tolerating it well.  Chart reviewed and case discussed with treatment team.  No events reported overnight.  Pt denies SI/HI/I/P or AH/VH or paranoia.  Pt eating and sleeping well.  Pt feels ready for discharge today.  Pt denies urges to use substances and endorses motivation to abstain.  Pt endorses motivation to continue medication as prescribed and engage in outpatient treatment.

## 2024-11-12 NOTE — BH DISCHARGE NOTE NURSING/SOCIAL WORK/PSYCH REHAB - NSCDUDCCRISIS_PSY_A_CORE
.  [facility] – Crisis Care for Children, Adults and Families  [address] [place] [address]  Mobile Crisis Hotline – (317) 768-6995/.  [facility]  [phone]/.  [facility] Response Crisis Hotline  (540) 744-2528  24 hour telephone crisis intervention and suicide prevention hotline concerned with all mental health issues/.  [facility]  [phone] [place] [address] [phone]   Hours:  Monday through Friday from 9 AM to 3 PM

## 2024-11-12 NOTE — BH INPATIENT PSYCHIATRY DISCHARGE NOTE - NSBHMETABOLIC_PSY_ALL_CORE_FT
BMI: BMI (kg/m2): 26.3 (11-08-24 @ 22:01)  HbA1c: A1C with Estimated Average Glucose Result: 10.6 % (11-09-24 @ 08:00)    Glucose: POCT Blood Glucose.: 244 mg/dL (11-12-24 @ 07:47)    BP: 133/79 (11-12-24 @ 07:41) (122/72 - 133/79)Vital Signs Last 24 Hrs  T(C): 37.1 (11-12-24 @ 07:41), Max: 37.1 (11-12-24 @ 07:41)  T(F): 98.7 (11-12-24 @ 07:41), Max: 98.7 (11-12-24 @ 07:41)  HR: 78 (11-12-24 @ 07:41) (78 - 78)  BP: 133/79 (11-12-24 @ 07:41) (133/79 - 133/79)  BP(mean): --  RR: --  SpO2: --      Lipid Panel:

## 2024-11-12 NOTE — BH DISCHARGE NOTE NURSING/SOCIAL WORK/PSYCH REHAB - NSDCPRGOAL_PSY_ALL_CORE
Writer met with patient for an individual session to review overall progress towards rehabilitation goals and to assess current functioning.  Patient was hospitalized for SI and HI. In session patient was receptive and cooperative. Overall patient has made good progress on psychiatric symptoms. Patient mood has improved. Patient denies SI, denies feeling depressed and anxious. Patient feels ready for discharge and feel confident and staying out of the hospital. On the unit patient was visible. Patient attends psych rehab groups and was engaged. Patient was fairly social with peers and staff and not a behavioral issue. Over the next few days Psych rehab staff will continue to engage and support patient throughout.

## 2024-11-12 NOTE — BH INPATIENT PSYCHIATRY DISCHARGE NOTE - NSDCRECOMMENDMEDICALFT_PSY_ALL_CORE
Please follow-up with your primary care doctor regarding your medical comorbidities including diabetes

## 2024-11-13 LAB
GLUCOSE BLDC GLUCOMTR-MCNC: 210 MG/DL — HIGH (ref 70–99)
GLUCOSE BLDC GLUCOMTR-MCNC: 233 MG/DL — HIGH (ref 70–99)
GLUCOSE BLDC GLUCOMTR-MCNC: 289 MG/DL — HIGH (ref 70–99)
GLUCOSE BLDC GLUCOMTR-MCNC: 378 MG/DL — HIGH (ref 70–99)

## 2024-11-13 PROCEDURE — 99232 SBSQ HOSP IP/OBS MODERATE 35: CPT

## 2024-11-13 RX ORDER — DIVALPROEX SODIUM 250 MG/1
1 TABLET, FILM COATED, EXTENDED RELEASE ORAL
Qty: 28 | Refills: 0
Start: 2024-11-13 | End: 2024-11-26

## 2024-11-13 RX ORDER — RISPERIDONE 3 MG/1
1 TABLET, FILM COATED ORAL
Refills: 0 | DISCHARGE

## 2024-11-13 RX ORDER — METFORMIN HYDROCHLORIDE 500 MG/1
1 TABLET, EXTENDED RELEASE ORAL
Qty: 28 | Refills: 0
Start: 2024-11-13 | End: 2024-11-26

## 2024-11-13 RX ORDER — B-COMPLEX WITH VITAMIN C
1 VIAL (ML) INJECTION
Qty: 14 | Refills: 0
Start: 2024-11-13 | End: 2024-11-26

## 2024-11-13 RX ORDER — BENZOCAINE .06; .7 ML/1; ML/1
0 SWAB TOPICAL
Qty: 100 | Refills: 1
Start: 2024-11-13

## 2024-11-13 RX ORDER — INSULIN GLARGINE,HUM.REC.ANLOG 100/ML
20 VIAL (ML) SUBCUTANEOUS
Qty: 2 | Refills: 0
Start: 2024-11-13

## 2024-11-13 RX ADMIN — Medication 1 TABLET(S): at 08:15

## 2024-11-13 RX ADMIN — DIVALPROEX SODIUM 500 MILLIGRAM(S): 250 TABLET, FILM COATED, EXTENDED RELEASE ORAL at 08:15

## 2024-11-13 RX ADMIN — Medication 4: at 12:35

## 2024-11-13 RX ADMIN — METFORMIN HYDROCHLORIDE 1000 MILLIGRAM(S): 500 TABLET, EXTENDED RELEASE ORAL at 20:16

## 2024-11-13 RX ADMIN — Medication 4 UNIT(S): at 08:14

## 2024-11-13 RX ADMIN — DIVALPROEX SODIUM 500 MILLIGRAM(S): 250 TABLET, FILM COATED, EXTENDED RELEASE ORAL at 20:18

## 2024-11-13 RX ADMIN — Medication 20 UNIT(S): at 20:22

## 2024-11-13 RX ADMIN — Medication 4 UNIT(S): at 12:35

## 2024-11-13 RX ADMIN — Medication 4 UNIT(S): at 16:38

## 2024-11-13 RX ADMIN — Medication 6: at 20:20

## 2024-11-13 RX ADMIN — Medication 6: at 16:38

## 2024-11-13 RX ADMIN — Medication 4: at 08:14

## 2024-11-13 RX ADMIN — TRAZODONE HYDROCHLORIDE 50 MILLIGRAM(S): 100 TABLET ORAL at 20:18

## 2024-11-13 RX ADMIN — METFORMIN HYDROCHLORIDE 1000 MILLIGRAM(S): 500 TABLET, EXTENDED RELEASE ORAL at 08:15

## 2024-11-13 NOTE — BH INPATIENT PSYCHIATRY PROGRESS NOTE - NSBHASSESSSUMMFT_PSY_ALL_CORE
54M with PMHx of DM, single, homeless, and PPH of Schizoaffective disorder, cocaine/alcohol/cannabis use disorder, antisocial personality disorder, who is a very frequent utilizer of the ED, mostly comes in due to being undomiciled and recent polysubstance abuse, now presenting to the [facility] ED BIB self with SI and HI in the context of recent cocaine and EtOH use. Patient reports recently being released from snf and relapsing on cocaine and EtOH. On transfer to [facility], denies SI/HI and denies AH/VH. Pt has ACT Team and was previously taking Depakote 500 mg BID, metformin 500 mg BID, risperdal 2mg BID and Invega Sustenna. In [facility], psych CL team held risperdal d/t unclear indication and to avoid antipsychotic redundancy.      Plan:  1. Legals: admit to [facility] on 9.13  2. Safety: routine observation, denies SI/HI/I/P on the unit.   3. Psychiatry: (defer med optimization to primary team)  -Resumed home meds:  	Invega Sustenna 234mg IM every 4 weeks (given on 11/12/24)  	Depakote 500mg po bid    4. Group, milieu, individual therapy as appropriate.  5. Medical:  #DM2  -hospitalist following; e-mailed hospitalist and diabetic RN regarding pt submission of 72 hour letter and plan for discharge Thurs, regarding DM discharge regimen and supply recommendations  - ISS, premeal and qhs POCT gluc monitoring  6. Dispo: pt put in 72 hour letter on 11/12
54M with PMHx of DM, single, homeless, and PPH of Schizoaffective disorder, cocaine/alcohol/cannabis use disorder, antisocial personality disorder, who is a very frequent utilizer of the ED, mostly comes in due to being undomiciled and recent polysubstance abuse, now presenting to the [facility] ED BIB self with SI and HI in the context of recent cocaine and EtOH use. Patient reports recently being released from halfway and relapsing on cocaine and EtOH. On transfer to [facility], denies SI/HI and denies AH/VH. Pt has ACT Team and was previously taking Depakote 500 mg BID, metformin 500 mg BID, risperdal 2mg BID and Invega Sustenna. In [facility], psych CL team held risperdal d/t unclear indication and to avoid antipsychotic redundancy.      Plan:  1. Legals: admit to [facility] on 9.13  2. Safety: routine observation, denies SI/HI/I/P on the unit.   3. Psychiatry: (defer med optimization to primary team)  -Resumed home meds:  	Invega Sustenna 234mg IM every 4 weeks (given on 11/12/24)  	Depakote 500mg po bid    4. Group, milieu, individual therapy as appropriate.  5. Medical:  #DM2  -hospitalist following; e-mailed hospitalist and diabetic RN regarding pt submission of 72 hour letter and plan for discharge Thurs, regarding DM discharge regimen and supply recommendations  - ISS, premeal and qhs POCT gluc monitoring  6. Dispo: pt put in 72 hour letter on 11/12
54M with PMHx of DM, single, homeless, and PPH of Schizoaffective disorder, cocaine/alcohol/cannabis use disorder, antisocial personality disorder, who is a very frequent utilizer of the ED, mostly comes in due to being undomiciled and recent polysubstance abuse, now presenting to the [facility] ED BIB self with SI and HI in the context of recent cocaine and EtOH use. Patient reports recently being released from [facility] and relapsing on cocaine and EtOH. On transfer to [facility], denies SI/HI and denies AH/VH. Pt has ACT Team and was previously taking Depakote 500 mg BID, metformin 500 mg BID, risperdal 2mg BID and Invega Sustenna. In [facility], psych CL team held risperdal d/t unclear indication and to avoid antipsychotic redundancy. Primary to consider antidepressant to further address depressive sx.     Plan:  1. Legals: admit to [facility] on 9.13  2. Safety: routine observation, denies SI/HI/I/P on the unit.   3. Psychiatry: (defer med optimization to primary team)  -Resumed home meds:  	Invega Sustenna (next dose 11/12/24)  	Depakote 500mg po bid  --consider Naltrexone  -Start trazodone 50mg qhs PRN for sleep  -PRNs for agitation:   	Zyprexa 5mg / Ativan 2mg PO q6h for agitation 2/2 schizoaffective disorder  	Zyprexa 5mg *OR* Ativan 2mg IM x1 for severe agitation 2/2 schizoaffective disorder  	[do not give IM Zyprexa within 1h of IM benzos]  4. Group, milieu, individual therapy as appropriate.  5. Medical:  #DM2  - c/w metformin 1000mg bid  - c/w lantus 18mg qhs (per [facility] orders)  - ISS, premeal and qhs POCT gluc monitoring  6. Dispo: pending clinical improvement.  Patient continues to require inpatient hospitalization for stabilization and safety.
54M with PMHx of DM, single, homeless, and PPH of Schizoaffective disorder, cocaine/alcohol/cannabis use disorder, antisocial personality disorder, who is a very frequent utilizer of the ED, mostly comes in due to being undomiciled and recent polysubstance abuse, now presenting to the [facility] ED BIB self with SI and HI in the context of recent cocaine and EtOH use. Patient reports recently being released from retirement and relapsing on cocaine and EtOH. On transfer to [facility], denies SI/HI and denies AH/VH. Pt has ACT Team and was previously taking Depakote 500 mg BID, metformin 500 mg BID, risperdal 2mg BID and Invega Sustenna. In [facility], psych CL team held risperdal d/t unclear indication and to avoid antipsychotic redundancy.      Plan:  1. Legals: admit to [facility] on 9.13  2. Safety: routine observation, denies SI/HI/I/P on the unit.   3. Psychiatry: (defer med optimization to primary team)  -Resumed home meds:  	Invega Sustenna 234mg IM every 4 weeks (next dose 11/12/24)  	Depakote 500mg po bid  --consider Naltrexone  -Start trazodone 50mg qhs PRN for sleep  -PRNs for agitation:   	Zyprexa 5mg / Ativan 2mg PO q6h for agitation 2/2 schizoaffective disorder  	Zyprexa 5mg *OR* Ativan 2mg IM x1 for severe agitation 2/2 schizoaffective disorder  	[do not give IM Zyprexa within 1h of IM benzos]  4. Group, milieu, individual therapy as appropriate.  5. Medical:  #DM2  -hospitalist following  - ISS, premeal and qhs POCT gluc monitoring  6. Dispo: pending clinical improvement.  Patient continues to require inpatient hospitalization for stabilization and safety.

## 2024-11-13 NOTE — BH INPATIENT PSYCHIATRY PROGRESS NOTE - CURRENT MEDICATION
MEDICATIONS  (STANDING):  dextrose 5%. 1000 milliLiter(s) (50 mL/Hr) IV Continuous <Continuous>  dextrose 5%. 1000 milliLiter(s) (100 mL/Hr) IV Continuous <Continuous>  dextrose 50% Injectable 12.5 Gram(s) IV Push once  dextrose 50% Injectable 25 Gram(s) IV Push once  dextrose 50% Injectable 25 Gram(s) IV Push once  divalproex  milliGRAM(s) Oral two times a day  glucagon  Injectable 1 milliGRAM(s) IntraMuscular once  insulin glargine Injectable (LANTUS) 20 Unit(s) SubCutaneous at bedtime  insulin lispro (ADMELOG) corrective regimen sliding scale   SubCutaneous at bedtime  insulin lispro (ADMELOG) corrective regimen sliding scale   SubCutaneous three times a day before meals  melatonin. 5 milliGRAM(s) Oral once  metFORMIN 1000 milliGRAM(s) Oral two times a day  multivitamin 1 Tablet(s) Oral daily    MEDICATIONS  (PRN):  dextrose Oral Gel 15 Gram(s) Oral once PRN Blood Glucose LESS THAN 70 milliGRAM(s)/deciliter  LORazepam     Tablet 2 milliGRAM(s) Oral every 6 hours PRN agitation 2/2 schizoaffective  LORazepam   Injectable 2 milliGRAM(s) IntraMuscular once PRN severe agitation 2/2 schizoaffective  nicotine  Polacrilex Gum 2 milliGRAM(s) Oral every 3 hours PRN Smoking Cessation  OLANZapine 5 milliGRAM(s) Oral every 6 hours PRN agitation 2/2 schizoaffective  OLANZapine Injectable 5 milliGRAM(s) IntraMuscular once PRN severe agitation 2/2 schizoaffective  traZODone 50 milliGRAM(s) Oral at bedtime PRN insomnia  
MEDICATIONS  (STANDING):  dextrose 5%. 1000 milliLiter(s) (50 mL/Hr) IV Continuous <Continuous>  dextrose 5%. 1000 milliLiter(s) (100 mL/Hr) IV Continuous <Continuous>  dextrose 50% Injectable 25 Gram(s) IV Push once  dextrose 50% Injectable 12.5 Gram(s) IV Push once  dextrose 50% Injectable 25 Gram(s) IV Push once  divalproex  milliGRAM(s) Oral two times a day  glucagon  Injectable 1 milliGRAM(s) IntraMuscular once  insulin glargine Injectable (LANTUS) 20 Unit(s) SubCutaneous at bedtime  insulin lispro (ADMELOG) corrective regimen sliding scale   SubCutaneous three times a day before meals  insulin lispro (ADMELOG) corrective regimen sliding scale   SubCutaneous at bedtime  insulin lispro Injectable (ADMELOG) 4 Unit(s) SubCutaneous three times a day before meals  melatonin. 5 milliGRAM(s) Oral once  metFORMIN 1000 milliGRAM(s) Oral two times a day  multivitamin 1 Tablet(s) Oral daily    MEDICATIONS  (PRN):  dextrose Oral Gel 15 Gram(s) Oral once PRN Blood Glucose LESS THAN 70 milliGRAM(s)/deciliter  LORazepam     Tablet 2 milliGRAM(s) Oral every 6 hours PRN agitation 2/2 schizoaffective  LORazepam   Injectable 2 milliGRAM(s) IntraMuscular once PRN severe agitation 2/2 schizoaffective  nicotine  Polacrilex Gum 2 milliGRAM(s) Oral every 3 hours PRN Smoking Cessation  OLANZapine 5 milliGRAM(s) Oral every 6 hours PRN agitation 2/2 schizoaffective  OLANZapine Injectable 5 milliGRAM(s) IntraMuscular once PRN severe agitation 2/2 schizoaffective  traZODone 50 milliGRAM(s) Oral at bedtime PRN insomnia  
MEDICATIONS  (STANDING):  dextrose 5%. 1000 milliLiter(s) (50 mL/Hr) IV Continuous <Continuous>  dextrose 5%. 1000 milliLiter(s) (100 mL/Hr) IV Continuous <Continuous>  dextrose 50% Injectable 25 Gram(s) IV Push once  dextrose 50% Injectable 25 Gram(s) IV Push once  dextrose 50% Injectable 12.5 Gram(s) IV Push once  divalproex  milliGRAM(s) Oral two times a day  glucagon  Injectable 1 milliGRAM(s) IntraMuscular once  insulin glargine Injectable (LANTUS) 20 Unit(s) SubCutaneous at bedtime  insulin lispro (ADMELOG) corrective regimen sliding scale   SubCutaneous three times a day before meals  insulin lispro (ADMELOG) corrective regimen sliding scale   SubCutaneous at bedtime  insulin lispro Injectable (ADMELOG) 4 Unit(s) SubCutaneous three times a day before meals  melatonin. 5 milliGRAM(s) Oral once  metFORMIN 1000 milliGRAM(s) Oral two times a day  multivitamin 1 Tablet(s) Oral daily    MEDICATIONS  (PRN):  dextrose Oral Gel 15 Gram(s) Oral once PRN Blood Glucose LESS THAN 70 milliGRAM(s)/deciliter  LORazepam     Tablet 2 milliGRAM(s) Oral every 6 hours PRN agitation 2/2 schizoaffective  LORazepam   Injectable 2 milliGRAM(s) IntraMuscular once PRN severe agitation 2/2 schizoaffective  nicotine  Polacrilex Gum 2 milliGRAM(s) Oral every 3 hours PRN Smoking Cessation  OLANZapine 5 milliGRAM(s) Oral every 6 hours PRN agitation 2/2 schizoaffective  OLANZapine Injectable 5 milliGRAM(s) IntraMuscular once PRN severe agitation 2/2 schizoaffective  traZODone 50 milliGRAM(s) Oral at bedtime PRN insomnia  
MEDICATIONS  (STANDING):  dextrose 5%. 1000 milliLiter(s) (50 mL/Hr) IV Continuous <Continuous>  dextrose 5%. 1000 milliLiter(s) (100 mL/Hr) IV Continuous <Continuous>  dextrose 50% Injectable 25 Gram(s) IV Push once  dextrose 50% Injectable 25 Gram(s) IV Push once  dextrose 50% Injectable 12.5 Gram(s) IV Push once  divalproex  milliGRAM(s) Oral two times a day  glucagon  Injectable 1 milliGRAM(s) IntraMuscular once  insulin glargine Injectable (LANTUS) 18 Unit(s) SubCutaneous at bedtime  insulin lispro (ADMELOG) corrective regimen sliding scale   SubCutaneous three times a day before meals  insulin lispro (ADMELOG) corrective regimen sliding scale   SubCutaneous at bedtime  melatonin. 5 milliGRAM(s) Oral once  metFORMIN 1000 milliGRAM(s) Oral two times a day    MEDICATIONS  (PRN):  dextrose Oral Gel 15 Gram(s) Oral once PRN Blood Glucose LESS THAN 70 milliGRAM(s)/deciliter  LORazepam     Tablet 2 milliGRAM(s) Oral every 6 hours PRN agitation 2/2 schizoaffective  LORazepam   Injectable 2 milliGRAM(s) IntraMuscular once PRN severe agitation 2/2 schizoaffective  nicotine  Polacrilex Gum 2 milliGRAM(s) Oral every 3 hours PRN Smoking Cessation  OLANZapine 5 milliGRAM(s) Oral every 6 hours PRN agitation 2/2 schizoaffective  OLANZapine Injectable 5 milliGRAM(s) IntraMuscular once PRN severe agitation 2/2 schizoaffective  traZODone 50 milliGRAM(s) Oral at bedtime PRN insomnia

## 2024-11-13 NOTE — BH INPATIENT PSYCHIATRY PROGRESS NOTE - NSICDXBHSECONDARYDX_PSY_ALL_CORE
DM (diabetes mellitus)   E11.9  Polysubstance dependence   F19.20  

## 2024-11-13 NOTE — BH INPATIENT PSYCHIATRY PROGRESS NOTE - NSBHATTESTAPPBILLTIME_PSY_A_CORE
I attest my time as ROSA ELENA is greater than 50% of the total combined time spent on qualifying patient care activities. I have reviewed and verified the documentation.
I attest my time as ROSA ELENA is greater than 50% of the total combined time spent on qualifying patient care activities. I have reviewed and verified the documentation.

## 2024-11-13 NOTE — BH INPATIENT PSYCHIATRY PROGRESS NOTE - NSBHATTESTTYPEVISIT_PSY_A_CORE
On-site Attending supervising ROSA ELENA (99XXX codes)
ROSA ELENA without on-site Attending supervision
ROSA ELENA without on-site Attending supervision
On-site Attending supervising ROSA ELENA (99XXX codes)

## 2024-11-13 NOTE — BH INPATIENT PSYCHIATRY PROGRESS NOTE - NSBHATTESTBILLING_PSY_A_CORE
29925-Fevqrcnapw OBS or IP - moderate complexity OR 35-49 mins
68222-Ouqmmcxcxq OBS or IP - moderate complexity OR 35-49 mins
26682-Nbxgjhdiso OBS or IP - moderate complexity OR 35-49 mins
30771-Ublbqkmipy OBS or IP - moderate complexity OR 35-49 mins

## 2024-11-13 NOTE — ADVANCED PRACTICE NURSE CONSULT - ASSESSMENT
54M admitted to [facility] for Schizophrenia w/ PMHx of uncontrolled T2DM, HTN consulted for uncontrolled blood glucose.  Patient reported that he supposed to take metformin and an injection that he has not taken because he does not know how to self-administer the insulin. But the medication is in the fridge.  The patient was educated on his current Ha1c level, the goal to delay complications, the importance of taking medications as prescribed, glucose monitoring, and meal planning to prevent hyperglycemia.  The writer was ready to teach the patient how to self-administer his insulin; the patient became apprehensive. The writer provided support and assured the patient she could come back another time for insulin pen teaching.  Patient agreed for the writer to come back another day.  
54 year-old M with PMHx of DM, single, homeless, and past psych hx of Schizoaffective disorder, cocaine use disorder, antisocial personality disorder, alcohol use disorder, cannabis use disorder, substance induced depression, substance induced psychosis, bipolar I,  very high frequent utilizer of the ED, mostly comes in due to being undomiciled and recent polysubstance abuse, presenting to the ED BIB self with SI and HI in the context of recent cocaine and EtOH.  Admitted to [facility] for inpatient psychiatry.  Met with patient for diabetes self-management education and support.  Reviewed with patient his current A1c level and goal A1c to delay complications.  Reviewed glucose monitoring and goal for fating and bedtime.  Education provided on consistent carbohydrates for breakfast, lunch, and dinner.  Demonstration insulin pen administration, used needles disposal, insulin storage, injection sites and safety test.  Patient taught how to set lancet device for glucose monitoring.  Reviewed management of hypoglycemia and hyperglycemia, the importance to take medications as prescribed, follow up care with dental twice a year, Optho yearly, PCP/Endo every three months, and foot care education.   Patient demonstrated insulin pen using insulin pen, BD needle, and injection pad. Patient demonstrated lancet device set up without difficulties.  Patient teach back treatment for hypoglycemia, he will eat fruits instead of juice for breakfast, include vegetables when eating lunch and dinner.  Patient refused any teaching materials

## 2024-11-13 NOTE — BH INPATIENT PSYCHIATRY PROGRESS NOTE - NSBHFUPINTERVALHXFT_PSY_A_CORE
Pt compliant with medication and tolerating it well.  Chart reviewed and case discussed with treatment team.  No events reported overnight.  Pt denies SI/HI/I/P or AH/VH or paranoia.  Pt reports his mood is better.  Pt reports he came here for inpatient rehab.  Pt reports using cocaine several times per week for several years.  Pt reports he uses cocaine to self medicate.
f/up SAD, bipolar type, VSS. no overnight issues.  Patient was superficially cooperative, denied SI/I/P, denied SE to meds, taking meds, reported sleeping well/ good appetite. denied AH; 
Pt compliant with medication and tolerating it well.  Chart reviewed and case discussed with treatment team.  No events reported overnight.  Pt denies SI/HI/I/P or AH/VH or paranoia.  Pt eating and sleeping well.  Pt feels ready for discharge tomorrow.  Pt reports he is able to administer his own insulin and use a glucometer to check his blood glucose.  Pt reports he also has his sister and ACT team to make sure he is taking his medication correctly.
Pt compliant with medication and tolerating it well.  Chart reviewed and case discussed with treatment team.  No events reported overnight.  Pt denies SI/HI/I/P or AH/VH or paranoia.  Pt eating and sleeping well.  Pt reports he continues to want to do inpatient rehab.  Pt reports he had to get rest the past few days, but now will attend groups.    Pt later approached treatment team, reporting he no longer wants rehab because he spoke to his ACT team and sister and wants to go to his sister's place.  Pt put in a 72 hour letter requesting discharge.

## 2024-11-13 NOTE — BH INPATIENT PSYCHIATRY PROGRESS NOTE - PRN MEDS
Yes
MEDICATIONS  (PRN):  dextrose Oral Gel 15 Gram(s) Oral once PRN Blood Glucose LESS THAN 70 milliGRAM(s)/deciliter  LORazepam     Tablet 2 milliGRAM(s) Oral every 6 hours PRN agitation 2/2 schizoaffective  LORazepam   Injectable 2 milliGRAM(s) IntraMuscular once PRN severe agitation 2/2 schizoaffective  nicotine  Polacrilex Gum 2 milliGRAM(s) Oral every 3 hours PRN Smoking Cessation  OLANZapine 5 milliGRAM(s) Oral every 6 hours PRN agitation 2/2 schizoaffective  OLANZapine Injectable 5 milliGRAM(s) IntraMuscular once PRN severe agitation 2/2 schizoaffective  traZODone 50 milliGRAM(s) Oral at bedtime PRN insomnia  

## 2024-11-13 NOTE — BH INPATIENT PSYCHIATRY PROGRESS NOTE - NSBHMETABOLIC_PSY_ALL_CORE_FT
BMI: BMI (kg/m2): 26.3 (11-08-24 @ 22:01)  HbA1c: A1C with Estimated Average Glucose Result: 10.6 % (11-09-24 @ 08:00)    Glucose: POCT Blood Glucose.: 237 mg/dL (11-11-24 @ 11:15)    BP: 130/79 (11-11-24 @ 08:04) (122/72 - 130/79)Vital Signs Last 24 Hrs  T(C): 36.6 (11-11-24 @ 08:04), Max: 36.6 (11-11-24 @ 08:04)  T(F): 97.9 (11-11-24 @ 08:04), Max: 97.9 (11-11-24 @ 08:04)  HR: 82 (11-11-24 @ 08:04) (82 - 82)  BP: 130/79 (11-11-24 @ 08:04) (130/79 - 130/79)  BP(mean): --  RR: --  SpO2: --      Lipid Panel: 
BMI: BMI (kg/m2): 26.3 (11-08-24 @ 22:01)  HbA1c: A1C with Estimated Average Glucose Result: 10.6 % (11-09-24 @ 08:00)    Glucose: POCT Blood Glucose.: 244 mg/dL (11-12-24 @ 07:47)    BP: 133/79 (11-12-24 @ 07:41) (122/72 - 133/79)Vital Signs Last 24 Hrs  T(C): 37.1 (11-12-24 @ 07:41), Max: 37.1 (11-12-24 @ 07:41)  T(F): 98.7 (11-12-24 @ 07:41), Max: 98.7 (11-12-24 @ 07:41)  HR: 78 (11-12-24 @ 07:41) (78 - 78)  BP: 133/79 (11-12-24 @ 07:41) (133/79 - 133/79)  BP(mean): --  RR: --  SpO2: --      Lipid Panel: 
BMI: BMI (kg/m2): 26.3 (11-08-24 @ 22:01)  HbA1c: A1C with Estimated Average Glucose Result: 10.6 % (11-09-24 @ 08:00)    Glucose: POCT Blood Glucose.: 233 mg/dL (11-13-24 @ 12:27)    BP: 133/79 (11-12-24 @ 07:41) (130/79 - 133/79)Vital Signs Last 24 Hrs  T(C): 36.4 (11-13-24 @ 07:57), Max: 36.4 (11-13-24 @ 07:57)  T(F): 97.5 (11-13-24 @ 07:57), Max: 97.5 (11-13-24 @ 07:57)  HR: --  BP: --  BP(mean): --  RR: --  SpO2: --    Orthostatic VS  11-13-24 @ 07:57  Lying BP: --/-- HR: --  Sitting BP: 113/80 HR: 79  Standing BP: --/-- HR: --  Site: --  Mode: --    Lipid Panel: 
BMI: BMI (kg/m2): 26.3 (11-08-24 @ 22:01)  HbA1c: A1C with Estimated Average Glucose Result: 10.6 % (11-09-24 @ 08:00)    Glucose: POCT Blood Glucose.: 276 mg/dL (11-10-24 @ 12:20)    BP: 122/72 (11-10-24 @ 07:28) (122/72 - 122/72)Vital Signs Last 24 Hrs  T(C): 36.7 (11-10-24 @ 07:28), Max: 36.7 (11-10-24 @ 07:28)  T(F): 98 (11-10-24 @ 07:28), Max: 98 (11-10-24 @ 07:28)  HR: 84 (11-10-24 @ 07:28) (84 - 84)  BP: 122/72 (11-10-24 @ 07:28) (122/72 - 122/72)  BP(mean): --  RR: --  SpO2: --    Orthostatic VS  11-09-24 @ 08:11  Lying BP: --/-- HR: --  Sitting BP: 121/69 HR: 90  Standing BP: 119/72 HR: 98  Site: --  Mode: --  Orthostatic VS  11-08-24 @ 22:01  Lying BP: 127/76 HR: 84  Sitting BP: 109/88 HR: 92  Standing BP: 127/77 HR: 98  Site: --  Mode: --    Lipid Panel:

## 2024-11-13 NOTE — BH INPATIENT PSYCHIATRY PROGRESS NOTE - NSBHFUPINTERVALCCFT_PSY_A_CORE
Pt seen f/u for SI and HI
reported feeling "good"

## 2024-11-13 NOTE — BH INPATIENT PSYCHIATRY PROGRESS NOTE - NSBHMSEAFFCONG_PSY_A_CORE
Left lower extremity ANGIOGRAM w/ POSSIBLE INTERVENTION scheduled on 11/6/24 at 9:30 am with , arrive at 06:30 am for renal hydration  Right femoral access    NPO and other recommendations per anesthesia guidelines.   Ok to continue ASA  If taking Plavix  - DO NOT STOP  H&P done by:  on 11/5/24  RENAL HYDRATION: 0.9 normal saline at 125 an hour for 2 hours prior   CONTRAST ALLERGY: None   DIABETIC MEDICATION: Jardiance hold 3 days prior   ANTICOAGULATION: Xarelto 15 mg hold 2 days prior   labs on arrivial cbc,cmp.lipid (fast) 5 days prior to the procedure.    Some procedures may require a groin access approach; if this occurs, please understand that you will be required to lay flat for 4-6 hours or longer post-procedure, refraining from bending your legs and neck. This will allow the access site to adequately seal.  
Congruent

## 2024-11-13 NOTE — ADVANCED PRACTICE NURSE CONSULT - RECOMMEDATIONS
Endorsed to primary nurse to reinforce education  Dietician consult for consistent carbohydrates   Case discussed with hospitalist to review diabetes medications and adjust according to blood glucose level.  Will f/u with patient when stable. 
Endorsed to primary nurse to reinforce education by supervise patient self-administering his insulin and glucose monitoring.  Recommended- Diabetes supplies, insulin pen, BD needles, alcohol swab, glucometer, lancets, and strips.  Patient will need to f/u with Endo/PCP within a one-two weeks of D/C.

## 2024-11-13 NOTE — BH INPATIENT PSYCHIATRY PROGRESS NOTE - NSBHCHARTREVIEWVS_PSY_A_CORE FT
[facility]
Vital Signs Last 24 Hrs  T(C): 36.6 (11-11-24 @ 08:04), Max: 36.6 (11-11-24 @ 08:04)  T(F): 97.9 (11-11-24 @ 08:04), Max: 97.9 (11-11-24 @ 08:04)  HR: 82 (11-11-24 @ 08:04) (82 - 82)  BP: 130/79 (11-11-24 @ 08:04) (130/79 - 130/79)  BP(mean): --  RR: --  SpO2: --    
Vital Signs Last 24 Hrs  T(C): 36.4 (11-13-24 @ 07:57), Max: 36.4 (11-13-24 @ 07:57)  T(F): 97.5 (11-13-24 @ 07:57), Max: 97.5 (11-13-24 @ 07:57)  HR: --  BP: --  BP(mean): --  RR: --  SpO2: --    Orthostatic VS  11-13-24 @ 07:57  Lying BP: --/-- HR: --  Sitting BP: 113/80 HR: 79  Standing BP: --/-- HR: --  Site: --  Mode: --  
Vital Signs Last 24 Hrs  T(C): 37.1 (11-12-24 @ 07:41), Max: 37.1 (11-12-24 @ 07:41)  T(F): 98.7 (11-12-24 @ 07:41), Max: 98.7 (11-12-24 @ 07:41)  HR: 78 (11-12-24 @ 07:41) (78 - 78)  BP: 133/79 (11-12-24 @ 07:41) (133/79 - 133/79)  BP(mean): --  RR: --  SpO2: --    
Vital Signs Last 24 Hrs  T(C): 36.7 (11-10-24 @ 07:28), Max: 36.7 (11-10-24 @ 07:28)  T(F): 98 (11-10-24 @ 07:28), Max: 98 (11-10-24 @ 07:28)  HR: 84 (11-10-24 @ 07:28) (84 - 84)  BP: 122/72 (11-10-24 @ 07:28) (122/72 - 122/72)  BP(mean): --  RR: --  SpO2: --    Orthostatic VS  11-09-24 @ 08:11  Lying BP: --/-- HR: --  Sitting BP: 121/69 HR: 90  Standing BP: 119/72 HR: 98  Site: --  Mode: --  Orthostatic VS  11-08-24 @ 22:01  Lying BP: 127/76 HR: 84  Sitting BP: 109/88 HR: 92  Standing BP: 127/77 HR: 98  Site: --  Mode: --

## 2024-11-14 VITALS — HEART RATE: 78 BPM | DIASTOLIC BLOOD PRESSURE: 80 MMHG | SYSTOLIC BLOOD PRESSURE: 124 MMHG | TEMPERATURE: 98 F

## 2024-11-14 LAB — GLUCOSE BLDC GLUCOMTR-MCNC: 199 MG/DL — HIGH (ref 70–99)

## 2024-11-14 RX ADMIN — Medication 4 UNIT(S): at 08:41

## 2024-11-14 RX ADMIN — Medication 2: at 08:41

## 2024-11-14 RX ADMIN — METFORMIN HYDROCHLORIDE 1000 MILLIGRAM(S): 500 TABLET, EXTENDED RELEASE ORAL at 08:41

## 2024-11-14 RX ADMIN — DIVALPROEX SODIUM 500 MILLIGRAM(S): 250 TABLET, FILM COATED, EXTENDED RELEASE ORAL at 08:21

## 2024-11-14 RX ADMIN — Medication 1 TABLET(S): at 08:21

## 2024-11-26 NOTE — ED CDU PROVIDER SUBSEQUENT DAY NOTE - PSYCHIATRIC [+], MLM
(+) hearing voices
Please send the attached prescription to the patient's pharmacy as soon as possible. Thank you!    Requested Prescriptions     Pending Prescriptions Disp Refills    clopidogrel (Plavix) 75 mg tablet [Pharmacy Med Name: CLOPIDOGREL 75MG TABLETS] 90 tablet 3     Sig: TAKE 1 TABLET(75 MG) BY MOUTH EVERY DAY       
SUICIDAL

## 2024-12-01 NOTE — ED PROVIDER NOTE - PHYSICAL EXAMINATION
Constitutional - well-developed.   Head - NCAT. Airway patent.   Eyes - PERRL.   CV - RRR. no murmur. no edema.   Pulm - CTAB.   Abd - soft, nt. no rebound. no guarding.   Neuro - A&Ox3. strength 5/5 x4. sensation intact x4. normal gait.   Skin - No rash. .  MSK - normal ROM. Never smoker
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
## 2025-03-12 ENCOUNTER — EMERGENCY (EMERGENCY)
Facility: HOSPITAL | Age: 55
LOS: 1 days | Discharge: HOME | End: 2025-03-12
Attending: EMERGENCY MEDICINE
Payer: MEDICAID

## 2025-03-12 VITALS
SYSTOLIC BLOOD PRESSURE: 143 MMHG | OXYGEN SATURATION: 97 % | RESPIRATION RATE: 16 BRPM | DIASTOLIC BLOOD PRESSURE: 85 MMHG | HEART RATE: 82 BPM | TEMPERATURE: 98 F

## 2025-03-12 LAB
ALBUMIN SERPL ELPH-MCNC: 4 G/DL — SIGNIFICANT CHANGE UP (ref 3.3–5.2)
ALP SERPL-CCNC: 60 U/L — SIGNIFICANT CHANGE UP (ref 40–120)
ALT FLD-CCNC: 20 U/L — SIGNIFICANT CHANGE UP
ANION GAP SERPL CALC-SCNC: 12 MMOL/L — SIGNIFICANT CHANGE UP (ref 5–17)
APAP SERPL-MCNC: <3 UG/ML — LOW (ref 10–26)
AST SERPL-CCNC: 23 U/L — SIGNIFICANT CHANGE UP
BASOPHILS # BLD AUTO: 0.02 K/UL — SIGNIFICANT CHANGE UP (ref 0–0.2)
BASOPHILS NFR BLD AUTO: 0.4 % — SIGNIFICANT CHANGE UP (ref 0–2)
BILIRUB SERPL-MCNC: <0.2 MG/DL — LOW (ref 0.4–2)
BUN SERPL-MCNC: 14.4 MG/DL — SIGNIFICANT CHANGE UP (ref 8–20)
CALCIUM SERPL-MCNC: 8.9 MG/DL — SIGNIFICANT CHANGE UP (ref 8.4–10.5)
CHLORIDE SERPL-SCNC: 103 MMOL/L — SIGNIFICANT CHANGE UP (ref 96–108)
CO2 SERPL-SCNC: 24 MMOL/L — SIGNIFICANT CHANGE UP (ref 22–29)
CREAT SERPL-MCNC: 0.53 MG/DL — SIGNIFICANT CHANGE UP (ref 0.5–1.3)
EGFR: 118 ML/MIN/1.73M2 — SIGNIFICANT CHANGE UP
EGFR: 118 ML/MIN/1.73M2 — SIGNIFICANT CHANGE UP
EOSINOPHIL # BLD AUTO: 0.12 K/UL — SIGNIFICANT CHANGE UP (ref 0–0.5)
EOSINOPHIL NFR BLD AUTO: 2.4 % — SIGNIFICANT CHANGE UP (ref 0–6)
ETHANOL SERPL-MCNC: <10 MG/DL — SIGNIFICANT CHANGE UP (ref 0–9)
GLUCOSE SERPL-MCNC: 115 MG/DL — HIGH (ref 70–99)
HCT VFR BLD CALC: 44.8 % — SIGNIFICANT CHANGE UP (ref 39–50)
HGB BLD-MCNC: 14.9 G/DL — SIGNIFICANT CHANGE UP (ref 13–17)
IMM GRANULOCYTES # BLD AUTO: 0.01 K/UL — SIGNIFICANT CHANGE UP (ref 0–0.07)
IMM GRANULOCYTES NFR BLD AUTO: 0.2 % — SIGNIFICANT CHANGE UP (ref 0–0.9)
LYMPHOCYTES # BLD AUTO: 2.28 K/UL — SIGNIFICANT CHANGE UP (ref 1–3.3)
LYMPHOCYTES NFR BLD AUTO: 45.1 % — HIGH (ref 13–44)
MCHC RBC-ENTMCNC: 29.3 PG — SIGNIFICANT CHANGE UP (ref 27–34)
MCHC RBC-ENTMCNC: 33.3 G/DL — SIGNIFICANT CHANGE UP (ref 32–36)
MCV RBC AUTO: 88.2 FL — SIGNIFICANT CHANGE UP (ref 80–100)
MONOCYTES # BLD AUTO: 0.57 K/UL — SIGNIFICANT CHANGE UP (ref 0–0.9)
MONOCYTES NFR BLD AUTO: 11.3 % — SIGNIFICANT CHANGE UP (ref 2–14)
NEUTROPHILS # BLD AUTO: 2.05 K/UL — SIGNIFICANT CHANGE UP (ref 1.8–7.4)
NEUTROPHILS NFR BLD AUTO: 40.6 % — LOW (ref 43–77)
NRBC # BLD AUTO: 0 K/UL — SIGNIFICANT CHANGE UP (ref 0–0)
NRBC # FLD: 0 K/UL — SIGNIFICANT CHANGE UP (ref 0–0)
NRBC BLD AUTO-RTO: 0 /100 WBCS — SIGNIFICANT CHANGE UP (ref 0–0)
PLATELET # BLD AUTO: 305 K/UL — SIGNIFICANT CHANGE UP (ref 150–400)
PMV BLD: 8.9 FL — SIGNIFICANT CHANGE UP (ref 7–13)
POTASSIUM SERPL-MCNC: 4 MMOL/L — SIGNIFICANT CHANGE UP (ref 3.5–5.3)
POTASSIUM SERPL-SCNC: 4 MMOL/L — SIGNIFICANT CHANGE UP (ref 3.5–5.3)
PROT SERPL-MCNC: 6.6 G/DL — SIGNIFICANT CHANGE UP (ref 6.6–8.7)
RBC # BLD: 5.08 M/UL — SIGNIFICANT CHANGE UP (ref 4.2–5.8)
RBC # FLD: 12.1 % — SIGNIFICANT CHANGE UP (ref 10.3–14.5)
SALICYLATES SERPL-MCNC: <0.6 MG/DL — LOW (ref 10–20)
SARS-COV-2 RNA SPEC QL NAA+PROBE: SIGNIFICANT CHANGE UP
SODIUM SERPL-SCNC: 139 MMOL/L — SIGNIFICANT CHANGE UP (ref 135–145)
VALPROATE SERPL-MCNC: <3.7 UG/ML — LOW (ref 50–100)
WBC # BLD: 5.05 K/UL — SIGNIFICANT CHANGE UP (ref 3.8–10.5)
WBC # FLD AUTO: 5.05 K/UL — SIGNIFICANT CHANGE UP (ref 3.8–10.5)

## 2025-03-12 PROCEDURE — 93010 ELECTROCARDIOGRAM REPORT: CPT

## 2025-03-12 PROCEDURE — 99285 EMERGENCY DEPT VISIT HI MDM: CPT

## 2025-03-12 NOTE — ED ADULT NURSE NOTE - OBJECTIVE STATEMENT
pt axo3, c/o tactile and auditory hallucinations, states voices are telling him to hurt people. has been off his medications for six momths, PMH OF bipolar and schizophrenia. pt has no belongings with him

## 2025-03-12 NOTE — ED ADULT TRIAGE NOTE - INTERNATIONAL TRAVEL
October 24, 2019      To Whom It May Concern:      Angelique Rizzo was seen in our Emergency Department today, 10/24/19.  I expect her condition to improve over the next 2 days.  She may return to work/school when improved.    Sincerely,        Cedric Van, DO        
No

## 2025-03-12 NOTE — ED PROVIDER NOTE - CLINICAL SUMMARY MEDICAL DECISION MAKING FREE TEXT BOX
55y M w/ hx schizoaffective disorder, bipolar; presents with hallucinations in the setting of medication noncompliance. Will medically clear for psych eval.
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
## 2025-03-12 NOTE — ED ADULT NURSE NOTE - SUICIDE RISK FACTORS
Command hallucinations/Alcohol/Substance abuse disorders/Psychotic disorder current/past/Recent onset of current/past psychiatric diagnosis

## 2025-03-12 NOTE — ED PROVIDER NOTE - OTHER FINDINGS
I called patient to schedule a colonoscopy date on 5/5/2020 with Dr Lawyer Rooney at [facility] for surgery. I reviewed surgery checklist and bowel prep. Patient voiced understanding. Instructions mail to patient. as interpreted by attending Dr. Vasquez

## 2025-03-12 NOTE — ED PROVIDER NOTE - PROGRESS NOTE DETAILS
Pedro: Telepsych consulted -- pt sleeping and unable to participate in interview at this time. Will hold for reassessment.
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
## 2025-03-12 NOTE — ED ADULT NURSE NOTE - HPI (INCLUDE ILLNESS QUALITY, SEVERITY, DURATION, TIMING, CONTEXT, MODIFYING FACTORS, ASSOCIATED SIGNS AND SYMPTOMS)
Patient has been medically cleared by main ED attending to . He arrived in yellow gown escorted by security, and was want on arrival. Patient reported HI and SI, patient needs psych eval.

## 2025-03-12 NOTE — ED PROVIDER NOTE - OBJECTIVE STATEMENT
55y M w/ hx schizoaffective disorder, bipolar, polysubstance use, HTN, HLD, DM; presents for psych eval. Pt reports having auditory hallucinations telling him to kill himself and others. Says he is homeless and living in the [place]; says he has been off his medications for a month. Says he is supposed to be on Depakote and Invega. Denies drug/alcohol use.

## 2025-03-12 NOTE — ED PROVIDER NOTE - NS ED ROS FT
Evita Mercado, XIMENA  1/9/2020  [facility]    XIMENA Arana, [facility] Diabetes Education  [phone] (voicemail)    Follow-Up:  Individual visit on 3/10/20 at 4:00 PM in [place]            Constitutional: no fever  CV: no chest pain  Resp: no cough, no shortness of breath  GI: no abdominal pain, no vomiting, no diarrhea  : no dysuria  MSK: no joint pain  Neuro: no headache  Psych: calm and cooperative

## 2025-03-12 NOTE — ED ADULT NURSE NOTE - NS ED NOTE  TALK SOMEONE YN
At last discharge, she was to wear 9 L of oxygen at rest and 12 L with exertion.  She has been doing well with 8 L at rest at home per her daughter.  Titrate supplemental oxygen to maintain saturations greater than or equal to 89%.  Continue BiPAP at bedtime.  She prefers her home nasal pillows and per policy, order reflects using home machine.  Discussed with respiratory therapy.  Patient is able to apply the mask and turn it on herself per her and her daughter.   No

## 2025-03-13 DIAGNOSIS — F14.10 COCAINE ABUSE, UNCOMPLICATED: ICD-10-CM

## 2025-03-13 LAB
AMPHET UR-MCNC: NEGATIVE — SIGNIFICANT CHANGE UP
APPEARANCE UR: CLEAR — SIGNIFICANT CHANGE UP
BARBITURATES UR SCN-MCNC: NEGATIVE — SIGNIFICANT CHANGE UP
BENZODIAZ UR-MCNC: NEGATIVE — SIGNIFICANT CHANGE UP
BILIRUB UR-MCNC: NEGATIVE — SIGNIFICANT CHANGE UP
COCAINE METAB.OTHER UR-MCNC: POSITIVE
COLOR SPEC: YELLOW — SIGNIFICANT CHANGE UP
DIFF PNL FLD: NEGATIVE — SIGNIFICANT CHANGE UP
FENTANYL UR QL SCN: NEGATIVE — SIGNIFICANT CHANGE UP
GLUCOSE BLDC GLUCOMTR-MCNC: 266 MG/DL — HIGH (ref 70–99)
GLUCOSE UR QL: >=1000 MG/DL
KETONES UR-MCNC: ABNORMAL MG/DL
LEUKOCYTE ESTERASE UR-ACNC: NEGATIVE — SIGNIFICANT CHANGE UP
METHADONE UR-MCNC: NEGATIVE — SIGNIFICANT CHANGE UP
NITRITE UR-MCNC: NEGATIVE — SIGNIFICANT CHANGE UP
OPIATES UR-MCNC: NEGATIVE — SIGNIFICANT CHANGE UP
PCP SPEC-MCNC: SIGNIFICANT CHANGE UP
PCP UR-MCNC: NEGATIVE — SIGNIFICANT CHANGE UP
PH UR: 5.5 — SIGNIFICANT CHANGE UP (ref 5–8)
PROT UR-MCNC: NEGATIVE MG/DL — SIGNIFICANT CHANGE UP
SP GR SPEC: >1.03 — HIGH (ref 1–1.03)
THC UR QL: NEGATIVE — SIGNIFICANT CHANGE UP
UROBILINOGEN FLD QL: 0.2 MG/DL — SIGNIFICANT CHANGE UP (ref 0.2–1)

## 2025-03-13 PROCEDURE — 90792 PSYCH DIAG EVAL W/MED SRVCS: CPT | Mod: 95

## 2025-03-13 PROCEDURE — 99223 1ST HOSP IP/OBS HIGH 75: CPT

## 2025-03-13 PROCEDURE — 99215 OFFICE O/P EST HI 40 MIN: CPT

## 2025-03-13 RX ORDER — RISPERIDONE 4 MG
1 TABLET ORAL
Refills: 0 | Status: ACTIVE | OUTPATIENT
Start: 2025-03-13 | End: 2026-02-09

## 2025-03-13 RX ORDER — PALIPERIDONE 9 MG/1
234 TABLET, EXTENDED RELEASE ORAL ONCE
Refills: 0 | Status: COMPLETED | OUTPATIENT
Start: 2025-03-13 | End: 2025-03-13

## 2025-03-13 RX ORDER — METFORMIN HYDROCHLORIDE 850 MG/1
1000 TABLET ORAL
Refills: 0 | Status: ACTIVE | OUTPATIENT
Start: 2025-03-13 | End: 2025-03-16

## 2025-03-13 RX ADMIN — METFORMIN HYDROCHLORIDE 1000 MILLIGRAM(S): 850 TABLET ORAL at 19:37

## 2025-03-13 RX ADMIN — PALIPERIDONE 234 MILLIGRAM(S): 9 TABLET, EXTENDED RELEASE ORAL at 18:15

## 2025-03-13 RX ADMIN — Medication 1 MILLIGRAM(S): at 18:10

## 2025-03-13 RX ADMIN — Medication 500 MILLIGRAM(S): at 18:10

## 2025-03-13 NOTE — ED BEHAVIORAL HEALTH NOTE - BEHAVIORAL HEALTH NOTE
SW attempted to contact pt's ACT/AOT team. SW called ACT/AOT [place] team at [phone] and left a voicemail with a call back number.
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
## 2025-03-13 NOTE — ED ADULT NURSE REASSESSMENT NOTE - NS ED NURSE REASSESS COMMENT FT1
Patient enters the unit requesting hot plate and sandwiches, stating he did not sleep and did not eat for about 5 days. Trays were given to patient. Patient went to bed and he is noted sleeping since. Urine collection is pending at this time, awaiting for patient to be awake for collection. He made no attempt to self harm or to harm others, safety maintained.
Patient noted in bed sleeping, safety maintained.
Assumed care of patient at 07:15.  Patient resting in bed appears to be sleeping with no distress and regular nonlabored breathing.  Safety of patient maintained.
Patient ate 100% of his meals.  Patient verbalized he will provide urine when he can void.  No attempts to harm self or others.  Safety of patient maintained.
Patient is awake out of his room.  Patient reports he need more rest here in the hospital but does not need inpatient treatment.  When questioned about having suicidal or homicidal ideations patient laughed and requested more food.  No attempts to harm self or others.  Patient reeducated about need for urine sample for testing and has collection cup.  Safety of patient maintained.

## 2025-03-13 NOTE — ED CDU PROVIDER INITIAL DAY NOTE - OBJECTIVE STATEMENT
55y M w/ hx schizoaffective disorder, bipolar, polysubstance use, HTN, HLD, DM; presents for psych eval. Pt reports having auditory hallucinations telling him to kill himself and others. Says he is homeless and living in the [place]; says he has been off his medications for a month. Says he is supposed to be on Depakote and Invega. Denies drug/alcohol use (however was found to be in possession of a crack pipe while in the ED).

## 2025-03-13 NOTE — ED BEHAVIORAL HEALTH ASSESSMENT NOTE - DIFFERENTIAL
schizoaffective disorder; cocaine use disorder; substance-induced mood disorder, substance withdrawal

## 2025-03-13 NOTE — ED CDU PROVIDER INITIAL DAY NOTE - NS ED ROS FT
Constitutional: no fever  CV: no chest pain  Resp: no cough, no shortness of breath  GI: no abdominal pain, no vomiting, no diarrhea  : no dysuria  MSK: no joint pain  Neuro: no headache  Psych: calm and cooperative
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
## 2025-03-13 NOTE — ED CDU PROVIDER INITIAL DAY NOTE - CONDUCTED BY FOR BH PROVIDER
Dr. Vasquez, ED attending normal/regular rate and rhythm/S1 S2 present/no gallops/no rub/no murmur/no JVD/no pedal edema details…
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
## 2025-03-14 VITALS
OXYGEN SATURATION: 98 % | HEART RATE: 98 BPM | SYSTOLIC BLOOD PRESSURE: 120 MMHG | RESPIRATION RATE: 18 BRPM | TEMPERATURE: 98 F | DIASTOLIC BLOOD PRESSURE: 57 MMHG

## 2025-03-14 PROCEDURE — 87635 SARS-COV-2 COVID-19 AMP PRB: CPT

## 2025-03-14 PROCEDURE — 36415 COLL VENOUS BLD VENIPUNCTURE: CPT

## 2025-03-14 PROCEDURE — 96372 THER/PROPH/DIAG INJ SC/IM: CPT

## 2025-03-14 PROCEDURE — G0378: CPT

## 2025-03-14 PROCEDURE — 81003 URINALYSIS AUTO W/O SCOPE: CPT

## 2025-03-14 PROCEDURE — 99238 HOSP IP/OBS DSCHRG MGMT 30/<: CPT

## 2025-03-14 PROCEDURE — 82962 GLUCOSE BLOOD TEST: CPT

## 2025-03-14 PROCEDURE — 93005 ELECTROCARDIOGRAM TRACING: CPT

## 2025-03-14 PROCEDURE — 80307 DRUG TEST PRSMV CHEM ANLYZR: CPT

## 2025-03-14 PROCEDURE — 80164 ASSAY DIPROPYLACETIC ACD TOT: CPT

## 2025-03-14 PROCEDURE — 85025 COMPLETE CBC W/AUTO DIFF WBC: CPT

## 2025-03-14 PROCEDURE — 99284 EMERGENCY DEPT VISIT MOD MDM: CPT | Mod: 25

## 2025-03-14 PROCEDURE — 80053 COMPREHEN METABOLIC PANEL: CPT

## 2025-03-14 PROCEDURE — 99214 OFFICE O/P EST MOD 30 MIN: CPT

## 2025-03-14 RX ADMIN — Medication 1 MILLIGRAM(S): at 06:48

## 2025-03-14 RX ADMIN — Medication 500 MILLIGRAM(S): at 06:48

## 2025-03-14 RX ADMIN — METFORMIN HYDROCHLORIDE 1000 MILLIGRAM(S): 850 TABLET ORAL at 06:48

## 2025-03-14 NOTE — ED BEHAVIORAL HEALTH PROGRESS NOTE - STANDING MEDS RECEIVED IN ED DETAILS FREE TEXT
divalproex  milliGRAM(s) Oral two times a day  metFORMIN 1000 milliGRAM(s) Oral two times a day  risperiDONE   Tablet 1 milliGRAM(s) Oral two times a day  Invegga sustenna 234 mg IM 3/13/24
divalproex  milliGRAM(s) Oral two times a day  risperiDONE   Tablet 1 milliGRAM(s) Oral two times a day  Invegcarmelita Smallenna 234 mg IM q 4 weeks, last dose due 3/7/25 (not received)

## 2025-03-14 NOTE — ED BEHAVIORAL HEALTH PROGRESS NOTE - SOURCES CURRRENT EVALUATION
Patient Interview/Collateral (personal, professional, ED staff, etc.)...
Patient Interview/Collateral (personal, professional, ED staff, etc.)...
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
## 2025-03-14 NOTE — ED BEHAVIORAL HEALTH PROGRESS NOTE - RISK ASSESSMENT
R.F. prior admissions, prior self-harm, non-compliance with outpatient treatment, undomiciled   P.F. connected to care with ACT Team, calm, cooperative
R.F. prior admissions, prior self-harm, non-compliance with outpatient treatment, undomiciled   P.F. connected to care with ACT Team, calm, cooperative

## 2025-03-14 NOTE — ED CDU PROVIDER SUBSEQUENT DAY NOTE - CLINICAL SUMMARY MEDICAL DECISION MAKING FREE TEXT BOX
55y M w/ hx schizoaffective disorder, bipolar; presents with hallucinations in the setting of medication noncompliance. Medically cleared. Seen by psych -- being held for reassessment.

## 2025-03-14 NOTE — ED CDU PROVIDER DISPOSITION NOTE - NSFOLLOWUPINSTRUCTIONS_ED_ALL_ED_FT
Follow up with your ACT team and all appropriate appointments; Take meds as directed  Return to the ED if any new or worsening symptoms
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
## 2025-03-14 NOTE — ED BEHAVIORAL HEALTH PROGRESS NOTE - NSBHATTESTTYPEVISIT_PSY_A_CORE
On-site Attending supervising ROSA ELENA (99XXX codes)
On-site Attending supervising ROSA ELENA (99XXX codes)

## 2025-03-14 NOTE — ED ADULT NURSE REASSESSMENT NOTE - GENERAL PATIENT STATE
comfortable appearance/resting/sleeping
resting/sleeping
comfortable appearance/cooperative/no change observed
comfortable appearance/resting/sleeping
comfortable appearance/resting/sleeping
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
## 2025-03-14 NOTE — ED BEHAVIORAL HEALTH PROGRESS NOTE - GENERAL APPEARANCE
No deformities present
No deformities present

## 2025-03-14 NOTE — ED CDU PROVIDER DISPOSITION NOTE - PATIENT PORTAL LINK FT
You can access the FollowMyHealth Patient Portal offered by [facility] by registering at the following website: [email or web address]. By joining [facility]’s FollowMyHealth portal, you will also be able to view your health information using other applications (apps) compatible with our system.

## 2025-03-24 NOTE — ED CDU PROVIDER INITIAL DAY NOTE - CROS ED ROS STATEMENT
all other ROS negative except as per HPI Bed in lowest position, wheels locked, appropriate side rails in place/Call bell, personal items and telephone in reach/Instruct patient to call for assistance before getting out of bed or chair/Non-slip footwear when patient is out of bed/[place] to call system/Physically safe environment - no spills, clutter or unnecessary equipment/Purposeful Proactive Rounding/Room/bathroom lighting operational, light cord in reach

## 2025-03-26 NOTE — ED BEHAVIORAL HEALTH ASSESSMENT NOTE - ESTIMATED INTELLIGENCE
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
## 2025-04-27 NOTE — ED CDU PROVIDER INITIAL DAY NOTE - CPE EDP NEURO NORM
I have personally seen and examined the patient. I have collaborated with and supervised the
normal...

## 2025-04-30 NOTE — ED BEHAVIORAL HEALTH ASSESSMENT NOTE - NS ED BHA BILLING ATTENDING WO NP TRAINEE
Medication Changes Today:  Stop potassium    Labs/Other recommendations:  Drink at least 52-64 oz of fluid  Follow up with Emi in 2 weeks with pre-appointment labs  Continue Entresto.  If too expensive please call me.  We will work on prior auth    Heart Failure daily management:   Weight self daily, at the same time wearing same amount of clothes.  Please call us if you notice 3 pound weight gain in 1 day or 5 pounds over a week.  Try to limit sodium in your diet to 2000 mg daily  Elevate legs for at least 30 minute a few times a day  Remain active walking frequently throughout the day   
[id number]

## 2025-05-27 NOTE — ED ADULT NURSE NOTE - CAS TRG GENERAL AIRWAY, MLM
05/27/25 0901   Pain Assessment   Pain Assessment Tool 0-10   Pain Score 3   Pain Location/Orientation Location: Back   Pain Onset/Description Onset: Ongoing   Hospital Pain Intervention(s) Repositioned;Emotional support;Rest   Restrictions/Precautions   Precautions Bed/chair alarms;Cognitive;Fall Risk;Supervision on toilet/commode;Visual deficit   Weight Bearing Restrictions No   ROM Restrictions No   Sit to Lying   Type of Assistance Needed Physical assistance   Physical Assistance Level 25% or less   Comment assist with LLE in to bed   Sit to Lying CARE Score 3   Sit to Stand   Type of Assistance Needed Physical assistance   Physical Assistance Level 51%-75%   Comment Mod/Max A sit to stand/stand to sit at table top- L lateral lean noted   Sit to Stand CARE Score 2   Bed-Chair Transfer   Type of Assistance Needed Physical assistance   Physical Assistance Level 76% or more   Comment Max A stand pivot transfer; Made pt Ax2 for nursing staff with education to staff provided and white board updated;   Chair/Bed-to-Chair Transfer CARE Score 2   Toileting Hygiene   Type of Assistance Needed Physical assistance   Physical Assistance Level Total assistance   Comment Ax2 needed for toileting hygiene tasks; Pt requires Ax1 to assist with balance at Max A level with additional person to provide all clothing management/hygiene tasks.   Toileting Hygiene CARE Score 1   Toilet Transfer   Type of Assistance Needed Physical assistance   Physical Assistance Level Total assistance   Comment Max A x1 for stand pivot transfers from w/c to BSC over toilet-BSC over toilet to w/c with additional person providing CGA/Sup level.   Toilet Transfer CARE Score 1   Toilet Transfer   Surface Assessed   (BSC over toilet)   Cognition   Overall Cognitive Status Impaired   Arousal/Participation Alert;Cooperative   Attention Attends with cues to redirect   Orientation Level Oriented X4   Memory Decreased short term memory;Decreased recall of  recent events;Decreased recall of precautions   Following Commands Follows one step commands with increased time or repetition   Comments LUE inattention noted throughout OT session with assistance to manage throughout functional transfers.   Activity Tolerance   Activity Tolerance Patient tolerated treatment well   Medical Staff Made Aware Nurse, Kathrin, PCA, Roseline, and therapy made aware of change in pt's transfer status. White board in bedroom updated due to change in functional transfer status.   Assessment   Treatment Assessment Pt participated in 90min OT session with fair- activity tolerance with focus on NM re-edu, therapeutic activities, and ADL training. Pt seated in w/c at start of therapy session. Pt agreeable to OT session. Pt reporting fatigue at beginning of OT session. Pt participated in table top activity in stance while reaching with RUE and WB with therapist support through LUE during task. Pt noted to have increased lean to L side during standing balance activity, with decreased standing tolerance to 2min first stand, 1.5min second stand with pt requiring L knee blocking throughout. Pt fatigued easily requiring extensive seated rest breaks. Pt finished puzzle in seated with WB through LUE elbow due to fatigue. Pt noted to have significant increase in fatigue with functional transfers reassessed during OT session. Pt transferred from w/c to bedside recliner-bedside recliner to w/c at Max A level SPT with gait belt and additional person providing CGA/Sup. Pt transferred from w/c to BSC over toilet-BSC over toilet to w/c at Max A level SPT with gait belt and additional person provided assistance with all CM/hygiene tasks. Pt participated at end of therapy session in SPT from w/c to bed at Max A x1 level with gait belt. Pt required increased assistance with all transfers at this time with nursing, therapy, and PCA staff education provided and white board updated in room to Ax2 transfers. Pt  participated in NM re-education activities seated at table top participating in towel glide exercises with stabilization at L elbow with hand over hand provided over LUE hand, [id number] in following planes: forward, side to side, and circles. Pt seated supported in bed at end of therapy session with bed alarm on, call bell in hand, all needed items in reach, and no further OT needs. Pt would benefit from continued OT services to progress pt with LUE NMR/NPP/WB, functional transfer training, activity tolernce, RUE TE, standing balance/tolerance, w/c mobility, hemidressing techniques, and LE X-leg technique for progression with ADL tasks. Continue with established POC at this time. Per medical records pt to be d/c to STR.   Prognosis Fair   Problem List Decreased strength;Decreased endurance;Impaired balance;Decreased mobility;Decreased coordination;Decreased cognition;Impaired judgement;Decreased safety awareness;Decreased skin integrity   Barriers to Discharge Inaccessible home environment;Decreased caregiver support   Plan   Treatment/Interventions ADL retraining;Functional transfer training;Therapeutic exercise;Endurance training;Patient/family training;Equipment eval/education;Bed mobility;Compensatory technique education   Discharge Recommendation   Rehab Resource Intensity Level, OT   (STR)   OT Therapy Minutes   OT Time In 0900   OT Time Out 1030   OT Total Time (minutes) 90   OT Mode of treatment - Individual (minutes) 90   OT Mode of treatment - Concurrent (minutes) 0   OT Mode of treatment - Group (minutes) 0   OT Mode of treatment - Co-treat (minutes) 0   OT Mode of Treatment - Total time(minutes) 90 minutes   OT Cumulative Minutes 790   Therapy Time missed   Time missed? No        Patent

## 2025-06-02 NOTE — ED BEHAVIORAL HEALTH ASSESSMENT NOTE - NSBHSUBSTUSED_PSY_A_CORE
You were seen emergent today for evaluation of urinary symptoms.  Urine is concerning for urinary tract faction.  Please take antibiotics as prescribed.  Please return if you have worsening symptoms, recurrent symptoms, or if you have any other concerns.  Please follow-up with your primary care provider regarding this emergency room visit.  
Alcohol/Cannabis/Cocaine/Crack/MDMA/Ecstasy/Club Drugs

## 2025-07-16 NOTE — ED BEHAVIORAL HEALTH ASSESSMENT NOTE - NSBHPSYCHOLCOGORIENT_PSY_A_CORE
Orders:    methocarbamol (ROBAXIN) 500 mg tablet; Take 1 tablet (500 mg total) by mouth daily at bedtime as needed for muscle spasms     Oriented to time, place, person, situation